# Patient Record
Sex: FEMALE | Race: WHITE | NOT HISPANIC OR LATINO | Employment: FULL TIME | URBAN - METROPOLITAN AREA
[De-identification: names, ages, dates, MRNs, and addresses within clinical notes are randomized per-mention and may not be internally consistent; named-entity substitution may affect disease eponyms.]

---

## 2017-01-08 ENCOUNTER — APPOINTMENT (OUTPATIENT)
Dept: RADIOLOGY | Facility: MEDICAL CENTER | Age: 67
DRG: 871 | End: 2017-01-08
Attending: EMERGENCY MEDICINE
Payer: MEDICAID

## 2017-01-08 ENCOUNTER — HOSPITAL ENCOUNTER (INPATIENT)
Facility: MEDICAL CENTER | Age: 67
LOS: 3 days | DRG: 871 | End: 2017-01-11
Attending: EMERGENCY MEDICINE | Admitting: INTERNAL MEDICINE
Payer: MEDICAID

## 2017-01-08 ENCOUNTER — RESOLUTE PROFESSIONAL BILLING HOSPITAL PROF FEE (OUTPATIENT)
Dept: HOSPITALIST | Facility: MEDICAL CENTER | Age: 67
End: 2017-01-08
Payer: MEDICAID

## 2017-01-08 PROBLEM — A41.9 SEPSIS (HCC): Status: ACTIVE | Noted: 2017-01-08

## 2017-01-08 PROBLEM — N12 PYELONEPHRITIS: Status: ACTIVE | Noted: 2017-01-08

## 2017-01-08 LAB
ALBUMIN SERPL BCP-MCNC: 3.8 G/DL (ref 3.2–4.9)
ALBUMIN/GLOB SERPL: 1.1 G/DL
ALP SERPL-CCNC: 84 U/L (ref 30–99)
ALT SERPL-CCNC: 8 U/L (ref 2–50)
ANION GAP SERPL CALC-SCNC: 10 MMOL/L (ref 0–11.9)
APPEARANCE UR: ABNORMAL
AST SERPL-CCNC: 9 U/L (ref 12–45)
BACTERIA #/AREA URNS HPF: ABNORMAL /HPF
BASOPHILS # BLD AUTO: 0.4 % (ref 0–1.8)
BASOPHILS # BLD: 0.04 K/UL (ref 0–0.12)
BILIRUB SERPL-MCNC: 1.1 MG/DL (ref 0.1–1.5)
BILIRUB UR QL STRIP.AUTO: NEGATIVE
BNP SERPL-MCNC: 126 PG/ML (ref 0–100)
BUN SERPL-MCNC: 16 MG/DL (ref 8–22)
CALCIUM SERPL-MCNC: 10.6 MG/DL (ref 8.5–10.5)
CHLORIDE SERPL-SCNC: 104 MMOL/L (ref 96–112)
CO2 SERPL-SCNC: 21 MMOL/L (ref 20–33)
COLOR UR: YELLOW
CREAT SERPL-MCNC: 1.13 MG/DL (ref 0.5–1.4)
EOSINOPHIL # BLD AUTO: 0 K/UL (ref 0–0.51)
EOSINOPHIL NFR BLD: 0 % (ref 0–6.9)
EPI CELLS #/AREA URNS HPF: ABNORMAL /HPF
ERYTHROCYTE [DISTWIDTH] IN BLOOD BY AUTOMATED COUNT: 42.6 FL (ref 35.9–50)
FLUAV H1 2009 PAND RNA SPEC QL NAA+PROBE: NOT DETECTED
FLUAV RNA SPEC QL NAA+PROBE: NEGATIVE
FLUAV+FLUBV AG SPEC QL IA: NORMAL
FLUBV RNA SPEC QL NAA+PROBE: NEGATIVE
GFR SERPL CREATININE-BSD FRML MDRD: 48 ML/MIN/1.73 M 2
GLOBULIN SER CALC-MCNC: 3.6 G/DL (ref 1.9–3.5)
GLUCOSE SERPL-MCNC: 138 MG/DL (ref 65–99)
GLUCOSE UR STRIP.AUTO-MCNC: NEGATIVE MG/DL
HCT VFR BLD AUTO: 43 % (ref 37–47)
HGB BLD-MCNC: 14.3 G/DL (ref 12–16)
IMM GRANULOCYTES # BLD AUTO: 0.03 K/UL (ref 0–0.11)
IMM GRANULOCYTES NFR BLD AUTO: 0.3 % (ref 0–0.9)
KETONES UR STRIP.AUTO-MCNC: NEGATIVE MG/DL
LACTATE BLD-SCNC: 1.7 MMOL/L (ref 0.5–2)
LACTATE BLD-SCNC: 2.2 MMOL/L (ref 0.5–2)
LEUKOCYTE ESTERASE UR QL STRIP.AUTO: ABNORMAL
LYMPHOCYTES # BLD AUTO: 0.9 K/UL (ref 1–4.8)
LYMPHOCYTES NFR BLD: 9.6 % (ref 22–41)
MCH RBC QN AUTO: 31.4 PG (ref 27–33)
MCHC RBC AUTO-ENTMCNC: 33.3 G/DL (ref 33.6–35)
MCV RBC AUTO: 94.3 FL (ref 81.4–97.8)
MICRO URNS: ABNORMAL
MONOCYTES # BLD AUTO: 0.82 K/UL (ref 0–0.85)
MONOCYTES NFR BLD AUTO: 8.8 % (ref 0–13.4)
NEUTROPHILS # BLD AUTO: 7.54 K/UL (ref 2–7.15)
NEUTROPHILS NFR BLD: 80.9 % (ref 44–72)
NITRITE UR QL STRIP.AUTO: NEGATIVE
NRBC # BLD AUTO: 0 K/UL
NRBC BLD AUTO-RTO: 0 /100 WBC
PH UR STRIP.AUTO: 6 [PH]
PLATELET # BLD AUTO: 166 K/UL (ref 164–446)
PMV BLD AUTO: 11.9 FL (ref 9–12.9)
POTASSIUM SERPL-SCNC: 3.6 MMOL/L (ref 3.6–5.5)
PROT SERPL-MCNC: 7.4 G/DL (ref 6–8.2)
PROT UR QL STRIP: 50 MG/DL
RBC # BLD AUTO: 4.56 M/UL (ref 4.2–5.4)
RBC # URNS HPF: ABNORMAL /HPF
RBC UR QL AUTO: ABNORMAL
SIGNIFICANT IND 70042: NORMAL
SITE SITE: NORMAL
SODIUM SERPL-SCNC: 135 MMOL/L (ref 135–145)
SOURCE SOURCE: NORMAL
SP GR UR STRIP.AUTO: 1.02
TROPONIN I SERPL-MCNC: <0.01 NG/ML (ref 0–0.04)
WBC # BLD AUTO: 9.3 K/UL (ref 4.8–10.8)
WBC #/AREA URNS HPF: >150 /HPF

## 2017-01-08 PROCEDURE — 700111 HCHG RX REV CODE 636 W/ 250 OVERRIDE (IP): Performed by: INTERNAL MEDICINE

## 2017-01-08 PROCEDURE — 81001 URINALYSIS AUTO W/SCOPE: CPT

## 2017-01-08 PROCEDURE — 700117 HCHG RX CONTRAST REV CODE 255: Performed by: EMERGENCY MEDICINE

## 2017-01-08 PROCEDURE — A9270 NON-COVERED ITEM OR SERVICE: HCPCS | Performed by: NURSE PRACTITIONER

## 2017-01-08 PROCEDURE — 87503 INFLUENZA DNA AMP PROB ADDL: CPT

## 2017-01-08 PROCEDURE — 87040 BLOOD CULTURE FOR BACTERIA: CPT | Mod: 91

## 2017-01-08 PROCEDURE — 74177 CT ABD & PELVIS W/CONTRAST: CPT

## 2017-01-08 PROCEDURE — 83605 ASSAY OF LACTIC ACID: CPT | Mod: 91

## 2017-01-08 PROCEDURE — 700102 HCHG RX REV CODE 250 W/ 637 OVERRIDE(OP): Performed by: NURSE PRACTITIONER

## 2017-01-08 PROCEDURE — 71010 DX-CHEST-LIMITED (1 VIEW): CPT

## 2017-01-08 PROCEDURE — 700105 HCHG RX REV CODE 258: Performed by: INTERNAL MEDICINE

## 2017-01-08 PROCEDURE — 700105 HCHG RX REV CODE 258: Performed by: EMERGENCY MEDICINE

## 2017-01-08 PROCEDURE — 770020 HCHG ROOM/CARE - TELE (206)

## 2017-01-08 PROCEDURE — 96374 THER/PROPH/DIAG INJ IV PUSH: CPT

## 2017-01-08 PROCEDURE — 99223 1ST HOSP IP/OBS HIGH 75: CPT | Performed by: INTERNAL MEDICINE

## 2017-01-08 PROCEDURE — 87400 INFLUENZA A/B EACH AG IA: CPT

## 2017-01-08 PROCEDURE — 94760 N-INVAS EAR/PLS OXIMETRY 1: CPT

## 2017-01-08 PROCEDURE — 36415 COLL VENOUS BLD VENIPUNCTURE: CPT

## 2017-01-08 PROCEDURE — 87186 SC STD MICRODIL/AGAR DIL: CPT | Mod: 91

## 2017-01-08 PROCEDURE — 85025 COMPLETE CBC W/AUTO DIFF WBC: CPT

## 2017-01-08 PROCEDURE — 700111 HCHG RX REV CODE 636 W/ 250 OVERRIDE (IP): Performed by: EMERGENCY MEDICINE

## 2017-01-08 PROCEDURE — 87502 INFLUENZA DNA AMP PROBE: CPT

## 2017-01-08 PROCEDURE — 83880 ASSAY OF NATRIURETIC PEPTIDE: CPT

## 2017-01-08 PROCEDURE — 99285 EMERGENCY DEPT VISIT HI MDM: CPT

## 2017-01-08 PROCEDURE — 700102 HCHG RX REV CODE 250 W/ 637 OVERRIDE(OP): Performed by: INTERNAL MEDICINE

## 2017-01-08 PROCEDURE — 700105 HCHG RX REV CODE 258

## 2017-01-08 PROCEDURE — 96361 HYDRATE IV INFUSION ADD-ON: CPT

## 2017-01-08 PROCEDURE — 87077 CULTURE AEROBIC IDENTIFY: CPT | Mod: 91

## 2017-01-08 PROCEDURE — A9270 NON-COVERED ITEM OR SERVICE: HCPCS | Performed by: INTERNAL MEDICINE

## 2017-01-08 PROCEDURE — 87086 URINE CULTURE/COLONY COUNT: CPT

## 2017-01-08 PROCEDURE — 84484 ASSAY OF TROPONIN QUANT: CPT

## 2017-01-08 PROCEDURE — 93005 ELECTROCARDIOGRAM TRACING: CPT | Performed by: NURSE PRACTITIONER

## 2017-01-08 PROCEDURE — 93010 ELECTROCARDIOGRAM REPORT: CPT | Performed by: INTERNAL MEDICINE

## 2017-01-08 PROCEDURE — 80053 COMPREHEN METABOLIC PANEL: CPT

## 2017-01-08 RX ORDER — LACTULOSE 20 G/30ML
30 SOLUTION ORAL
Status: DISCONTINUED | OUTPATIENT
Start: 2017-01-08 | End: 2017-01-11 | Stop reason: HOSPADM

## 2017-01-08 RX ORDER — ENEMA 19; 7 G/133ML; G/133ML
1 ENEMA RECTAL
Status: DISCONTINUED | OUTPATIENT
Start: 2017-01-08 | End: 2017-01-11 | Stop reason: HOSPADM

## 2017-01-08 RX ORDER — ONDANSETRON 2 MG/ML
4 INJECTION INTRAMUSCULAR; INTRAVENOUS ONCE
Status: COMPLETED | OUTPATIENT
Start: 2017-01-08 | End: 2017-01-08

## 2017-01-08 RX ORDER — SODIUM CHLORIDE 9 MG/ML
30 INJECTION, SOLUTION INTRAVENOUS
Status: DISCONTINUED | OUTPATIENT
Start: 2017-01-08 | End: 2017-01-11 | Stop reason: HOSPADM

## 2017-01-08 RX ORDER — METHOCARBAMOL 750 MG/1
1500 TABLET, FILM COATED ORAL 2 TIMES DAILY
Status: DISCONTINUED | OUTPATIENT
Start: 2017-01-08 | End: 2017-01-11 | Stop reason: HOSPADM

## 2017-01-08 RX ORDER — SODIUM CHLORIDE 9 MG/ML
1000 INJECTION, SOLUTION INTRAVENOUS ONCE
Status: COMPLETED | OUTPATIENT
Start: 2017-01-08 | End: 2017-01-08

## 2017-01-08 RX ORDER — PROMETHAZINE HYDROCHLORIDE 25 MG/1
12.5-25 SUPPOSITORY RECTAL EVERY 4 HOURS PRN
Status: DISCONTINUED | OUTPATIENT
Start: 2017-01-08 | End: 2017-01-11 | Stop reason: HOSPADM

## 2017-01-08 RX ORDER — PROMETHAZINE HYDROCHLORIDE 25 MG/1
12.5-25 TABLET ORAL EVERY 4 HOURS PRN
Status: DISCONTINUED | OUTPATIENT
Start: 2017-01-08 | End: 2017-01-11 | Stop reason: HOSPADM

## 2017-01-08 RX ORDER — UBIDECARENONE 75 MG
100 CAPSULE ORAL DAILY
Status: DISCONTINUED | OUTPATIENT
Start: 2017-01-08 | End: 2017-01-11 | Stop reason: HOSPADM

## 2017-01-08 RX ORDER — PROPRANOLOL HYDROCHLORIDE 40 MG/1
80 TABLET ORAL 2 TIMES DAILY
Status: DISCONTINUED | OUTPATIENT
Start: 2017-01-08 | End: 2017-01-11 | Stop reason: HOSPADM

## 2017-01-08 RX ORDER — TRAMADOL HYDROCHLORIDE 50 MG/1
50 TABLET ORAL EVERY 6 HOURS PRN
Status: DISCONTINUED | OUTPATIENT
Start: 2017-01-08 | End: 2017-01-11 | Stop reason: HOSPADM

## 2017-01-08 RX ORDER — METHOCARBAMOL 750 MG/1
1500 TABLET, FILM COATED ORAL 2 TIMES DAILY
COMMUNITY
End: 2017-07-25 | Stop reason: SDUPTHER

## 2017-01-08 RX ORDER — SODIUM CHLORIDE 9 MG/ML
500 INJECTION, SOLUTION INTRAVENOUS
Status: DISCONTINUED | OUTPATIENT
Start: 2017-01-08 | End: 2017-01-11 | Stop reason: HOSPADM

## 2017-01-08 RX ORDER — GABAPENTIN 300 MG/1
300 CAPSULE ORAL 3 TIMES DAILY
Status: DISCONTINUED | OUTPATIENT
Start: 2017-01-08 | End: 2017-01-11 | Stop reason: HOSPADM

## 2017-01-08 RX ORDER — CEFTRIAXONE 2 G/1
2 INJECTION, POWDER, FOR SOLUTION INTRAMUSCULAR; INTRAVENOUS ONCE
Status: DISCONTINUED | OUTPATIENT
Start: 2017-01-08 | End: 2017-01-08

## 2017-01-08 RX ORDER — SODIUM CHLORIDE 9 MG/ML
INJECTION, SOLUTION INTRAVENOUS
Status: COMPLETED
Start: 2017-01-08 | End: 2017-01-08

## 2017-01-08 RX ORDER — ALBUTEROL SULFATE 90 UG/1
2 AEROSOL, METERED RESPIRATORY (INHALATION) EVERY 6 HOURS PRN
Status: DISCONTINUED | OUTPATIENT
Start: 2017-01-08 | End: 2017-01-11 | Stop reason: HOSPADM

## 2017-01-08 RX ORDER — TRAMADOL HYDROCHLORIDE 50 MG/1
100 TABLET ORAL EVERY 6 HOURS PRN
Status: DISCONTINUED | OUTPATIENT
Start: 2017-01-08 | End: 2017-01-11 | Stop reason: HOSPADM

## 2017-01-08 RX ORDER — ONDANSETRON 4 MG/1
4 TABLET, ORALLY DISINTEGRATING ORAL EVERY 4 HOURS PRN
Status: DISCONTINUED | OUTPATIENT
Start: 2017-01-08 | End: 2017-01-11 | Stop reason: HOSPADM

## 2017-01-08 RX ORDER — AMOXICILLIN 250 MG
1 CAPSULE ORAL
Status: DISCONTINUED | OUTPATIENT
Start: 2017-01-08 | End: 2017-01-11 | Stop reason: HOSPADM

## 2017-01-08 RX ORDER — DOCUSATE SODIUM 100 MG/1
100 CAPSULE, LIQUID FILLED ORAL EVERY MORNING
Status: DISCONTINUED | OUTPATIENT
Start: 2017-01-09 | End: 2017-01-11 | Stop reason: HOSPADM

## 2017-01-08 RX ORDER — PROPRANOLOL HYDROCHLORIDE 40 MG/1
80 TABLET ORAL 2 TIMES DAILY
COMMUNITY
End: 2017-06-06

## 2017-01-08 RX ORDER — AMOXICILLIN 250 MG
1 CAPSULE ORAL NIGHTLY
Status: DISCONTINUED | OUTPATIENT
Start: 2017-01-08 | End: 2017-01-11 | Stop reason: HOSPADM

## 2017-01-08 RX ORDER — GABAPENTIN 300 MG/1
300 CAPSULE ORAL 3 TIMES DAILY
COMMUNITY
End: 2017-03-01 | Stop reason: SDUPTHER

## 2017-01-08 RX ORDER — VENLAFAXINE HYDROCHLORIDE 37.5 MG/1
37.5 CAPSULE, EXTENDED RELEASE ORAL 2 TIMES DAILY
Status: DISCONTINUED | OUTPATIENT
Start: 2017-01-08 | End: 2017-01-11 | Stop reason: HOSPADM

## 2017-01-08 RX ORDER — BISACODYL 10 MG
10 SUPPOSITORY, RECTAL RECTAL
Status: DISCONTINUED | OUTPATIENT
Start: 2017-01-08 | End: 2017-01-11 | Stop reason: HOSPADM

## 2017-01-08 RX ORDER — ACETAMINOPHEN 325 MG/1
650 TABLET ORAL EVERY 6 HOURS PRN
Status: DISCONTINUED | OUTPATIENT
Start: 2017-01-08 | End: 2017-01-11 | Stop reason: HOSPADM

## 2017-01-08 RX ORDER — ONDANSETRON 2 MG/ML
4 INJECTION INTRAMUSCULAR; INTRAVENOUS EVERY 4 HOURS PRN
Status: DISCONTINUED | OUTPATIENT
Start: 2017-01-08 | End: 2017-01-11 | Stop reason: HOSPADM

## 2017-01-08 RX ADMIN — IOHEXOL 100 ML: 350 INJECTION, SOLUTION INTRAVENOUS at 17:35

## 2017-01-08 RX ADMIN — ONDANSETRON 4 MG: 2 INJECTION, SOLUTION INTRAMUSCULAR; INTRAVENOUS at 16:15

## 2017-01-08 RX ADMIN — STANDARDIZED SENNA CONCENTRATE AND DOCUSATE SODIUM 1 TABLET: 8.6; 5 TABLET, FILM COATED ORAL at 20:03

## 2017-01-08 RX ADMIN — METHOCARBAMOL 1500 MG: 750 TABLET ORAL at 22:34

## 2017-01-08 RX ADMIN — PROPRANOLOL HYDROCHLORIDE 80 MG: 10 TABLET ORAL at 20:03

## 2017-01-08 RX ADMIN — SODIUM CHLORIDE 1000 ML: 9 INJECTION, SOLUTION INTRAVENOUS at 16:15

## 2017-01-08 RX ADMIN — TRAMADOL HYDROCHLORIDE 50 MG: 50 TABLET, COATED ORAL at 22:36

## 2017-01-08 RX ADMIN — CEFTRIAXONE 2 G: 2 INJECTION, POWDER, FOR SOLUTION INTRAMUSCULAR; INTRAVENOUS at 22:28

## 2017-01-08 RX ADMIN — SODIUM CHLORIDE 1000 ML: 9 INJECTION, SOLUTION INTRAVENOUS at 20:05

## 2017-01-08 RX ADMIN — VENLAFAXINE HYDROCHLORIDE 37.5 MG: 37.5 CAPSULE, EXTENDED RELEASE ORAL at 22:33

## 2017-01-08 RX ADMIN — VITAMIN B12 0.1 MG ORAL TABLET 100 MCG: 0.1 TABLET ORAL at 22:34

## 2017-01-08 RX ADMIN — ACETAMINOPHEN 650 MG: 325 TABLET, FILM COATED ORAL at 20:02

## 2017-01-08 RX ADMIN — GABAPENTIN 300 MG: 300 CAPSULE ORAL at 20:01

## 2017-01-08 ASSESSMENT — PAIN SCALES - GENERAL
PAINLEVEL_OUTOF10: 6
PAINLEVEL_OUTOF10: 9
PAINLEVEL_OUTOF10: 7

## 2017-01-08 ASSESSMENT — COPD QUESTIONNAIRES
DURING THE PAST 4 WEEKS HOW MUCH DID YOU FEEL SHORT OF BREATH: NONE/LITTLE OF THE TIME
DO YOU EVER COUGH UP ANY MUCUS OR PHLEGM?: YES, A FEW DAYS A WEEK OR MONTH
HAVE YOU SMOKED AT LEAST 100 CIGARETTES IN YOUR ENTIRE LIFE: YES
COPD SCREENING SCORE: 5

## 2017-01-08 ASSESSMENT — LIFESTYLE VARIABLES: EVER_SMOKED: YES

## 2017-01-08 NOTE — IP AVS SNAPSHOT
1/11/2017          Miryam Veloz  [id number] [facility] [place] [address]    Dear Miryam:    [facility] wants to ensure your discharge home is safe and you or your loved ones have had all your questions answered regarding your care after you leave the hospital.    You may receive a telephone call within two days of your discharge.  This call is to make certain you understand your discharge instructions as well as ensure we provided you with the best care possible during your stay with us.     The call will only last approximately 3-5 minutes and will be done by a nurse.    Once again, we want to ensure your discharge home is safe and that you have a clear understanding of any next steps in your care.  If you have any questions or concerns, please do not hesitate to contact us, we are here for you.  Thank you for choosing [facility] for your healthcare needs.    Sincerely,    Khalif Godoy    [facility]

## 2017-01-08 NOTE — IP AVS SNAPSHOT
" After Visit Summary                                                                                                                  Name:Miryam Veloz  Medical Record Number:[id number]  CSN: [id number]    YOB: 1950   Age: 66 y.o.  Sex: female  HT:1.778 m (5' 10\") WT: 112.8 kg (248 lb 10.9 oz)          Admit Date: 1/8/2017     Discharge Date:   Today's Date: 1/11/2017  Attending Doctor:  Xavier Jules M.D.                  Allergies:  Codeine; Imitrex; Penicillins; Sulfa drugs; Azithromycin; Dilaudid; Sensipar; Imipramine; Other drug; and Seroquel            Discharge Instructions       Discharge Instructions    Discharged to home by car with relative. Discharged via walking, hospital escort: Refused.  Special equipment needed: Not Applicable    Be sure to schedule a follow-up appointment with your primary care doctor or any specialists as instructed.     Discharge Plan:   Diet Plan: Discussed  Activity Level: Discussed  Confirmed Follow up Appointment: Patient to Call and Schedule Appointment  Confirmed Symptoms Management: Discussed  Medication Reconciliation Updated: Yes  Influenza Vaccine Indication: Not indicated: Previously immunized this influenza season and > 8 years of age    I understand that a diet low in cholesterol, fat, and sodium is recommended for good health. Unless I have been given specific instructions below for another diet, I accept this instruction as my diet prescription.     Special Instructions: Sepsis, Adult  Sepsis is a serious infection of your blood or tissues that affects your whole body. The infection that causes sepsis may be bacterial, viral, fungal, or parasitic. Sepsis may be life threatening. Sepsis can cause your blood pressure to drop. This may result in shock. Shock causes your central nervous system and your organs to stop working correctly.   RISK FACTORS  Sepsis can happen in anyone, but it is more likely to happen in people who have weakened immune " systems.  SIGNS AND SYMPTOMS   Symptoms of sepsis can include:  · Fever or low body temperature (hypothermia).  · Rapid breathing (hyperventilation).  · Chills.  · Rapid heartbeat (tachycardia).  · Confusion or light-headedness.  · Trouble breathing.  · Urinating much less than usual.  · Cool, clammy skin or red, flushed skin.  · Other problems with the heart, kidneys, or brain.  DIAGNOSIS   Your health care provider will likely do tests to look for an infection, to see if the infection has spread to your blood, and to see how serious your condition is. Tests can include:  · Blood tests, including cultures of your blood.  · Cultures of other fluids from your body, such as:  ¨ Urine.  ¨ Pus from wounds.  ¨ Mucus coughed up from your lungs.  · Urine tests other than cultures.  · X-ray exams or other imaging tests.  TREATMENT   Treatment will begin with elimination of the source of infection. If your sepsis is likely caused by a bacterial or fungal infection, you will be given antibiotic or antifungal medicines.  You may also receive:  · Oxygen.  · Fluids through an IV tube.  · Medicines to increase your blood pressure.  · A machine to clean your blood (dialysis) if your kidneys fail.  · A machine to help you breathe if your lungs fail.  SEEK IMMEDIATE MEDICAL CARE IF:  You get an infection or develop any of the signs and symptoms of sepsis after surgery or a hospitalization.     This information is not intended to replace advice given to you by your health care provider. Make sure you discuss any questions you have with your health care provider.     Document Released: 09/15/2004 Document Revised: 05/03/2016 Document Reviewed: 08/25/2014  [employer] Interactive Patient Education ©2016 [employer]      · Is patient discharged on Warfarin / Coumadin?   No     · Is patient Post Blood Transfusion?  No    Depression / Suicide Risk    As you are discharged from this [facility] facility, it is important to learn how to  keep safe from harming yourself.    Recognize the warning signs:  · Abrupt changes in personality, positive or negative- including increase in energy   · Giving away possessions  · Change in eating patterns- significant weight changes-  positive or negative  · Change in sleeping patterns- unable to sleep or sleeping all the time   · Unwillingness or inability to communicate  · Depression  · Unusual sadness, discouragement and loneliness  · Talk of wanting to die  · Neglect of personal appearance   · Rebelliousness- reckless behavior  · Withdrawal from people/activities they love  · Confusion- inability to concentrate     If you or a loved one observes any of these behaviors or has concerns about self-harm, here's what you can do:  · Talk about it- your feelings and reasons for harming yourself  · Remove any means that you might use to hurt yourself (examples: pills, rope, extension cords, firearm)  · Get professional help from the community (Mental Health, Substance Abuse, psychological counseling)  · Do not be alone:Call your Safe Contact- someone whom you trust who will be there for you.  · Call your local CRISIS HOTLINE 065-7945 or 343-338-4681  · Call your local Children's Mobile Crisis Response Team [place] (956) 110-7789 or www.[employer]  · Call the toll free National Suicide Prevention Hotlines   · National Suicide Prevention Lifeline 074-178-EPNU (3359)  · National Hope Line Network 800-SUICIDE (469-0387)    Pyelonephritis, Adult  Pyelonephritis is a kidney infection. In general, there are 2 main types of pyelonephritis:  · Infections that come on quickly without any warning (acute pyelonephritis).  · Infections that persist for a long period of time (chronic pyelonephritis).  CAUSES   Two main causes of pyelonephritis are:  · Bacteria traveling from the bladder to the kidney. This is a problem especially in pregnant women. The urine in the bladder can become filled with bacteria from multiple  causes, including:  ¨ Inflammation of the prostate gland (prostatitis).  ¨ Sexual intercourse in females.  ¨ Bladder infection (cystitis).  · Bacteria traveling from the bloodstream to the tissue part of the kidney.  Problems that may increase your risk of getting a kidney infection include:  · Diabetes.  · Kidney stones or bladder stones.  · Cancer.  · Catheters placed in the bladder.  · Other abnormalities of the kidney or ureter.  SYMPTOMS   · Abdominal pain.  · Pain in the side or flank area.  · Fever.  · Chills.  · Upset stomach.  · Blood in the urine (dark urine).  · Frequent urination.  · Strong or persistent urge to urinate.  · Burning or stinging when urinating.  DIAGNOSIS   Your caregiver may diagnose your kidney infection based on your symptoms. A urine sample may also be taken.  TREATMENT   In general, treatment depends on how severe the infection is.   · If the infection is mild and caught early, your caregiver may treat you with oral antibiotics and send you home.  · If the infection is more severe, the bacteria may have gotten into the bloodstream. This will require intravenous (IV) antibiotics and a hospital stay. Symptoms may include:  ¨ High fever.  ¨ Severe flank pain.  ¨ Shaking chills.  · Even after a hospital stay, your caregiver may require you to be on oral antibiotics for a period of time.  · Other treatments may be required depending upon the cause of the infection.  HOME CARE INSTRUCTIONS   · Take your antibiotics as directed. Finish them even if you start to feel better.  · Make an appointment to have your urine checked to make sure the infection is gone.  · Drink enough fluids to keep your urine clear or pale yellow.  · Take medicines for the bladder if you have urgency and frequency of urination as directed by your caregiver.  SEEK IMMEDIATE MEDICAL CARE IF:   · You have a fever or persistent symptoms for more than 2-3 days.  · You have a fever and your symptoms suddenly get  worse.  · You are unable to take your antibiotics or fluids.  · You develop shaking chills.  · You experience extreme weakness or fainting.  · There is no improvement after 2 days of treatment.  MAKE SURE YOU:  · Understand these instructions.  · Will watch your condition.  · Will get help right away if you are not doing well or get worse.     This information is not intended to replace advice given to you by your health care provider. Make sure you discuss any questions you have with your health care provider.     Document Released: 12/18/2006 Document Revised: 06/18/2013 Document Reviewed: 05/23/2012  [employer] Interactive Patient Education ©2016 [employer]    Tramadol tablets  What is this medicine?  TRAMADOL (TRA ma dole) is a pain reliever. It is used to treat moderate to severe pain in adults.  This medicine may be used for other purposes; ask your health care provider or pharmacist if you have questions.  COMMON BRAND NAME(S): Ultram  What should I tell my health care provider before I take this medicine?  They need to know if you have any of these conditions:  -brain tumor  -depression  -drug abuse or addiction  -head injury  -if you frequently drink alcohol containing drinks  -kidney disease or trouble passing urine  -liver disease  -lung disease, asthma, or breathing problems  -seizures or epilepsy  -suicidal thoughts, plans, or attempt; a previous suicide attempt by you or a family member  -an unusual or allergic reaction to tramadol, codeine, other medicines, foods, dyes, or preservatives  -pregnant or trying to get pregnant  -breast-feeding  How should I use this medicine?  Take this medicine by mouth with a full glass of water. Follow the directions on the prescription label. If the medicine upsets your stomach, take it with food or milk. Do not take more medicine than you are told to take.  Talk to your pediatrician regarding the use of this medicine in children. Special care may be  needed.  Overdosage: If you think you have taken too much of this medicine contact a poison control center or emergency room at once.  NOTE: This medicine is only for you. Do not share this medicine with others.  What if I miss a dose?  If you miss a dose, take it as soon as you can. If it is almost time for your next dose, take only that dose. Do not take double or extra doses.  What may interact with this medicine?  Do not take this medicine with any of the following medications:  -MAOIs like Carbex, Eldepryl, Marplan, Nardil, and Parnate  This medicine may also interact with the following medications:  -alcohol or medicines that contain alcohol  -antihistamines  -benzodiazepines  -bupropion  -carbamazepine or oxcarbazepine  -clozapine  -cyclobenzaprine  -digoxin  -furazolidone  -linezolid  -medicines for depression, anxiety, or psychotic disturbances  -medicines for migraine headache like almotriptan, eletriptan, frovatriptan, naratriptan, rizatriptan, sumatriptan, zolmitriptan  -medicines for pain like pentazocine, buprenorphine, butorphanol, meperidine, nalbuphine, and propoxyphene  -medicines for sleep  -muscle relaxants  -naltrexone  -phenobarbital  -phenothiazines like perphenazine, thioridazine, chlorpromazine, mesoridazine, fluphenazine, prochlorperazine, promazine, and trifluoperazine  -procarbazine  -warfarin  This list may not describe all possible interactions. Give your health care provider a list of all the medicines, herbs, non-prescription drugs, or dietary supplements you use. Also tell them if you smoke, drink alcohol, or use illegal drugs. Some items may interact with your medicine.  What should I watch for while using this medicine?  Tell your doctor or health care professional if your pain does not go away, if it gets worse, or if you have new or a different type of pain. You may develop tolerance to the medicine. Tolerance means that you will need a higher dose of the medicine for pain  relief. Tolerance is normal and is expected if you take this medicine for a long time.  Do not suddenly stop taking your medicine because you may develop a severe reaction. Your body becomes used to the medicine. This does NOT mean you are addicted. Addiction is a behavior related to getting and using a drug for a non-medical reason. If you have pain, you have a medical reason to take pain medicine. Your doctor will tell you how much medicine to take. If your doctor wants you to stop the medicine, the dose will be slowly lowered over time to avoid any side effects.  You may get drowsy or dizzy. Do not drive, use machinery, or do anything that needs mental alertness until you know how this medicine affects you. Do not stand or sit up quickly, especially if you are an older patient. This reduces the risk of dizzy or fainting spells. Alcohol can increase or decrease the effects of this medicine. Avoid alcoholic drinks.  You may have constipation. Try to have a bowel movement at least every 2 to 3 days. If you do not have a bowel movement for 3 days, call your doctor or health care professional.  Your mouth may get dry. Chewing sugarless gum or sucking hard candy, and drinking plenty of water may help. Contact your doctor if the problem does not go away or is severe.  What side effects may I notice from receiving this medicine?  Side effects that you should report to your doctor or health care professional as soon as possible:  -allergic reactions like skin rash, itching or hives, swelling of the face, lips, or tongue  -breathing difficulties, wheezing  -confusion  -itching  -light headedness or fainting spells  -redness, blistering, peeling or loosening of the skin, including inside the mouth  -seizures  Side effects that usually do not require medical attention (report to your doctor or health care professional if they continue or are bothersome):  -constipation  -dizziness  -drowsiness  -headache  -nausea,  vomiting  This list may not describe all possible side effects. Call your doctor for medical advice about side effects. You may report side effects to FDA at 5-716-FDA-5589.  Where should I keep my medicine?  Keep out of the reach of children.  Store at room temperature between 15 and 30 degrees C (59 and 86 degrees F). Keep container tightly closed. Throw away any unused medicine after the expiration date.  NOTE: This sheet is a summary. It may not cover all possible information. If you have questions about this medicine, talk to your doctor, pharmacist, or health care provider.  © 2014, [employer]. (8/31/2011 11:55:44 AM)    Sulfamethoxazole; Trimethoprim, SMX-TMP tablets  What is this medicine?  SULFAMETHOXAZOLE; TRIMETHOPRIM or SMX-TMP (suhl fuh meth OK dakota zohl; trye METH oh prim) is a combination of a sulfonamide antibiotic and a second antibiotic, trimethoprim. It is used to treat or prevent certain kinds of bacterial infections. It will not work for colds, flu, or other viral infections.  This medicine may be used for other purposes; ask your health care provider or pharmacist if you have questions.  COMMON BRAND NAME(S): Bacter-Aid DS , Bactrim DS, Bactrim, Septra DS, Septra  What should I tell my health care provider before I take this medicine?  They need to know if you have any of these conditions:  -anemia  -asthma  -being treated with anticonvulsants  -if you frequently drink alcohol containing drinks  -kidney disease  -liver disease  -low level of folic acid or glucose-6-phosphate dehydrogenase  -poor nutrition or malabsorption  -porphyria  -severe allergies  -thyroid disorder  -an unusual or allergic reaction to sulfamethoxazole, trimethoprim, sulfa drugs, other medicines, foods, dyes, or preservatives  -pregnant or trying to get pregnant  -breast-feeding  How should I use this medicine?  Take this medicine by mouth with a full glass of water. Follow the directions on the prescription  label. Take your medicine at regular intervals. Do not take it more often than directed. Do not skip doses or stop your medicine early.  Talk to your pediatrician regarding the use of this medicine in children. Special care may be needed. This medicine has been used in children as young as 2 months of age.  Overdosage: If you think you have taken too much of this medicine contact a poison control center or emergency room at once.  NOTE: This medicine is only for you. Do not share this medicine with others.  What if I miss a dose?  If you miss a dose, take it as soon as you can. If it is almost time for your next dose, take only that dose. Do not take double or extra doses.  What may interact with this medicine?  Do not take this medicine with any of the following medications:  -aminobenzoate potassium  -dofetilide  -metronidazole  This medicine may also interact with the following medications:  -ACE inhibitors like benazepril, enalapril, lisinopril, and ramipril  -cyclosporine  -digoxin  -diuretics  -indomethacin  -medicines for diabetes  -methenamine  -methotrexate  -phenytoin  -potassium supplements  -pyrimethamine  -sulfinpyrazone  -tricyclic antidepressants  -warfarin  This list may not describe all possible interactions. Give your health care provider a list of all the medicines, herbs, non-prescription drugs, or dietary supplements you use. Also tell them if you smoke, drink alcohol, or use illegal drugs. Some items may interact with your medicine.  What should I watch for while using this medicine?  Tell your doctor or health care professional if your symptoms do not improve. Drink several glasses of water a day to reduce the risk of kidney problems.  Do not treat diarrhea with over the counter products. Contact your doctor if you have diarrhea that lasts more than 2 days or if it is severe and watery.  This medicine can make you more sensitive to the sun. Keep out of the sun. If you cannot avoid being in the  sun, wear protective clothing and use a sunscreen. Do not use sun lamps or tanning beds/booths.  What side effects may I notice from receiving this medicine?  Side effects that you should report to your doctor or health care professional as soon as possible:  -allergic reactions like skin rash or hives, swelling of the face, lips, or tongue  -breathing problems  -fever or chills, sore throat  -irregular heartbeat, chest pain  -joint or muscle pain  -pain or difficulty passing urine  -red pinpoint spots on skin  -redness, blistering, peeling or loosening of the skin, including inside the mouth  -unusual bleeding or bruising  -unusually weak or tired  -yellowing of the eyes or skin  Side effects that usually do not require medical attention (report to your doctor or health care professional if they continue or are bothersome):  -diarrhea  -dizziness  -headache  -loss of appetite  -nausea, vomiting  -nervousness  This list may not describe all possible side effects. Call your doctor for medical advice about side effects. You may report side effects to FDA at 6-736-FDA-3649.  Where should I keep my medicine?  Keep out of the reach of children.  Store at room temperature between 20 to 25 degrees C (68 to 77 degrees F). Protect from light. Throw away any unused medicine after the expiration date.  NOTE: This sheet is a summary. It may not cover all possible information. If you have questions about this medicine, talk to your doctor, pharmacist, or health care provider.  © 2014, [employer]. (8/19/2009 11:32:51 AM)    Rivaroxaban oral tablets  What is this medicine?  RIVAROXABAN (ri va JAVIER a ban) is an anticoagulant (blood thinner). It is used to treat blood clots in the lungs or in the veins. It is also used after knee or hip surgeries to prevent blood clots. It is also used to lower the chance of stroke in people with a medical condition called atrial fibrillation.  This medicine may be used for other  purposes; ask your health care provider or pharmacist if you have questions.  COMMON BRAND NAME(S): Xarelto  What should I tell my health care provider before I take this medicine?  They need to know if you have any of these conditions:  -bleeding disorders  -bleeding in the brain  -blood in your stools (black or tarry stools) or if you have blood in your vomit  -history of stomach bleeding  -kidney disease  -liver disease  -low blood counts, like low white cell, platelet, or red cell counts  -recent or planned spinal or epidural procedure  -take medicines that treat or prevent blood clots  -an unusual or allergic reaction to rivaroxaban, other medicines, foods, dyes, or preservatives  -pregnant or trying to get pregnant  -breast-feeding  How should I use this medicine?  Take this medicine by mouth with a glass of water. Follow the directions on the prescription label. Take your medicine at regular intervals. Do not take it more often than directed. Do not stop taking except on your doctor's advice.  If you are taking this medicine after hip or knee replacement surgery, take it with or without food.  If you are taking this medicine for atrial fibrillation, take it with your evening meal. If you are taking this medicine to treat blood clots, take it with food at the same time each day. If you are unable to swallow your tablet, you may crush the tablet and mix it in applesauce. Then, immediately eat the applesauce. You should eat more food right after you eat the applesauce containing the crushed tablet.  Talk to your pediatrician regarding the use of this medicine in children. Special care may be needed.  Overdosage: If you think you've taken too much of this medicine contact a poison control center or emergency room at once.  Overdosage: If you think you have taken too much of this medicine contact a poison control center or emergency room at once.  NOTE: This medicine is only for you. Do not share this medicine  with others.  What if I miss a dose?  If you take your medicine once a day and miss a dose, take the missed dose as soon as you remember. If you take your medicine twice a day and miss a dose, take the missed dose immediately. In this instance, 2 tablets may be taken at the same time. The next day you should take 1 tablet twice a day as directed.  What may interact with this medicine?  -aspirin and aspirin-like medicines  -certain antibiotics like erythromycin, azithromycin, and clarithromycin  -certain medicines for fungal infections like ketoconazole and itraconazole  -certain medicines for irregular heart beat like amiodarone, quinidine, dronedarone  -certain medicines for seizures like carbamazepine, phenytoin  -certain medicines that treat or prevent blood clots like warfarin, enoxaparin, and dalteparin   -conivaptan  -diltiazem  -felodipine  -indinavir  -lopinavir; ritonavir  -NSAIDS, medicines for pain and inflammation, like ibuprofen or naproxen  -ranolazine  -rifampin  -ritonavir  -[place]'s wort  -verapamil  This list may not describe all possible interactions. Give your health care provider a list of all the medicines, herbs, non-prescription drugs, or dietary supplements you use. Also tell them if you smoke, drink alcohol, or use illegal drugs. Some items may interact with your medicine.  What should I watch for while using this medicine?  Do not stop taking this medicine without first talking to your doctor. Stopping this medicine may increase your risk of having a stroke. Be sure to refill your prescription before you run out of medicine.  This medicine may increase your risk to bruise or bleed. Call your doctor or health care professional if you notice any unusual bleeding.  Be careful brushing and flossing your teeth or using a toothpick because you may bleed more easily. If you have any dental work done, tell your dentist you are receiving this medicine.  What side effects may I notice from  receiving this medicine?  Side effects that you should report to your doctor or health care professional as soon as possible:  -allergic reactions like skin rash, itching or hives, swelling of the face, lips, or tongue  -back pain  -bloody or black, tarry stools  -changes in vision  -confusion, trouble speaking or understanding  -red or dark-brown urine  -redness, blistering, peeling or loosening of the skin, including inside the mouth  -severe headaches  -spitting up blood or brown material that looks like coffee grounds  -sudden numbness or weakness of the face, arm or leg  -trouble walking, dizziness, loss of balance or coordination  -unusual bruising or bleeding from the eye, gums, or nose   Side effects that usually do not require medical attention (Report these to your doctor or health care professional if they continue or are bothersome.):  -dizziness  -muscle pain  This list may not describe all possible side effects. Call your doctor for medical advice about side effects. You may report side effects to FDA at 1-564-FDA-2522.  Where should I keep my medicine?  Keep out of the reach of children.  Store at room temperature between 15 and 30 degrees C (59 and 86 degrees F). Throw away any unused medicine after the expiration date.  NOTE: This sheet is a summary. It may not cover all possible information. If you have questions about this medicine, talk to your doctor, pharmacist, or health care provider.  © 2014, [employer]/Gold Standard. (3/17/2014 3:32:09 PM)           Discharge Medication Instructions:    Below are the medications your physician expects you to take upon discharge:    Review all your home medications and newly ordered medications with your doctor and/or pharmacist. Follow medication instructions as directed by your doctor and/or pharmacist.    Please keep your medication list with you and share with your physician.               Medication List      START taking these medications         Instructions    ciprofloxacin 500 MG Tabs   Commonly known as:  CIPRO   Next Dose Due:  Tomorrow morning, 1/12    Take 1 Tab by mouth 2 times a day for 10 days.   Dose:  500 mg       rivaroxaban 20 MG Tabs tablet   Last time this was given:  20 mg on 1/10/2017  5:36 PM   Commonly known as:  XARELTO   Next Dose Due:  Tomorrow evening, 1/12    Take 1 Tab by mouth with dinner.   Dose:  20 mg       tramadol 50 MG Tabs   Last time this was given:  100 mg on 1/11/2017  5:40 AM   Commonly known as:  ULTRAM   Next Dose Due:  Every 6 hr as needed.    Take 1-2 Tabs by mouth every 6 hours as needed for Mild Pain.   Dose:   mg         CONTINUE taking these medications        Instructions    albuterol 108 (90 BASE) MCG/ACT Aers inhalation aerosol   Commonly known as:  VENTOLIN HFA   Next Dose Due:  Every 6 hr as needed.    Inhale 2 Puffs by mouth every 6 hours as needed for Shortness of Breath.   Dose:  2 Puff       cyanocobalamin 1000 MCG Tabs   Last time this was given:  100 mcg on 1/11/2017  9:09 AM   Commonly known as:  VITAMIN B12   Next Dose Due:  Tomorrow, 1/12    Take 1 Tab by mouth every day.   Dose:  1000 mcg       gabapentin 300 MG Caps   Last time this was given:  300 mg on 1/11/2017  1:54 PM   Commonly known as:  NEURONTIN   Next Dose Due:  This evening, 1/11    Take 300 mg by mouth 3 times a day.   Dose:  300 mg       methocarbamol 750 MG Tabs   Last time this was given:  1,500 mg on 1/11/2017  9:08 AM   Commonly known as:  ROBAXIN   Next Dose Due:  This evening, 1/11    Take 1,500 mg by mouth 2 Times a Day.   Dose:  1500 mg       propranolol 40 MG Tabs   Last time this was given:  80 mg on 1/11/2017  9:08 AM   Commonly known as:  INDERAL   Next Dose Due:  This evening, 1/11    Take 80 mg by mouth 2 times a day.   Dose:  80 mg       venlafaxine XR 37.5 MG Cp24   Last time this was given:  37.5 mg on 1/11/2017  9:08 AM   Commonly known as:  EFFEXOR XR   Next Dose Due:  Tomorrow morning, 1/12    Take 1 Cap by  mouth 2 times a day. TAKE ONE CAPSULE BY MOUTH TWICE A DAY WITH MEALS   Dose:  37.5 mg         STOP taking these medications     furosemide 20 MG Tabs   Commonly known as:  LASIX               Instructions           Diet / Nutrition:    Follow any diet instructions given to you by your doctor or the dietician, including how much salt (sodium) you are allowed each day.    If you are overweight, talk to your doctor about a weight reduction plan.    Activity:    Remain physically active following your doctor's instructions about exercise and activity.    Rest often.     Any time you become even a little tired or short of breath, SIT DOWN and rest.    Worsening Symptoms:    Report any of the following signs and symptoms to the doctor's office immediately:    *Pain of jaw, arm, or neck  *Chest pain not relieved by medication                               *Dizziness or loss of consciousness  *Difficulty breathing even when at rest   *More tired than usual                                       *Bleeding drainage or swelling of surgical site  *Swelling of feet, ankles, legs or stomach                 *Fever (>100ºF)  *Pink or blood tinged sputum  *Weight gain (3lbs/day or 5lbs /week)           *Shock from internal defibrillator (if applicable)  *Palpitations or irregular heartbeats                *Cool and/or numb extremities    Stroke Awareness    Common Risk Factors for Stroke include:    Age  Atrial Fibrillation  Carotid Artery Stenosis  Diabetes Mellitus  Excessive alcohol consumption  High blood pressure  Overweight   Physical inactivity  Smoking    Warning signs and symptoms of a stroke include:    *Sudden numbness or weakness of the face, arm or leg (especially on one side of the body).  *Sudden confusion, trouble speaking or understanding.  *Sudden trouble seeing in one or both eyes.  *Sudden trouble walking, dizziness, loss of balance or coordination.Sudden severe headache with no known cause.    It is very  important to get treatment quickly when a stroke occurs. If you experience any of the above warning signs, call 911 immediately.                   Disclaimer         Quit Smoking / Tobacco Use:    I understand the use of any tobacco products increases my chance of suffering from future heart disease or stroke and could cause other illnesses which may shorten my life. Quitting the use of tobacco products is the single most important thing I can do to improve my health. For further information on smoking / tobacco cessation call a Toll Free Quit Line at 1-110.669.6288 (*[employer] [place]) or 1-659.488.5853 (American [employer] Association) or you can access the web based program at www.lungusa.org.    [place] Tobacco Users Help Line:  (167) 143-2113       Toll Free: 1-815.867.6157  Quit Tobacco Program [facility] (197)713-0125    Crisis Hotline:    [place] Crisis Hotline:  1-571-DWJWNTI or 1-175.196.8342    [place] Crisis Hotline:    1-894.582.2440 or 604-074-9141    Discharge Survey:   Thank you for choosing [facility]. We hope we did everything we could to make your hospital stay a pleasant one. You may be receiving a phone survey and we would appreciate your time and participation in answering the questions. Your input is very valuable to us in our efforts to improve our service to our patients and their families.        My signature on this form indicates that:    1. I have reviewed and understand the above information.  2. My questions regarding this information have been answered to my satisfaction.  3. I have formulated a plan with my discharge nurse to obtain my prescribed medications for home.                  Disclaimer         __________________________________                     __________       ________                       Patient Signature                                                 Date                    Time

## 2017-01-08 NOTE — ED NOTES
"Miryam Veloz  66 y.o.  Chief Complaint   Patient presents with   • Flu Like Symptoms     pt reports nausea, vomiting, fevers, and \"unable to keep anything down\" since wednesday      Pt has a sepsis score of 3, appears pale and sweaty, states that she feels like she is going to \"pass out\". Charge RN aware. Rechecked b/p in triage, was 99/59. Pt assisted to senior lounge and is laying in a recliner chair at this time.  called for sepsis lab draw  "

## 2017-01-08 NOTE — ED PROVIDER NOTES
"ED Provider Note    CHIEF COMPLAINT  Chief Complaint   Patient presents with   • Flu Like Symptoms     pt reports nausea, vomiting, fevers, and \"unable to keep anything down\" since wednesday        HPI  Miryam Veloz is a 66 y.o. female who presents for evaluation of body aches reported nausea vomiting diarrhea low-grade fevers. She denies productive cough. No sore throat. She does have mild headache no neck stiffness.  She denies any dysuria or hematuria. She does report some crampy abdominal pain. She has history of extensive abdominal surgeries has not been having any bowel movements. She has been sick for several days unable to keep any clear liquids down and she has not been making any urine.    REVIEW OF SYSTEMS  See HPI for further details. No night sweats or weight loss numbness tingling or weakness All other systems are negative.     PAST MEDICAL HISTORY  Past Medical History   Diagnosis Date   • Headache, frequent episodic tension-type    • Bipolar affective disorder (HCC) 1995   • Depression 5/22/2009   • Lumbar facet arthropathy (HCC) 2004     lumbar disc disease   • Chronic knee pain 2000   • IBS (irritable bowel syndrome)    • Family history of breast cancer in mother    • Rotator cuff syndrome of right shoulder 2008   • Migraine    • Hypertension        FAMILY HISTORY  Noncontributory    SOCIAL HISTORY  Social History     Social History   • Marital Status: Single     Spouse Name: N/A   • Number of Children: N/A   • Years of Education: N/A     Social History Main Topics   • Smoking status: Former Smoker -- 1.00 packs/day for 20 years     Types: Cigarettes     Quit date: 01/01/1994   • Smokeless tobacco: Never Used   • Alcohol Use: Yes      Comment: 2-3 drinks per month   • Drug Use: No      Comment: Drug Problem Met, Clean x 24 years.   • Sexual Activity: Not on file     Other Topics Concern   • Not on file     Social History Narrative     Former smoker denies IV drugs  SURGICAL HISTORY  Past " "Surgical History   Procedure Laterality Date   • Cholecystectomy  1999   • Finger or hand incision and drainage Right 10/20/2015     Procedure: FINGER OR HAND INCISION AND DRAINAGE- 4th finger;  Surgeon: Eric Pritchett M.D.;  Location: SURGERY [facility];  Service:      Appendectomy hysterectomy  CURRENT MEDICATIONS    Current facility-administered medications:   •  cefTRIAXone (ROCEPHIN) injection 2 g, 2 g, Intravenous, Once, Juan Aldana M.D.    Current outpatient prescriptions:   •  gabapentin (NEURONTIN) 300 MG Cap, Take 300 mg by mouth 3 times a day., Disp: , Rfl:   •  methocarbamol (ROBAXIN) 750 MG Tab, Take 1,500 mg by mouth 2 Times a Day., Disp: , Rfl:   •  propranolol (INDERAL) 40 MG Tab, Take 80 mg by mouth 2 times a day., Disp: , Rfl:   •  albuterol (VENTOLIN HFA) 108 (90 BASE) MCG/ACT Aero Soln inhalation aerosol, Inhale 2 Puffs by mouth every 6 hours as needed for Shortness of Breath., Disp: 8.5 g, Rfl: 3  •  furosemide (LASIX) 20 MG Tab, Take 1 Tab by mouth every day., Disp: 30 Tab, Rfl: 3  •  venlafaxine XR (EFFEXOR XR) 37.5 MG CAPSULE SR 24 HR, Take 1 Cap by mouth 2 times a day. TAKE ONE CAPSULE BY MOUTH TWICE A DAY WITH MEALS, Disp: 60 Cap, Rfl: 3  •  cyanocobalamin (VITAMIN B12) 1000 MCG Tab, Take 1 Tab by mouth every day., Disp: 30 Tab, Rfl: 3      ALLERGIES  Allergies   Allergen Reactions   • Codeine Itching and Nausea   • Imitrex [Sumatriptan Succinate] Shortness of Breath and Swelling   • Penicillins Rash and Vomiting   • Sulfa Drugs Shortness of Breath and Itching   • Azithromycin Itching   • Dilaudid [Hydromorphone] Itching     sweating   • Sensipar [Cinacalcet] Unspecified     Nightmares and leg cramping   • Imipramine Shortness of Breath and Itching     anxious   • Other Drug      Z Pack   • Seroquel [Quetiapine Fumarate]        PHYSICAL EXAM  VITAL SIGNS: BP 99/59 mmHg  Pulse 133  Temp(Src) 36.4 °C (97.5 °F)  Resp 16  Ht 1.778 m (5' 10\")  Wt 104.9 kg (231 lb 4.2 oz)  BMI " 33.18 kg/m2  SpO2 92% Room air O2: 94    Constitutional: The patient appears uncomfortable  HENT: Normocephalic, Atraumatic, Bilateral external ears normal, Oropharynx moist, No oral exudates, Nose normal.   Eyes: PERRLA, EOMI, Conjunctiva normal, No discharge.   Neck: Normal range of motion, No tenderness, Supple, No stridor.   Cardiovascular: Normal heart rate, Normal rhythm, No murmurs, No rubs, No gallops.   Thorax & Lungs: Normal breath sounds, No respiratory distress, No wheezing, No chest tenderness.   Abdomen: Bowel sounds high pitched, moderate distention noted. Patient is diffusely tender but no hernias masses rebound guarding or rigidity  Skin: Warm, Dry, No erythema, No rash.   Back: No tenderness, No CVA tenderness.   Extremities: Intact distal pulses, No edema, No tenderness, No cyanosis, No clubbing.   Neurologic: Alert & oriented x 3, Normal motor function, Normal sensory function, No focal deficits noted.   Psychiatric: Affect normal, Judgment normal, Mood normal.         RADIOLOGY/PROCEDURES  CT-ABDOMEN-PELVIS WITH   Final Result      New right renal upper pole anterior cortex hypodense masslike region could indicate focal pyelonephritis or renal cell carcinoma.      Bilateral renal pelvis/proximal ureteral wall enhancement is suspicious for ureteritis      Stable splenomegaly and 1 cm mass which is of unlikely clinical significance      Cholecystectomy      DX-CHEST-LIMITED (1 VIEW)   Final Result      No acute cardiopulmonary disease.          COURSE & MEDICAL DECISION MAKING  Pertinent Labs & Imaging studies reviewed. (See chart for details)  Results for orders placed or performed during the hospital encounter of 01/08/17   Lactic acid (lactate)   Result Value Ref Range    Lactic Acid 2.2 (H) 0.5 - 2.0 mmol/L   Lactic acid (lactate)   Result Value Ref Range    Lactic Acid 1.7 0.5 - 2.0 mmol/L   CBC WITH DIFFERENTIAL   Result Value Ref Range    WBC 9.3 4.8 - 10.8 K/uL    RBC 4.56 4.20 - 5.40  M/uL    Hemoglobin 14.3 12.0 - 16.0 g/dL    Hematocrit 43.0 37.0 - 47.0 %    MCV 94.3 81.4 - 97.8 fL    MCH 31.4 27.0 - 33.0 pg    MCHC 33.3 (L) 33.6 - 35.0 g/dL    RDW 42.6 35.9 - 50.0 fL    Platelet Count 166 164 - 446 K/uL    MPV 11.9 9.0 - 12.9 fL    Neutrophils-Polys 80.90 (H) 44.00 - 72.00 %    Lymphocytes 9.60 (L) 22.00 - 41.00 %    Monocytes 8.80 0.00 - 13.40 %    Eosinophils 0.00 0.00 - 6.90 %    Basophils 0.40 0.00 - 1.80 %    Immature Granulocytes 0.30 0.00 - 0.90 %    Nucleated RBC 0.00 /100 WBC    Neutrophils (Absolute) 7.54 (H) 2.00 - 7.15 K/uL    Lymphs (Absolute) 0.90 (L) 1.00 - 4.80 K/uL    Monos (Absolute) 0.82 0.00 - 0.85 K/uL    Eos (Absolute) 0.00 0.00 - 0.51 K/uL    Baso (Absolute) 0.04 0.00 - 0.12 K/uL    Immature Granulocytes (abs) 0.03 0.00 - 0.11 K/uL    NRBC (Absolute) 0.00 K/uL   COMP METABOLIC PANEL   Result Value Ref Range    Sodium 135 135 - 145 mmol/L    Potassium 3.6 3.6 - 5.5 mmol/L    Chloride 104 96 - 112 mmol/L    Co2 21 20 - 33 mmol/L    Anion Gap 10.0 0.0 - 11.9    Glucose 138 (H) 65 - 99 mg/dL    Bun 16 8 - 22 mg/dL    Creatinine 1.13 0.50 - 1.40 mg/dL    Calcium 10.6 (H) 8.5 - 10.5 mg/dL    AST(SGOT) 9 (L) 12 - 45 U/L    ALT(SGPT) 8 2 - 50 U/L    Alkaline Phosphatase 84 30 - 99 U/L    Total Bilirubin 1.1 0.1 - 1.5 mg/dL    Albumin 3.8 3.2 - 4.9 g/dL    Total Protein 7.4 6.0 - 8.2 g/dL    Globulin 3.6 (H) 1.9 - 3.5 g/dL    A-G Ratio 1.1 g/dL   URINALYSIS   Result Value Ref Range    Micro Urine Req Microscopic     Color Yellow     Character Sl Cloudy (A)     Specific Gravity 1.021 <1.035    Ph 6.0 5.0-8.0    Glucose Negative Negative mg/dL    Ketones Negative Negative mg/dL    Protein 50 (A) Negative mg/dL    Bilirubin Negative Negative    Nitrite Negative Negative    Leukocyte Esterase Large (A) Negative    Occult Blood Small (A) Negative   ESTIMATED GFR   Result Value Ref Range    GFR If  59 (A) >60 mL/min/1.73 m 2    GFR If Non  48 (A) >60  mL/min/1.73 m 2   INFLUENZA RAPID   Result Value Ref Range    Significant Indicator NEG     Source RESP     Site RESPIRATORY     Rapid Influenza A-B       Negative for Influenza A and Influenza B antigens.  Infection due to influenza A or B cannot be ruled out  since the antigen present in the specimen may be below the  detection limit of the test. Culture confirmation of  negative samples is recommended.     BTYPE NATRIURETIC PEPTIDE   Result Value Ref Range    B Natriuretic Peptide 126 (H) 0 - 100 pg/mL   TROPONIN   Result Value Ref Range    Troponin I <0.01 0.00 - 0.04 ng/mL   URINE MICROSCOPIC (W/UA)   Result Value Ref Range    WBC >150 (A) /hpf    RBC 20-50 (A) /hpf    Bacteria Many (A) None /hpf    Epithelial Cells Few /hpf      Septic protocol was initiated. The patient had initially slightly low blood pressure that normalized after IV fluids. Chest x-ray is normal she did not any skin findings. I was concerned about possible bowel obstruction given the fact that she had no surgeries yet CT scan subsequently straight likely pyelonephritis. There is question of possible renal lesion but her urine is for the positive strongly suggesting that this is the source of her infection. Blood cultures were administered. She was given nausea medicine and pain medicine. She was given broad-spectrum IV antibiotics and will be admitted to internal medicine  FINAL IMPRESSION  1. Pyelonephritis      Electronically signed by: Juan Aldana, 1/8/2017 3:56 PM

## 2017-01-08 NOTE — IP AVS SNAPSHOT
Dizzion Access Code: Activation code not generated  Current Dizzion Status: Active    Meet[employer]hart  A secure, online tool to manage your health information     [facility]’s Dizzion® is a secure, online tool that connects you to your personalized health information from the privacy of your home -- day or night - making it very easy for you to manage your healthcare. Once the activation process is completed, you can even access your medical information using the Dizzion rupert, which is available for free in the Apple Rupert store or Google Play store.     Dizzion provides the following levels of access (as shown below):   My Chart Features   [facility] Primary Care Doctor [facility]  Specialists [facility]  Urgent  Care Non-[facility]  Primary Care  Doctor   Email your healthcare team securely and privately 24/7 X X X X   Manage appointments: schedule your next appointment; view details of past/upcoming appointments X      Request prescription refills. X      View recent personal medical records, including lab and immunizations X X X X   View health record, including health history, allergies, medications X X X X   Read reports about your outpatient visits, procedures, consult and ER notes X X X X   See your discharge summary, which is a recap of your hospital and/or ER visit that includes your diagnosis, lab results, and care plan. X X       How to register for Dizzion:  1. Go to  [email or web address].  2. Click on the Sign Up Now box, which takes you to the New Member Sign Up page. You will need to provide the following information:  a. Enter your Dizzion Access Code exactly as it appears at the top of this page. (You will not need to use this code after you’ve completed the sign-up process. If you do not sign up before the expiration date, you must request a new code.)   b. Enter your date of birth.   c. Enter your home email address.   d. Click Submit, and follow the next screen’s instructions.  3. Create a Dizzion ID. This will  be your [employer] login ID and cannot be changed, so think of one that is secure and easy to remember.  4. Create a [employer] password. You can change your password at any time.  5. Enter your Password Reset Question and Answer. This can be used at a later time if you forget your password.   6. Enter your e-mail address. This allows you to receive e-mail notifications when new information is available in [employer].  7. Click Sign Up. You can now view your health information.    For assistance activating your [employer] account, call [phone]

## 2017-01-08 NOTE — IP AVS SNAPSHOT
" <p align=\"LEFT\"><IMG SRC=\"[email or web address]\" alt=\"Image\" WIDTH=\"50%\" HEIGHT=\"200\" BORDER=\"\"></p>                   Name:Miryam Veloz  Medical Record Number:[id number]  CSN: [id number]    YOB: 1950   Age: 66 y.o.  Sex: female  HT:1.778 m (5' 10\") WT: 112.8 kg (248 lb 10.9 oz)          Admit Date: 1/8/2017     Discharge Date:   Today's Date: 1/11/2017  Attending Doctor:  Xavier Jules M.D.                  Allergies:  Codeine; Imitrex; Penicillins; Sulfa drugs; Azithromycin; Dilaudid; Sensipar; Imipramine; Other drug; and Seroquel             Medication List      Take these Medications        Instructions    albuterol 108 (90 BASE) MCG/ACT Aers inhalation aerosol   Commonly known as:  VENTOLIN HFA    Inhale 2 Puffs by mouth every 6 hours as needed for Shortness of Breath.   Dose:  2 Puff       ciprofloxacin 500 MG Tabs   Commonly known as:  CIPRO    Take 1 Tab by mouth 2 times a day for 10 days.   Dose:  500 mg       cyanocobalamin 1000 MCG Tabs   Commonly known as:  VITAMIN B12    Take 1 Tab by mouth every day.   Dose:  1000 mcg       gabapentin 300 MG Caps   Commonly known as:  NEURONTIN    Take 300 mg by mouth 3 times a day.   Dose:  300 mg       methocarbamol 750 MG Tabs   Commonly known as:  ROBAXIN    Take 1,500 mg by mouth 2 Times a Day.   Dose:  1500 mg       propranolol 40 MG Tabs   Commonly known as:  INDERAL    Take 80 mg by mouth 2 times a day.   Dose:  80 mg       rivaroxaban 20 MG Tabs tablet   Commonly known as:  XARELTO    Take 1 Tab by mouth with dinner.   Dose:  20 mg       tramadol 50 MG Tabs   Commonly known as:  ULTRAM    Take 1-2 Tabs by mouth every 6 hours as needed for Mild Pain.   Dose:   mg       venlafaxine XR 37.5 MG Cp24   Commonly known as:  EFFEXOR XR    Take 1 Cap by mouth 2 times a day. TAKE ONE CAPSULE BY MOUTH TWICE A DAY WITH MEALS   Dose:  37.5 mg         "

## 2017-01-09 PROBLEM — J96.01 ACUTE RESPIRATORY FAILURE WITH HYPOXIA (HCC): Status: ACTIVE | Noted: 2017-01-09

## 2017-01-09 PROBLEM — R78.81 GRAM-NEGATIVE BACTEREMIA: Status: ACTIVE | Noted: 2017-01-09

## 2017-01-09 PROBLEM — R65.20 SEVERE SEPSIS (HCC): Status: ACTIVE | Noted: 2017-01-09

## 2017-01-09 PROBLEM — A41.9 SEVERE SEPSIS (HCC): Status: ACTIVE | Noted: 2017-01-09

## 2017-01-09 PROBLEM — M79.7 FIBROMYALGIA: Status: ACTIVE | Noted: 2017-01-09

## 2017-01-09 PROBLEM — N18.2 CHRONIC RENAL FAILURE, STAGE 2 (MILD): Status: ACTIVE | Noted: 2017-01-09

## 2017-01-09 PROBLEM — I48.0 PAROXYSMAL ATRIAL FIBRILLATION (HCC): Status: ACTIVE | Noted: 2017-01-09

## 2017-01-09 PROBLEM — I10 HTN (HYPERTENSION): Status: ACTIVE | Noted: 2017-01-09

## 2017-01-09 LAB
ANION GAP SERPL CALC-SCNC: 7 MMOL/L (ref 0–11.9)
BASOPHILS # BLD AUTO: 0.7 % (ref 0–1.8)
BASOPHILS # BLD: 0.06 K/UL (ref 0–0.12)
BUN SERPL-MCNC: 18 MG/DL (ref 8–22)
CALCIUM SERPL-MCNC: 10.2 MG/DL (ref 8.5–10.5)
CHLORIDE SERPL-SCNC: 109 MMOL/L (ref 96–112)
CO2 SERPL-SCNC: 21 MMOL/L (ref 20–33)
CREAT SERPL-MCNC: 0.98 MG/DL (ref 0.5–1.4)
EKG IMPRESSION: NORMAL
EOSINOPHIL # BLD AUTO: 0.01 K/UL (ref 0–0.51)
EOSINOPHIL NFR BLD: 0.1 % (ref 0–6.9)
ERYTHROCYTE [DISTWIDTH] IN BLOOD BY AUTOMATED COUNT: 43.8 FL (ref 35.9–50)
GFR SERPL CREATININE-BSD FRML MDRD: 57 ML/MIN/1.73 M 2
GLUCOSE SERPL-MCNC: 128 MG/DL (ref 65–99)
HCT VFR BLD AUTO: 39.3 % (ref 37–47)
HGB BLD-MCNC: 13.5 G/DL (ref 12–16)
IMM GRANULOCYTES # BLD AUTO: 0.03 K/UL (ref 0–0.11)
IMM GRANULOCYTES NFR BLD AUTO: 0.3 % (ref 0–0.9)
LYMPHOCYTES # BLD AUTO: 1.13 K/UL (ref 1–4.8)
LYMPHOCYTES NFR BLD: 13.2 % (ref 22–41)
MCH RBC QN AUTO: 32.8 PG (ref 27–33)
MCHC RBC AUTO-ENTMCNC: 34.4 G/DL (ref 33.6–35)
MCV RBC AUTO: 95.4 FL (ref 81.4–97.8)
MONOCYTES # BLD AUTO: 0.9 K/UL (ref 0–0.85)
MONOCYTES NFR BLD AUTO: 10.5 % (ref 0–13.4)
NEUTROPHILS # BLD AUTO: 6.45 K/UL (ref 2–7.15)
NEUTROPHILS NFR BLD: 75.2 % (ref 44–72)
NRBC # BLD AUTO: 0 K/UL
NRBC BLD AUTO-RTO: 0 /100 WBC
PLATELET # BLD AUTO: 134 K/UL (ref 164–446)
PMV BLD AUTO: 11.7 FL (ref 9–12.9)
POTASSIUM SERPL-SCNC: 3.6 MMOL/L (ref 3.6–5.5)
RBC # BLD AUTO: 4.12 M/UL (ref 4.2–5.4)
SODIUM SERPL-SCNC: 137 MMOL/L (ref 135–145)
WBC # BLD AUTO: 8.6 K/UL (ref 4.8–10.8)

## 2017-01-09 PROCEDURE — 85025 COMPLETE CBC W/AUTO DIFF WBC: CPT

## 2017-01-09 PROCEDURE — 700111 HCHG RX REV CODE 636 W/ 250 OVERRIDE (IP): Performed by: INTERNAL MEDICINE

## 2017-01-09 PROCEDURE — 87040 BLOOD CULTURE FOR BACTERIA: CPT | Mod: 91

## 2017-01-09 PROCEDURE — A9270 NON-COVERED ITEM OR SERVICE: HCPCS | Performed by: NURSE PRACTITIONER

## 2017-01-09 PROCEDURE — 36415 COLL VENOUS BLD VENIPUNCTURE: CPT

## 2017-01-09 PROCEDURE — A9270 NON-COVERED ITEM OR SERVICE: HCPCS | Performed by: INTERNAL MEDICINE

## 2017-01-09 PROCEDURE — 700105 HCHG RX REV CODE 258: Performed by: INTERNAL MEDICINE

## 2017-01-09 PROCEDURE — 770020 HCHG ROOM/CARE - TELE (206)

## 2017-01-09 PROCEDURE — 700102 HCHG RX REV CODE 250 W/ 637 OVERRIDE(OP): Performed by: NURSE PRACTITIONER

## 2017-01-09 PROCEDURE — 700102 HCHG RX REV CODE 250 W/ 637 OVERRIDE(OP): Performed by: INTERNAL MEDICINE

## 2017-01-09 PROCEDURE — 99233 SBSQ HOSP IP/OBS HIGH 50: CPT | Performed by: INTERNAL MEDICINE

## 2017-01-09 PROCEDURE — 80048 BASIC METABOLIC PNL TOTAL CA: CPT

## 2017-01-09 RX ORDER — SODIUM CHLORIDE 9 MG/ML
INJECTION, SOLUTION INTRAVENOUS CONTINUOUS
Status: DISCONTINUED | OUTPATIENT
Start: 2017-01-09 | End: 2017-01-11 | Stop reason: HOSPADM

## 2017-01-09 RX ORDER — IBUPROFEN 200 MG
200 TABLET ORAL EVERY 6 HOURS PRN
Status: DISCONTINUED | OUTPATIENT
Start: 2017-01-09 | End: 2017-01-11 | Stop reason: HOSPADM

## 2017-01-09 RX ORDER — ASPIRIN 325 MG
325 TABLET ORAL DAILY
Status: DISCONTINUED | OUTPATIENT
Start: 2017-01-09 | End: 2017-01-10

## 2017-01-09 RX ADMIN — TRAMADOL HYDROCHLORIDE 50 MG: 50 TABLET, COATED ORAL at 06:09

## 2017-01-09 RX ADMIN — METHOCARBAMOL 1500 MG: 750 TABLET ORAL at 08:42

## 2017-01-09 RX ADMIN — ACETAMINOPHEN 650 MG: 325 TABLET, FILM COATED ORAL at 10:55

## 2017-01-09 RX ADMIN — VENLAFAXINE HYDROCHLORIDE 37.5 MG: 37.5 CAPSULE, EXTENDED RELEASE ORAL at 08:42

## 2017-01-09 RX ADMIN — CEFTRIAXONE 2 G: 2 INJECTION, POWDER, FOR SOLUTION INTRAMUSCULAR; INTRAVENOUS at 21:29

## 2017-01-09 RX ADMIN — ASPIRIN 325 MG: 325 TABLET, COATED ORAL at 16:43

## 2017-01-09 RX ADMIN — ENOXAPARIN SODIUM 40 MG: 100 INJECTION SUBCUTANEOUS at 09:00

## 2017-01-09 RX ADMIN — GABAPENTIN 300 MG: 300 CAPSULE ORAL at 08:42

## 2017-01-09 RX ADMIN — VITAMIN B12 0.1 MG ORAL TABLET 100 MCG: 0.1 TABLET ORAL at 08:43

## 2017-01-09 RX ADMIN — PROPRANOLOL HYDROCHLORIDE 80 MG: 10 TABLET ORAL at 21:26

## 2017-01-09 RX ADMIN — GABAPENTIN 300 MG: 300 CAPSULE ORAL at 21:28

## 2017-01-09 RX ADMIN — METHOCARBAMOL 1500 MG: 750 TABLET ORAL at 21:27

## 2017-01-09 RX ADMIN — VENLAFAXINE HYDROCHLORIDE 37.5 MG: 37.5 CAPSULE, EXTENDED RELEASE ORAL at 16:43

## 2017-01-09 RX ADMIN — TRAMADOL HYDROCHLORIDE 100 MG: 50 TABLET, COATED ORAL at 21:29

## 2017-01-09 RX ADMIN — GABAPENTIN 300 MG: 300 CAPSULE ORAL at 14:52

## 2017-01-09 RX ADMIN — SODIUM CHLORIDE: 9 INJECTION, SOLUTION INTRAVENOUS at 16:42

## 2017-01-09 RX ADMIN — PROPRANOLOL HYDROCHLORIDE 80 MG: 10 TABLET ORAL at 10:55

## 2017-01-09 RX ADMIN — SODIUM CHLORIDE: 9 INJECTION, SOLUTION INTRAVENOUS at 08:30

## 2017-01-09 ASSESSMENT — PAIN SCALES - GENERAL
PAINLEVEL_OUTOF10: 3
PAINLEVEL_OUTOF10: 7
PAINLEVEL_OUTOF10: 6
PAINLEVEL_OUTOF10: 6
PAINLEVEL_OUTOF10: 7
PAINLEVEL_OUTOF10: 1
PAINLEVEL_OUTOF10: 5

## 2017-01-09 ASSESSMENT — LIFESTYLE VARIABLES
AVERAGE NUMBER OF DAYS PER WEEK YOU HAVE A DRINK CONTAINING ALCOHOL: 1
EVER FELT BAD OR GUILTY ABOUT YOUR DRINKING: NO
ON A TYPICAL DAY WHEN YOU DRINK ALCOHOL HOW MANY DRINKS DO YOU HAVE: 1
HAVE YOU EVER FELT YOU SHOULD CUT DOWN ON YOUR DRINKING: NO
ALCOHOL_USE: YES
TOTAL SCORE: 0
HOW MANY TIMES IN THE PAST YEAR HAVE YOU HAD 5 OR MORE DRINKS IN A DAY: 0
TOTAL SCORE: 0
EVER HAD A DRINK FIRST THING IN THE MORNING TO STEADY YOUR NERVES TO GET RID OF A HANGOVER: NO
HAVE PEOPLE ANNOYED YOU BY CRITICIZING YOUR DRINKING: NO
EVER_SMOKED: YES
TOTAL SCORE: 0
CONSUMPTION TOTAL: NEGATIVE

## 2017-01-09 NOTE — PROGRESS NOTES
MS:    [id number]    SR-ST ; F PVC, F PAC  .16/.1/.32    Intermittent A-Flutter Bursts    2235-Present    A-Fib/Flutter 75-84; F PVC, F PAC  -/.08/-

## 2017-01-09 NOTE — PROGRESS NOTES
Assumed Pt care at approximately 1930 upon Pt's arrival from ED. Pt transferred from [facility] to bed as x2 assist. Pt x2 assist with walker to commode since arriving. Pt attached to Tele-Box and monitor room notified. Pt SR with frequent PAC bursts associated with elevated rat as high as 150 BPM upon arrival. Call received from monitor room at approximately 2205 in regards to Pt having non-sustained A-Flutter bursts with elevated rates during bursts. On call provider Roxane Lopez contacted and notified of rhythm changes. EKG order received. Pt states 9/10 lower back pain upon arrival. Pt febrile with temp of 101.2 degrees upon arrival. PRN Tylenol administered in response to pain and temperature. Pt hypertensive with BP of 174/75 upon arrival. Scheduled vasoactive medication administered in response to HTN. VS re-assessed post medication administration. Pt currently afebrile at 98.8 degrees and normotensive at 119/56. Pt updated on immediate POC and call light system. Pt requesting not to do admission profile at this time do to fatigue. Pt wants to sleep only. Currently awaiting IV ABX from pharmacy and EKG results. Bed alarm in affect and call light within reach.

## 2017-01-09 NOTE — PROGRESS NOTES
Cherri from Lab called with critical result of Blood Culture of Gram Negative Rods. Critical lab result read back to Cherri.   On call provider Roxane Lopez notified of critical lab result ofBlood Culture of Gram Negative Rods.  Critical lab result read back by Roxane.

## 2017-01-09 NOTE — ED NOTES
Med rec updated and complete  Allergies reviewed.  No antibiotics in last 30 days.  Pt has not taken any medications since the 4th of January.    PPE worn by this writer.

## 2017-01-09 NOTE — PROGRESS NOTES
Call received from monitor room at 2245 in regards to Pt sustaining A-Flutter. EKG underway at time of conversion. EKG results reading A-Fib with rate of . Pt asymptomatic as she denies any alteration in feeling. On call provider Roxane Lopez contacted with updates. Rate ranging between  BPM while on phone within provider. Currently awaiting updates.

## 2017-01-09 NOTE — PROGRESS NOTES
Hospital Medicine Progress Note, Adult, Complex               Author: Emilio CITLALLI Nickolas Date & Time created: 1/9/2017  7:58 AM     66/F with Stage II chronic kidney disease, bipolar disorder, hypertension, fibromyalgia, admitted for nausea/vomiting x 5 days, with fevers and chills, decreased OFI and diminished urination.  No leukocytosis. UA positive for pyuria. Lactate 2.2. Crea within baseline. CT abd/pelvis showed new right renal upper pole cortex hypodense region likely focal pyelonephritis, bilateral renal pelvis,  proximal ureteral wall enhancement suspicious for ureteritis , no hydronephrosis. Started on rocephin,a nd IVF.          Interval History:  1/9/2017 - no overnight events. Remains hemodynamically stable. Had fever yesterday but afebrile since last night. Remains on 2L O2 NC (from 4L). No leukocytosis. Hgb stable. No bands. BMP unimpressive. Lactate WNL. Blood culture from 1/8 grew gram negative rods. (+) report of bout of Afib overnight, but converted back to NSR, and has been maintaining since.       > Seen and examined. (+) Chronic back & leg pain, with increased flank pain. (+) headache. Denied nausea, vomiting, CP, SOB.   States she does not use home O2.       Review of Systems:  ROS   Pertinent positives/negatives as mentioned above.     A complete review of systems was done. All other systems were negative.       Physical Exam:  Physical Exam   Constitutional: She is oriented to person, place, and time. She appears well-developed and well-nourished. No distress.   HENT:   Head: Normocephalic and atraumatic.   Mouth/Throat: No oropharyngeal exudate.   Eyes: Conjunctivae are normal. Pupils are equal, round, and reactive to light. No scleral icterus.   Neck: Normal range of motion. Neck supple.   Cardiovascular: Normal rate and regular rhythm.  Exam reveals no gallop and no friction rub.    No murmur heard.  Pulmonary/Chest: Effort normal and breath sounds normal. No respiratory distress. She has no  wheezes. She has no rales. She exhibits no tenderness.   Abdominal: Soft. Bowel sounds are normal. She exhibits no distension. There is tenderness (B/L flanks). There is no rebound and no guarding.   Musculoskeletal: Normal range of motion. She exhibits no edema or tenderness.   Lymphadenopathy:     She has no cervical adenopathy.   Neurological: She is alert and oriented to person, place, and time. No cranial nerve deficit.   Skin: Skin is warm and dry. No rash noted. No erythema. No pallor.   Psychiatric: She has a normal mood and affect. Her behavior is normal. Judgment and thought content normal.   Nursing note and vitals reviewed.      Labs:        Invalid input(s): [id number]  Recent Labs      17   TROPONINI   --   <0.01   BNPBTYPENAT  126*   --      Recent Labs      17   14317   031   SODIUM  135  137   POTASSIUM  3.6  3.6   CHLORIDE  104  109   CO2  21  21   BUN  16  18   CREATININE  1.13  0.98   CALCIUM  10.6*  10.2     Recent Labs      17   14317   0314   ALTSGPT  8   --    ASTSGOT  9*   --    ALKPHOSPHAT  84   --    TBILIRUBIN  1.1   --    GLUCOSE  138*  128*     Recent Labs      17   14317   0314   RBC  4.56  4.12*   HEMOGLOBIN  14.3  13.5   HEMATOCRIT  43.0  39.3   PLATELETCT  166  134*     Recent Labs      17   14317   0314   WBC  9.3  8.6   NEUTSPOLYS  80.90*  75.20*   LYMPHOCYTES  9.60*  13.20*   MONOCYTES  8.80  10.50   EOSINOPHILS  0.00  0.10   BASOPHILS  0.40  0.70   ASTSGOT  9*   --    ALTSGPT  8   --    ALKPHOSPHAT  84   --    TBILIRUBIN  1.1   --            Hemodynamics:  Temp (24hrs), Av.7 °C (99.9 °F), Min:36.4 °C (97.5 °F), Max:40.4 °C (104.8 °F)  Temperature: 37.1 °C (98.7 °F)  Pulse  Av.2  Min: 64  Max: 133Heart Rate (Monitored): 72  Blood Pressure: 107/64 mmHg, NIBP: (!) 161/73 mmHg     Respiratory:    Respiration: 16, Pulse Oximetry: 97 %, O2 Daily Delivery Respiratory : Silicone Nasal  Cannula        RUL Breath Sounds: Diminished, RML Breath Sounds: Diminished, RLL Breath Sounds: Diminished, TRICIA Breath Sounds: Diminished, LLL Breath Sounds: Diminished  Fluids:    Intake/Output Summary (Last 24 hours) at 01/09/17 0758  Last data filed at 01/09/17 0600   Gross per 24 hour   Intake    700 ml   Output    380 ml   Net    320 ml     Weight: 111.7 kg (246 lb 4.1 oz)  GI/Nutrition:  Orders Placed This Encounter   Procedures   • Diet Order     Standing Status: Standing      Number of Occurrences: 1      Standing Expiration Date:      Order Specific Question:  Diet:     Answer:  Regular [1]     Medical Decision Making, by Problem:  Active Problems:    Severe sepsis, POA (HCC) due to pyelonephritis  - continue IVF NS at 125cc/hr, with PRN boluses for low BP.   - continue antibiotics for pyelonephritis. Follow urine culture and blood cultures.   - close hemodynamic monitoring, as if clinically deteriorates, may need to broaden antibiotics pending cultures.       Acute pyelonephritis  - no hydronephrosis or obstuction.  - continue IV ceftriaxone for now. Follow urine culture.       Acute respiratory failure with hypoxia (HCC) due to sepsis, POA  - continue RT protocol, O2 to keep sats>88%,      Gram-negative katharina bacteremia  - suspect E. Coli.   - continue IV ceftriaxone for now. If clinically deteriorates, will broaden antibiotics pending cultures. Repeat blood cultures x 2.       Paroxysmal atrial fibrillation (HCC)  - due to sepsis.   - continue IVF. Start full dose ASA for now. CHADS2=1. Continue propranolol.  - continue telemetry.       HTN (hypertension)  - continue propranolol (home dose). Monitor, as hemodynamics can become tenuous from sepsis.       Chronic renal failure, stage 2 (mild)  - continue IVF. Monitor. BMP in AM.      Fibromyalgia        Labs reviewed, Medications reviewed and Radiology images reviewed  Luna catheter: No Luna      DVT Prophylaxis: Enoxaparin (Lovenox)    Ulcer prophylaxis:  Not indicated  Antibiotics: Treating active infection/contamination beyond 24 hours perioperative coverage  Assessed for rehab: Patient was assess for and/or received rehabilitation services during this hospitalization

## 2017-01-09 NOTE — H&P
IDENTIFICATION:  The patient is a 66-year-old female patient of Dr. Bonds and   is also followed by nephrology, Dr. Lloyd.    CHIEF COMPLAINT:  Nausea, vomiting for 5 days.    HISTORY OF PRESENT ILLNESS:  The patient is a 66-year-old female who has   chronic low back pain which she states is related to her kidneys.  She has a   history of stage II chronic kidney disease, is followed by Dr. Lloyd for   this, she states for the last 5 days, however, she started having nausea and   vomiting, it seemed intermittent and progressively getting worse.  She started   having some fevers and uncontrollable chills, was shaking and she comes to   the hospital today to be evaluated.  She denies any lightheadedness,   dizziness, or headache.  She denies any dysuria, but states she has not been   eating or drinking much.  She is not urinating very often.  She does have mid   to low back pain, but she states it is hard to tell if this is acute or   chronic.  She denies any rash or itching, or diarrhea.    REVIEW OF SYSTEMS:  A 10-point review of systems is reviewed and otherwise   negative.    PAST MEDICAL HISTORY:  Stage II chronic kidney disease, bipolar disorder,   hypertension, fibromyalgia.    OUTPATIENT MEDICATIONS:  Gabapentin 300 mg 3 times daily, Robaxin 1500 mg   twice daily, propranolol 80 mg twice daily, albuterol 2 puffs every 6 hours as   needed for shortness of breath, Lasix 20 mg daily, Effexor XR 37.5 mg twice   daily, and vitamin B12 1000 mcg daily.    DRUG ALLERGIES:  CODEINE, IMITREX, PENICILLIN, SULFA, AZITHROMYCIN, DILAUDID,   SENSIPAR, AND IMIPRAMINE.    SOCIAL HISTORY:  Patient denies any tobacco or alcohol use.    PAST SURGICAL HISTORY:  None.    FAMILY HISTORY:  Father had heart disease.    PHYSICAL EXAMINATION:  VITAL SIGNS:  Temperature 97.5, blood pressure 99/59, heart rate 92,   respiratory rate 16, oxygen saturation 94% on room air.  GENERAL:  Patient is a pleasant female who is ill appearing.   She was shaking   uncontrollably with chills.  HEENT:  Head atraumatic.  Extraocular movements intact.  Pupils equal, round,   reactive to light.  Sclerae clear.  Conjunctivae pink.  Mucous membranes dry.    Skin turgor poor.  Skin is pink and warm to touch.  Capillary refill less   than 4 seconds.  NECK:  Supple.  No jugular venous distention.  Trachea midline.  No anterior,   posterior cervical or supraclavicular lymphadenopathy.  HEART:  Regular rate and rhythm with no murmur.  Point of maximal impulse   nondisplaced.  LUNGS:  Clear to auscultation bilaterally.  Good breath sounds to bases.  CHEST:  Symmetric with respiration.  ABDOMEN:  Soft, nontender, nondistended with normoactive bowel sounds.  No   palpable hepatosplenomegaly.  EXTREMITIES:  No clubbing, cyanosis or edema.  Pedal pulses 2+ bilaterally.  NEUROLOGIC:  Patient is alert and oriented x3 and she is moving all 4   extremities equally.    LABORATORY DATA:  Urinalysis shows over 150 white cells with many bacteria.    Lactate 1.7.  Influenza negative.  Troponin less than 0.01.  Sodium 135,   potassium 3.6, BUN 16, creatinine 1.13, calcium 10.6.  Lactate 2.2.  WBC is   9.3, hemoglobin 14.3, platelets 166.    IMAGING:  CT scan of the abdomen and pelvis shows new right renal upper pole   cortex hypodense region likely focal pyelonephritis, bilateral renal pelvis,   proximal ureteral wall enhancement suspicious for ureteritis.    ASSESSMENT AND PLAN:  1.  Acute pyelonephritis.  Patient will be admitted to the hospital and   treated with intravenous antibiotics consisting of Rocephin and we will send   urine for culture and follow up on these results.  2.  Sepsis with elevated lactate level, order for serial lactate level treated   with aggressive IV fluid hydration and monitor her on telemetry given her   septic picture.  3.  Hypertension.  I will continue her propranolol only as tolerated by her   blood pressure and place parameters to hold this for  systolic blood pressure   less than 100.  4.  Fibromyalgia.  We will continue Neurontin and Robaxin.  5.  Stage II chronic kidney disease followed by Dr. Lloyd.  We will monitor   creatinine and urine output while in the hospital.  Renal failure is   secondary to lithium ingestion years ago for her bipolar disorder.  She is not   currently on lithium.    Patient will need greater than 2 midnight stays for treatment of her acute   sepsis with pyelonephritis.       ____________________________________     MD JAYESH GAMBOA / NTS    DD:  01/08/2017 18:59:56  DT:  01/08/2017 19:40:21    D#:  [id number]  Job#:  [id number]

## 2017-01-10 LAB
ANION GAP SERPL CALC-SCNC: 3 MMOL/L (ref 0–11.9)
BACTERIA UR CULT: ABNORMAL
BACTERIA UR CULT: ABNORMAL
BASOPHILS # BLD AUTO: 0.5 % (ref 0–1.8)
BASOPHILS # BLD: 0.03 K/UL (ref 0–0.12)
BUN SERPL-MCNC: 17 MG/DL (ref 8–22)
CALCIUM SERPL-MCNC: 9.9 MG/DL (ref 8.5–10.5)
CHLORIDE SERPL-SCNC: 108 MMOL/L (ref 96–112)
CO2 SERPL-SCNC: 25 MMOL/L (ref 20–33)
CREAT SERPL-MCNC: 0.93 MG/DL (ref 0.5–1.4)
EOSINOPHIL # BLD AUTO: 0.05 K/UL (ref 0–0.51)
EOSINOPHIL NFR BLD: 0.8 % (ref 0–6.9)
ERYTHROCYTE [DISTWIDTH] IN BLOOD BY AUTOMATED COUNT: 45.1 FL (ref 35.9–50)
GFR SERPL CREATININE-BSD FRML MDRD: >60 ML/MIN/1.73 M 2
GLUCOSE SERPL-MCNC: 121 MG/DL (ref 65–99)
HCT VFR BLD AUTO: 38.1 % (ref 37–47)
HGB BLD-MCNC: 12.1 G/DL (ref 12–16)
IMM GRANULOCYTES # BLD AUTO: 0.03 K/UL (ref 0–0.11)
IMM GRANULOCYTES NFR BLD AUTO: 0.5 % (ref 0–0.9)
LYMPHOCYTES # BLD AUTO: 1.02 K/UL (ref 1–4.8)
LYMPHOCYTES NFR BLD: 16.3 % (ref 22–41)
MCH RBC QN AUTO: 31 PG (ref 27–33)
MCHC RBC AUTO-ENTMCNC: 31.8 G/DL (ref 33.6–35)
MCV RBC AUTO: 97.7 FL (ref 81.4–97.8)
MONOCYTES # BLD AUTO: 0.83 K/UL (ref 0–0.85)
MONOCYTES NFR BLD AUTO: 13.2 % (ref 0–13.4)
NEUTROPHILS # BLD AUTO: 4.31 K/UL (ref 2–7.15)
NEUTROPHILS NFR BLD: 68.7 % (ref 44–72)
NRBC # BLD AUTO: 0 K/UL
NRBC BLD AUTO-RTO: 0 /100 WBC
PLATELET # BLD AUTO: 131 K/UL (ref 164–446)
PMV BLD AUTO: 12.6 FL (ref 9–12.9)
POTASSIUM SERPL-SCNC: 4.3 MMOL/L (ref 3.6–5.5)
RBC # BLD AUTO: 3.9 M/UL (ref 4.2–5.4)
SIGNIFICANT IND 70042: ABNORMAL
SITE SITE: ABNORMAL
SODIUM SERPL-SCNC: 136 MMOL/L (ref 135–145)
SOURCE SOURCE: ABNORMAL
WBC # BLD AUTO: 6.3 K/UL (ref 4.8–10.8)

## 2017-01-10 PROCEDURE — A9270 NON-COVERED ITEM OR SERVICE: HCPCS | Performed by: INTERNAL MEDICINE

## 2017-01-10 PROCEDURE — 700102 HCHG RX REV CODE 250 W/ 637 OVERRIDE(OP): Performed by: NURSE PRACTITIONER

## 2017-01-10 PROCEDURE — 80048 BASIC METABOLIC PNL TOTAL CA: CPT

## 2017-01-10 PROCEDURE — 770020 HCHG ROOM/CARE - TELE (206)

## 2017-01-10 PROCEDURE — 99233 SBSQ HOSP IP/OBS HIGH 50: CPT | Performed by: HOSPITALIST

## 2017-01-10 PROCEDURE — 85025 COMPLETE CBC W/AUTO DIFF WBC: CPT

## 2017-01-10 PROCEDURE — 700111 HCHG RX REV CODE 636 W/ 250 OVERRIDE (IP): Performed by: INTERNAL MEDICINE

## 2017-01-10 PROCEDURE — 700102 HCHG RX REV CODE 250 W/ 637 OVERRIDE(OP): Performed by: INTERNAL MEDICINE

## 2017-01-10 PROCEDURE — 700102 HCHG RX REV CODE 250 W/ 637 OVERRIDE(OP): Performed by: HOSPITALIST

## 2017-01-10 PROCEDURE — A9270 NON-COVERED ITEM OR SERVICE: HCPCS | Performed by: HOSPITALIST

## 2017-01-10 PROCEDURE — A9270 NON-COVERED ITEM OR SERVICE: HCPCS | Performed by: NURSE PRACTITIONER

## 2017-01-10 PROCEDURE — 700105 HCHG RX REV CODE 258: Performed by: INTERNAL MEDICINE

## 2017-01-10 PROCEDURE — 36415 COLL VENOUS BLD VENIPUNCTURE: CPT

## 2017-01-10 RX ADMIN — GABAPENTIN 300 MG: 300 CAPSULE ORAL at 16:34

## 2017-01-10 RX ADMIN — METHOCARBAMOL 1500 MG: 750 TABLET ORAL at 08:52

## 2017-01-10 RX ADMIN — VENLAFAXINE HYDROCHLORIDE 37.5 MG: 37.5 CAPSULE, EXTENDED RELEASE ORAL at 08:52

## 2017-01-10 RX ADMIN — SODIUM CHLORIDE: 9 INJECTION, SOLUTION INTRAVENOUS at 04:41

## 2017-01-10 RX ADMIN — METHOCARBAMOL 1500 MG: 750 TABLET ORAL at 19:47

## 2017-01-10 RX ADMIN — PROPRANOLOL HYDROCHLORIDE 80 MG: 10 TABLET ORAL at 19:47

## 2017-01-10 RX ADMIN — VENLAFAXINE HYDROCHLORIDE 37.5 MG: 37.5 CAPSULE, EXTENDED RELEASE ORAL at 16:34

## 2017-01-10 RX ADMIN — PROPRANOLOL HYDROCHLORIDE 80 MG: 10 TABLET ORAL at 08:52

## 2017-01-10 RX ADMIN — SODIUM CHLORIDE: 9 INJECTION, SOLUTION INTRAVENOUS at 13:26

## 2017-01-10 RX ADMIN — RIVAROXABAN 20 MG: 20 TABLET, FILM COATED ORAL at 17:36

## 2017-01-10 RX ADMIN — TRAMADOL HYDROCHLORIDE 100 MG: 50 TABLET, COATED ORAL at 04:41

## 2017-01-10 RX ADMIN — GABAPENTIN 300 MG: 300 CAPSULE ORAL at 19:47

## 2017-01-10 RX ADMIN — VITAMIN B12 0.1 MG ORAL TABLET 100 MCG: 0.1 TABLET ORAL at 08:52

## 2017-01-10 RX ADMIN — TRAMADOL HYDROCHLORIDE 100 MG: 50 TABLET, COATED ORAL at 19:46

## 2017-01-10 RX ADMIN — SODIUM CHLORIDE: 9 INJECTION, SOLUTION INTRAVENOUS at 22:25

## 2017-01-10 RX ADMIN — CEFTRIAXONE 2 G: 2 INJECTION, POWDER, FOR SOLUTION INTRAMUSCULAR; INTRAVENOUS at 19:46

## 2017-01-10 RX ADMIN — ACETAMINOPHEN 650 MG: 325 TABLET, FILM COATED ORAL at 19:46

## 2017-01-10 RX ADMIN — GABAPENTIN 300 MG: 300 CAPSULE ORAL at 08:52

## 2017-01-10 RX ADMIN — ENOXAPARIN SODIUM 40 MG: 100 INJECTION SUBCUTANEOUS at 08:54

## 2017-01-10 RX ADMIN — ASPIRIN 325 MG: 325 TABLET, COATED ORAL at 08:52

## 2017-01-10 ASSESSMENT — PAIN SCALES - GENERAL
PAINLEVEL_OUTOF10: 5
PAINLEVEL_OUTOF10: 3
PAINLEVEL_OUTOF10: 5
PAINLEVEL_OUTOF10: 6
PAINLEVEL_OUTOF10: 5
PAINLEVEL_OUTOF10: 5

## 2017-01-10 ASSESSMENT — ENCOUNTER SYMPTOMS
DIARRHEA: 0
CONSTIPATION: 0
WHEEZING: 0
COUGH: 0
FEVER: 0
NAUSEA: 0
SHORTNESS OF BREATH: 0
VOMITING: 0
CHILLS: 0

## 2017-01-10 ASSESSMENT — PATIENT HEALTH QUESTIONNAIRE - PHQ9
1. LITTLE INTEREST OR PLEASURE IN DOING THINGS: NOT AT ALL
SUM OF ALL RESPONSES TO PHQ9 QUESTIONS 1 AND 2: 0
SUM OF ALL RESPONSES TO PHQ QUESTIONS 1-9: 0

## 2017-01-10 NOTE — RESPIRATORY CARE
COPD EDUCATION by COPD CLINICAL EDUCATOR  1/10/2017 at 6:20 AM by Judy Chi     Patient reviewed by COPD education team. Patient does not qualify for COPD program.

## 2017-01-10 NOTE — CARE PLAN
Problem: Safety  Goal: Will remain free from injury  Pt educated on the importance of utilizing call light prior to any attempts of ambulation. Pt demonstrates understanding of education point through compliance. Ambulatory ability assessed, bed alarm in affect, bed locked and in low position, upper bed rails up, personal possessions within reach, frequent trips to the bathroom offered, and call light and phone within reach.

## 2017-01-10 NOTE — DISCHARGE PLANNING
Prescription for Xaralto received by CM. Reviewed with pt at bedside. Her pharmacy is [facility] on [address]. Ph [phone] Fax [phone]. Rx faxed to them. Await callback with price to pt.

## 2017-01-10 NOTE — PROGRESS NOTES
Hospital Medicine Progress Note, Adult, Complex               Author: Xavier Jules  Date & Time created: 1/10/2017  1:58 PM     CC: 66/F with Stage II chronic kidney disease, bipolar disorder, hypertension, fibromyalgia, admitted for nausea/vomiting x 5 days, with fevers and chills, decreased OFI and diminished urination.  No leukocytosis. UA positive for pyuria. Lactate 2.2. Crea within baseline. CT abd/pelvis showed new right renal upper pole cortex hypodense region likely focal pyelonephritis, bilateral renal pelvis,  proximal ureteral wall enhancement suspicious for ureteritis , no hydronephrosis. Started on rocephin, and IVF.      Interval History:  1/9/2017 - no overnight events. Remains hemodynamically stable. Had fever yesterday but afebrile since last night. Remains on 2L O2 NC (from 4L). No leukocytosis. Hgb stable. No bands. BMP unimpressive. Lactate WNL. Blood culture from 1/8 grew gram negative rods. (+) report of bout of Afib overnight, but converted back to NSR, and has been maintaining since.   1/10: AFib: Echo pending. Discussed anticoag, pt chose xarelto. Repeat BC Pending. Possible dc home tomm.     Review of Systems:  Review of Systems   Constitutional: Negative for fever and chills.   Respiratory: Negative for cough, shortness of breath and wheezing.    Cardiovascular: Negative for chest pain.   Gastrointestinal: Negative for nausea, vomiting, diarrhea and constipation.      Physical Exam:  Physical Exam   Constitutional: She is oriented to person, place, and time. She appears well-developed and well-nourished. No distress.   HENT:   Head: Normocephalic and atraumatic.   Cardiovascular:   No murmur heard.  irr irr   Pulmonary/Chest: Effort normal and breath sounds normal. No respiratory distress. She has no wheezes. She has no rales.   Abdominal: Soft. Bowel sounds are normal. She exhibits no distension. There is tenderness (B/L flanks). There is no rebound and no guarding.   Musculoskeletal:  Normal range of motion. She exhibits no edema.   Neurological: She is alert and oriented to person, place, and time.   Skin: Skin is warm and dry. No erythema.   Nursing note and vitals reviewed.    Labs:        Invalid input(s): [id number]  Recent Labs      17   TROPONINI   --   <0.01   BNPBTYPENAT  126*   --      Recent Labs      01/08/17   1431  01/09/17   0314  01/10/17   0234   SODIUM  135  137  136   POTASSIUM  3.6  3.6  4.3   CHLORIDE  104  109  108   CO2  21  21  25   BUN  16  18  17   CREATININE  1.13  0.98  0.93   CALCIUM  10.6*  10.2  9.9     Recent Labs      01/08/17   1431  01/09/17   0314  01/10/17   0234   ALTSGPT  8   --    --    ASTSGOT  9*   --    --    ALKPHOSPHAT  84   --    --    TBILIRUBIN  1.1   --    --    GLUCOSE  138*  128*  121*     Recent Labs      01/08/17   1431  01/09/17   0314  01/10/17   0234   RBC  4.56  4.12*  3.90*   HEMOGLOBIN  14.3  13.5  12.1   HEMATOCRIT  43.0  39.3  38.1   PLATELETCT  166  134*  131*     Recent Labs      01/08/17   1431  01/09/17   0314  01/10/17   0234   WBC  9.3  8.6  6.3   NEUTSPOLYS  80.90*  75.20*  68.70   LYMPHOCYTES  9.60*  13.20*  16.30*   MONOCYTES  8.80  10.50  13.20   EOSINOPHILS  0.00  0.10  0.80   BASOPHILS  0.40  0.70  0.50   ASTSGOT  9*   --    --    ALTSGPT  8   --    --    ALKPHOSPHAT  84   --    --    TBILIRUBIN  1.1   --    --            Hemodynamics:  Temp (24hrs), Av.9 °C (98.5 °F), Min:36.7 °C (98.1 °F), Max:37.3 °C (99.2 °F)  Temperature: 36.8 °C (98.2 °F)  Pulse  Av.1  Min: 56  Max: 133   Blood Pressure: (!) 93/63 mmHg     Respiratory:    Respiration: 18, Pulse Oximetry: 96 %        RUL Breath Sounds: Diminished, RML Breath Sounds: Diminished, RLL Breath Sounds: Diminished, TRICIA Breath Sounds: Diminished, LLL Breath Sounds: Diminished  Fluids:    Intake/Output Summary (Last 24 hours) at 01/10/17 1358  Last data filed at 01/10/17 0600   Gross per 24 hour   Intake   2225 ml   Output      0 ml    Net   2225 ml     Weight: 113.3 kg (249 lb 12.5 oz)  GI/Nutrition:  Orders Placed This Encounter   Procedures   • Diet Order     Standing Status: Standing      Number of Occurrences: 1      Standing Expiration Date:      Order Specific Question:  Diet:     Answer:  Regular [1]     Medical Decision Making, by Problem:  Active Problems:    Severe sepsis, POA (HCC) due to pyelonephritis  - continue IVF NS at 125cc/hr, with PRN boluses for low BP.   - continue antibiotics for pyelonephritis. Follow urine culture and blood cultures.   - close hemodynamic monitoring, as if clinically deteriorates, may need to broaden antibiotics pending cultures.       Acute pyelonephritis  - no hydronephrosis or obstuction.  - continue IV ceftriaxone for now. Follow urine culture.       Acute respiratory failure with hypoxia (HCC) due to sepsis, POA  - continue RT protocol, O2 to keep sats>88%,      Gram-negative katharina bacteremia  - suspect E. Coli.   - continue IV ceftriaxone for now. If clinically deteriorates, will broaden antibiotics pending cultures. Repeat blood cultures x 2.   --  Repeat BC Pending.       Paroxysmal atrial fibrillation (HCC)  - due to sepsis.   - continue IVF.   - Echo pending.   - Discussed anticoag, pt chose xarelto. DC asa  - CHADS2=1 (HTN). Continue propranolol.  - continue telemetry.       HTN (hypertension)  - continue propranolol (home dose). Monitor, as hemodynamics can become tenuous from sepsis.       Chronic renal failure, stage 2 (mild)  - continue IVF. Monitor. BMP in AM.      Fibromyalgia    Dispo: Possible dc home tomm.      Labs reviewed and Medications reviewed  Luna catheter: No Luna      DVT Prophylaxis: Enoxaparin (Lovenox)    Ulcer prophylaxis: Not indicated  Antibiotics: Treating active infection/contamination beyond 24 hours perioperative coverage  Assessed for rehab: Patient was assess for and/or received rehabilitation services during this hospitalization

## 2017-01-10 NOTE — CARE PLAN
Problem: Pain Management  Goal: Pain level will decrease to patient’s comfort goal  Pt educated on the 0-10 pain scale, pain descriptors, and locating pain. Pt demonstrates understanding through usage. Rest and administration of PRN analgesics implemented in response to pain. Minimum q2h pain assessments in affect.

## 2017-01-10 NOTE — CARE PLAN
Problem: Safety  Goal: Will remain free from injury  Outcome: PROGRESSING AS EXPECTED  Patient educated about risk of falls and reasoning for use of tread socks, calling for help, and bed alarms.         Problem: Knowledge Deficit  Goal: Knowledge of disease process/condition, treatment plan, diagnostic tests, and medications will improve  Outcome: PROGRESSING AS EXPECTED  Patient educated regarding medications, procedures and plan of care.

## 2017-01-10 NOTE — PROGRESS NOTES
Assumed Pt care at 1900. Pt AOx4 and is administered PRN analgesic (see MAR) for stated 6/10 HA pain. Pt BP within parameters prior to administration of scheduled PO Propanolol and Pt denies any SS of hypotension. Pt up to bathroom as stand bye assist. Walker no longer necessary. Pt updated on immediate POC. Pt denies any other requests at this time. Call light and phone within reach.

## 2017-01-11 VITALS
HEART RATE: 69 BPM | BODY MASS INDEX: 35.6 KG/M2 | TEMPERATURE: 98.2 F | DIASTOLIC BLOOD PRESSURE: 54 MMHG | OXYGEN SATURATION: 90 % | RESPIRATION RATE: 16 BRPM | HEIGHT: 70 IN | WEIGHT: 248.68 LBS | SYSTOLIC BLOOD PRESSURE: 105 MMHG

## 2017-01-11 PROBLEM — R51.9 HEADACHE: Status: ACTIVE | Noted: 2017-01-11

## 2017-01-11 LAB
ANION GAP SERPL CALC-SCNC: 4 MMOL/L (ref 0–11.9)
BACTERIA BLD CULT: ABNORMAL
BACTERIA BLD CULT: ABNORMAL
BUN SERPL-MCNC: 11 MG/DL (ref 8–22)
CALCIUM SERPL-MCNC: 9.5 MG/DL (ref 8.5–10.5)
CHLORIDE SERPL-SCNC: 112 MMOL/L (ref 96–112)
CO2 SERPL-SCNC: 22 MMOL/L (ref 20–33)
CREAT SERPL-MCNC: 0.79 MG/DL (ref 0.5–1.4)
ERYTHROCYTE [DISTWIDTH] IN BLOOD BY AUTOMATED COUNT: 44.9 FL (ref 35.9–50)
GFR SERPL CREATININE-BSD FRML MDRD: >60 ML/MIN/1.73 M 2
GLUCOSE SERPL-MCNC: 131 MG/DL (ref 65–99)
HCT VFR BLD AUTO: 34 % (ref 37–47)
HGB BLD-MCNC: 11 G/DL (ref 12–16)
LV EJECT FRACT  99904: 65
LV EJECT FRACT MOD 2C 99903: 62.67
LV EJECT FRACT MOD 4C 99902: 63.19
LV EJECT FRACT MOD BP 99901: 62.81
MCH RBC QN AUTO: 31.1 PG (ref 27–33)
MCHC RBC AUTO-ENTMCNC: 32.4 G/DL (ref 33.6–35)
MCV RBC AUTO: 96 FL (ref 81.4–97.8)
PLATELET # BLD AUTO: 144 K/UL (ref 164–446)
PMV BLD AUTO: 12.1 FL (ref 9–12.9)
POTASSIUM SERPL-SCNC: 3.7 MMOL/L (ref 3.6–5.5)
RBC # BLD AUTO: 3.54 M/UL (ref 4.2–5.4)
SIGNIFICANT IND 70042: ABNORMAL
SITE SITE: ABNORMAL
SODIUM SERPL-SCNC: 138 MMOL/L (ref 135–145)
SOURCE SOURCE: ABNORMAL
T4 FREE SERPL-MCNC: 1.11 NG/DL (ref 0.53–1.43)
TSH SERPL DL<=0.005 MIU/L-ACNC: 0.55 UIU/ML (ref 0.3–3.7)
WBC # BLD AUTO: 6.2 K/UL (ref 4.8–10.8)

## 2017-01-11 PROCEDURE — 700102 HCHG RX REV CODE 250 W/ 637 OVERRIDE(OP): Performed by: HOSPITALIST

## 2017-01-11 PROCEDURE — 90471 IMMUNIZATION ADMIN: CPT

## 2017-01-11 PROCEDURE — 85027 COMPLETE CBC AUTOMATED: CPT

## 2017-01-11 PROCEDURE — 700102 HCHG RX REV CODE 250 W/ 637 OVERRIDE(OP): Performed by: NURSE PRACTITIONER

## 2017-01-11 PROCEDURE — 99239 HOSP IP/OBS DSCHRG MGMT >30: CPT | Performed by: HOSPITALIST

## 2017-01-11 PROCEDURE — 93306 TTE W/DOPPLER COMPLETE: CPT

## 2017-01-11 PROCEDURE — 93306 TTE W/DOPPLER COMPLETE: CPT | Mod: 26 | Performed by: INTERNAL MEDICINE

## 2017-01-11 PROCEDURE — 3E0234Z INTRODUCTION OF SERUM, TOXOID AND VACCINE INTO MUSCLE, PERCUTANEOUS APPROACH: ICD-10-PCS | Performed by: HOSPITALIST

## 2017-01-11 PROCEDURE — 700105 HCHG RX REV CODE 258: Performed by: INTERNAL MEDICINE

## 2017-01-11 PROCEDURE — A9270 NON-COVERED ITEM OR SERVICE: HCPCS | Performed by: INTERNAL MEDICINE

## 2017-01-11 PROCEDURE — 36415 COLL VENOUS BLD VENIPUNCTURE: CPT

## 2017-01-11 PROCEDURE — A9270 NON-COVERED ITEM OR SERVICE: HCPCS | Performed by: NURSE PRACTITIONER

## 2017-01-11 PROCEDURE — 84439 ASSAY OF FREE THYROXINE: CPT

## 2017-01-11 PROCEDURE — 700111 HCHG RX REV CODE 636 W/ 250 OVERRIDE (IP): Performed by: HOSPITALIST

## 2017-01-11 PROCEDURE — 700102 HCHG RX REV CODE 250 W/ 637 OVERRIDE(OP): Performed by: INTERNAL MEDICINE

## 2017-01-11 PROCEDURE — 80048 BASIC METABOLIC PNL TOTAL CA: CPT

## 2017-01-11 PROCEDURE — 84443 ASSAY THYROID STIM HORMONE: CPT

## 2017-01-11 PROCEDURE — 90670 PCV13 VACCINE IM: CPT | Performed by: HOSPITALIST

## 2017-01-11 PROCEDURE — A9270 NON-COVERED ITEM OR SERVICE: HCPCS | Performed by: HOSPITALIST

## 2017-01-11 RX ORDER — BUTALBITAL, ACETAMINOPHEN AND CAFFEINE 50; 325; 40 MG/1; MG/1; MG/1
1 TABLET ORAL EVERY 6 HOURS PRN
Status: DISCONTINUED | OUTPATIENT
Start: 2017-01-11 | End: 2017-01-11 | Stop reason: HOSPADM

## 2017-01-11 RX ORDER — CIPROFLOXACIN 500 MG/1
500 TABLET, FILM COATED ORAL 2 TIMES DAILY
Qty: 20 TAB | Refills: 0 | Status: SHIPPED | OUTPATIENT
Start: 2017-01-11 | End: 2017-01-21

## 2017-01-11 RX ORDER — TRAMADOL HYDROCHLORIDE 50 MG/1
50-100 TABLET ORAL EVERY 6 HOURS PRN
Qty: 40 TAB | Refills: 0 | Status: SHIPPED | OUTPATIENT
Start: 2017-01-11 | End: 2017-03-01 | Stop reason: SDUPTHER

## 2017-01-11 RX ADMIN — SODIUM CHLORIDE: 9 INJECTION, SOLUTION INTRAVENOUS at 05:42

## 2017-01-11 RX ADMIN — GABAPENTIN 300 MG: 300 CAPSULE ORAL at 09:08

## 2017-01-11 RX ADMIN — VENLAFAXINE HYDROCHLORIDE 37.5 MG: 37.5 CAPSULE, EXTENDED RELEASE ORAL at 09:08

## 2017-01-11 RX ADMIN — ACETAMINOPHEN 650 MG: 325 TABLET, FILM COATED ORAL at 09:08

## 2017-01-11 RX ADMIN — METHOCARBAMOL 1500 MG: 750 TABLET ORAL at 09:08

## 2017-01-11 RX ADMIN — VENLAFAXINE HYDROCHLORIDE 37.5 MG: 37.5 CAPSULE, EXTENDED RELEASE ORAL at 16:06

## 2017-01-11 RX ADMIN — GABAPENTIN 300 MG: 300 CAPSULE ORAL at 13:54

## 2017-01-11 RX ADMIN — TRAMADOL HYDROCHLORIDE 100 MG: 50 TABLET, COATED ORAL at 05:40

## 2017-01-11 RX ADMIN — BUTALBITAL, ACETAMINOPHEN, AND CAFFEINE 1 TABLET: 50; 325; 40 TABLET ORAL at 13:54

## 2017-01-11 RX ADMIN — VITAMIN B12 0.1 MG ORAL TABLET 100 MCG: 0.1 TABLET ORAL at 09:09

## 2017-01-11 RX ADMIN — PROPRANOLOL HYDROCHLORIDE 80 MG: 10 TABLET ORAL at 09:08

## 2017-01-11 RX ADMIN — RIVAROXABAN 20 MG: 20 TABLET, FILM COATED ORAL at 16:06

## 2017-01-11 RX ADMIN — PNEUMOCOCCAL 13-VALENT CONJUGATE VACCINE 0.5 ML: 2.2; 2.2; 2.2; 2.2; 2.2; 4.4; 2.2; 2.2; 2.2; 2.2; 2.2; 2.2; 2.2 INJECTION, SUSPENSION INTRAMUSCULAR at 16:06

## 2017-01-11 ASSESSMENT — PAIN SCALES - GENERAL
PAINLEVEL_OUTOF10: 3
PAINLEVEL_OUTOF10: 3
PAINLEVEL_OUTOF10: 6
PAINLEVEL_OUTOF10: 3

## 2017-01-11 NOTE — CARE PLAN
Problem: Communication  Goal: The ability to communicate needs accurately and effectively will improve  Outcome: PROGRESSING AS EXPECTED  POC discussed with pt. All medications explained. All questions answered.     Problem: Venous Thromboembolism (VTW)/Deep Vein Thrombosis (DVT) Prevention:  Goal: Patient will participate in Venous Thrombosis (VTE)/Deep Vein Thrombosis (DVT)Prevention Measures  Outcome: PROGRESSING AS EXPECTED  Pt is ambulatory and receives lovenox.

## 2017-01-11 NOTE — DISCHARGE SUMMARY
Hospital Medicine Discharge Note     Admit Date:  1/8/2017       Discharge Date:   1/11/2017    Attending Physician:  Xavier Jules     Diagnoses (includes active and resolved):   Principal Problem:    Severe sepsis, POA (HCC) due to pyelonephritis POA: Yes  Active Problems:    Acute pyelonephritis POA: Yes    Acute respiratory failure with hypoxia (HCC) due to sepsis, POA POA: Yes    Gram-negative katharina bacteremia POA: Yes    Paroxysmal atrial fibrillation (HCC) POA: No    HTN (hypertension) POA: Yes    Chronic renal failure, stage 2 (mild) POA: Yes    Fibromyalgia POA: Yes    Headache POA: Unknown  Resolved Problems:    * No resolved hospital problems. *      Hospital Summary (Brief Narrative):       66-year-old female w/h/o chronic low back pain which she states is related to her kidneys, stage II chronic kidney disease, followed by Dr. Lloyd p/w nausea and vomiting, it seemed intermittent and progressively getting worse.   She was admitted and had an abnormal UA. She was found on CT abdomen to have likely focal pyelonephritis. Her urine and blood cultures grew E. coli. She was treated with ceftriaxone. The E. coli turned out to be pansensitive. Repeat blood cultures were negative. Thus she was able to be discharged home on oral ciprofloxacin.  Narcotic history was researched for this patient. The last fill was in 9/2016.     Consultants:      None    Procedures:        None    Discharge Medications:           Medication List      START taking these medications       Instructions    ciprofloxacin 500 MG Tabs   Commonly known as:  CIPRO   Next Dose Due:  Tomorrow morning, 1/12    Take 1 Tab by mouth 2 times a day for 10 days.   Dose:  500 mg       rivaroxaban 20 MG Tabs tablet   Last time this was given:  20 mg on 1/11/2017  4:06 PM   Commonly known as:  XARELTO   Next Dose Due:  Tomorrow evening, 1/12    Take 1 Tab by mouth with dinner.   Dose:  20 mg       tramadol 50 MG Tabs   Last time this was given:  100 mg  on 1/11/2017  5:40 AM   Commonly known as:  ULTRAM   Next Dose Due:  Every 6 hr as needed.    Take 1-2 Tabs by mouth every 6 hours as needed for Mild Pain.   Dose:   mg         CONTINUE taking these medications       Instructions    albuterol 108 (90 BASE) MCG/ACT Aers inhalation aerosol   Commonly known as:  VENTOLIN HFA   Next Dose Due:  Every 6 hr as needed.    Inhale 2 Puffs by mouth every 6 hours as needed for Shortness of Breath.   Dose:  2 Puff       cyanocobalamin 1000 MCG Tabs   Last time this was given:  100 mcg on 1/11/2017  9:09 AM   Commonly known as:  VITAMIN B12   Next Dose Due:  Tomorrow, 1/12    Take 1 Tab by mouth every day.   Dose:  1000 mcg       gabapentin 300 MG Caps   Last time this was given:  300 mg on 1/11/2017  1:54 PM   Commonly known as:  NEURONTIN   Next Dose Due:  This evening, 1/11    Take 300 mg by mouth 3 times a day.   Dose:  300 mg       methocarbamol 750 MG Tabs   Last time this was given:  1,500 mg on 1/11/2017  9:08 AM   Commonly known as:  ROBAXIN   Next Dose Due:  This evening, 1/11    Take 1,500 mg by mouth 2 Times a Day.   Dose:  1500 mg       propranolol 40 MG Tabs   Last time this was given:  80 mg on 1/11/2017  9:08 AM   Commonly known as:  INDERAL   Next Dose Due:  This evening, 1/11    Take 80 mg by mouth 2 times a day.   Dose:  80 mg       venlafaxine XR 37.5 MG Cp24   Last time this was given:  37.5 mg on 1/11/2017  4:06 PM   Commonly known as:  EFFEXOR XR   Next Dose Due:  Tomorrow morning, 1/12    Take 1 Cap by mouth 2 times a day. TAKE ONE CAPSULE BY MOUTH TWICE A DAY WITH MEALS   Dose:  37.5 mg         STOP taking these medications          furosemide 20 MG Tabs   Commonly known as:  LASIX           Disposition:   Discharge home    Diet:   Regular    Activity:   As tolerated    Code status:   Full code    Primary Care Provider:    HODAN Sterling    Follow up appointment details :      PCP in 2 weeks  No follow-up provider specified.  No future  appointments.    Pending Studies:         Final repeat blood cultures    Time spent on discharge day patient visit: 48 minutes    #################################################    Interval history/exam for day of discharge:    Filed Vitals:    01/10/17 2006 01/11/17 0006 01/11/17 0406 01/11/17 0740   BP: 134/87 104/70 113/79 142/65   Pulse: 99 85 71 60   Temp: 38 °C (100.4 °F) 36.4 °C (97.5 °F) 37.5 °C (99.5 °F) 36.8 °C (98.3 °F)   Resp: 18 16 16 16   Height:       Weight: 112.8 kg (248 lb 10.9 oz)      SpO2: 92% 91% 90% 92%     Weight/BMI: Body mass index is 35.68 kg/(m^2).  Pulse Oximetry: 92 %, O2 (LPM): 1.5, O2 Delivery: None (Room Air)    General:  NAD  CVS:  RRR  PULM:  CTAB, no respiratory distress  Flanks: Tenderness to palpation improved    Most Recent Labs:    Lab Results   Component Value Date/Time    WBC 6.2 01/11/2017 02:04 AM    RBC 3.54* 01/11/2017 02:04 AM    HEMOGLOBIN 11.0* 01/11/2017 02:04 AM    HEMATOCRIT 34.0* 01/11/2017 02:04 AM    MCV 96.0 01/11/2017 02:04 AM    MCH 31.1 01/11/2017 02:04 AM    MCHC 32.4* 01/11/2017 02:04 AM    MPV 12.1 01/11/2017 02:04 AM    NEUTROPHILS-POLYS 68.70 01/10/2017 02:34 AM    LYMPHOCYTES 16.30* 01/10/2017 02:34 AM    MONOCYTES 13.20 01/10/2017 02:34 AM    EOSINOPHILS 0.80 01/10/2017 02:34 AM    BASOPHILS 0.50 01/10/2017 02:34 AM      Lab Results   Component Value Date/Time    SODIUM 138 01/11/2017 02:04 AM    POTASSIUM 3.7 01/11/2017 02:04 AM    CHLORIDE 112 01/11/2017 02:04 AM    CO2 22 01/11/2017 02:04 AM    GLUCOSE 131* 01/11/2017 02:04 AM    BUN 11 01/11/2017 02:04 AM    CREATININE 0.79 01/11/2017 02:04 AM      Lab Results   Component Value Date/Time    ALT(SGPT) 8 01/08/2017 02:31 PM    AST(SGOT) 9* 01/08/2017 02:31 PM    ALKALINE PHOSPHATASE 84 01/08/2017 02:31 PM    TOTAL BILIRUBIN 1.1 01/08/2017 02:31 PM    ALBUMIN 3.8 01/08/2017 02:31 PM    GLOBULIN 3.6* 01/08/2017 02:31 PM     No results found for: PROTHROMBTM, INR     Imaging/ Testing:       ECHOCARDIOGRAM COMP W/O CONT   Final Result      CT-ABDOMEN-PELVIS WITH   Final Result      New right renal upper pole anterior cortex hypodense masslike region could indicate focal pyelonephritis or renal cell carcinoma.      Bilateral renal pelvis/proximal ureteral wall enhancement is suspicious for ureteritis      Stable splenomegaly and 1 cm mass which is of unlikely clinical significance      Cholecystectomy      DX-CHEST-LIMITED (1 VIEW)   Final Result      No acute cardiopulmonary disease.          Instructions:      The patient was instructed to return to the ER in the event of worsening symptoms. I have counseled the patient on the importance of compliance and the patient has agreed to proceed with all medical recommendations and follow up plan indicated above.   The patient understands that all medications come with benefits and risks. Risks may include permanent injury or death and these risks can be minimized with close reassessment and monitoring.

## 2017-01-11 NOTE — DISCHARGE PLANNING
Pt provided with a note for absence from work due to hospitalization. DC Plan is dc today. Son to provide ride home. Kaitlinto authed with no copay to pt. Pt with no dc concerns.

## 2017-01-11 NOTE — PROGRESS NOTES
Assessment completed. Patient A&O x 4. Pt c/o 3/10 head pain, medicated per MAR. POC discussed, pending ECHO results. NS running at 125 mL/hr. Call light & bedside table within reach. Bed locked and in lowest position. Bed alarm on and fall precautions in place. Hourly rounding in place.

## 2017-01-12 NOTE — PROGRESS NOTES
Pt ready for d/c. Orders received. Pt received new prescriptions for xarelto, ultram, and cipro. Educated on Xarelto and its anticoagulation effects. Educated pt on cipro and the importance of completing abx therapy. IV d/c'd and tip intact. Tele monitor off tech notified. All personal belongings with pt. Pt will follow up with PCP, she states she will call her MD tomorrow.

## 2017-01-12 NOTE — DISCHARGE INSTRUCTIONS
Discharge Instructions    Discharged to home by car with relative. Discharged via walking, hospital escort: Refused.  Special equipment needed: Not Applicable    Be sure to schedule a follow-up appointment with your primary care doctor or any specialists as instructed.     Discharge Plan:   Diet Plan: Discussed  Activity Level: Discussed  Confirmed Follow up Appointment: Patient to Call and Schedule Appointment  Confirmed Symptoms Management: Discussed  Medication Reconciliation Updated: Yes  Influenza Vaccine Indication: Not indicated: Previously immunized this influenza season and > 8 years of age    I understand that a diet low in cholesterol, fat, and sodium is recommended for good health. Unless I have been given specific instructions below for another diet, I accept this instruction as my diet prescription.     Special Instructions: Sepsis, Adult  Sepsis is a serious infection of your blood or tissues that affects your whole body. The infection that causes sepsis may be bacterial, viral, fungal, or parasitic. Sepsis may be life threatening. Sepsis can cause your blood pressure to drop. This may result in shock. Shock causes your central nervous system and your organs to stop working correctly.   RISK FACTORS  Sepsis can happen in anyone, but it is more likely to happen in people who have weakened immune systems.  SIGNS AND SYMPTOMS   Symptoms of sepsis can include:  · Fever or low body temperature (hypothermia).  · Rapid breathing (hyperventilation).  · Chills.  · Rapid heartbeat (tachycardia).  · Confusion or light-headedness.  · Trouble breathing.  · Urinating much less than usual.  · Cool, clammy skin or red, flushed skin.  · Other problems with the heart, kidneys, or brain.  DIAGNOSIS   Your health care provider will likely do tests to look for an infection, to see if the infection has spread to your blood, and to see how serious your condition is. Tests can include:  · Blood tests, including cultures of  your blood.  · Cultures of other fluids from your body, such as:  ¨ Urine.  ¨ Pus from wounds.  ¨ Mucus coughed up from your lungs.  · Urine tests other than cultures.  · X-ray exams or other imaging tests.  TREATMENT   Treatment will begin with elimination of the source of infection. If your sepsis is likely caused by a bacterial or fungal infection, you will be given antibiotic or antifungal medicines.  You may also receive:  · Oxygen.  · Fluids through an IV tube.  · Medicines to increase your blood pressure.  · A machine to clean your blood (dialysis) if your kidneys fail.  · A machine to help you breathe if your lungs fail.  SEEK IMMEDIATE MEDICAL CARE IF:  You get an infection or develop any of the signs and symptoms of sepsis after surgery or a hospitalization.     This information is not intended to replace advice given to you by your health care provider. Make sure you discuss any questions you have with your health care provider.     Document Released: 09/15/2004 Document Revised: 05/03/2016 Document Reviewed: 08/25/2014  [employer]amiliar Interactive Patient Education ©2016 [employer]      · Is patient discharged on Warfarin / Coumadin?   No     · Is patient Post Blood Transfusion?  No    Depression / Suicide Risk    As you are discharged from this [facility] facility, it is important to learn how to keep safe from harming yourself.    Recognize the warning signs:  · Abrupt changes in personality, positive or negative- including increase in energy   · Giving away possessions  · Change in eating patterns- significant weight changes-  positive or negative  · Change in sleeping patterns- unable to sleep or sleeping all the time   · Unwillingness or inability to communicate  · Depression  · Unusual sadness, discouragement and loneliness  · Talk of wanting to die  · Neglect of personal appearance   · Rebelliousness- reckless behavior  · Withdrawal from people/activities they love  · Confusion- inability to  concentrate     If you or a loved one observes any of these behaviors or has concerns about self-harm, here's what you can do:  · Talk about it- your feelings and reasons for harming yourself  · Remove any means that you might use to hurt yourself (examples: pills, rope, extension cords, firearm)  · Get professional help from the community (Mental Health, Substance Abuse, psychological counseling)  · Do not be alone:Call your Safe Contact- someone whom you trust who will be there for you.  · Call your local CRISIS HOTLINE 172-4932 or 779-853-8246  · Call your local Children's Mobile Crisis Response Team [place] (289) 597-8054 or www.hCentive  · Call the toll free National Suicide Prevention Hotlines   · National Suicide Prevention Lifeline 932-583-JRLN (6856)  · [employer] Line Network 800-SUICIDE (451-7531)    Pyelonephritis, Adult  Pyelonephritis is a kidney infection. In general, there are 2 main types of pyelonephritis:  · Infections that come on quickly without any warning (acute pyelonephritis).  · Infections that persist for a long period of time (chronic pyelonephritis).  CAUSES   Two main causes of pyelonephritis are:  · Bacteria traveling from the bladder to the kidney. This is a problem especially in pregnant women. The urine in the bladder can become filled with bacteria from multiple causes, including:  ¨ Inflammation of the prostate gland (prostatitis).  ¨ Sexual intercourse in females.  ¨ Bladder infection (cystitis).  · Bacteria traveling from the bloodstream to the tissue part of the kidney.  Problems that may increase your risk of getting a kidney infection include:  · Diabetes.  · Kidney stones or bladder stones.  · Cancer.  · Catheters placed in the bladder.  · Other abnormalities of the kidney or ureter.  SYMPTOMS   · Abdominal pain.  · Pain in the side or flank area.  · Fever.  · Chills.  · Upset stomach.  · Blood in the urine (dark urine).  · Frequent urination.  · Strong or  persistent urge to urinate.  · Burning or stinging when urinating.  DIAGNOSIS   Your caregiver may diagnose your kidney infection based on your symptoms. A urine sample may also be taken.  TREATMENT   In general, treatment depends on how severe the infection is.   · If the infection is mild and caught early, your caregiver may treat you with oral antibiotics and send you home.  · If the infection is more severe, the bacteria may have gotten into the bloodstream. This will require intravenous (IV) antibiotics and a hospital stay. Symptoms may include:  ¨ High fever.  ¨ Severe flank pain.  ¨ Shaking chills.  · Even after a hospital stay, your caregiver may require you to be on oral antibiotics for a period of time.  · Other treatments may be required depending upon the cause of the infection.  HOME CARE INSTRUCTIONS   · Take your antibiotics as directed. Finish them even if you start to feel better.  · Make an appointment to have your urine checked to make sure the infection is gone.  · Drink enough fluids to keep your urine clear or pale yellow.  · Take medicines for the bladder if you have urgency and frequency of urination as directed by your caregiver.  SEEK IMMEDIATE MEDICAL CARE IF:   · You have a fever or persistent symptoms for more than 2-3 days.  · You have a fever and your symptoms suddenly get worse.  · You are unable to take your antibiotics or fluids.  · You develop shaking chills.  · You experience extreme weakness or fainting.  · There is no improvement after 2 days of treatment.  MAKE SURE YOU:  · Understand these instructions.  · Will watch your condition.  · Will get help right away if you are not doing well or get worse.     This information is not intended to replace advice given to you by your health care provider. Make sure you discuss any questions you have with your health care provider.     Document Released: 12/18/2006 Document Revised: 06/18/2013 Document Reviewed: 05/23/2012  [email or web address]  Interactive Patient Education ©2016 [employer]    Tramadol tablets  What is this medicine?  TRAMADOL (TRA ma dole) is a pain reliever. It is used to treat moderate to severe pain in adults.  This medicine may be used for other purposes; ask your health care provider or pharmacist if you have questions.  COMMON BRAND NAME(S): Ultram  What should I tell my health care provider before I take this medicine?  They need to know if you have any of these conditions:  -brain tumor  -depression  -drug abuse or addiction  -head injury  -if you frequently drink alcohol containing drinks  -kidney disease or trouble passing urine  -liver disease  -lung disease, asthma, or breathing problems  -seizures or epilepsy  -suicidal thoughts, plans, or attempt; a previous suicide attempt by you or a family member  -an unusual or allergic reaction to tramadol, codeine, other medicines, foods, dyes, or preservatives  -pregnant or trying to get pregnant  -breast-feeding  How should I use this medicine?  Take this medicine by mouth with a full glass of water. Follow the directions on the prescription label. If the medicine upsets your stomach, take it with food or milk. Do not take more medicine than you are told to take.  Talk to your pediatrician regarding the use of this medicine in children. Special care may be needed.  Overdosage: If you think you have taken too much of this medicine contact a poison control center or emergency room at once.  NOTE: This medicine is only for you. Do not share this medicine with others.  What if I miss a dose?  If you miss a dose, take it as soon as you can. If it is almost time for your next dose, take only that dose. Do not take double or extra doses.  What may interact with this medicine?  Do not take this medicine with any of the following medications:  -MAOIs like Carbex, Eldepryl, Marplan, Nardil, and Parnate  This medicine may also interact with the following medications:  -alcohol or medicines  that contain alcohol  -antihistamines  -benzodiazepines  -bupropion  -carbamazepine or oxcarbazepine  -clozapine  -cyclobenzaprine  -digoxin  -furazolidone  -linezolid  -medicines for depression, anxiety, or psychotic disturbances  -medicines for migraine headache like almotriptan, eletriptan, frovatriptan, naratriptan, rizatriptan, sumatriptan, zolmitriptan  -medicines for pain like pentazocine, buprenorphine, butorphanol, meperidine, nalbuphine, and propoxyphene  -medicines for sleep  -muscle relaxants  -naltrexone  -phenobarbital  -phenothiazines like perphenazine, thioridazine, chlorpromazine, mesoridazine, fluphenazine, prochlorperazine, promazine, and trifluoperazine  -procarbazine  -warfarin  This list may not describe all possible interactions. Give your health care provider a list of all the medicines, herbs, non-prescription drugs, or dietary supplements you use. Also tell them if you smoke, drink alcohol, or use illegal drugs. Some items may interact with your medicine.  What should I watch for while using this medicine?  Tell your doctor or health care professional if your pain does not go away, if it gets worse, or if you have new or a different type of pain. You may develop tolerance to the medicine. Tolerance means that you will need a higher dose of the medicine for pain relief. Tolerance is normal and is expected if you take this medicine for a long time.  Do not suddenly stop taking your medicine because you may develop a severe reaction. Your body becomes used to the medicine. This does NOT mean you are addicted. Addiction is a behavior related to getting and using a drug for a non-medical reason. If you have pain, you have a medical reason to take pain medicine. Your doctor will tell you how much medicine to take. If your doctor wants you to stop the medicine, the dose will be slowly lowered over time to avoid any side effects.  You may get drowsy or dizzy. Do not drive, use machinery, or do  anything that needs mental alertness until you know how this medicine affects you. Do not stand or sit up quickly, especially if you are an older patient. This reduces the risk of dizzy or fainting spells. Alcohol can increase or decrease the effects of this medicine. Avoid alcoholic drinks.  You may have constipation. Try to have a bowel movement at least every 2 to 3 days. If you do not have a bowel movement for 3 days, call your doctor or health care professional.  Your mouth may get dry. Chewing sugarless gum or sucking hard candy, and drinking plenty of water may help. Contact your doctor if the problem does not go away or is severe.  What side effects may I notice from receiving this medicine?  Side effects that you should report to your doctor or health care professional as soon as possible:  -allergic reactions like skin rash, itching or hives, swelling of the face, lips, or tongue  -breathing difficulties, wheezing  -confusion  -itching  -light headedness or fainting spells  -redness, blistering, peeling or loosening of the skin, including inside the mouth  -seizures  Side effects that usually do not require medical attention (report to your doctor or health care professional if they continue or are bothersome):  -constipation  -dizziness  -drowsiness  -headache  -nausea, vomiting  This list may not describe all possible side effects. Call your doctor for medical advice about side effects. You may report side effects to FDA at 9-057-FDA-7974.  Where should I keep my medicine?  Keep out of the reach of children.  Store at room temperature between 15 and 30 degrees C (59 and 86 degrees F). Keep container tightly closed. Throw away any unused medicine after the expiration date.  NOTE: This sheet is a summary. It may not cover all possible information. If you have questions about this medicine, talk to your doctor, pharmacist, or health care provider.  © 2014, [employer]. (8/31/2011 11:55:44  AM)    Sulfamethoxazole; Trimethoprim, SMX-TMP tablets  What is this medicine?  SULFAMETHOXAZOLE; TRIMETHOPRIM or SMX-TMP (suhl fuh meth OK dakota zohl; trye METH oh prim) is a combination of a sulfonamide antibiotic and a second antibiotic, trimethoprim. It is used to treat or prevent certain kinds of bacterial infections. It will not work for colds, flu, or other viral infections.  This medicine may be used for other purposes; ask your health care provider or pharmacist if you have questions.  COMMON BRAND NAME(S): Bacter-Aid DS , Bactrim DS, Bactrim, Septra DS, Septra  What should I tell my health care provider before I take this medicine?  They need to know if you have any of these conditions:  -anemia  -asthma  -being treated with anticonvulsants  -if you frequently drink alcohol containing drinks  -kidney disease  -liver disease  -low level of folic acid or glucose-6-phosphate dehydrogenase  -poor nutrition or malabsorption  -porphyria  -severe allergies  -thyroid disorder  -an unusual or allergic reaction to sulfamethoxazole, trimethoprim, sulfa drugs, other medicines, foods, dyes, or preservatives  -pregnant or trying to get pregnant  -breast-feeding  How should I use this medicine?  Take this medicine by mouth with a full glass of water. Follow the directions on the prescription label. Take your medicine at regular intervals. Do not take it more often than directed. Do not skip doses or stop your medicine early.  Talk to your pediatrician regarding the use of this medicine in children. Special care may be needed. This medicine has been used in children as young as 2 months of age.  Overdosage: If you think you have taken too much of this medicine contact a poison control center or emergency room at once.  NOTE: This medicine is only for you. Do not share this medicine with others.  What if I miss a dose?  If you miss a dose, take it as soon as you can. If it is almost time for your next dose, take only that  dose. Do not take double or extra doses.  What may interact with this medicine?  Do not take this medicine with any of the following medications:  -aminobenzoate potassium  -dofetilide  -metronidazole  This medicine may also interact with the following medications:  -ACE inhibitors like benazepril, enalapril, lisinopril, and ramipril  -cyclosporine  -digoxin  -diuretics  -indomethacin  -medicines for diabetes  -methenamine  -methotrexate  -phenytoin  -potassium supplements  -pyrimethamine  -sulfinpyrazone  -tricyclic antidepressants  -warfarin  This list may not describe all possible interactions. Give your health care provider a list of all the medicines, herbs, non-prescription drugs, or dietary supplements you use. Also tell them if you smoke, drink alcohol, or use illegal drugs. Some items may interact with your medicine.  What should I watch for while using this medicine?  Tell your doctor or health care professional if your symptoms do not improve. Drink several glasses of water a day to reduce the risk of kidney problems.  Do not treat diarrhea with over the counter products. Contact your doctor if you have diarrhea that lasts more than 2 days or if it is severe and watery.  This medicine can make you more sensitive to the sun. Keep out of the sun. If you cannot avoid being in the sun, wear protective clothing and use a sunscreen. Do not use sun lamps or tanning beds/booths.  What side effects may I notice from receiving this medicine?  Side effects that you should report to your doctor or health care professional as soon as possible:  -allergic reactions like skin rash or hives, swelling of the face, lips, or tongue  -breathing problems  -fever or chills, sore throat  -irregular heartbeat, chest pain  -joint or muscle pain  -pain or difficulty passing urine  -red pinpoint spots on skin  -redness, blistering, peeling or loosening of the skin, including inside the mouth  -unusual bleeding or  bruising  -unusually weak or tired  -yellowing of the eyes or skin  Side effects that usually do not require medical attention (report to your doctor or health care professional if they continue or are bothersome):  -diarrhea  -dizziness  -headache  -loss of appetite  -nausea, vomiting  -nervousness  This list may not describe all possible side effects. Call your doctor for medical advice about side effects. You may report side effects to FDA at 9-702-XOP-7865.  Where should I keep my medicine?  Keep out of the reach of children.  Store at room temperature between 20 to 25 degrees C (68 to 77 degrees F). Protect from light. Throw away any unused medicine after the expiration date.  NOTE: This sheet is a summary. It may not cover all possible information. If you have questions about this medicine, talk to your doctor, pharmacist, or health care provider.  © 2014, [employer]. (8/19/2009 11:32:51 AM)    Rivaroxaban oral tablets  What is this medicine?  RIVAROXABAN (ri va JAVIER a ban) is an anticoagulant (blood thinner). It is used to treat blood clots in the lungs or in the veins. It is also used after knee or hip surgeries to prevent blood clots. It is also used to lower the chance of stroke in people with a medical condition called atrial fibrillation.  This medicine may be used for other purposes; ask your health care provider or pharmacist if you have questions.  COMMON BRAND NAME(S): Xarelto  What should I tell my health care provider before I take this medicine?  They need to know if you have any of these conditions:  -bleeding disorders  -bleeding in the brain  -blood in your stools (black or tarry stools) or if you have blood in your vomit  -history of stomach bleeding  -kidney disease  -liver disease  -low blood counts, like low white cell, platelet, or red cell counts  -recent or planned spinal or epidural procedure  -take medicines that treat or prevent blood clots  -an unusual or allergic reaction  to rivaroxaban, other medicines, foods, dyes, or preservatives  -pregnant or trying to get pregnant  -breast-feeding  How should I use this medicine?  Take this medicine by mouth with a glass of water. Follow the directions on the prescription label. Take your medicine at regular intervals. Do not take it more often than directed. Do not stop taking except on your doctor's advice.  If you are taking this medicine after hip or knee replacement surgery, take it with or without food.  If you are taking this medicine for atrial fibrillation, take it with your evening meal. If you are taking this medicine to treat blood clots, take it with food at the same time each day. If you are unable to swallow your tablet, you may crush the tablet and mix it in applesauce. Then, immediately eat the applesauce. You should eat more food right after you eat the applesauce containing the crushed tablet.  Talk to your pediatrician regarding the use of this medicine in children. Special care may be needed.  Overdosage: If you think you've taken too much of this medicine contact a poison control center or emergency room at once.  Overdosage: If you think you have taken too much of this medicine contact a poison control center or emergency room at once.  NOTE: This medicine is only for you. Do not share this medicine with others.  What if I miss a dose?  If you take your medicine once a day and miss a dose, take the missed dose as soon as you remember. If you take your medicine twice a day and miss a dose, take the missed dose immediately. In this instance, 2 tablets may be taken at the same time. The next day you should take 1 tablet twice a day as directed.  What may interact with this medicine?  -aspirin and aspirin-like medicines  -certain antibiotics like erythromycin, azithromycin, and clarithromycin  -certain medicines for fungal infections like ketoconazole and itraconazole  -certain medicines for irregular heart beat like  amiodarone, quinidine, dronedarone  -certain medicines for seizures like carbamazepine, phenytoin  -certain medicines that treat or prevent blood clots like warfarin, enoxaparin, and dalteparin   -conivaptan  -diltiazem  -felodipine  -indinavir  -lopinavir; ritonavir  -NSAIDS, medicines for pain and inflammation, like ibuprofen or naproxen  -ranolazine  -rifampin  -ritonavir  -[place]'s wort  -verapamil  This list may not describe all possible interactions. Give your health care provider a list of all the medicines, herbs, non-prescription drugs, or dietary supplements you use. Also tell them if you smoke, drink alcohol, or use illegal drugs. Some items may interact with your medicine.  What should I watch for while using this medicine?  Do not stop taking this medicine without first talking to your doctor. Stopping this medicine may increase your risk of having a stroke. Be sure to refill your prescription before you run out of medicine.  This medicine may increase your risk to bruise or bleed. Call your doctor or health care professional if you notice any unusual bleeding.  Be careful brushing and flossing your teeth or using a toothpick because you may bleed more easily. If you have any dental work done, tell your dentist you are receiving this medicine.  What side effects may I notice from receiving this medicine?  Side effects that you should report to your doctor or health care professional as soon as possible:  -allergic reactions like skin rash, itching or hives, swelling of the face, lips, or tongue  -back pain  -bloody or black, tarry stools  -changes in vision  -confusion, trouble speaking or understanding  -red or dark-brown urine  -redness, blistering, peeling or loosening of the skin, including inside the mouth  -severe headaches  -spitting up blood or brown material that looks like coffee grounds  -sudden numbness or weakness of the face, arm or leg  -trouble walking, dizziness, loss of balance or  coordination  -unusual bruising or bleeding from the eye, gums, or nose   Side effects that usually do not require medical attention (Report these to your doctor or health care professional if they continue or are bothersome.):  -dizziness  -muscle pain  This list may not describe all possible side effects. Call your doctor for medical advice about side effects. You may report side effects to FDA at 4-066-FDA-4857.  Where should I keep my medicine?  Keep out of the reach of children.  Store at room temperature between 15 and 30 degrees C (59 and 86 degrees F). Throw away any unused medicine after the expiration date.  NOTE: This sheet is a summary. It may not cover all possible information. If you have questions about this medicine, talk to your doctor, pharmacist, or health care provider.  © 2014, [employer]. (3/17/2014 3:32:09 PM)

## 2017-01-13 LAB
BACTERIA BLD CULT: NORMAL
SIGNIFICANT IND 70042: NORMAL
SITE SITE: NORMAL
SOURCE SOURCE: NORMAL

## 2017-01-14 LAB
BACTERIA BLD CULT: NORMAL
BACTERIA BLD CULT: NORMAL
SIGNIFICANT IND 70042: NORMAL
SIGNIFICANT IND 70042: NORMAL
SITE SITE: NORMAL
SITE SITE: NORMAL
SOURCE SOURCE: NORMAL
SOURCE SOURCE: NORMAL

## 2017-01-25 ENCOUNTER — OFFICE VISIT (OUTPATIENT)
Dept: MEDICAL GROUP | Facility: MEDICAL CENTER | Age: 67
End: 2017-01-25
Attending: NURSE PRACTITIONER
Payer: MEDICAID

## 2017-01-25 VITALS
OXYGEN SATURATION: 94 % | WEIGHT: 226 LBS | HEART RATE: 86 BPM | BODY MASS INDEX: 32.35 KG/M2 | DIASTOLIC BLOOD PRESSURE: 84 MMHG | HEIGHT: 70 IN | SYSTOLIC BLOOD PRESSURE: 146 MMHG | RESPIRATION RATE: 20 BRPM | TEMPERATURE: 98.2 F

## 2017-01-25 DIAGNOSIS — R22.42 LUMP OF SKIN OF LEFT LOWER EXTREMITY: ICD-10-CM

## 2017-01-25 DIAGNOSIS — L72.3 SEBACEOUS CYST: ICD-10-CM

## 2017-01-25 DIAGNOSIS — M25.562 CHRONIC PAIN OF BOTH KNEES: ICD-10-CM

## 2017-01-25 DIAGNOSIS — G89.29 CHRONIC PAIN OF BOTH KNEES: ICD-10-CM

## 2017-01-25 DIAGNOSIS — N12 PYELONEPHRITIS: ICD-10-CM

## 2017-01-25 DIAGNOSIS — R00.2 HEART PALPITATIONS: ICD-10-CM

## 2017-01-25 DIAGNOSIS — Z13.21 ENCOUNTER FOR VITAMIN DEFICIENCY SCREENING: ICD-10-CM

## 2017-01-25 DIAGNOSIS — M25.561 CHRONIC PAIN OF BOTH KNEES: ICD-10-CM

## 2017-01-25 DIAGNOSIS — I10 ESSENTIAL HYPERTENSION: ICD-10-CM

## 2017-01-25 DIAGNOSIS — R53.83 LOW ENERGY: ICD-10-CM

## 2017-01-25 DIAGNOSIS — I48.91 ATRIAL FIBRILLATION, NEW ONSET (HCC): ICD-10-CM

## 2017-01-25 LAB
APPEARANCE UR: CLEAR
BILIRUB UR STRIP-MCNC: NEGATIVE MG/DL
COLOR UR AUTO: NORMAL
GLUCOSE UR STRIP.AUTO-MCNC: NEGATIVE MG/DL
KETONES UR STRIP.AUTO-MCNC: NEGATIVE MG/DL
LEUKOCYTE ESTERASE UR QL STRIP.AUTO: NEGATIVE
NITRITE UR QL STRIP.AUTO: NEGATIVE
PH UR STRIP.AUTO: 5 [PH] (ref 5–8)
PROT UR QL STRIP: NEGATIVE MG/DL
RBC UR QL AUTO: NEGATIVE
SP GR UR STRIP.AUTO: 1.01
UROBILINOGEN UR STRIP-MCNC: 0.2 MG/DL

## 2017-01-25 PROCEDURE — 81002 URINALYSIS NONAUTO W/O SCOPE: CPT | Performed by: NURSE PRACTITIONER

## 2017-01-25 PROCEDURE — 99214 OFFICE O/P EST MOD 30 MIN: CPT | Performed by: NURSE PRACTITIONER

## 2017-01-25 PROCEDURE — 99213 OFFICE O/P EST LOW 20 MIN: CPT | Performed by: NURSE PRACTITIONER

## 2017-01-25 NOTE — ASSESSMENT & PLAN NOTE
"Pt reports will complete her antibiotics (Cipro) tomorrow.  Recently hospitalized for sepsis and Pyelonephritis.   Reports since leaving hospital has felt \"not right\"  Sleeps on left side. Feels heart beating.  "

## 2017-01-25 NOTE — ASSESSMENT & PLAN NOTE
BP  146/84 in clinic today. Known hx of HTN.Pt stating that they have been taking their medication ( Inderal 40 mg BID) as prescribed without adverse effect. Pt denies HA, vision changes, neurologic deficits, CP, SOB.

## 2017-01-25 NOTE — ASSESSMENT & PLAN NOTE
Pt reports saw Vascular Surgeon and instructed by him to see Orthopedic MD or gen'l surgeon related to probable sebaceous cyst to left lower leg.

## 2017-01-25 NOTE — MR AVS SNAPSHOT
"        Miryam Juan Magdiel   2017 2:30 PM   Office Visit   MRN: [id number]    [facility]   Dept Phone:  [phone]    Description:  Female : 1950   Provider:  HODAN Sterling           Reason for Visit     Hospital Follow-up           Allergies as of 2017     Allergen Noted Reactions    Codeine 2009   Itching, Nausea    Imitrex [Sumatriptan Succinate] 2010   Shortness of Breath, Swelling    Penicillins 2007   Rash, Vomiting    Sulfa Drugs 2007   Shortness of Breath, Itching    Azithromycin 2007   Itching    Dilaudid [Hydromorphone] 10/20/2015   Itching    sweating    Sensipar [Cinacalcet] 2016   Unspecified    Nightmares and leg cramping    Imipramine 2011   Shortness of Breath, Itching    anxious    Other Drug 2011       Z Pack    Seroquel [Quetiapine Fumarate] 2011         You were diagnosed with     Pyelonephritis   [104473]       Heart palpitations   [509968]       Atrial fibrillation, new onset (CMS-HCC)   [602183]       Low energy   [9311682]       Encounter for vitamin deficiency screening   [431409]       Essential hypertension   [1491472]       Lump of skin of left lower extremity   [4249515]       Chronic pain of both knees   [1894817]       Sebaceous cyst   [706.2.ICD-9-CM]         Vital Signs     Blood Pressure Pulse Temperature Respirations Height Weight    146/84 mmHg 86 36.8 °C (98.2 °F) 20 1.778 m (5' 10\") 102.513 kg (226 lb)    Body Mass Index Oxygen Saturation Smoking Status             32.43 kg/m2 94% Former Smoker         Basic Information     Date Of Birth Sex Race Ethnicity Preferred Language    1950 Female White Non- English      Your appointments     Feb 15, 2017  2:50 PM   Established Patient with HODAN Sterling   [facility]    [address] [place] [id number]   [phone]           You will be receiving a confirmation call a few days " before your appointment from our automated call confirmation system.              Problem List              ICD-10-CM Priority Class Noted - Resolved    Bipolar affective disorder (CMS-HCC) F31.9   5/22/2009 - Present    Depression F32.9   5/22/2009 - Present    Family history of breast cancer in mother Z80.3   Unknown - Present    Anxiety F41.9   7/15/2011 - Present    Allergic rhinitis due to pollen J30.1   8/26/2011 - Present    Rotator cuff syndrome of right shoulder M75.101   12/9/2011 - Present    Migraine headache G43.909   Unknown - Present    Lumbar facet arthropathy (HCC) M12.88   Unknown - Present    Lumbar disc disease with radiculopathy M51.16   9/14/2012 - Present    Recurrent sinus infections J32.9   7/22/2014 - Present    Essential hypertension I10   8/12/2014 - Present    Chronic pain syndrome G89.4   11/24/2014 - Present    Cellulitis of right hand L03.113   10/4/2015 - Present    Osteoarthritis, hand, primary localized M19.049   10/5/2015 - Present    Pain of right hand M79.641   10/15/2015 - Present    Tenosynovitis of finger M65.9 High  10/15/2015 - Present    Hypercalcemia E83.52 Medium  10/20/2015 - Present    Vitamin B12 deficiency E53.8   11/12/2015 - Present    Renal insufficiency N28.9   11/12/2015 - Present    Acute pain of left wrist M25.532   11/12/2015 - Present    Chronic pain G89.29   12/21/2015 - Present    Pain of finger of left hand M79.645   7/28/2016 - Present    Elevated PTHrP level (CMS-[facility]) E21.5   7/28/2016 - Present    Lump of skin of left lower extremity R22.42   8/22/2016 - Present    Pain in both knees M25.561, M25.562   10/6/2016 - Present    Accessory skin tags Q82.8   10/6/2016 - Present    Acute pyelonephritis N12 Medium  1/8/2017 - Present    Severe sepsis, POA (HCC) due to pyelonephritis A41.9, R65.20 High  1/9/2017 - Present    Acute respiratory failure with hypoxia (HCC) due to sepsis, POA J96.01 Medium  1/9/2017 - Present    Gram-negative katharina bacteremia R78.81  Medium  1/9/2017 - Present    HTN (hypertension) I10 Low  1/9/2017 - Present    Paroxysmal atrial fibrillation (CMS-[facility]) I48.0 Medium  1/9/2017 - Present    Chronic renal failure, stage 2 (mild) N18.2 Low  1/9/2017 - Present    Fibromyalgia M79.7 Low  1/9/2017 - Present    Headache R51   1/11/2017 - Present    Heart palpitations R00.2   1/25/2017 - Present    Low energy R53.83   1/25/2017 - Present      Health Maintenance        Date Due Completion Dates    PAP SMEAR 9/20/1973 ---    IMM ZOSTER VACCINE 9/20/2012 ---    MAMMOGRAM 12/13/2017 12/13/2016, 5/29/2013, 8/12/2011, 7/17/2009, 7/17/2009, 7/23/2008, 6/27/2008, 6/27/2008, 12/9/2005    COLONOSCOPY 10/7/2021 10/7/2011 (Declined)    Override on 10/7/2011: Patient Declined (will do FIT test instead)    IMM DTaP/Tdap/Td Vaccine (2 - Td) 10/27/2026 10/27/2016            Results     POCT Urinalysis      Component Value Standard Range & Units    POC Color Light Yellow Negative    POC Appearance clear Negative    POC Leukocyte Esterase negative Negative    POC Nitrites negative Negative    POC Urobiligen 0.2 Negative (0.2) mg/dL    POC Protein negative Negative mg/dL    POC Urine PH 5.0 5.0 - 8.0    POC Blood negative Negative    POC Specific Gravity 1.010 <1.005 - >1.030    POC Ketones negative Negative mg/dL    POC Biliruben negative Negative mg/dL    POC Glucose negative Negative mg/dL                        Current Immunizations     13-VALENT PCV PREVNAR 1/11/2017  4:06 PM    Influenza TIV (IM) 9/19/2015, 11/7/2014    Influenza Vaccine Adult HD 10/1/2016    Influenza Vaccine Pediatric 11/3/2010    Tdap Vaccine 10/27/2016      Below and/or attached are the medications your provider expects you to take. Review all of your home medications and newly ordered medications with your provider and/or pharmacist. Follow medication instructions as directed by your provider and/or pharmacist. Please keep your medication list with you and share with your provider. Update the  information when medications are discontinued, doses are changed, or new medications (including over-the-counter products) are added; and carry medication information at all times in the event of emergency situations     Allergies:  CODEINE - Itching,Nausea     IMITREX - Shortness of Breath,Swelling     PENICILLINS - Rash,Vomiting     SULFA DRUGS - Shortness of Breath,Itching     AZITHROMYCIN - Itching     DILAUDID - Itching     SENSIPAR - Unspecified     IMIPRAMINE - Shortness of Breath,Itching     OTHER DRUG - (reactions not documented)     SEROQUEL - (reactions not documented)               Medications  Valid as of: January 25, 2017 -  4:14 PM    Generic Name Brand Name Tablet Size Instructions for use    Albuterol Sulfate (Aero Soln) albuterol 108 (90 BASE) MCG/ACT Inhale 2 Puffs by mouth every 6 hours as needed for Shortness of Breath.        Cholecalciferol (Tab) cholecalciferol 1000 UNIT Take 1,000 Units by mouth every day.        Cyanocobalamin (Tab) VITAMIN B12 1000 MCG Take 1 Tab by mouth every day.        Diclofenac Sodium (Gel) Diclofenac Sodium 1 %         Gabapentin (Cap) NEURONTIN 300 MG Take 300 mg by mouth 3 times a day.        Methocarbamol (Tab) ROBAXIN 750 MG Take 1,500 mg by mouth 2 Times a Day.        Propranolol HCl (Tab) INDERAL 40 MG Take 80 mg by mouth 2 times a day.        Rivaroxaban (Tab) XARELTO 20 MG Take 1 Tab by mouth with dinner.        TraMADol HCl (Tab) ULTRAM 50 MG Take 1-2 Tabs by mouth every 6 hours as needed for Mild Pain.        Venlafaxine HCl (CAPSULE SR 24 HR) EFFEXOR XR 37.5 MG Take 1 Cap by mouth 2 times a day. TAKE ONE CAPSULE BY MOUTH TWICE A DAY WITH MEALS        .                 Medicines prescribed today were sent to:     [facility] #[id number] - [place] - [address]    [address] [address]    Phone: [phone] Fax: [phone]    Open 24 Hours?: No      Medication refill instructions:       If your prescription bottle indicates you have medication  refills left, it is not necessary to call your provider’s office. Please contact your pharmacy and they will refill your medication.    If your prescription bottle indicates you do not have any refills left, you may request refills at any time through one of the following ways: The online [employer] system (except Urgent Care), by calling your provider’s office, or by asking your pharmacy to contact your provider’s office with a refill request. Medication refills are processed only during regular business hours and may not be available until the next business day. Your provider may request additional information or to have a follow-up visit with you prior to refilling your medication.   *Please Note: Medication refills are assigned a new Rx number when refilled electronically. Your pharmacy may indicate that no refills were authorized even though a new prescription for the same medication is available at the pharmacy. Please request the medicine by name with the pharmacy before contacting your provider for a refill.        Your To Do List     Future Labs/Procedures Complete By Expires    CBC WITH DIFFERENTIAL  As directed 1/25/2018    COMP METABOLIC PANEL  As directed 1/25/2018    VITAMIN B12  As directed 1/25/2018    VITAMIN D,25 HYDROXY  As directed 1/25/2018      Referral     A referral request has been sent to our patient care coordination department. Please allow 3-5 business days for us to process this request and contact you either by phone or mail. If you do not hear from us by the 5th business day, please call us at [phone].        Other Notes About Your Plan     4/6/16 Dr Quiroga appt for R hand pain, Fibromyalgia. Increase Gabapentin  300 mg TID. Wrist splint at night. F/u 3 months.  12/31/15 [facility] Neurology Consult-Dr Rima Quiroga. Carpal Tunnel, hand pain. Gabapentin, Hand exercises, consider NCS/EMG.  Fall risk done 10/15/15 RB           [employer] Access Code: Activation code not generated  Current  MyChart Status: Active

## 2017-01-25 NOTE — ASSESSMENT & PLAN NOTE
Pt reports when lies on left side notices her hear thumping more.  Feels tired and sleeping a lot.  Now on Xarelto and continued her on Propranol BID.

## 2017-01-25 NOTE — PROGRESS NOTES
"    Chief Complaint: Hospital follow up, new onset A-fib    HPI:  Miryam presents to the clinic for follow up on Hospital admit 1.8--> 1/11/17 for Sepsis and Pyelonephritis, with Paroxy Atrial Fib.    Her PMH is extensive and includes:    Anxiety,Depression, Bipolar Disorder  Fibromyalgia  Headaches  Hypertension  Hyperparathyroidism/High PTH level  Hyperlipidemia  Osteoarthritis  Osteopenia  Degenerative Disc Disease  Carpel Tunnel Syndrome  Cholecystectomy  Appendectomy  Tenosynovitis of Finger  Pyelonephritis w sepsis (new)  Paroxysmal Atrial Fibrillation ( new)  New Onset Atrial fibrillation (new)    She is established with  Neurology- Dr Quiroga  Nephrology- Dr Rafael Lloyd  Counseling-Psychology- [facility]  Surgeon-[facility] for Sebaceous cyst       Referral Approved:  Orthopedics-[facility] for bilat knee pains,     Review of Records show  1/8/17---> 1/11/17 Hospital Admit for Pyelonephritis, Sepsis, paroxysmal A-fib, HTN, Fibromyalgia, Headaches, Discharged on Cipro and Xarelto.  10/27/16 Clinic visit for Xray results, Tx of Skin Tags, Voltaren RX, bilat knee pains and Referral to Orthopedics([facility])  10/16/Clinic Visit for Left leg Sebaceous Cyst, knee pain--->Referred to Surgeon([facility])  7/28/16 clinic Visit for finger pains of left hand and for refills.    Acute pyelonephritis  Pt reports will complete her antibiotics (Cipro) tomorrow.  Recently hospitalized for sepsis and Pyelonephritis.   Reports since leaving hospital has felt \"not right\"  Sleeps on left side. Feels heart beating.    Heart palpitations  Pt reports when lies on left side notices her hear thumping more.  Feels tired and sleeping a lot. Reports recently dx with Atrial Fib.  No hx of this.  Echo while in hospital showed EF = ~ 65 %. EKG showing Afib w controlled rate.  Now on Xarelto and continued her on Propranol BID.    Low energy  Pt reports lack of energy since hospitalization.   Denies fever, " chills. Urinating okay.  We reviewed her lab work and in discharge slight anemia, normal MCV, so uncertain if due to illness or  IV hydration. Pt denies dark or bloody stools. Denies bleeding.  Will recheck CBC in a few weeks.      HTN (hypertension)  BP  146/84 in clinic today. Known hx of HTN.Pt stating that they have been taking their medication ( Inderal 40 mg BID) as prescribed without adverse effect. Pt denies HA, vision changes, neurologic deficits, CP, SOB.     Lump of skin of left lower extremity  Pt reports saw Vascular Surgeon and instructed by him to see Orthopedic MD or gen'l surgeon related to probable sebaceous cyst to left lower leg.  Pt asking for referral wanting it removed.       Patient Active Problem List    Diagnosis Date Noted   • Severe sepsis, POA (HCC) due to pyelonephritis 01/09/2017     Priority: High   • Tenosynovitis of finger 10/15/2015     Priority: High   • Acute respiratory failure with hypoxia (HCC) due to sepsis, POA 01/09/2017     Priority: Medium   • Gram-negative katharina bacteremia 01/09/2017     Priority: Medium   • Paroxysmal atrial fibrillation (CMS-[facility]) 01/09/2017     Priority: Medium   • Acute pyelonephritis 01/08/2017     Priority: Medium   • Hypercalcemia 10/20/2015     Priority: Medium   • HTN (hypertension) 01/09/2017     Priority: Low   • Chronic renal failure, stage 2 (mild) 01/09/2017     Priority: Low   • Fibromyalgia 01/09/2017     Priority: Low   • Heart palpitations 01/25/2017   • Low energy 01/25/2017   • Headache 01/11/2017   • Pain in both knees 10/06/2016   • Accessory skin tags 10/06/2016   • Lump of skin of left lower extremity 08/22/2016   • Pain of finger of left hand 07/28/2016   • Elevated PTHrP level (CMS-HCC) 07/28/2016   • Chronic pain 12/21/2015   • Vitamin B12 deficiency 11/12/2015   • Renal insufficiency 11/12/2015   • Acute pain of left wrist 11/12/2015   • Pain of right hand 10/15/2015   • Osteoarthritis, hand, primary localized 10/05/2015   •  Cellulitis of right hand 10/04/2015   • Chronic pain syndrome 11/24/2014   • Essential hypertension 08/12/2014   • Recurrent sinus infections 07/22/2014   • Lumbar disc disease with radiculopathy 09/14/2012   • Migraine headache    • Lumbar facet arthropathy (HCC)    • Rotator cuff syndrome of right shoulder 12/09/2011   • Allergic rhinitis due to pollen 08/26/2011   • Anxiety 07/15/2011   • Family history of breast cancer in mother    • Bipolar affective disorder (CMS-HCC) 05/22/2009   • Depression 05/22/2009       Allergies:Codeine; Imitrex; Penicillins; Sulfa drugs; Azithromycin; Dilaudid; Sensipar; Imipramine; Other drug; and Seroquel    Current Outpatient Prescriptions   Medication Sig Dispense Refill   • Diclofenac Sodium 1 % Gel      • vitamin D (CHOLECALCIFEROL) 1000 UNIT Tab Take 1,000 Units by mouth every day.     • tramadol (ULTRAM) 50 MG Tab Take 1-2 Tabs by mouth every 6 hours as needed for Mild Pain. 40 Tab 0   • rivaroxaban (XARELTO) 20 MG Tab tablet Take 1 Tab by mouth with dinner. 30 Tab 2   • gabapentin (NEURONTIN) 300 MG Cap Take 300 mg by mouth 3 times a day.     • methocarbamol (ROBAXIN) 750 MG Tab Take 1,500 mg by mouth 2 Times a Day.     • propranolol (INDERAL) 40 MG Tab Take 80 mg by mouth 2 times a day.     • albuterol (VENTOLIN HFA) 108 (90 BASE) MCG/ACT Aero Soln inhalation aerosol Inhale 2 Puffs by mouth every 6 hours as needed for Shortness of Breath. 8.5 g 3   • venlafaxine XR (EFFEXOR XR) 37.5 MG CAPSULE SR 24 HR Take 1 Cap by mouth 2 times a day. TAKE ONE CAPSULE BY MOUTH TWICE A DAY WITH MEALS 60 Cap 3   • cyanocobalamin (VITAMIN B12) 1000 MCG Tab Take 1 Tab by mouth every day. 30 Tab 3     No current facility-administered medications for this visit.       Social History   Substance Use Topics   • Smoking status: Former Smoker -- 1.00 packs/day for 20 years     Types: Cigarettes     Quit date: 01/01/1994   • Smokeless tobacco: Never Used   • Alcohol Use: Yes      Comment: 2-3 drinks  "per month       Family History   Problem Relation Age of Onset   • Cancer Mother      breast cancer, bilaterally   • GI Father      ulcer   • Heart Disease Father 40     MIs   • Psychiatry Father      anxiety   • Arthritis Sister      rheumatoid   • Stroke Sister 79     mid brain       ROS:  Review of Systems   See HPI Above        Exam:  Blood pressure 146/84, pulse 86, temperature 36.8 °C (98.2 °F), resp. rate 20, height 1.778 m (5' 10\"), weight 102.513 kg (226 lb), SpO2 94 %.  General:  Well nourished, over-weight, well developed female in NAD  HENT:Head is grossly normal. PERRL.  Neck: Supple. Trachea is midline.  Pulmonary: Clear to ausculation .  Normal effort. No rales, ronchi, or wheezing.   Cardiovascular: Irregular rhythm and normal rate  Abdomen-Abdomen is soft, No tenderness.  Upper extremities- Strong = . Good ROM. Hands slightly dry.  Lower extremities- neg for edema, redness, tenderness.  Neuro- A & O x 4. Speech clear and appropriate.     Current medications, allergies, and problem list reviewed with patient and updated in  [facility] today.    Assessment/Plan:  1. Acute pyelonephritis -REsolved POCT Urinalysis in cling completely normal.   2. Heart palpitations  REFERRAL TO CARDIOLOGY   3. Atrial fibrillation, new onset (CMS-HCC)  REFERRAL TO CARDIOLOGY  Continue Inderal BID, continue Xarelto   4. Low energy  CBC WITH DIFFERENTIAL    COMP METABOLIC PANEL   5. Encounter for vitamin deficiency screening  VITAMIN D,25 HYDROXY    VITAMIN B12   6. Essential hypertension  Continue Inderal BID.    7. Lump of skin of left lower extremity  REFERRAL TO GENERAL SURGERY    REFERRAL TO ORTHOPEDICS   8. Chronic pain of both knees  REFERRAL TO ORTHOPEDICS   9. Sebaceous cyst  REFERRAL TO GENERAL SURGERY   Pt instructed if fast heart rate, dizziness or chest pain to go to ER.  Follow up in 1 month. Call or return if questions, concerns, or worsening condition.  "

## 2017-03-01 ENCOUNTER — OFFICE VISIT (OUTPATIENT)
Dept: MEDICAL GROUP | Facility: MEDICAL CENTER | Age: 67
End: 2017-03-01
Attending: NURSE PRACTITIONER
Payer: MEDICAID

## 2017-03-01 VITALS
HEIGHT: 70 IN | DIASTOLIC BLOOD PRESSURE: 70 MMHG | OXYGEN SATURATION: 93 % | HEART RATE: 76 BPM | BODY MASS INDEX: 32.64 KG/M2 | SYSTOLIC BLOOD PRESSURE: 120 MMHG | WEIGHT: 228 LBS | RESPIRATION RATE: 20 BRPM | TEMPERATURE: 98.2 F

## 2017-03-01 DIAGNOSIS — G56.03 BILATERAL CARPAL TUNNEL SYNDROME: ICD-10-CM

## 2017-03-01 DIAGNOSIS — Z78.0 POST-MENOPAUSE: ICD-10-CM

## 2017-03-01 DIAGNOSIS — L68.0 FEMALE HIRSUTISM: ICD-10-CM

## 2017-03-01 DIAGNOSIS — I10 ESSENTIAL HYPERTENSION: ICD-10-CM

## 2017-03-01 DIAGNOSIS — M25.512 ACUTE PAIN OF LEFT SHOULDER: ICD-10-CM

## 2017-03-01 DIAGNOSIS — M25.561 CHRONIC PAIN OF BOTH KNEES: ICD-10-CM

## 2017-03-01 DIAGNOSIS — M79.7 FIBROMYALGIA: ICD-10-CM

## 2017-03-01 DIAGNOSIS — I48.0 PAROXYSMAL ATRIAL FIBRILLATION (HCC): ICD-10-CM

## 2017-03-01 DIAGNOSIS — G43.109 MIGRAINE WITH AURA AND WITHOUT STATUS MIGRAINOSUS, NOT INTRACTABLE: ICD-10-CM

## 2017-03-01 DIAGNOSIS — Z91.09 ENVIRONMENTAL ALLERGIES: ICD-10-CM

## 2017-03-01 DIAGNOSIS — M25.562 CHRONIC PAIN OF BOTH KNEES: ICD-10-CM

## 2017-03-01 DIAGNOSIS — G89.29 CHRONIC PAIN OF BOTH KNEES: ICD-10-CM

## 2017-03-01 PROCEDURE — 99213 OFFICE O/P EST LOW 20 MIN: CPT | Performed by: NURSE PRACTITIONER

## 2017-03-01 PROCEDURE — 99214 OFFICE O/P EST MOD 30 MIN: CPT | Performed by: NURSE PRACTITIONER

## 2017-03-01 RX ORDER — TRAMADOL HYDROCHLORIDE 50 MG/1
50 TABLET ORAL
Qty: 30 TAB | Refills: 0 | Status: SHIPPED | OUTPATIENT
Start: 2017-03-01 | End: 2017-06-06

## 2017-03-01 RX ORDER — FUROSEMIDE 20 MG/1
TABLET ORAL
COMMUNITY
Start: 2017-02-10 | End: 2017-03-01

## 2017-03-01 RX ORDER — LORATADINE 10 MG/1
10 TABLET ORAL DAILY
Qty: 30 TAB | Refills: 2 | Status: SHIPPED | OUTPATIENT
Start: 2017-03-01 | End: 2017-06-10 | Stop reason: SDUPTHER

## 2017-03-01 RX ORDER — ONDANSETRON 4 MG/1
4 TABLET, ORALLY DISINTEGRATING ORAL
Qty: 20 TAB | Refills: 2 | Status: SHIPPED | OUTPATIENT
Start: 2017-03-01 | End: 2019-01-08 | Stop reason: SDUPTHER

## 2017-03-01 RX ORDER — GABAPENTIN 300 MG/1
300 CAPSULE ORAL 3 TIMES DAILY
Qty: 90 CAP | Refills: 5 | Status: SHIPPED | OUTPATIENT
Start: 2017-03-01 | End: 2017-09-05 | Stop reason: SDUPTHER

## 2017-03-01 ASSESSMENT — PATIENT HEALTH QUESTIONNAIRE - PHQ9: CLINICAL INTERPRETATION OF PHQ2 SCORE: 2

## 2017-03-01 ASSESSMENT — PAIN SCALES - GENERAL: PAINLEVEL: 8=MODERATE-SEVERE PAIN

## 2017-03-01 NOTE — ASSESSMENT & PLAN NOTE
"Pt reprots menopause occurred at about \"late 50's\"  Pt complains of facial hair and on chin.  States having to pluck hairs weekly the past month and concerned about  This hirsutism  Will refer to GYN  Last PAP 5-6 years, normal   "

## 2017-03-01 NOTE — MR AVS SNAPSHOT
"        Miryam Veloz   3/1/2017 3:30 PM   Office Visit   MRN: [id number]    [facility]   Dept Phone:  [phone]    Description:  Female : 1950   Provider:  HODAN Sterling           Reason for Visit     Follow-Up     Shoulder Pain left       Allergies as of 3/1/2017     Allergen Noted Reactions    Codeine 2009   Itching, Nausea    Imitrex [Sumatriptan Succinate] 2010   Shortness of Breath, Swelling    Penicillins 2007   Rash, Vomiting    Sulfa Drugs 2007   Shortness of Breath, Itching    Azithromycin 2007   Itching    Dilaudid [Hydromorphone] 10/20/2015   Itching    sweating    Sensipar [Cinacalcet] 2016   Unspecified    Nightmares and leg cramping    Imipramine 2011   Shortness of Breath, Itching    anxious    Other Drug 2011       Z Pack    Seroquel [Quetiapine Fumarate] 2011         You were diagnosed with     Paroxysmal atrial fibrillation (CMS-HCC)   [097054]       Migraine with aura and without status migrainosus, not intractable   [545949]       Bilateral carpal tunnel syndrome   [665349]       Chronic pain of both knees   [3147162]       Fibromyalgia   [844301]       Environmental allergies   [500611]       Acute pain of left shoulder   [8891173]       Post-menopause   [473097]       Female hirsutism   [011944]         Vital Signs     Blood Pressure Pulse Temperature Respirations Height Weight    120/70 mmHg 76 36.8 °C (98.2 °F) 20 1.778 m (5' 10\") 103.42 kg (228 lb)    Body Mass Index Oxygen Saturation Smoking Status             32.71 kg/m2 93% Former Smoker         Basic Information     Date Of Birth Sex Race Ethnicity Preferred Language    1950 Female White Non- English      Your appointments     2017  2:30 PM   Established Patient with HODAN Sterling   [facility]    [address] [place] [id number]   [phone]           You will be receiving a " confirmation call a few days before your appointment from our automated call confirmation system.              Problem List              ICD-10-CM Priority Class Noted - Resolved    Bipolar affective disorder (CMS-HCC) F31.9   5/22/2009 - Present    Depression F32.9   5/22/2009 - Present    Family history of breast cancer in mother Z80.3   Unknown - Present    Anxiety F41.9   7/15/2011 - Present    Allergic rhinitis due to pollen J30.1   8/26/2011 - Present    Rotator cuff syndrome of right shoulder M75.101   12/9/2011 - Present    Migraine headache G43.909   Unknown - Present    Lumbar facet arthropathy (HCC) M12.88   Unknown - Present    Lumbar disc disease with radiculopathy M51.16   9/14/2012 - Present    Recurrent sinus infections J32.9   7/22/2014 - Present    Essential hypertension I10   8/12/2014 - Present    Chronic pain syndrome G89.4   11/24/2014 - Present    Cellulitis of right hand L03.113   10/4/2015 - Present    Osteoarthritis, hand, primary localized M19.049   10/5/2015 - Present    Pain of right hand M79.641   10/15/2015 - Present    Tenosynovitis of finger M65.9 High  10/15/2015 - Present    Hypercalcemia E83.52 Medium  10/20/2015 - Present    Vitamin B12 deficiency E53.8   11/12/2015 - Present    Renal insufficiency N28.9   11/12/2015 - Present    Acute pain of left wrist M25.532   11/12/2015 - Present    Chronic pain G89.29   12/21/2015 - Present    Pain of finger of left hand M79.645   7/28/2016 - Present    Elevated PTHrP level (CMS-HCC) E21.5   7/28/2016 - Present    Lump of skin of left lower extremity R22.42   8/22/2016 - Present    Pain in both knees M25.561, M25.562   10/6/2016 - Present    Accessory skin tags Q82.8   10/6/2016 - Present    Acute pyelonephritis N12 Medium  1/8/2017 - Present    Severe sepsis, POA (HCC) due to pyelonephritis A41.9, R65.20 High  1/9/2017 - Present    Acute respiratory failure with hypoxia (HCC) due to sepsis, POA J96.01 Medium  1/9/2017 - Present     Gram-negative katharina bacteremia R78.81 Medium  1/9/2017 - Present    HTN (hypertension) I10 Low  1/9/2017 - Present    Paroxysmal atrial fibrillation (CMS-HCC) I48.0 Medium  1/9/2017 - Present    Chronic renal failure, stage 2 (mild) N18.2 Low  1/9/2017 - Present    Fibromyalgia M79.7 Low  1/9/2017 - Present    Headache R51   1/11/2017 - Present    Heart palpitations R00.2   1/25/2017 - Present    Low energy R53.83   1/25/2017 - Present    Acute pain of left shoulder M25.512   3/1/2017 - Present    Post-menopause Z78.0   3/1/2017 - Present      Health Maintenance        Date Due Completion Dates    PAP SMEAR 9/20/1973 ---    IMM ZOSTER VACCINE 9/20/2012 ---    MAMMOGRAM 12/13/2017 12/13/2016, 5/29/2013, 8/12/2011, 7/17/2009, 7/17/2009, 7/23/2008, 6/27/2008, 6/27/2008, 12/9/2005    COLONOSCOPY 10/7/2021 10/7/2011 (Declined)    Override on 10/7/2011: Patient Declined (will do FIT test instead)    IMM DTaP/Tdap/Td Vaccine (2 - Td) 10/27/2026 10/27/2016            Current Immunizations     13-VALENT PCV PREVNAR 1/11/2017  4:06 PM    Influenza TIV (IM) 9/19/2015, 11/7/2014    Influenza Vaccine Adult HD 10/1/2016    Influenza Vaccine Pediatric 11/3/2010    Tdap Vaccine 10/27/2016      Below and/or attached are the medications your provider expects you to take. Review all of your home medications and newly ordered medications with your provider and/or pharmacist. Follow medication instructions as directed by your provider and/or pharmacist. Please keep your medication list with you and share with your provider. Update the information when medications are discontinued, doses are changed, or new medications (including over-the-counter products) are added; and carry medication information at all times in the event of emergency situations     Allergies:  CODEINE - Itching,Nausea     IMITREX - Shortness of Breath,Swelling     PENICILLINS - Rash,Vomiting     SULFA DRUGS - Shortness of Breath,Itching     AZITHROMYCIN - Itching      DILAUDID - Itching     SENSIPAR - Unspecified     IMIPRAMINE - Shortness of Breath,Itching     OTHER DRUG - (reactions not documented)     SEROQUEL - (reactions not documented)               Medications  Valid as of: March 01, 2017 -  3:52 PM    Generic Name Brand Name Tablet Size Instructions for use    Albuterol Sulfate (Aero Soln) albuterol 108 (90 BASE) MCG/ACT Inhale 2 Puffs by mouth every 6 hours as needed for Shortness of Breath.        Cholecalciferol (Tab) cholecalciferol 1000 UNIT Take 1,000 Units by mouth every day.        Cyanocobalamin (Tab) VITAMIN B12 1000 MCG Take 1 Tab by mouth every day.        Diclofenac Sodium (Gel) Diclofenac Sodium 1 % Apply twice a day to joints as needed        Furosemide (Tab) LASIX 20 MG         Gabapentin (Cap) NEURONTIN 300 MG Take 1 Cap by mouth 3 times a day.        Loratadine (Tab) CLARITIN 10 MG Take 1 Tab by mouth every day.        Methocarbamol (Tab) ROBAXIN 750 MG Take 1,500 mg by mouth 2 Times a Day.        Ondansetron (TABLET DISPERSIBLE) ZOFRAN ODT 4 MG Take 1 Tab by mouth every 24 hours as needed for Nausea/Vomiting.        Propranolol HCl (Tab) INDERAL 40 MG Take 80 mg by mouth 2 times a day.        Rivaroxaban (Tab) XARELTO 20 MG Take 1 Tab by mouth with dinner.        TraMADol HCl (Tab) ULTRAM 50 MG Take 1 Tab by mouth every 24 hours as needed for Mild Pain or Moderate Pain (headaches).        Venlafaxine HCl (CAPSULE SR 24 HR) EFFEXOR XR 37.5 MG Take 1 Cap by mouth 2 times a day. TAKE ONE CAPSULE BY MOUTH TWICE A DAY WITH MEALS        .                 Medicines prescribed today were sent to:     [facility] #[id number] - [place] [address] - [place] [address]    Phone: [phone] Fax: [phone]    Open 24 Hours?: No      Medication refill instructions:       If your prescription bottle indicates you have medication refills left, it is not necessary to call your provider’s office. Please contact your pharmacy and they will refill your  medication.    If your prescription bottle indicates you do not have any refills left, you may request refills at any time through one of the following ways: The online [facility] system (except Urgent Care), by calling your provider’s office, or by asking your pharmacy to contact your provider’s office with a refill request. Medication refills are processed only during regular business hours and may not be available until the next business day. Your provider may request additional information or to have a follow-up visit with you prior to refilling your medication.   *Please Note: Medication refills are assigned a new Rx number when refilled electronically. Your pharmacy may indicate that no refills were authorized even though a new prescription for the same medication is available at the pharmacy. Please request the medicine by name with the pharmacy before contacting your provider for a refill.        Your To Do List     Future Labs/Procedures Complete By Expires    DX-SHOULDER 2+ LEFT  As directed 3/1/2018      Referral     A referral request has been sent to our patient care coordination department. Please allow 3-5 business days for us to process this request and contact you either by phone or mail. If you do not hear from us by the 5th business day, please call us at [phone].        Other Notes About Your Plan     4/6/16 Dr Quiroga appt for R hand pain, Fibromyalgia. Increase Gabapentin  300 mg TID. Wrist splint at night. F/u 3 months.  12/31/15 [facility] Neurology Consult-Dr Rima Quiroga. Carpal Tunnel, hand pain. Gabapentin, Hand exercises, consider NCS/EMG.  Fall risk done 10/15/15            [employer] Access Code: Activation code not generated  Current [facility] Status: Active

## 2017-03-01 NOTE — ASSESSMENT & PLAN NOTE
Pt reports she often gets an aura and then gets migraines.  In past had problem with Imitrex and also with Topamax.   States wants Tramadol for headaches.  Is not able to take NSAID's due to kidney issues.  In past Pt has seen Dr Quiroga for bilat carpal tunnel issues but never for headaches.  Pt gets nausea and sometimes photophobia with headaches that occur about 2-3 x per month.   I discussed one time RX for Tramadol and some Zofran RX but recommend re-referral to Neurology to evaluate her headaches further for possible preventative strategies.

## 2017-03-01 NOTE — ASSESSMENT & PLAN NOTE
"Pt reports she talked to [facility] and was told they did not accept her Insurance at [facility]. She has left message with Leia at Referral Dept and has not heard back.   Pt reports feels \"okay\" but if lies on left side can feel heart beating,.  Feels more winded when exercising. Is taking INderal BID and Xarelto 20 mg/day.    "

## 2017-03-01 NOTE — ASSESSMENT & PLAN NOTE
Pt reports left shoulder pain for about week to 1  1/2 wks ago onset of pain to move shoulder. No known injury. Decreased ROM.

## 2017-03-01 NOTE — PROGRESS NOTES
"    Chief Complaint: \"migraines\", Left shoulder Pain, Facial hair issues,     HPI:  Miryam presents to the clinic for follow up on multiple medical conditions.    Her PMH includes:    Anxiety,Depression, Bipolar Disorder  Fibromyalgia  Headaches  Hypertension  Hyperparathyroidism/High PTH level  Hyperlipidemia  Osteoarthritis  bilat Knee pains  Osteopenia  Degenerative Disc Disease  Carpel Tunnel Syndrome  Cholecystectomy  Appendectomy  Tenosynovitis of Finger  Pyelonephritis w sepsis (new)  Paroxysmal Atrial Fibrillation ( new)  New Onset Atrial fibrillation (new)  Left Shoulder Pain ( new)  Facial hair-Hirsutism (new)    She is established with  Neurology- Dr Quiroga  Nephrology- Dr Rafael Lloyd  Counseling-Psychology- [facility]  Surgeon-[facility] for Sebaceous cyst ([facility])      Referral Approved:  Orthopedics-[facility] for bilat knee pains,   Cardiology - [facility]    Review of Records shows:    2/15/17 No Show Clinic  1/25/17 Clinic Visit for Hospital f/u an discussion of her new Atrial Fib issue. Referred to Cardiology, General Surgeon, and Orthopedics.  Labs ordered. Pt to continue Xarelto and Inderal BID.    --------------------------------------------------------------------------------------------------------------------------------------------------------------------------------------------  1/8/17---> 1/11/17 Hospital Admit for Pyelonephritis, Sepsis, paroxysmal A-fib, HTN, Fibromyalgia, Headaches, Discharged on Cipro and Xarelto.  10/27/16 Clinic visit for Xray results, Tx of Skin Tags, Voltaren RX, bilat knee pains and Referral to Orthopedics([facility])  10/16/Clinic Visit for Left leg Sebaceous Cyst, knee pain--->Referred to Surgeon([facility])  7/28/16 clinic Visit for finger pains of left hand and for refills.    REsults Review:  No lab tests completed by Patient.    [facility] Report:  1/15/17 Tramdol 50 mg # 40 by Hospitalist  9/6/16 Nor[place] 5/325 # 20 by " "Titi Recinos DDS  7/28/16 [place] 5/325 # 30 by me  ------------------------------------------------------------     Migraine Headaches  Pt reports she often gets an aura and then gets migraines.  In past had problem with Imitrex and also with Topamax.   States wants Tramadol for headaches.  Is not able to take NSAID's due to kidney issues.  In past Pt has seen Dr Quiroga for bilat carpal tunnel issues but never for headaches.  Pt gets nausea and sometimes photophobia with headaches that occur about 2-3 x per month.   I discussed one time RX for Tramadol and some Zofran RX but recommend re-referral to Neurology to evaluate her headaches further for possible preventative strategies.    Paroxysmal atrial fibrillation (CMS-HCC)  Pt reports she talked to [facility] and was told they did not accept her Insurance at [facility]. She has left message with Leia at Referral Dept and has not heard back.   Pt reports feels \"okay\" but if lies on left side can feel heart beating,.  Feels more winded when exercising. Is taking INderal BID and Xarelto 20 mg/day.      Acute pain of left shoulder  Pt reports left shoulder pain for about week to 1  1/2 wks ago onset of pain to move shoulder. No known injury. Decreased ROM.  Pt is point tender over AC joint laterally. Suspect possible bursitis.  Recommend alternate ice and heat. Not a candidate for NSAID. Will have her discuss with Orthopedic MD at upcoming   Visit in late March. X-ray to be completed prior to this.    Post-menopause  Pt reprots menopause occurred at about \"late 50's\"  Pt complains of facial hair and on chin.  States having to pluck hairs weekly the past month and concerned about  This hirsutism  Will refer to GYN  Last PAP 5-6 years, normal       Patient Active Problem List    Diagnosis Date Noted   • Severe sepsis, POA (HCC) due to pyelonephritis 01/09/2017     Priority: High   • Tenosynovitis of finger 10/15/2015     Priority: High   • Acute respiratory failure " with hypoxia (HCC) due to sepsis, POA 01/09/2017     Priority: Medium   • Gram-negative katharina bacteremia 01/09/2017     Priority: Medium   • Paroxysmal atrial fibrillation (CMS-HCC) 01/09/2017     Priority: Medium   • Acute pyelonephritis 01/08/2017     Priority: Medium   • Hypercalcemia 10/20/2015     Priority: Medium   • HTN (hypertension) 01/09/2017     Priority: Low   • Chronic renal failure, stage 2 (mild) 01/09/2017     Priority: Low   • Fibromyalgia 01/09/2017     Priority: Low   • Acute pain of left shoulder 03/01/2017   • Post-menopause 03/01/2017   • Heart palpitations 01/25/2017   • Low energy 01/25/2017   • Headache 01/11/2017   • Pain in both knees 10/06/2016   • Accessory skin tags 10/06/2016   • Lump of skin of left lower extremity 08/22/2016   • Pain of finger of left hand 07/28/2016   • Elevated PTHrP level (CMS-HCC) 07/28/2016   • Chronic pain 12/21/2015   • Vitamin B12 deficiency 11/12/2015   • Renal insufficiency 11/12/2015   • Acute pain of left wrist 11/12/2015   • Pain of right hand 10/15/2015   • Osteoarthritis, hand, primary localized 10/05/2015   • Cellulitis of right hand 10/04/2015   • Chronic pain syndrome 11/24/2014   • Essential hypertension 08/12/2014   • Recurrent sinus infections 07/22/2014   • Lumbar disc disease with radiculopathy 09/14/2012   • Migraine headache    • Lumbar facet arthropathy (HCC)    • Rotator cuff syndrome of right shoulder 12/09/2011   • Allergic rhinitis due to pollen 08/26/2011   • Anxiety 07/15/2011   • Family history of breast cancer in mother    • Bipolar affective disorder (CMS-HCC) 05/22/2009   • Depression 05/22/2009       Allergies:Codeine; Imitrex; Penicillins; Sulfa drugs; Azithromycin; Dilaudid; Sensipar; Imipramine; Other drug; and Seroquel    Current Outpatient Prescriptions   Medication Sig Dispense Refill   • Diclofenac Sodium 1 % Gel Apply twice a day to joints as needed 1 Tube 5   • tramadol (ULTRAM) 50 MG Tab Take 1 Tab by mouth every 24 hours  "as needed for Mild Pain or Moderate Pain (headaches). 30 Tab 0   • gabapentin (NEURONTIN) 300 MG Cap Take 1 Cap by mouth 3 times a day. 90 Cap 5   • loratadine (CLARITIN) 10 MG Tab Take 1 Tab by mouth every day. 30 Tab 2   • ondansetron (ZOFRAN ODT) 4 MG TABLET DISPERSIBLE Take 1 Tab by mouth every 24 hours as needed for Nausea/Vomiting. 20 Tab 2   • vitamin D (CHOLECALCIFEROL) 1000 UNIT Tab Take 1,000 Units by mouth every day.     • rivaroxaban (XARELTO) 20 MG Tab tablet Take 1 Tab by mouth with dinner. 30 Tab 2   • methocarbamol (ROBAXIN) 750 MG Tab Take 1,500 mg by mouth 2 Times a Day.     • propranolol (INDERAL) 40 MG Tab Take 80 mg by mouth 2 times a day.     • albuterol (VENTOLIN HFA) 108 (90 BASE) MCG/ACT Aero Soln inhalation aerosol Inhale 2 Puffs by mouth every 6 hours as needed for Shortness of Breath. 8.5 g 3   • venlafaxine XR (EFFEXOR XR) 37.5 MG CAPSULE SR 24 HR Take 1 Cap by mouth 2 times a day. TAKE ONE CAPSULE BY MOUTH TWICE A DAY WITH MEALS 60 Cap 3   • cyanocobalamin (VITAMIN B12) 1000 MCG Tab Take 1 Tab by mouth every day. 30 Tab 3   • furosemide (LASIX) 20 MG Tab        No current facility-administered medications for this visit.       Social History   Substance Use Topics   • Smoking status: Former Smoker -- 1.00 packs/day for 20 years     Types: Cigarettes     Quit date: 01/01/1994   • Smokeless tobacco: Never Used   • Alcohol Use: Yes      Comment: 2-3 drinks per month       Family History   Problem Relation Age of Onset   • Cancer Mother      breast cancer, bilaterally   • GI Father      ulcer   • Heart Disease Father 40     MIs   • Psychiatry Father      anxiety   • Arthritis Sister      rheumatoid   • Stroke Sister 79     mid brain       ROS:  Review of Systems   See HPI Above      Exam:  Blood pressure 120/70, pulse 76, temperature 36.8 °C (98.2 °F), resp. rate 20, height 1.778 m (5' 10\"), weight 103.42 kg (228 lb), SpO2 93 %.  General:  Well nourished, well developed female in mild " distress.  HENT:Head is grossly normal. PERRL.  Neck: Supple. Trachea is midline.  Pulmonary: Clear to ausculation .  Normal effort. No rales, ronchi, or wheezing.   Cardiovascular: Regular rate and rhythm.  Abdomen-Abdomen is soft, No tenderness.  Upper extremities- Strong = . Good ROM except decreased ROM of lifting L arm above head due to shoulder pain. No swelling noted.  Point tender over lateral AC joint of Left Shoulder.  Lower extremities- neg for edema, redness, tenderness.  Neuro- A & O x 4. Speech clear and appropriate.     Current medications, allergies, and problem list reviewed with patient and updated in  [facility] today.    Assessment/Plan:  1. Paroxysmal atrial fibrillation (CMS-HCC)  Continue Inderal and Xarelto. Pt to call our referral Dept ([facility]) about Cardiology referral so she can make an appt.  RE-REFER to CARDIOLOGY today.   2. Migraine with aura and without status migrainosus, not intractable  tramadol (ULTRAM) 50 MG Tab 1 x RX,  If requires more needs to agree to Controlled Subst Agreement and provide Urine for UDS.  Pt to see Neurology about headaches.    REFERRAL TO NEUROLOGY   3. Bilateral carpal tunnel syndrome  REFERRAL TO NEUROLOGY   4. Chronic pain of both knees  Diclofenac Sodium 1 % Gel-refill   5. Fibromyalgia  gabapentin (NEURONTIN) 300 MG Cap-refill   6. Environmental allergies  loratadine (CLARITIN) 10 MG Tab   7. Acute pain of left shoulder  DX-SHOULDER 2+ LEFT, Rest and ice  Pt to discuss w [facility] Ortho at March appt.   8. Post-menopause  REFERRAL TO GYNECOLOGY   9. Female hirsutism  REFERRAL TO GYNECOLOGY   Discussed HA red flags. To ER with neurologic symptoms, visual disturbances, jaw claudication, fever/chills, weight loss, nuchal rigidity, temporal artery tenderness.   Follow up in 6 weeks to review past due lab work (slips given to patient). Call or return if questions, concerns, or worsening condition.

## 2017-03-20 RX ORDER — VENLAFAXINE HYDROCHLORIDE 37.5 MG/1
CAPSULE, EXTENDED RELEASE ORAL
Qty: 60 CAP | Refills: 2 | Status: SHIPPED | OUTPATIENT
Start: 2017-03-20 | End: 2017-06-16 | Stop reason: SDUPTHER

## 2017-03-20 NOTE — TELEPHONE ENCOUNTER
Was the patient seen in the last year in this department? Yes     Does patient have an active prescription for medications requested? No     Received Request Via: Pharmacy   Future Appointments       Provider Department [place]    4/12/2017 2:30 PM HODAN Sterling [facility]

## 2017-04-04 ENCOUNTER — HOSPITAL ENCOUNTER (OUTPATIENT)
Dept: LAB | Facility: MEDICAL CENTER | Age: 67
End: 2017-04-04
Attending: NURSE PRACTITIONER
Payer: MEDICAID

## 2017-04-04 ENCOUNTER — HOSPITAL ENCOUNTER (OUTPATIENT)
Dept: LAB | Facility: MEDICAL CENTER | Age: 67
End: 2017-04-04
Attending: INTERNAL MEDICINE
Payer: MEDICAID

## 2017-04-04 DIAGNOSIS — R53.83 LOW ENERGY: ICD-10-CM

## 2017-04-04 DIAGNOSIS — Z13.21 ENCOUNTER FOR VITAMIN DEFICIENCY SCREENING: ICD-10-CM

## 2017-04-04 LAB
25(OH)D3 SERPL-MCNC: 36 NG/ML (ref 30–100)
25(OH)D3 SERPL-MCNC: 37 NG/ML (ref 30–100)
ALBUMIN SERPL BCP-MCNC: 4 G/DL (ref 3.2–4.9)
ALBUMIN SERPL BCP-MCNC: 4 G/DL (ref 3.2–4.9)
ALBUMIN/GLOB SERPL: 1.1 G/DL
ALBUMIN/GLOB SERPL: 1.1 G/DL
ALP SERPL-CCNC: 104 U/L (ref 30–99)
ALP SERPL-CCNC: 105 U/L (ref 30–99)
ALT SERPL-CCNC: 14 U/L (ref 2–50)
ALT SERPL-CCNC: 14 U/L (ref 2–50)
ANION GAP SERPL CALC-SCNC: 10 MMOL/L (ref 0–11.9)
ANION GAP SERPL CALC-SCNC: 6 MMOL/L (ref 0–11.9)
APPEARANCE UR: CLEAR
AST SERPL-CCNC: 12 U/L (ref 12–45)
AST SERPL-CCNC: 13 U/L (ref 12–45)
BASOPHILS # BLD AUTO: 1.4 % (ref 0–1.8)
BASOPHILS # BLD AUTO: 1.5 % (ref 0–1.8)
BASOPHILS # BLD: 0.11 K/UL (ref 0–0.12)
BASOPHILS # BLD: 0.11 K/UL (ref 0–0.12)
BILIRUB SERPL-MCNC: 0.7 MG/DL (ref 0.1–1.5)
BILIRUB SERPL-MCNC: 0.7 MG/DL (ref 0.1–1.5)
BILIRUB UR QL STRIP.AUTO: NEGATIVE
BUN SERPL-MCNC: 18 MG/DL (ref 8–22)
BUN SERPL-MCNC: 18 MG/DL (ref 8–22)
CALCIUM SERPL-MCNC: 10.7 MG/DL (ref 8.5–10.5)
CALCIUM SERPL-MCNC: 10.7 MG/DL (ref 8.5–10.5)
CHLORIDE SERPL-SCNC: 105 MMOL/L (ref 96–112)
CHLORIDE SERPL-SCNC: 107 MMOL/L (ref 96–112)
CHOLEST SERPL-MCNC: 196 MG/DL (ref 100–199)
CO2 SERPL-SCNC: 24 MMOL/L (ref 20–33)
CO2 SERPL-SCNC: 24 MMOL/L (ref 20–33)
COLOR UR: NORMAL
CREAT SERPL-MCNC: 0.99 MG/DL (ref 0.5–1.4)
CREAT SERPL-MCNC: 0.99 MG/DL (ref 0.5–1.4)
CREAT UR-MCNC: 65.7 MG/DL
EOSINOPHIL # BLD AUTO: 0.15 K/UL (ref 0–0.51)
EOSINOPHIL # BLD AUTO: 0.18 K/UL (ref 0–0.51)
EOSINOPHIL NFR BLD: 2 % (ref 0–6.9)
EOSINOPHIL NFR BLD: 2.4 % (ref 0–6.9)
ERYTHROCYTE [DISTWIDTH] IN BLOOD BY AUTOMATED COUNT: 46.4 FL (ref 35.9–50)
ERYTHROCYTE [DISTWIDTH] IN BLOOD BY AUTOMATED COUNT: 46.4 FL (ref 35.9–50)
GFR SERPL CREATININE-BSD FRML MDRD: 56 ML/MIN/1.73 M 2
GFR SERPL CREATININE-BSD FRML MDRD: 56 ML/MIN/1.73 M 2
GLOBULIN SER CALC-MCNC: 3.6 G/DL (ref 1.9–3.5)
GLOBULIN SER CALC-MCNC: 3.7 G/DL (ref 1.9–3.5)
GLUCOSE SERPL-MCNC: 108 MG/DL (ref 65–99)
GLUCOSE SERPL-MCNC: 108 MG/DL (ref 65–99)
GLUCOSE UR STRIP.AUTO-MCNC: NEGATIVE MG/DL
HCT VFR BLD AUTO: 47.8 % (ref 37–47)
HCT VFR BLD AUTO: 48.1 % (ref 37–47)
HDLC SERPL-MCNC: 35 MG/DL
HGB BLD-MCNC: 15.7 G/DL (ref 12–16)
HGB BLD-MCNC: 15.7 G/DL (ref 12–16)
IMM GRANULOCYTES # BLD AUTO: 0.02 K/UL (ref 0–0.11)
IMM GRANULOCYTES # BLD AUTO: 0.02 K/UL (ref 0–0.11)
IMM GRANULOCYTES NFR BLD AUTO: 0.3 % (ref 0–0.9)
IMM GRANULOCYTES NFR BLD AUTO: 0.3 % (ref 0–0.9)
KETONES UR STRIP.AUTO-MCNC: NEGATIVE MG/DL
LDLC SERPL CALC-MCNC: 99 MG/DL
LEUKOCYTE ESTERASE UR QL STRIP.AUTO: NEGATIVE
LYMPHOCYTES # BLD AUTO: 2.45 K/UL (ref 1–4.8)
LYMPHOCYTES # BLD AUTO: 2.49 K/UL (ref 1–4.8)
LYMPHOCYTES NFR BLD: 32.5 % (ref 22–41)
LYMPHOCYTES NFR BLD: 32.5 % (ref 22–41)
MAGNESIUM SERPL-MCNC: 2.1 MG/DL (ref 1.5–2.5)
MCH RBC QN AUTO: 31.2 PG (ref 27–33)
MCH RBC QN AUTO: 31.3 PG (ref 27–33)
MCHC RBC AUTO-ENTMCNC: 32.6 G/DL (ref 33.6–35)
MCHC RBC AUTO-ENTMCNC: 32.8 G/DL (ref 33.6–35)
MCV RBC AUTO: 95.2 FL (ref 81.4–97.8)
MCV RBC AUTO: 95.4 FL (ref 81.4–97.8)
MICRO URNS: NORMAL
MONOCYTES # BLD AUTO: 0.53 K/UL (ref 0–0.85)
MONOCYTES # BLD AUTO: 0.57 K/UL (ref 0–0.85)
MONOCYTES NFR BLD AUTO: 7 % (ref 0–13.4)
MONOCYTES NFR BLD AUTO: 7.5 % (ref 0–13.4)
NEUTROPHILS # BLD AUTO: 4.28 K/UL (ref 2–7.15)
NEUTROPHILS # BLD AUTO: 4.28 K/UL (ref 2–7.15)
NEUTROPHILS NFR BLD: 55.9 % (ref 44–72)
NEUTROPHILS NFR BLD: 56.7 % (ref 44–72)
NITRITE UR QL STRIP.AUTO: NEGATIVE
NRBC # BLD AUTO: 0 K/UL
NRBC # BLD AUTO: 0 K/UL
NRBC BLD AUTO-RTO: 0 /100 WBC
NRBC BLD AUTO-RTO: 0 /100 WBC
PH UR STRIP.AUTO: 6.5 [PH]
PHOSPHATE SERPL-MCNC: 2.7 MG/DL (ref 2.5–4.5)
PLATELET # BLD AUTO: 210 K/UL (ref 164–446)
PLATELET # BLD AUTO: 217 K/UL (ref 164–446)
PMV BLD AUTO: 12.4 FL (ref 9–12.9)
PMV BLD AUTO: 12.5 FL (ref 9–12.9)
POTASSIUM SERPL-SCNC: 4.6 MMOL/L (ref 3.6–5.5)
POTASSIUM SERPL-SCNC: 4.6 MMOL/L (ref 3.6–5.5)
PROT SERPL-MCNC: 7.6 G/DL (ref 6–8.2)
PROT SERPL-MCNC: 7.7 G/DL (ref 6–8.2)
PROT UR QL STRIP: NEGATIVE MG/DL
PROT UR-MCNC: 7.8 MG/DL (ref 0–15)
PROT/CREAT UR: 119 MG/G (ref 10–107)
PTH-INTACT SERPL-MCNC: 192.4 PG/ML (ref 14–72)
RBC # BLD AUTO: 5.02 M/UL (ref 4.2–5.4)
RBC # BLD AUTO: 5.04 M/UL (ref 4.2–5.4)
RBC UR QL AUTO: NEGATIVE
SODIUM SERPL-SCNC: 137 MMOL/L (ref 135–145)
SODIUM SERPL-SCNC: 139 MMOL/L (ref 135–145)
SP GR UR STRIP.AUTO: 1.01
TRIGL SERPL-MCNC: 308 MG/DL (ref 0–149)
URATE SERPL-MCNC: 7 MG/DL (ref 1.9–8.2)
VIT B12 SERPL-MCNC: 814 PG/ML (ref 211–911)
WBC # BLD AUTO: 7.5 K/UL (ref 4.8–10.8)
WBC # BLD AUTO: 7.7 K/UL (ref 4.8–10.8)

## 2017-04-04 PROCEDURE — 82607 VITAMIN B-12: CPT

## 2017-04-04 PROCEDURE — 80053 COMPREHEN METABOLIC PANEL: CPT | Mod: 91

## 2017-04-04 PROCEDURE — 80053 COMPREHEN METABOLIC PANEL: CPT

## 2017-04-04 PROCEDURE — 80061 LIPID PANEL: CPT

## 2017-04-04 PROCEDURE — 82570 ASSAY OF URINE CREATININE: CPT

## 2017-04-04 PROCEDURE — 81003 URINALYSIS AUTO W/O SCOPE: CPT

## 2017-04-04 PROCEDURE — 83735 ASSAY OF MAGNESIUM: CPT

## 2017-04-04 PROCEDURE — 84100 ASSAY OF PHOSPHORUS: CPT

## 2017-04-04 PROCEDURE — 82306 VITAMIN D 25 HYDROXY: CPT | Mod: 91

## 2017-04-04 PROCEDURE — 83970 ASSAY OF PARATHORMONE: CPT

## 2017-04-04 PROCEDURE — 36415 COLL VENOUS BLD VENIPUNCTURE: CPT

## 2017-04-04 PROCEDURE — 85025 COMPLETE CBC W/AUTO DIFF WBC: CPT

## 2017-04-04 PROCEDURE — 82306 VITAMIN D 25 HYDROXY: CPT

## 2017-04-04 PROCEDURE — 85025 COMPLETE CBC W/AUTO DIFF WBC: CPT | Mod: 91

## 2017-04-04 PROCEDURE — 84156 ASSAY OF PROTEIN URINE: CPT

## 2017-04-04 PROCEDURE — 84550 ASSAY OF BLOOD/URIC ACID: CPT

## 2017-04-11 ENCOUNTER — HOSPITAL ENCOUNTER (OUTPATIENT)
Dept: RADIOLOGY | Facility: MEDICAL CENTER | Age: 67
End: 2017-04-11
Attending: NURSE PRACTITIONER
Payer: MEDICAID

## 2017-04-11 DIAGNOSIS — M25.512 ACUTE PAIN OF LEFT SHOULDER: ICD-10-CM

## 2017-04-11 PROCEDURE — 73030 X-RAY EXAM OF SHOULDER: CPT | Mod: LT

## 2017-04-12 ENCOUNTER — OFFICE VISIT (OUTPATIENT)
Dept: MEDICAL GROUP | Facility: MEDICAL CENTER | Age: 67
End: 2017-04-12
Attending: NURSE PRACTITIONER
Payer: MEDICAID

## 2017-04-12 VITALS
RESPIRATION RATE: 16 BRPM | OXYGEN SATURATION: 93 % | TEMPERATURE: 98 F | SYSTOLIC BLOOD PRESSURE: 122 MMHG | HEART RATE: 80 BPM | WEIGHT: 228 LBS | BODY MASS INDEX: 32.64 KG/M2 | HEIGHT: 70 IN | DIASTOLIC BLOOD PRESSURE: 70 MMHG

## 2017-04-12 DIAGNOSIS — E66.9 OBESITY (BMI 30-39.9): ICD-10-CM

## 2017-04-12 DIAGNOSIS — R05.9 COUGH: ICD-10-CM

## 2017-04-12 DIAGNOSIS — J20.8 ACUTE BRONCHITIS DUE TO OTHER SPECIFIED ORGANISMS: ICD-10-CM

## 2017-04-12 DIAGNOSIS — G89.29 CHRONIC PAIN OF BOTH KNEES: ICD-10-CM

## 2017-04-12 DIAGNOSIS — E78.1 HYPERTRIGLYCERIDEMIA: ICD-10-CM

## 2017-04-12 DIAGNOSIS — R23.8 SKIN IRRITATION: ICD-10-CM

## 2017-04-12 DIAGNOSIS — M25.561 CHRONIC PAIN OF BOTH KNEES: ICD-10-CM

## 2017-04-12 DIAGNOSIS — M25.562 CHRONIC PAIN OF BOTH KNEES: ICD-10-CM

## 2017-04-12 DIAGNOSIS — R06.02 SHORTNESS OF BREATH: ICD-10-CM

## 2017-04-12 DIAGNOSIS — M25.512 ACUTE PAIN OF LEFT SHOULDER: ICD-10-CM

## 2017-04-12 PROCEDURE — 99214 OFFICE O/P EST MOD 30 MIN: CPT | Performed by: NURSE PRACTITIONER

## 2017-04-12 PROCEDURE — 99213 OFFICE O/P EST LOW 20 MIN: CPT | Performed by: NURSE PRACTITIONER

## 2017-04-12 RX ORDER — FUROSEMIDE 20 MG/1
TABLET ORAL
COMMUNITY
Start: 2017-03-15 | End: 2017-06-06

## 2017-04-12 RX ORDER — CODEINE PHOSPHATE/GUAIFENESIN 10-100MG/5
5 LIQUID (ML) ORAL 2 TIMES DAILY PRN
Qty: 120 ML | Refills: 0 | Status: SHIPPED | OUTPATIENT
Start: 2017-04-12 | End: 2017-06-06

## 2017-04-12 RX ORDER — PREDNISONE 10 MG/1
TABLET ORAL
Qty: 10 TAB | Refills: 0 | Status: SHIPPED | OUTPATIENT
Start: 2017-04-12 | End: 2017-06-06

## 2017-04-12 RX ORDER — DOXYCYCLINE 100 MG/1
100 CAPSULE ORAL 2 TIMES DAILY
Qty: 14 CAP | Refills: 0 | Status: SHIPPED | OUTPATIENT
Start: 2017-04-12 | End: 2017-06-05 | Stop reason: SDUPTHER

## 2017-04-12 RX ORDER — BENZONATATE 100 MG/1
100 CAPSULE ORAL 3 TIMES DAILY PRN
Qty: 30 CAP | Refills: 0 | Status: SHIPPED | OUTPATIENT
Start: 2017-04-12 | End: 2017-06-06

## 2017-04-12 RX ORDER — TRIAMCINOLONE ACETONIDE 1 MG/G
CREAM TOPICAL
Qty: 1 TUBE | Refills: 0 | Status: SHIPPED | OUTPATIENT
Start: 2017-04-12 | End: 2017-12-22

## 2017-04-12 ASSESSMENT — PAIN SCALES - GENERAL: PAINLEVEL: 5=MODERATE PAIN

## 2017-04-12 NOTE — ASSESSMENT & PLAN NOTE
Pt reports put icy hot on her left posterior neck a few days ago and felt burning. Reports mild itching to area and small rash.  Denies any other rash areas.  Has not tried any tx on rash area.

## 2017-04-12 NOTE — Clinical Note
April 12, 2017       Patient: Miryam Veloz   YOB: 1950   Date of Visit: 4/12/2017         To Whom It May Concern:  Please excuse Miryam Veloz from work on 4/6 and 4/7 due to illness and may return on 4/10/17.    If you have any questions or concerns, please don't hesitate to call [phone]          Sincerely,          PIPE SterlingPJETT.  Electronically Signed

## 2017-04-12 NOTE — ASSESSMENT & PLAN NOTE
We reviewed her high level of Triglycerides.  Discussed increasing fiber, Omega-FA's-Fish oil and increase exercise and cutting back on her intake of sugar/carbs.

## 2017-04-12 NOTE — ASSESSMENT & PLAN NOTE
We discussed her Left Shoulder Xrays showing mild osteoarthritis in her shoulder.  Pt reports she has lightened her purse and switched to carrying it on her right shoulder and her shoulder pain has resolved.

## 2017-04-12 NOTE — MR AVS SNAPSHOT
"        Miryam Juan Magdiel   2017 2:30 PM   Office Visit   MRN: [id number]    [facility]   Dept Phone:  [phone]    Description:  Female : 1950   Provider:  HODAN Sterling           Reason for Visit     Follow-Up lab/xray    Breathing Problem difficulty breathing/loss of voice x6 days      Allergies as of 2017     Allergen Noted Reactions    Codeine 2009   Itching, Nausea    Imitrex [Sumatriptan Succinate] 2010   Shortness of Breath, Swelling    Penicillins 2007   Rash, Vomiting    Sulfa Drugs 2007   Shortness of Breath, Itching    Azithromycin 2007   Itching    Dilaudid [Hydromorphone] 10/20/2015   Itching    sweating    Sensipar [Cinacalcet] 2016   Unspecified    Nightmares and leg cramping    Imipramine 2011   Shortness of Breath, Itching    anxious    Other Drug 2011       Z Pack    Seroquel [Quetiapine Fumarate] 2011         You were diagnosed with     Hypertriglyceridemia   [071543]       Acute pain of left shoulder   [5331826]       Shortness of breath   [786.05.ICD-9-CM]       Acute bronchitis due to other specified organisms   [8449166]       Cough   [786.2.ICD-9-CM]       Chronic pain of both knees   [1686728]       Skin irritation   [114502]         Vital Signs     Blood Pressure Pulse Temperature Respirations Height Weight    122/70 mmHg 80 36.7 °C (98 °F) 16 1.778 m (5' 10\") 103.42 kg (228 lb)    Body Mass Index Oxygen Saturation Smoking Status             32.71 kg/m2 93% Former Smoker         Basic Information     Date Of Birth Sex Race Ethnicity Preferred Language    1950 Female White Non- English      Your appointments     May 30, 2017  4:15 PM   NEW PATIENT with Wander Poole M.D.   [facility] (--)    [address], [address]  [place] [id number] [phone]              Problem List              ICD-10-CM Priority Class Noted - " Resolved    Bipolar affective disorder (CMS-[facility]) F31.9   5/22/2009 - Present    Depression F32.9   5/22/2009 - Present    Family history of breast cancer in mother Z80.3   Unknown - Present    Anxiety F41.9   7/15/2011 - Present    Allergic rhinitis due to pollen J30.1   8/26/2011 - Present    Rotator cuff syndrome of right shoulder M75.101   12/9/2011 - Present    Migraine headache G43.909   Unknown - Present    Lumbar facet arthropathy (HCC) M12.88   Unknown - Present    Lumbar disc disease with radiculopathy M51.16   9/14/2012 - Present    Recurrent sinus infections J32.9   7/22/2014 - Present    Essential hypertension I10   8/12/2014 - Present    Chronic pain syndrome G89.4   11/24/2014 - Present    Cellulitis of right hand L03.113   10/4/2015 - Present    Osteoarthritis, hand, primary localized M19.049   10/5/2015 - Present    Pain of right hand M79.641   10/15/2015 - Present    Tenosynovitis of finger M65.9 High  10/15/2015 - Present    Hypercalcemia E83.52 Medium  10/20/2015 - Present    Vitamin B12 deficiency E53.8   11/12/2015 - Present    Renal insufficiency N28.9   11/12/2015 - Present    Acute pain of left wrist M25.532   11/12/2015 - Present    Chronic pain G89.29   12/21/2015 - Present    Pain of finger of left hand M79.645   7/28/2016 - Present    Elevated PTHrP level (CMS-HCC) E21.5   7/28/2016 - Present    Lump of skin of left lower extremity R22.42   8/22/2016 - Present    Pain in both knees M25.561, M25.562   10/6/2016 - Present    Accessory skin tags Q82.8   10/6/2016 - Present    Acute pyelonephritis N12 Medium  1/8/2017 - Present    Severe sepsis, POA ([facility]) due to pyelonephritis A41.9, R65.20 High  1/9/2017 - Present    Acute respiratory failure with hypoxia (HCC) due to sepsis, POA J96.01 Medium  1/9/2017 - Present    Gram-negative katharina bacteremia R78.81 Medium  1/9/2017 - Present    HTN (hypertension) I10 Low  1/9/2017 - Present    Paroxysmal atrial fibrillation (CMS-HCC) I48.0 Medium   1/9/2017 - Present    Chronic renal failure, stage 2 (mild) N18.2 Low  1/9/2017 - Present    Fibromyalgia M79.7 Low  1/9/2017 - Present    Headache R51   1/11/2017 - Present    Heart palpitations R00.2   1/25/2017 - Present    Low energy R53.83   1/25/2017 - Present    Acute pain of left shoulder M25.512   3/1/2017 - Present    Post-menopause Z78.0   3/1/2017 - Present    Hypertriglyceridemia E78.1   4/12/2017 - Present    Shortness of breath R06.02   4/12/2017 - Present    Skin irritation R23.8   4/12/2017 - Present      Health Maintenance        Date Due Completion Dates    PAP SMEAR 9/20/1973 ---    IMM ZOSTER VACCINE 9/20/2012 ---    MAMMOGRAM 12/13/2017 12/13/2016, 5/29/2013, 8/12/2011, 7/17/2009, 7/17/2009, 7/23/2008, 6/27/2008, 6/27/2008, 12/9/2005    COLONOSCOPY 10/7/2021 10/7/2011 (Declined)    Override on 10/7/2011: Patient Declined (will do FIT test instead)    IMM DTaP/Tdap/Td Vaccine (2 - Td) 10/27/2026 10/27/2016            Current Immunizations     13-VALENT PCV PREVNAR 1/11/2017  4:06 PM    Influenza TIV (IM) 9/19/2015, 11/7/2014    Influenza Vaccine Adult HD 10/1/2016    Influenza Vaccine Pediatric 11/3/2010    Tdap Vaccine 10/27/2016      Below and/or attached are the medications your provider expects you to take. Review all of your home medications and newly ordered medications with your provider and/or pharmacist. Follow medication instructions as directed by your provider and/or pharmacist. Please keep your medication list with you and share with your provider. Update the information when medications are discontinued, doses are changed, or new medications (including over-the-counter products) are added; and carry medication information at all times in the event of emergency situations     Allergies:  CODEINE - Itching,Nausea     IMITREX - Shortness of Breath,Swelling     PENICILLINS - Rash,Vomiting     SULFA DRUGS - Shortness of Breath,Itching     AZITHROMYCIN - Itching     DILAUDID - Itching      SENSIPAR - Unspecified     IMIPRAMINE - Shortness of Breath,Itching     OTHER DRUG - (reactions not documented)     SEROQUEL - (reactions not documented)               Medications  Valid as of: April 12, 2017 -  3:37 PM    Generic Name Brand Name Tablet Size Instructions for use    Albuterol Sulfate (Aero Soln) albuterol 108 (90 BASE) MCG/ACT Inhale 2 Puffs by mouth every 6 hours as needed for Shortness of Breath.        Benzonatate (Cap) TESSALON 100 MG Take 1 Cap by mouth 3 times a day as needed.        Cholecalciferol (Tab) cholecalciferol 1000 UNIT Take 1,000 Units by mouth every day.        Cyanocobalamin (Tab) VITAMIN B12 1000 MCG Take 1 Tab by mouth every day.        Diclofenac Sodium (Gel) Diclofenac Sodium 1 % Apply twice a day to joints as needed        Doxycycline Monohydrate (Cap) MONODOX 100 MG Take 1 Cap by mouth 2 times a day.        Furosemide (Tab) LASIX 20 MG         Gabapentin (Cap) NEURONTIN 300 MG Take 1 Cap by mouth 3 times a day.        Guaifenesin-Codeine (Syrup) TUSSI-ORGANIDIN -10 MG/5ML Take 5 mL by mouth 2 times a day as needed.        Loratadine (Tab) CLARITIN 10 MG Take 1 Tab by mouth every day.        Methocarbamol (Tab) ROBAXIN 750 MG Take 1,500 mg by mouth 2 Times a Day.        Ondansetron (TABLET DISPERSIBLE) ZOFRAN ODT 4 MG Take 1 Tab by mouth every 24 hours as needed for Nausea/Vomiting.        PredniSONE (Tab) DELTASONE 10 MG Take 2 10 mg tabs daily for 2 days, then 1 10 mg for 6 days        Propranolol HCl (Tab) INDERAL 40 MG Take 80 mg by mouth 2 times a day.        Rivaroxaban (Tab) XARELTO 20 MG Take 1 Tab by mouth with dinner.        TraMADol HCl (Tab) ULTRAM 50 MG Take 1 Tab by mouth every 24 hours as needed for Mild Pain or Moderate Pain (headaches).        Triamcinolone Acetonide (Cream) KENALOG 0.1 % Apply to skin lesion on posterior neck twice a day as needed        Venlafaxine HCl (CAPSULE SR 24 HR) EFFEXOR XR 37.5 MG TAKE ONE CAPSULE BY MOUTH TWICE A DAY WITH  MEALS        .                 Medicines prescribed today were sent to:     [facility][id number] - [place], [place] - [address] [place] [address]    Phone: [phone] Fax: [phone]    Open 24 Hours?: No      Medication refill instructions:       If your prescription bottle indicates you have medication refills left, it is not necessary to call your provider’s office. Please contact your pharmacy and they will refill your medication.    If your prescription bottle indicates you do not have any refills left, you may request refills at any time through one of the following ways: The online ANPI system (except Urgent Care), by calling your provider’s office, or by asking your pharmacy to contact your provider’s office with a refill request. Medication refills are processed only during regular business hours and may not be available until the next business day. Your provider may request additional information or to have a follow-up visit with you prior to refilling your medication.   *Please Note: Medication refills are assigned a new Rx number when refilled electronically. Your pharmacy may indicate that no refills were authorized even though a new prescription for the same medication is available at the pharmacy. Please request the medicine by name with the pharmacy before contacting your provider for a refill.        Other Notes About Your Plan     4/6/16 Dr Quiroga appt for R hand pain, Fibromyalgia. Increase Gabapentin  300 mg TID. Wrist splint at night. F/u 3 months.  12/31/15 [facility] Neurology Consult-Dr Rima Quiroga. Carpal Tunnel, hand pain. Gabapentin, Hand exercises, consider NCS/EMG.  Fall risk done 10/15/15 RB           [employer] Access Code: Activation code not generated  Current ANPI Status: Active

## 2017-04-12 NOTE — ASSESSMENT & PLAN NOTE
Pt reports she has recently been losing her voice.  States about 5 days ago coughing started, green sputum, then Monday dark brown. States slight SOB and voice hoarse and slight upper chest tightness with deep breath.   In addition reports sore throat and frontal sinus pressure.  She is a former smoker 1ppd x 20 years or so.

## 2017-04-12 NOTE — PROGRESS NOTES
Chief Complaint: Results, shortness of breath and hoarse voice    HPI:  Miryam presents to the clinic for follow up on Migraine Headaches, left Shoulder Pain, and concern over facial hair.    Her PMH is extensive and includes:    Anxiety,Depression, Bipolar Disorder  Fibromyalgia  Headaches  Hypertension  Hyperparathyroidism/High PTH level  Hyperlipidemia  Osteoarthritis  bilat Knee pains  Osteopenia  Degenerative Disc Disease  Carpel Tunnel Syndrome  Cholecystectomy  Appendectomy  Tenosynovitis of Finger    Pyelonephritis w sepsis (new Jan 2017)  Paroxysmal Atrial Fibrillation ( new- Jan 2017)  New Onset Atrial fibrillation (new)  Left Shoulder Pain ( new MArch 2017)  Facial hair-Hirsutism (new March 2017)    She is established with  Neurology- Dr Quiroga  Nephrology- Dr Rafael Lloyd  Counseling-Psychology- [facility]  Surgeon-[facility] for Sebaceous cyst ([facility])  Orthopedics-[facility] for bilat knee pains,     Referrals Recently Approved:     Cardiology - [facility]  GYN-  [facility]  Neurology-Again for Dr Quiroga    Review of Records shows:    3/1/17 Clinic visit for Migraines and requesting Tramadol, Re-referred to Cardiology, Re-referred to Neurology to see Dr uQiroga again, Left Shoulder xray ordered.  Referral to GYN for her concern over Hirsutism.  2/15/17 No Show Clinic  1/25/17 Clinic Visit for Hospital f/u an discussion of her new Atrial Fib issue. Referred to Cardiology, General Surgeon, and Orthopedics.  Labs ordered. Pt to continue Xarelto and Inderal BID.  1/8/17---> 1/11/17 Hospital Admit for Pyelonephritis, Sepsis, paroxysmal A-fib, HTN, Fibromyalgia, Headaches, Discharged on Cipro and Xarelto.  10/27/16 Clinic visit for Xray results, Tx of Skin Tags, Voltaren RX, bilat knee pains and Referral to Orthopedics([facility])  10/16/Clinic Visit for Left leg Sebaceous Cyst, knee pain--->Referred to Surgeon([facility])  7/28/16 clinic Visit for finger pains  of left hand and for refills.      [place]  Report shows:  3/9/17 Tramadol 50 mg # 30 by me   1/15/17 Tramadol 50 mg # 40 by Dr carrera  9/6/16 [place] 5/325 # 40 by Titi Recinos (DDS)  7/28/16 Norco 5/325 # 30 by me  ----------------------------------------------------------  REsults Review:    4/4/17 Labs- CBC noraml, CMP normal except BS= 108, GFR= 56, Cholesterol Total= 196, Triglycerides= 308, HDL= 35, LDL= 99                        Vitamin D normal,  PTH elevated.  4/11/17 Left Shoulder Xray Report: Mild Osteoarthritis    Hypertriglyceridemia  We reviewed her high level of Triglycerides.  Discussed increasing fiber, Omega-FA's-Fish oil and increase exercise and cutting back on her intake of sugar/carbs.    Acute pain of left shoulder  We discussed her Left Shoulder Xrays showing mild osteoarthritis in her shoulder.  Pt reports she has lightened her purse and switched to carrying it on her right shoulder and her shoulder pain has resolved.    Shortness of breath  Pt reports she has recently been losing her voice.  States about 5 days ago coughing started, green sputum, then Monday dark brown. States slight SOB and voice hoarse and slight upper chest tightness with deep breath.   In addition reports sore throat and frontal sinus pressure.  She is a former smoker 1ppd x 20 years or so.    Skin irritation  Pt reports put icy hot on her left posterior neck a few days ago and felt burning. Reports mild itching to area and small rash.  Denies any other rash areas.  Has not tried any tx on rash area.      Patient Active Problem List    Diagnosis Date Noted   • Severe sepsis, POA (HCC) due to pyelonephritis 01/09/2017     Priority: High   • Tenosynovitis of finger 10/15/2015     Priority: High   • Acute respiratory failure with hypoxia (HCC) due to sepsis, POA 01/09/2017     Priority: Medium   • Gram-negative katharina bacteremia 01/09/2017     Priority: Medium   • Paroxysmal atrial fibrillation (CMS-HCC) 01/09/2017      Priority: Medium   • Acute pyelonephritis 01/08/2017     Priority: Medium   • Hypercalcemia 10/20/2015     Priority: Medium   • HTN (hypertension) 01/09/2017     Priority: Low   • Chronic renal failure, stage 2 (mild) 01/09/2017     Priority: Low   • Fibromyalgia 01/09/2017     Priority: Low   • Hypertriglyceridemia 04/12/2017   • Shortness of breath 04/12/2017   • Skin irritation 04/12/2017   • Obesity (BMI 30-39.9) 04/12/2017   • Acute pain of left shoulder 03/01/2017   • Post-menopause 03/01/2017   • Heart palpitations 01/25/2017   • Low energy 01/25/2017   • Headache 01/11/2017   • Pain in both knees 10/06/2016   • Accessory skin tags 10/06/2016   • Lump of skin of left lower extremity 08/22/2016   • Pain of finger of left hand 07/28/2016   • Elevated PTHrP level (CMS-HCC) 07/28/2016   • Chronic pain 12/21/2015   • Vitamin B12 deficiency 11/12/2015   • Renal insufficiency 11/12/2015   • Acute pain of left wrist 11/12/2015   • Pain of right hand 10/15/2015   • Osteoarthritis, hand, primary localized 10/05/2015   • Cellulitis of right hand 10/04/2015   • Chronic pain syndrome 11/24/2014   • Essential hypertension 08/12/2014   • Recurrent sinus infections 07/22/2014   • Lumbar disc disease with radiculopathy 09/14/2012   • Migraine headache    • Lumbar facet arthropathy (HCC)    • Rotator cuff syndrome of right shoulder 12/09/2011   • Allergic rhinitis due to pollen 08/26/2011   • Anxiety 07/15/2011   • Family history of breast cancer in mother    • Bipolar affective disorder (CMS-HCC) 05/22/2009   • Depression 05/22/2009       Allergies:Codeine; Imitrex; Penicillins; Sulfa drugs; Azithromycin; Dilaudid; Sensipar; Imipramine; Other drug; and Seroquel    Current Outpatient Prescriptions   Medication Sig Dispense Refill   • doxycycline (MONODOX) 100 MG capsule Take 1 Cap by mouth 2 times a day. 14 Cap 0   • predniSONE (DELTASONE) 10 MG Tab Take 2 10 mg tabs daily for 2 days, then 1 10 mg for 6 days 10 Tab 0   •  guaifenesin-codeine (TUSSI-ORGANIDIN NR) 100-10 MG/5ML syrup Take 5 mL by mouth 2 times a day as needed. 120 mL 0   • benzonatate (TESSALON) 100 MG Cap Take 1 Cap by mouth 3 times a day as needed. 30 Cap 0   • triamcinolone acetonide (KENALOG) 0.1 % Cream Apply to skin lesion on posterior neck twice a day as needed 1 Tube 0   • venlafaxine XR (EFFEXOR XR) 37.5 MG CAPSULE SR 24 HR TAKE ONE CAPSULE BY MOUTH TWICE A DAY WITH MEALS 60 Cap 2   • Diclofenac Sodium 1 % Gel Apply twice a day to joints as needed 1 Tube 5   • gabapentin (NEURONTIN) 300 MG Cap Take 1 Cap by mouth 3 times a day. 90 Cap 5   • loratadine (CLARITIN) 10 MG Tab Take 1 Tab by mouth every day. 30 Tab 2   • ondansetron (ZOFRAN ODT) 4 MG TABLET DISPERSIBLE Take 1 Tab by mouth every 24 hours as needed for Nausea/Vomiting. 20 Tab 2   • vitamin D (CHOLECALCIFEROL) 1000 UNIT Tab Take 1,000 Units by mouth every day.     • rivaroxaban (XARELTO) 20 MG Tab tablet Take 1 Tab by mouth with dinner. 30 Tab 2   • methocarbamol (ROBAXIN) 750 MG Tab Take 1,500 mg by mouth 2 Times a Day.     • propranolol (INDERAL) 40 MG Tab Take 80 mg by mouth 2 times a day.     • albuterol (VENTOLIN HFA) 108 (90 BASE) MCG/ACT Aero Soln inhalation aerosol Inhale 2 Puffs by mouth every 6 hours as needed for Shortness of Breath. 8.5 g 3   • cyanocobalamin (VITAMIN B12) 1000 MCG Tab Take 1 Tab by mouth every day. 30 Tab 3   • furosemide (LASIX) 20 MG Tab      • tramadol (ULTRAM) 50 MG Tab Take 1 Tab by mouth every 24 hours as needed for Mild Pain or Moderate Pain (headaches). 30 Tab 0     No current facility-administered medications for this visit.       Social History   Substance Use Topics   • Smoking status: Former Smoker -- 1.00 packs/day for 20 years     Types: Cigarettes     Quit date: 01/01/1994   • Smokeless tobacco: Never Used   • Alcohol Use: Yes      Comment: 2-3 drinks per month       Family History   Problem Relation Age of Onset   • Cancer Mother      breast cancer,  "bilaterally   • GI Father      ulcer   • Heart Disease Father 40     MIs   • Psychiatry Father      anxiety   • Arthritis Sister      rheumatoid   • Stroke Sister 79     mid brain       ROS:  Review of Systems   See HPI Above    Exam:  Blood pressure 122/70, pulse 80, temperature 36.7 °C (98 °F), resp. rate 16, height 1.778 m (5' 10\"), weight 103.42 kg (228 lb), SpO2 93 %.  General:  Well nourished, well developed female in NAD  HENT:Head is grossly normal. PERRL.  Neck: Supple. Trachea is midline.  Pulmonary: Clear to ausculation .  Normal effort. No rales, ronchi, or wheezing.   Cardiovascular: Regular rate and rhythm.  Abdomen-Abdomen is soft, No tenderness.  Upper extremities- Strong = . Good ROM  Lower extremities- neg for edema, redness, tenderness.  Neuro- A & O x 4. Speech clear and appropriate.     Current medications, allergies, and problem list reviewed with patient and updated in  [facility] today.    Assessment/Plan:  1. Hypertriglyceridemia  Pt to increase walking/exercise, Recommended Fish-Oil [place] # FA's 2-3 x per day. Increase fiber in diet. Decrease sugar and carbohydrates.   2. Acute pain of left shoulder , resolved Pt to continue to only carry a light purse and use right shoulder.   3. Shortness of breath  predniSONE (DELTASONE) 10 MG Tab   4. Acute bronchitis due to other specified organisms  doxycycline (MONODOX) 100 MG capsule   5. Cough  guaifenesin-codeine (TUSSI-ORGANIDIN NR) 100-10 MG/5ML syrup    benzonatate (TESSALON) 100 MG Cap   6. Chronic pain of both knees  Continue Diclofenac gel, ice as needed f/u [facility].   7. Skin irritation  Kenalog Rx sparingly   8. Obesity (BMI 30-39.9)  Patient identified as having weight management issue.  Appropriate orders and counseling given.   Follow up in 3 months. Call or return if questions, concerns, or worsening condition.  "

## 2017-05-10 ENCOUNTER — OFFICE VISIT (OUTPATIENT)
Dept: MEDICAL GROUP | Facility: MEDICAL CENTER | Age: 67
End: 2017-05-10
Attending: NURSE PRACTITIONER
Payer: MEDICAID

## 2017-05-10 VITALS
DIASTOLIC BLOOD PRESSURE: 78 MMHG | WEIGHT: 228 LBS | OXYGEN SATURATION: 93 % | TEMPERATURE: 98.1 F | RESPIRATION RATE: 16 BRPM | SYSTOLIC BLOOD PRESSURE: 124 MMHG | HEART RATE: 80 BPM | BODY MASS INDEX: 32.64 KG/M2 | HEIGHT: 70 IN

## 2017-05-10 DIAGNOSIS — M46.1 SI (SACROILIAC) JOINT INFLAMMATION (HCC): ICD-10-CM

## 2017-05-10 DIAGNOSIS — R00.2 HEART PALPITATIONS: ICD-10-CM

## 2017-05-10 DIAGNOSIS — R06.02 SHORTNESS OF BREATH: ICD-10-CM

## 2017-05-10 DIAGNOSIS — I48.0 PAROXYSMAL ATRIAL FIBRILLATION (HCC): ICD-10-CM

## 2017-05-10 DIAGNOSIS — M51.16 LUMBAR DISC DISEASE WITH RADICULOPATHY: ICD-10-CM

## 2017-05-10 DIAGNOSIS — I10 ESSENTIAL HYPERTENSION: ICD-10-CM

## 2017-05-10 PROCEDURE — 99213 OFFICE O/P EST LOW 20 MIN: CPT | Performed by: NURSE PRACTITIONER

## 2017-05-10 PROCEDURE — 93005 ELECTROCARDIOGRAM TRACING: CPT | Performed by: NURSE PRACTITIONER

## 2017-05-10 PROCEDURE — 93000 ELECTROCARDIOGRAM COMPLETE: CPT | Performed by: NURSE PRACTITIONER

## 2017-05-10 PROCEDURE — 99214 OFFICE O/P EST MOD 30 MIN: CPT | Mod: 25 | Performed by: NURSE PRACTITIONER

## 2017-05-10 RX ORDER — PROPRANOLOL HYDROCHLORIDE 40 MG/1
40 TABLET ORAL 3 TIMES DAILY
Qty: 90 TAB | Refills: 1 | Status: SHIPPED | OUTPATIENT
Start: 2017-05-10 | End: 2017-07-06 | Stop reason: SDUPTHER

## 2017-05-10 RX ORDER — PREDNISONE 10 MG/1
TABLET ORAL
Qty: 12 TAB | Refills: 0 | Status: SHIPPED | OUTPATIENT
Start: 2017-05-10 | End: 2017-06-06

## 2017-05-10 ASSESSMENT — PAIN SCALES - GENERAL: PAINLEVEL: 4=SLIGHT-MODERATE PAIN

## 2017-05-10 NOTE — MR AVS SNAPSHOT
"Josijohanny Juan Magdiel   5/10/2017 3:30 PM   Office Visit   MRN: [id number]    [facility]   Dept Phone:  [phone]    Description:  Female : 1950   Provider:  HODAN Sterling           Reason for Visit     Follow-Up short of breath      Allergies as of 5/10/2017     Allergen Noted Reactions    Codeine 2009   Itching, Nausea    Imitrex [Sumatriptan Succinate] 2010   Shortness of Breath, Swelling    Penicillins 2007   Rash, Vomiting    Sulfa Drugs 2007   Shortness of Breath, Itching    Azithromycin 2007   Itching    Dilaudid [Hydromorphone] 10/20/2015   Itching    sweating    Sensipar [Cinacalcet] 2016   Unspecified    Nightmares and leg cramping    Imipramine 2011   Shortness of Breath, Itching    anxious    Other Drug 2011       Z Pack    Seroquel [Quetiapine Fumarate] 2011         You were diagnosed with     Shortness of breath   [786.05.ICD-9-CM]       Essential hypertension   [7445939]       Heart palpitations   [768789]       Paroxysmal atrial fibrillation (CMS-HCC)   [828943]       SI (sacroiliac) joint inflammation (CMS-HCC)   [520620]         Vital Signs     Blood Pressure Pulse Temperature Respirations Height Weight    124/78 mmHg 80 36.7 °C (98.1 °F) 16 1.778 m (5' 10\") 103.42 kg (228 lb)    Body Mass Index Oxygen Saturation Breastfeeding? Smoking Status          32.71 kg/m2 93% No Former Smoker        Basic Information     Date Of Birth Sex Race Ethnicity Preferred Language    1950 Female White Non- English      Your appointments     May 30, 2017  4:15 PM   NEW PATIENT with Wander Poole M.D.   [facility] (--)    [address], [address]  [place] [id number] [phone]              Problem List              ICD-10-CM Priority Class Noted - Resolved    Bipolar affective disorder (CMS-HCC) F31.9   2009 - Present    Depression F32.9   2009 - " Present    Family history of breast cancer in mother Z80.3   Unknown - Present    Anxiety F41.9   7/15/2011 - Present    Allergic rhinitis due to pollen J30.1   8/26/2011 - Present    Rotator cuff syndrome of right shoulder M75.101   12/9/2011 - Present    Migraine headache G43.909   Unknown - Present    Lumbar facet arthropathy (HCC) M12.88   Unknown - Present    Lumbar disc disease with radiculopathy M51.16   9/14/2012 - Present    Recurrent sinus infections J32.9   7/22/2014 - Present    Essential hypertension I10   8/12/2014 - Present    Chronic pain syndrome G89.4   11/24/2014 - Present    Cellulitis of right hand L03.113   10/4/2015 - Present    Osteoarthritis, hand, primary localized M19.049   10/5/2015 - Present    Pain of right hand M79.641   10/15/2015 - Present    Tenosynovitis of finger M65.9 High  10/15/2015 - Present    Hypercalcemia E83.52 Medium  10/20/2015 - Present    Vitamin B12 deficiency E53.8   11/12/2015 - Present    Renal insufficiency N28.9   11/12/2015 - Present    Acute pain of left wrist M25.532   11/12/2015 - Present    Chronic pain G89.29   12/21/2015 - Present    Pain of finger of left hand M79.645   7/28/2016 - Present    Elevated PTHrP level (CMS-HCC) E21.5   7/28/2016 - Present    Lump of skin of left lower extremity R22.42   8/22/2016 - Present    Pain in both knees M25.561, M25.562   10/6/2016 - Present    Accessory skin tags Q82.8   10/6/2016 - Present    Acute pyelonephritis N12 Medium  1/8/2017 - Present    Severe sepsis, POA (HCC) due to pyelonephritis A41.9, R65.20 High  1/9/2017 - Present    Acute respiratory failure with hypoxia (HCC) due to sepsis, POA J96.01 Medium  1/9/2017 - Present    Gram-negative katharina bacteremia R78.81 Medium  1/9/2017 - Present    HTN (hypertension) I10 Low  1/9/2017 - Present    Paroxysmal atrial fibrillation (CMS-HCC) I48.0 Medium  1/9/2017 - Present    Chronic renal failure, stage 2 (mild) N18.2 Low  1/9/2017 - Present    Fibromyalgia M79.7 Low   1/9/2017 - Present    Headache R51   1/11/2017 - Present    Heart palpitations R00.2   1/25/2017 - Present    Low energy R53.83   1/25/2017 - Present    Acute pain of left shoulder M25.512   3/1/2017 - Present    Post-menopause Z78.0   3/1/2017 - Present    Hypertriglyceridemia E78.1   4/12/2017 - Present    Shortness of breath R06.02   4/12/2017 - Present    Skin irritation R23.8   4/12/2017 - Present    Obesity (BMI 30-39.9) E66.9   4/12/2017 - Present      Health Maintenance        Date Due Completion Dates    PAP SMEAR 9/20/1973 ---    IMM ZOSTER VACCINE 9/20/2012 ---    MAMMOGRAM 5/29/2014 5/29/2013, 8/12/2011, 7/17/2009, 7/17/2009, 7/23/2008, 6/27/2008, 6/27/2008, 12/9/2005    COLONOSCOPY 10/7/2021 10/7/2011 (Declined)    Override on 10/7/2011: Patient Declined (will do FIT test instead)    IMM DTaP/Tdap/Td Vaccine (2 - Td) 10/27/2026 10/27/2016            Results     EKG - Clinic Performed                   Current Immunizations     13-VALENT PCV PREVNAR 1/11/2017  4:06 PM    Influenza TIV (IM) 9/19/2015, 11/7/2014    Influenza Vaccine Adult HD 10/1/2016    Influenza Vaccine Pediatric 11/3/2010    Tdap Vaccine 10/27/2016      Below and/or attached are the medications your provider expects you to take. Review all of your home medications and newly ordered medications with your provider and/or pharmacist. Follow medication instructions as directed by your provider and/or pharmacist. Please keep your medication list with you and share with your provider. Update the information when medications are discontinued, doses are changed, or new medications (including over-the-counter products) are added; and carry medication information at all times in the event of emergency situations     Allergies:  CODEINE - Itching,Nausea     IMITREX - Shortness of Breath,Swelling     PENICILLINS - Rash,Vomiting     SULFA DRUGS - Shortness of Breath,Itching     AZITHROMYCIN - Itching     DILAUDID - Itching     SENSIPAR - Unspecified      IMIPRAMINE - Shortness of Breath,Itching     OTHER DRUG - (reactions not documented)     SEROQUEL - (reactions not documented)               Medications  Valid as of: May 10, 2017 -  4:31 PM    Generic Name Brand Name Tablet Size Instructions for use    Albuterol Sulfate (Aero Soln) albuterol 108 (90 BASE) MCG/ACT Inhale 2 Puffs by mouth every 6 hours as needed for Shortness of Breath.        Benzonatate (Cap) TESSALON 100 MG Take 1 Cap by mouth 3 times a day as needed.        Cholecalciferol (Tab) cholecalciferol 1000 UNIT Take 1,000 Units by mouth every day.        Cyanocobalamin (Tab) VITAMIN B12 1000 MCG Take 1 Tab by mouth every day.        Diclofenac Sodium (Gel) Diclofenac Sodium 1 % Apply twice a day to joints as needed        Doxycycline Monohydrate (Cap) MONODOX 100 MG Take 1 Cap by mouth 2 times a day.        Furosemide (Tab) LASIX 20 MG         Gabapentin (Cap) NEURONTIN 300 MG Take 1 Cap by mouth 3 times a day.        Guaifenesin-Codeine (Syrup) TUSSI-ORGANIDIN -10 MG/5ML Take 5 mL by mouth 2 times a day as needed.        Loratadine (Tab) CLARITIN 10 MG Take 1 Tab by mouth every day.        Methocarbamol (Tab) ROBAXIN 750 MG Take 1,500 mg by mouth 2 Times a Day.        Ondansetron (TABLET DISPERSIBLE) ZOFRAN ODT 4 MG Take 1 Tab by mouth every 24 hours as needed for Nausea/Vomiting.        PredniSONE (Tab) DELTASONE 10 MG Take 2 10 mg tabs daily for 2 days, then 1 10 mg for 6 days        PredniSONE (Tab) DELTASONE 10 MG Take 10 mg Twice a day for 5 days then once a day for 2 days        Propranolol HCl (Tab) INDERAL 40 MG Take 80 mg by mouth 2 times a day.        Propranolol HCl (Tab) INDERAL 40 MG Take 1 Tab by mouth 3 times a day.        Rivaroxaban (Tab) XARELTO 20 MG Take 1 Tab by mouth with dinner.        TraMADol HCl (Tab) ULTRAM 50 MG Take 1 Tab by mouth every 24 hours as needed for Mild Pain or Moderate Pain (headaches).        Triamcinolone Acetonide (Cream) KENALOG 0.1 % Apply to skin  lesion on posterior neck twice a day as needed        Venlafaxine HCl (CAPSULE SR 24 HR) EFFEXOR XR 37.5 MG TAKE ONE CAPSULE BY MOUTH TWICE A DAY WITH MEALS        .                 Medicines prescribed today were sent to:     [id number] [place] [address] [address]    Phone: [phone] Fax: [phone]    Open 24 Hours?: No      Medication refill instructions:       If your prescription bottle indicates you have medication refills left, it is not necessary to call your provider’s office. Please contact your pharmacy and they will refill your medication.    If your prescription bottle indicates you do not have any refills left, you may request refills at any time through one of the following ways: The online [employer]ent system (except Urgent Care), by calling your provider’s office, or by asking your pharmacy to contact your provider’s office with a refill request. Medication refills are processed only during regular business hours and may not be available until the next business day. Your provider may request additional information or to have a follow-up visit with you prior to refilling your medication.   *Please Note: Medication refills are assigned a new Rx number when refilled electronically. Your pharmacy may indicate that no refills were authorized even though a new prescription for the same medication is available at the pharmacy. Please request the medicine by name with the pharmacy before contacting your provider for a refill.        Other Notes About Your Plan     4/6/16 Dr Quiroga appt for R hand pain, Fibromyalgia. Increase Gabapentin  300 mg TID. Wrist splint at night. F/u 3 months.  12/31/15 [facility] Neurology Consult-Dr Rima Quiroga. Carpal Tunnel, hand pain. Gabapentin, Hand exercises, consider NCS/EMG.  Fall risk done 10/15/15 RB           TextHubhart Access Code: Activation code not generated  Current [employer]ent Status: Active

## 2017-05-10 NOTE — ASSESSMENT & PLAN NOTE
Pt reports the past 2 or so days she has felt her heart beating harder in her left chest area. Denies light-headed feeling but has had some mild SOB and mild ankle edema.  She was dx earlier this year w Paroxysmal Afib and has been taking Propranolol 40 mg BID for her migraine prevention and for rate control/ Paroxysmal Afib. In review of Discharge Summary from her Jan. Hospitalization for new onset Afib she was to take 80 mg BID.   Based on her current 12 lead EKG in Clinic showing NSR and rate 78-80 and stable BP I recommended we increase her Propranolol to 40 mg TID and order Holter Monitor as I suspect she is going in and out of A fib. She is taking Xarelto as prescribed and will continue.  Pt is against holter monitor at this point and wants to talk with Cardiology. I have instructed her if she gets fast heart rate and CP/SOB she is to go to ER for evaluation and treatment and Pt agrees.

## 2017-05-10 NOTE — PROGRESS NOTES
Chief Complaint: Episodes of SOB and Palpitations    HPI:  Miryam presents to the clinic as same day appointment for  Shortness of breath.      Her PMH includes    Anxiety,Depression, Bipolar Disorder  Fibromyalgia  Headaches  Hypertension  Hyperparathyroidism/High PTH level  Hyperlipidemia  Osteoarthritis  bilat Knee pains  Osteopenia  Degenerative Disc Disease  Carpel Tunnel Syndrome  Cholecystectomy  Appendectomy  Tenosynovitis of Finger    Pyelonephritis w sepsis (new Jan 2017)  Paroxysmal Atrial Fibrillation ( new- Jan 2017)  New Onset Atrial fibrillation (new)  Left Shoulder Pain ( new MArch 2017)  Facial hair-Hirsutism (new March 2017)    She is established with  Neurology- Dr Quiroga  Nephrology- Dr Rafael Lloyd  Counseling-Psychology- [facility]  Surgeon-[facility] for Sebaceous cyst ([facility])  Orthopedics-[facility] for bilat knee pains,      Referrals Recently Approved:     Cardiology - [facility]  GYN-  Lizbeth [facility]ows-[facility]  Neurology-Again for Dr Quiroga    Review of Records shows:    4/12/17 Clinic visit for Cough, SOB, Dx Bronchitis, RX for Robitussin and codeine, Tessalon, Doxycycline.  3/1/17 Clinic visit for Migraines and requesting Tramadol, Re-referred to Cardiology, Re-referred to Neurology to see Dr Quiroga again, Left Shoulder xray ordered.  Referral to GYN for her concern over Hirsutism.  2/15/17 No Show Clinic  1/25/17 Clinic Visit for Hospital f/u an discussion of her new Atrial Fib issue. Referred to Cardiology, General Surgeon, and Orthopedics.  Labs ordered. Pt to continue Xarelto and Inderal BID.  1/8/17---> 1/11/17 Hospital Admit for Pyelonephritis, Sepsis, paroxysmal A-fib, HTN, Fibromyalgia, Headaches, Discharged on Cipro and Xarelto.  10/27/16 Clinic visit for Xray results, Tx of Skin Tags, Voltaren RX, bilat knee pains and Referral to Orthopedics([facility])  10/16/Clinic Visit for Left leg Sebaceous Cyst, knee pain--->Referred to Surgeon([facility])  7/28/16 clinic Visit for finger pains of left hand and for refills.      [place]  Report shows:  3/9/17 Tramadol 50 mg # 30 by me    1/15/17 Tramadol 50 mg # 40 by Dr carrera  9/6/16 Forbes 5/325 # 40 by Titi Recinos (DDS)  7/28/16 Norco 5/325 # 30 by me  ----------------------------------------------------------      Shortness of breath  Pt reports 2 days ago short of breath and felt in left heart area \"rapid beating\"  Has been walking a mile a day.  Has appt to see Cardiologist for initial visit is on 5/30 w Dr Poole.   In Jan admitted for new onset Afib and was recommended she increase her Propranolol to 80 mg BID.  Reports slight had swelling to ankes over w/e and tool Lasix and helped.    1/11/17 Echo Report:    CONCLUSIONS  No prior study is available for comparison.   Normal left ventricular size, wall thickness, and systolic function.  Left ventricular ejection fraction is visually estimated to be 65%.  Diastolic function is difficult to assess with atrial fibrillation.  The right ventricle was normal in size and function.  Mild mitral regurgitation.    Heart palpitations  Pt reports the past 2 or so days she has felt her heart beating harder in her left chest area. Denies light-headed feeling but has had some mild SOB and mild ankle edema.  She was dx earlier this year w Paroxysmal Afib and has been taking Propranolol 40 mg BID for her migraine prevention and for rate control/ Paroxysmal Afib. In review of Discharge Summary from her Jan. Hospitalization for new onset Afib she was to take 80 mg BID.   Based on her current 12 lead EKG in Clinic showing NSR and rate 78-80 and no acute changes and stable BP I recommended we increase her Propranolol to 40 mg TID and order Holter Monitor as I suspect she is going in and out of A fib. She is taking Xarelto as prescribed and will continue.  Pt is against holter monitor at this point and wants to talk with Cardiology. I have instructed her if she gets fast " "heart rate and CP/SOB she is to go to ER for evaluation and treatment and Pt agrees.    Paroxysmal atrial fibrillation (CMS-HCC)  Pt has regular pulse and stable BP.  Denies any Chest pain currently.  Has had intermittent \"heart pounding\" in her left chest area that has bothered her. Denies sweating or n/v. Has had slight sensation at times of mild SOB. None currently.  Is waiting to see Cardiology and has 1st appt on May 30, 2017 w Dr Barrera.    Essential hypertension  BP  124/78 in clinic today. Known hx of HTN.Pt stating that they have been taking their medication as prescribed without adverse effect. Pt denies HA, vision changes, neurologic deficits, CP, SOB.   She did take a prn Lasix this w/e due to some mild ankle swelling and states \"it helped\"        Lumbar disc disease with radiculopathy  Pt reports pin point pains to left SI joint area and no relief with heat.  Worse with bending forward.  Not a candidate for NSAID due to kidney disease. Discussed low dose Prednisone and ice on and off to area. If no better, Pt to let me know and can refer  Back to Orthopedics or to Pain Management.      Patient Active Problem List    Diagnosis Date Noted   • Severe sepsis, POA (HCC) due to pyelonephritis 01/09/2017     Priority: High   • Tenosynovitis of finger 10/15/2015     Priority: High   • Acute respiratory failure with hypoxia (HCC) due to sepsis, POA 01/09/2017     Priority: Medium   • Gram-negative katharina bacteremia 01/09/2017     Priority: Medium   • Paroxysmal atrial fibrillation (CMS-HCC) 01/09/2017     Priority: Medium   • Acute pyelonephritis 01/08/2017     Priority: Medium   • Hypercalcemia 10/20/2015     Priority: Medium   • HTN (hypertension) 01/09/2017     Priority: Low   • Chronic renal failure, stage 2 (mild) 01/09/2017     Priority: Low   • Fibromyalgia 01/09/2017     Priority: Low   • Hypertriglyceridemia 04/12/2017   • Shortness of breath 04/12/2017   • Skin irritation 04/12/2017   • Obesity (BMI " 30-39.9) 04/12/2017   • Acute pain of left shoulder 03/01/2017   • Post-menopause 03/01/2017   • Heart palpitations 01/25/2017   • Low energy 01/25/2017   • Headache 01/11/2017   • Pain in both knees 10/06/2016   • Accessory skin tags 10/06/2016   • Lump of skin of left lower extremity 08/22/2016   • Pain of finger of left hand 07/28/2016   • Elevated PTHrP level (CMS-HCC) 07/28/2016   • Chronic pain 12/21/2015   • Vitamin B12 deficiency 11/12/2015   • Renal insufficiency 11/12/2015   • Acute pain of left wrist 11/12/2015   • Pain of right hand 10/15/2015   • Osteoarthritis, hand, primary localized 10/05/2015   • Cellulitis of right hand 10/04/2015   • Chronic pain syndrome 11/24/2014   • Essential hypertension 08/12/2014   • Recurrent sinus infections 07/22/2014   • Lumbar disc disease with radiculopathy 09/14/2012   • Migraine headache    • Lumbar facet arthropathy (HCC)    • Rotator cuff syndrome of right shoulder 12/09/2011   • Allergic rhinitis due to pollen 08/26/2011   • Anxiety 07/15/2011   • Family history of breast cancer in mother    • Bipolar affective disorder (CMS-HCC) 05/22/2009   • Depression 05/22/2009       Allergies:Codeine; Imitrex; Penicillins; Sulfa drugs; Azithromycin; Dilaudid; Sensipar; Imipramine; Other drug; and Seroquel    Current Outpatient Prescriptions   Medication Sig Dispense Refill   • propranolol (INDERAL) 40 MG Tab Take 1 Tab by mouth 3 times a day. 90 Tab 1   • predniSONE (DELTASONE) 10 MG Tab Take 10 mg Twice a day for 5 days then once a day for 2 days 12 Tab 0   • furosemide (LASIX) 20 MG Tab      • doxycycline (MONODOX) 100 MG capsule Take 1 Cap by mouth 2 times a day. 14 Cap 0   • predniSONE (DELTASONE) 10 MG Tab Take 2 10 mg tabs daily for 2 days, then 1 10 mg for 6 days 10 Tab 0   • guaifenesin-codeine (TUSSI-ORGANIDIN NR) 100-10 MG/5ML syrup Take 5 mL by mouth 2 times a day as needed. 120 mL 0   • benzonatate (TESSALON) 100 MG Cap Take 1 Cap by mouth 3 times a day as  needed. 30 Cap 0   • triamcinolone acetonide (KENALOG) 0.1 % Cream Apply to skin lesion on posterior neck twice a day as needed 1 Tube 0   • venlafaxine XR (EFFEXOR XR) 37.5 MG CAPSULE SR 24 HR TAKE ONE CAPSULE BY MOUTH TWICE A DAY WITH MEALS 60 Cap 2   • Diclofenac Sodium 1 % Gel Apply twice a day to joints as needed 1 Tube 5   • tramadol (ULTRAM) 50 MG Tab Take 1 Tab by mouth every 24 hours as needed for Mild Pain or Moderate Pain (headaches). 30 Tab 0   • gabapentin (NEURONTIN) 300 MG Cap Take 1 Cap by mouth 3 times a day. 90 Cap 5   • loratadine (CLARITIN) 10 MG Tab Take 1 Tab by mouth every day. 30 Tab 2   • ondansetron (ZOFRAN ODT) 4 MG TABLET DISPERSIBLE Take 1 Tab by mouth every 24 hours as needed for Nausea/Vomiting. 20 Tab 2   • vitamin D (CHOLECALCIFEROL) 1000 UNIT Tab Take 1,000 Units by mouth every day.     • rivaroxaban (XARELTO) 20 MG Tab tablet Take 1 Tab by mouth with dinner. 30 Tab 2   • methocarbamol (ROBAXIN) 750 MG Tab Take 1,500 mg by mouth 2 Times a Day.     • propranolol (INDERAL) 40 MG Tab Take 80 mg by mouth 2 times a day.     • albuterol (VENTOLIN HFA) 108 (90 BASE) MCG/ACT Aero Soln inhalation aerosol Inhale 2 Puffs by mouth every 6 hours as needed for Shortness of Breath. 8.5 g 3   • cyanocobalamin (VITAMIN B12) 1000 MCG Tab Take 1 Tab by mouth every day. 30 Tab 3     No current facility-administered medications for this visit.       Social History   Substance Use Topics   • Smoking status: Former Smoker -- 1.00 packs/day for 20 years     Types: Cigarettes     Quit date: 01/01/1994   • Smokeless tobacco: Never Used   • Alcohol Use: Yes      Comment: 2-3 drinks per month       Family History   Problem Relation Age of Onset   • Cancer Mother      breast cancer, bilaterally   • GI Father      ulcer   • Heart Disease Father 40     MIs   • Psychiatry Father      anxiety   • Arthritis Sister      rheumatoid   • Stroke Sister 79     mid brain       ROS:  Review of Systems   See HPI  "Above      Exam:  Blood pressure 124/78, pulse 80, temperature 36.7 °C (98.1 °F), resp. rate 16, height 1.778 m (5' 10\"), weight 103.42 kg (228 lb), SpO2 93 %, not currently breastfeeding.  General:  Well nourished, over-weight,well developed female in mild distress.  HENT:Head is grossly normal. PERRL.  Neck: Supple. Trachea is midline.  Pulmonary: Clear to ausculation .  Normal effort. No rales, ronchi, or wheezing.   Cardiovascular: Regular rate and rhythm.  Abdomen-Abdomen is soft, No tenderness.  Upper extremities- Strong = . Good ROM  Lower extremities- neg for edema, redness, tenderness.  Neuro- A & O x 4. Speech clear and appropriate.     Current medications, allergies, and problem list reviewed with patient and updated in  EPIC today.    Assessment/Plan:  1. Shortness of breath  EKG - Clinic Performed- NSR with no acute changes.    2. Essential hypertension  Continue medication   3. Heart palpitations  EKG - Clinic Performed    propranolol (INDERAL) 40 MG Tab- increase in dose to 40 mg TID. Pt not amenable to Holter 24 hr monitor. Pt to keep appt with Cardiology-Dr Poole set for 5/30/17   4. Paroxysmal atrial fibrillation (CMS-HCC)  propranolol (INDERAL) 40 MG Tab   5. SI (sacroiliac) joint inflammation (CMS-HCC)  predniSONE (DELTASONE) 10 MG Tab        Pt instructed if CP/SOB or fast heart rate to go to ER.  Follow up in 6 weeks. Call or return if questions, concerns, or worsening condition.  "

## 2017-05-10 NOTE — ASSESSMENT & PLAN NOTE
"BP  124/78 in clinic today. Known hx of HTN.Pt stating that they have been taking their medication as prescribed without adverse effect. Pt denies HA, vision changes, neurologic deficits, CP, SOB.   She did take a prn Lasix this w/e due to some mild ankle swelling and states \"it helped\"  "

## 2017-05-10 NOTE — ASSESSMENT & PLAN NOTE
Pt reports pin point pains to left SI joint area and no relief with heat.  Worse with bending forward.  Not a candidate for NSAID due to kidney disease. Discussed low dose Prednisone and ice on and off to area. If no better, Pt to let me know and can refer  Back to Orthopedics or to Pain Management.

## 2017-05-10 NOTE — ASSESSMENT & PLAN NOTE
"Pt reports 2 days ago short of breath and felt in left heart area \"rapid beating\"  Has been walking a mile a day.  Has appt to see Cardiologist for initial visit is on 5/30 w Dr Poole.   In Jan admitted for new onset Afib and was recommended she increase her Propranolol to 80 mg BID.  Reports slight had swelling to ankes over w/e and tool Lasix and helped.    1/11/17 Echo Report:    CONCLUSIONS  No prior study is available for comparison.   Normal left ventricular size, wall thickness, and systolic function.  Left ventricular ejection fraction is visually estimated to be 65%.  Diastolic function is difficult to assess with atrial fibrillation.  The right ventricle was normal in size and function.  Mild mitral regurgitation.    "

## 2017-05-10 NOTE — ASSESSMENT & PLAN NOTE
"Pt has regular pulse and stable BP.  Denies any Chest pain currently.  Has had intermittent \"heart pounding\" in her left chest area that has bothered her. Denies sweating or n/v. Has had slight sensation at times of mild SOB. None currently.  Is waiting to see Cardiology and has 1st appt on May 30, 2017 w Dr Barrera.  "

## 2017-05-17 RX ORDER — FUROSEMIDE 20 MG/1
TABLET ORAL
Qty: 30 TAB | Refills: 2 | Status: SHIPPED | OUTPATIENT
Start: 2017-05-17 | End: 2017-08-11 | Stop reason: SDUPTHER

## 2017-05-17 NOTE — TELEPHONE ENCOUNTER
Was the patient seen in the last year in this department? Yes     Does patient have an active prescription for medications requested? No     Received Request Via: Pharmacy   Future Appointments       Provider Department [place]    5/30/2017 4:15 PM Wander Poole M.D. [facility]

## 2017-05-30 ENCOUNTER — OFFICE VISIT (OUTPATIENT)
Dept: MEDICAL GROUP | Facility: MEDICAL CENTER | Age: 67
End: 2017-05-30
Attending: NURSE PRACTITIONER
Payer: MEDICAID

## 2017-05-30 VITALS
RESPIRATION RATE: 16 BRPM | WEIGHT: 236 LBS | OXYGEN SATURATION: 96 % | BODY MASS INDEX: 33.79 KG/M2 | HEART RATE: 82 BPM | HEIGHT: 70 IN | TEMPERATURE: 97.9 F | SYSTOLIC BLOOD PRESSURE: 120 MMHG | DIASTOLIC BLOOD PRESSURE: 90 MMHG

## 2017-05-30 DIAGNOSIS — M25.561 ACUTE PAIN OF RIGHT KNEE: ICD-10-CM

## 2017-05-30 DIAGNOSIS — M51.16 LUMBAR DISC DISEASE WITH RADICULOPATHY: ICD-10-CM

## 2017-05-30 PROCEDURE — 99213 OFFICE O/P EST LOW 20 MIN: CPT | Performed by: NURSE PRACTITIONER

## 2017-05-30 RX ORDER — TRAMADOL HYDROCHLORIDE 50 MG/1
50 TABLET ORAL
Qty: 20 TAB | Refills: 0 | Status: SHIPPED | OUTPATIENT
Start: 2017-05-30 | End: 2017-12-22

## 2017-05-30 RX ORDER — BACLOFEN 10 MG/1
10 TABLET ORAL 2 TIMES DAILY
Qty: 60 TAB | Refills: 2 | Status: SHIPPED | OUTPATIENT
Start: 2017-05-30 | End: 2017-12-22

## 2017-05-30 RX ORDER — CHLORAL HYDRATE 500 MG
1000 CAPSULE ORAL DAILY
COMMUNITY
End: 2020-06-09

## 2017-05-30 ASSESSMENT — PAIN SCALES - GENERAL: PAINLEVEL: 6=MODERATE PAIN

## 2017-05-30 NOTE — PROGRESS NOTES
Chief Complaint:    HPI:  Miryam presents to the clinic for back and knee pains after gardening.    Her PMH includes    Anxiety,Depression, Bipolar Disorder  Fibromyalgia  Headaches  Hypertension  Hyperparathyroidism/High PTH level  Hyperlipidemia  Mild Renal Insufficiency/CKD-mild  Osteoarthritis  bilat Knee pains  Osteopenia  Degenerative Disc Disease  Carpel Tunnel Syndrome  Cholecystectomy  Appendectomy  Tenosynovitis of Finger  Pyelonephritis w sepsis (new Jan 2017)  Paroxysmal Atrial Fibrillation ( new- Jan 2017)  New Onset Atrial fibrillation (new)  Left Shoulder Pain ( new MArch 2017)  Facial hair-Hirsutism (new March 2017)    She is established with  Neurology- Dr Quiroga  Nephrology- Dr Rafael Lloyd  Counseling-Psychology- [facility]  Surgeon-[facility] for Sebaceous cyst ([facility])  Orthopedics-[facility] for bilat knee pains,      Referrals Recently Approved:     Cardiology - [facility]---> Appt set for 5/30/17 but canceled  GYN-  Lizbeth Meadows-David  Neurology-Again for Dr Quiroga    Review of Records shows:    5/10/17 Clinic visit for SOB and Palpitations. Adjustment of her Inderal ( increase) and instructed to keep appt w Cardiology on 5/30/17 to establish r/t paroxysmal Atrial Fib/Palpitations.  4/12/17 Clinic visit for Cough, SOB, Dx Bronchitis, RX for Robitussin and codeine, Tessalon, Doxycycline.  3/1/17 Clinic visit for Migraines and requesting Tramadol, Re-referred to Cardiology, Re-referred to Neurology to see Dr Quiroga again, Left Shoulder xray ordered.  Referral to GYN for her concern over Hirsutism.  2/15/17 No Show Clinic  1/25/17 Clinic Visit for Hospital f/u an discussion of her new Atrial Fib issue. Referred to Cardiology, General Surgeon, and Orthopedics.  Labs ordered. Pt to continue Xarelto and Inderal BID.  1/8/17---> 1/11/17 Hospital Admit for Pyelonephritis, Sepsis, paroxysmal A-fib, HTN, Fibromyalgia, Headaches, Discharged on Cipro and  "Xarelto.  10/27/16 Clinic visit for Xray results, Tx of Skin Tags, Voltaren RX, bilat knee pains and Referral to Orthopedics([facility])  10/16/Clinic Visit for Left leg Sebaceous Cyst, knee pain--->Referred to Surgeon([facility])  7/28/16 clinic Visit for finger pains of left hand and for refills.      [place]  Report shows:  3/9/17 Tramadol 50 mg # 30 by me    1/15/17 Tramadol 50 mg # 40 by Dr carrera  9/6/16 [place] 5/325 # 40 by Titi Recinos (DDS)  7/28/16 [place] 5/325 # 30 by me  ----------------------------------------------------------      Lumbar disc disease with radiculopathy  Pt has chronic low back pain. States always has pain in low back.  Typically in low back and feels hard, Now denying sciatica.  She reports doing gardening and felt pain to right low back.  States using Robaxin at night.  Not able to take NSAIDS due to mild chronic kidney disease.  States muscles in right low back were \"hard\" and now not so much.  Pt interested in \"injection\" into back.    Acute pain of right knee  Pt has chronic bilat knee pain with mild medial joint space narrowing per xrays taken 10/21/17.  She has  Seen [facility] in the past for her knee pains.  She reports while gardening about a week ago felt pain to her right knee.  States digging hole in ground and felt pain to knee. Used ice and heat and no relief.         Patient Active Problem List    Diagnosis Date Noted   • Severe sepsis, POA (HCC) due to pyelonephritis 01/09/2017     Priority: High   • Tenosynovitis of finger 10/15/2015     Priority: High   • Acute respiratory failure with hypoxia (HCC) due to sepsis, POA 01/09/2017     Priority: Medium   • Gram-negative katharina bacteremia 01/09/2017     Priority: Medium   • Paroxysmal atrial fibrillation (CMS-HCC) 01/09/2017     Priority: Medium   • Acute pyelonephritis 01/08/2017     Priority: Medium   • Hypercalcemia 10/20/2015     Priority: Medium   • HTN (hypertension) 01/09/2017     Priority: Low   • Chronic " renal failure, stage 2 (mild) 01/09/2017     Priority: Low   • Fibromyalgia 01/09/2017     Priority: Low   • Acute pain of right knee 05/30/2017   • Hypertriglyceridemia 04/12/2017   • Shortness of breath 04/12/2017   • Skin irritation 04/12/2017   • Obesity (BMI 30-39.9) 04/12/2017   • Acute pain of left shoulder 03/01/2017   • Post-menopause 03/01/2017   • Heart palpitations 01/25/2017   • Low energy 01/25/2017   • Headache 01/11/2017   • Pain in both knees 10/06/2016   • Accessory skin tags 10/06/2016   • Lump of skin of left lower extremity 08/22/2016   • Pain of finger of left hand 07/28/2016   • Elevated PTHrP level (CMS-HCC) 07/28/2016   • Chronic pain 12/21/2015   • Vitamin B12 deficiency 11/12/2015   • Renal insufficiency 11/12/2015   • Acute pain of left wrist 11/12/2015   • Pain of right hand 10/15/2015   • Osteoarthritis, hand, primary localized 10/05/2015   • Cellulitis of right hand 10/04/2015   • Chronic pain syndrome 11/24/2014   • Essential hypertension 08/12/2014   • Recurrent sinus infections 07/22/2014   • Lumbar disc disease with radiculopathy 09/14/2012   • Migraine headache    • Lumbar facet arthropathy (HCC)    • Rotator cuff syndrome of right shoulder 12/09/2011   • Allergic rhinitis due to pollen 08/26/2011   • Anxiety 07/15/2011   • Family history of breast cancer in mother    • Bipolar affective disorder (CMS-HCC) 05/22/2009   • Depression 05/22/2009       Allergies:Codeine; Imitrex; Penicillins; Sulfa drugs; Azithromycin; Dilaudid; Sensipar; Imipramine; Other drug; and Seroquel    Current Outpatient Prescriptions   Medication Sig Dispense Refill   • Omega-3 Fatty Acids (FISH OIL) 1000 MG Cap capsule Take 1,000 mg by mouth every day.     • baclofen (LIORESAL) 10 MG Tab Take 1 Tab by mouth 2 times a day. 60 Tab 2   • tramadol (ULTRAM) 50 MG Tab Take 1 Tab by mouth every 24 hours as needed for Moderate Pain or Severe Pain. 20 Tab 0   • propranolol (INDERAL) 40 MG Tab Take 1 Tab by mouth 3  times a day. 90 Tab 1   • triamcinolone acetonide (KENALOG) 0.1 % Cream Apply to skin lesion on posterior neck twice a day as needed 1 Tube 0   • venlafaxine XR (EFFEXOR XR) 37.5 MG CAPSULE SR 24 HR TAKE ONE CAPSULE BY MOUTH TWICE A DAY WITH MEALS 60 Cap 2   • Diclofenac Sodium 1 % Gel Apply twice a day to joints as needed 1 Tube 5   • tramadol (ULTRAM) 50 MG Tab Take 1 Tab by mouth every 24 hours as needed for Mild Pain or Moderate Pain (headaches). 30 Tab 0   • gabapentin (NEURONTIN) 300 MG Cap Take 1 Cap by mouth 3 times a day. 90 Cap 5   • loratadine (CLARITIN) 10 MG Tab Take 1 Tab by mouth every day. 30 Tab 2   • ondansetron (ZOFRAN ODT) 4 MG TABLET DISPERSIBLE Take 1 Tab by mouth every 24 hours as needed for Nausea/Vomiting. 20 Tab 2   • vitamin D (CHOLECALCIFEROL) 1000 UNIT Tab Take 1,000 Units by mouth every day.     • rivaroxaban (XARELTO) 20 MG Tab tablet Take 1 Tab by mouth with dinner. 30 Tab 2   • methocarbamol (ROBAXIN) 750 MG Tab Take 1,500 mg by mouth 2 Times a Day.     • albuterol (VENTOLIN HFA) 108 (90 BASE) MCG/ACT Aero Soln inhalation aerosol Inhale 2 Puffs by mouth every 6 hours as needed for Shortness of Breath. 8.5 g 3   • cyanocobalamin (VITAMIN B12) 1000 MCG Tab Take 1 Tab by mouth every day. 30 Tab 3   • furosemide (LASIX) 20 MG Tab TAKE ONE TABLET BY MOUTH DAILY (GENERINC LASIX) 30 Tab 2   • predniSONE (DELTASONE) 10 MG Tab Take 10 mg Twice a day for 5 days then once a day for 2 days 12 Tab 0   • furosemide (LASIX) 20 MG Tab      • doxycycline (MONODOX) 100 MG capsule Take 1 Cap by mouth 2 times a day. 14 Cap 0   • predniSONE (DELTASONE) 10 MG Tab Take 2 10 mg tabs daily for 2 days, then 1 10 mg for 6 days 10 Tab 0   • guaifenesin-codeine (TUSSI-ORGANIDIN NR) 100-10 MG/5ML syrup Take 5 mL by mouth 2 times a day as needed. 120 mL 0   • benzonatate (TESSALON) 100 MG Cap Take 1 Cap by mouth 3 times a day as needed. 30 Cap 0   • propranolol (INDERAL) 40 MG Tab Take 80 mg by mouth 2 times a  "day.       No current facility-administered medications for this visit.       Social History   Substance Use Topics   • Smoking status: Former Smoker -- 1.00 packs/day for 20 years     Types: Cigarettes     Quit date: 01/01/1994   • Smokeless tobacco: Never Used   • Alcohol Use: Yes      Comment: 2-3 drinks per month       Family History   Problem Relation Age of Onset   • Cancer Mother      breast cancer, bilaterally   • GI Father      ulcer   • Heart Disease Father 40     MIs   • Psychiatry Father      anxiety   • Arthritis Sister      rheumatoid   • Stroke Sister 79     mid brain       ROS:  Review of Systems   See HPI Above        Exam:  Blood pressure 120/90, pulse 82, temperature 36.6 °C (97.9 °F), resp. rate 16, height 1.778 m (5' 10\"), weight 107.049 kg (236 lb), SpO2 96 %.  General:  Well nourished, well developed female in NAD  HENT:Head is grossly normal. PERRL.  Neck: Supple. Trachea is midline.  Pulmonary: Clear to ausculation .  Normal effort. No rales, ronchi, or wheezing.   Cardiovascular: Regular rate and rhythm.  Abdomen-Abdomen is soft, No tenderness.  Upper extremities- Strong = . Good ROM  Lower extremities- neg for edema, redness.Mild tenderness to right anterior knee. Good ROM of both knees. Walks slowly with good balance.  Neuro- A & O x 4. Speech clear and appropriate.     Current medications, allergies, and problem list reviewed with patient and updated in  [facility] today.    Assessment/Plan:  1. Lumbar disc disease with radiculopathy  REFERRAL TO ORTHOPEDICS    DX-LUMBAR SPINE-2 OR 3 VIEWS    baclofen (LIORESAL) 10 MG Tab    tramadol (ULTRAM) 50 MG Tab( discussed not an ongoing RX for narcs)   2. Acute pain of right knee  REFERRAL TO ORTHOPEDICS    tramadol (ULTRAM) 50 MG Tab   Follow up in 6 weeks on low back pain, knee pain. Call or return if questions, concerns, or worsening condition.  "

## 2017-05-30 NOTE — Clinical Note
May 30, 2017       Patient: Miryam Veloz   YOB: 1950   Date of Visit: 5/30/2017         To Whom It May Concern:     Miryam Veloz was seen in the clinic today. Please excuse her from work 5/30/ through 5/31/17 and may return to work on 6/1/17.    If you have any questions or concerns, please don't hesitate to call [phone]          Sincerely,          BELINDA Sterling.  Electronically Signed

## 2017-05-30 NOTE — MR AVS SNAPSHOT
"        Miryam Juan Magdiel   2017 8:30 AM   Office Visit   MRN: [id number]    [facility]   Dept Phone:  [phone]    Description:  Female : 1950   Provider:  HODAN Sterling           Reason for Visit     Low Back Pain     Knee Pain right       Allergies as of 2017     Allergen Noted Reactions    Codeine 2009   Itching, Nausea    Imitrex [Sumatriptan Succinate] 2010   Shortness of Breath, Swelling    Penicillins 2007   Rash, Vomiting    Sulfa Drugs 2007   Shortness of Breath, Itching    Azithromycin 2007   Itching    Dilaudid [Hydromorphone] 10/20/2015   Itching    sweating    Sensipar [Cinacalcet] 2016   Unspecified    Nightmares and leg cramping    Imipramine 2011   Shortness of Breath, Itching    anxious    Other Drug 2011       Z Pack    Seroquel [Quetiapine Fumarate] 2011         You were diagnosed with     Lumbar disc disease with radiculopathy   [431990]       Acute pain of right knee   [5545067]         Vital Signs     Blood Pressure Pulse Temperature Respirations Height Weight    120/90 mmHg 82 36.6 °C (97.9 °F) 16 1.778 m (5' 10\") 107.049 kg (236 lb)    Body Mass Index Oxygen Saturation Smoking Status             33.86 kg/m2 96% Former Smoker         Basic Information     Date Of Birth Sex Race Ethnicity Preferred Language    1950 Female White Non- English      Problem List              ICD-10-CM Priority Class Noted - Resolved    Bipolar affective disorder (CMS-HCC) F31.9   2009 - Present    Depression F32.9   2009 - Present    Family history of breast cancer in mother Z80.3   Unknown - Present    Anxiety F41.9   7/15/2011 - Present    Allergic rhinitis due to pollen J30.1   2011 - Present    Rotator cuff syndrome of right shoulder M75.101   2011 - Present    Migraine headache G43.909   Unknown - Present    Lumbar facet arthropathy (HCC) M12.88   Unknown - Present   " Lumbar disc disease with radiculopathy M51.16   9/14/2012 - Present    Recurrent sinus infections J32.9   7/22/2014 - Present    Essential hypertension I10   8/12/2014 - Present    Chronic pain syndrome G89.4   11/24/2014 - Present    Cellulitis of right hand L03.113   10/4/2015 - Present    Osteoarthritis, hand, primary localized M19.049   10/5/2015 - Present    Pain of right hand M79.641   10/15/2015 - Present    Tenosynovitis of finger M65.9 High  10/15/2015 - Present    Hypercalcemia E83.52 Medium  10/20/2015 - Present    Vitamin B12 deficiency E53.8   11/12/2015 - Present    Renal insufficiency N28.9   11/12/2015 - Present    Acute pain of left wrist M25.532   11/12/2015 - Present    Chronic pain G89.29   12/21/2015 - Present    Pain of finger of left hand M79.645   7/28/2016 - Present    Elevated PTHrP level (CMS-HCC) E21.5   7/28/2016 - Present    Lump of skin of left lower extremity R22.42   8/22/2016 - Present    Pain in both knees M25.561, M25.562   10/6/2016 - Present    Accessory skin tags Q82.8   10/6/2016 - Present    Acute pyelonephritis N12 Medium  1/8/2017 - Present    Severe sepsis, POA (HCC) due to pyelonephritis A41.9, R65.20 High  1/9/2017 - Present    Acute respiratory failure with hypoxia (HCC) due to sepsis, POA J96.01 Medium  1/9/2017 - Present    Gram-negative katharina bacteremia R78.81 Medium  1/9/2017 - Present    HTN (hypertension) I10 Low  1/9/2017 - Present    Paroxysmal atrial fibrillation (CMS-[facility]) I48.0 Medium  1/9/2017 - Present    Chronic renal failure, stage 2 (mild) N18.2 Low  1/9/2017 - Present    Fibromyalgia M79.7 Low  1/9/2017 - Present    Headache R51   1/11/2017 - Present    Heart palpitations R00.2   1/25/2017 - Present    Low energy R53.83   1/25/2017 - Present    Acute pain of left shoulder M25.512   3/1/2017 - Present    Post-menopause Z78.0   3/1/2017 - Present    Hypertriglyceridemia E78.1   4/12/2017 - Present    Shortness of breath R06.02   4/12/2017 - Present    Skin  irritation R23.8   4/12/2017 - Present    Obesity (BMI 30-39.9) E66.9   4/12/2017 - Present    Acute pain of right knee M25.561   5/30/2017 - Present      Health Maintenance        Date Due Completion Dates    PAP SMEAR 9/20/1973 ---    IMM ZOSTER VACCINE 9/20/2012 ---    MAMMOGRAM 5/29/2014 5/29/2013, 8/12/2011, 7/17/2009, 7/17/2009, 7/23/2008, 6/27/2008, 6/27/2008, 12/9/2005    COLONOSCOPY 10/7/2021 10/7/2011 (Declined)    Override on 10/7/2011: Patient Declined (will do FIT test instead)    IMM DTaP/Tdap/Td Vaccine (2 - Td) 10/27/2026 10/27/2016            Current Immunizations     13-VALENT PCV PREVNAR 1/11/2017  4:06 PM    Influenza TIV (IM) 9/19/2015, 11/7/2014    Influenza Vaccine Adult HD 10/1/2016    Influenza Vaccine Pediatric 11/3/2010    Tdap Vaccine 10/27/2016      Below and/or attached are the medications your provider expects you to take. Review all of your home medications and newly ordered medications with your provider and/or pharmacist. Follow medication instructions as directed by your provider and/or pharmacist. Please keep your medication list with you and share with your provider. Update the information when medications are discontinued, doses are changed, or new medications (including over-the-counter products) are added; and carry medication information at all times in the event of emergency situations     Allergies:  CODEINE - Itching,Nausea     IMITREX - Shortness of Breath,Swelling     PENICILLINS - Rash,Vomiting     SULFA DRUGS - Shortness of Breath,Itching     AZITHROMYCIN - Itching     DILAUDID - Itching     SENSIPAR - Unspecified     IMIPRAMINE - Shortness of Breath,Itching     OTHER DRUG - (reactions not documented)     SEROQUEL - (reactions not documented)               Medications  Valid as of: May 30, 2017 -  8:51 AM    Generic Name Brand Name Tablet Size Instructions for use    Albuterol Sulfate (Aero Soln) albuterol 108 (90 BASE) MCG/ACT Inhale 2 Puffs by mouth every 6 hours as  needed for Shortness of Breath.        Baclofen (Tab) LIORESAL 10 MG Take 1 Tab by mouth 2 times a day.        Benzonatate (Cap) TESSALON 100 MG Take 1 Cap by mouth 3 times a day as needed.        Cholecalciferol (Tab) cholecalciferol 1000 UNIT Take 1,000 Units by mouth every day.        Cyanocobalamin (Tab) VITAMIN B12 1000 MCG Take 1 Tab by mouth every day.        Diclofenac Sodium (Gel) Diclofenac Sodium 1 % Apply twice a day to joints as needed        Doxycycline Monohydrate (Cap) MONODOX 100 MG Take 1 Cap by mouth 2 times a day.        Furosemide (Tab) LASIX 20 MG         Furosemide (Tab) LASIX 20 MG TAKE ONE TABLET BY MOUTH DAILY (GENERINC LASIX)        Gabapentin (Cap) NEURONTIN 300 MG Take 1 Cap by mouth 3 times a day.        Guaifenesin-Codeine (Syrup) TUSSI-ORGANIDIN -10 MG/5ML Take 5 mL by mouth 2 times a day as needed.        Loratadine (Tab) CLARITIN 10 MG Take 1 Tab by mouth every day.        Methocarbamol (Tab) ROBAXIN 750 MG Take 1,500 mg by mouth 2 Times a Day.        Omega-3 Fatty Acids (Cap) fish oil 1000 MG Take 1,000 mg by mouth every day.        Ondansetron (TABLET DISPERSIBLE) ZOFRAN ODT 4 MG Take 1 Tab by mouth every 24 hours as needed for Nausea/Vomiting.        PredniSONE (Tab) DELTASONE 10 MG Take 2 10 mg tabs daily for 2 days, then 1 10 mg for 6 days        PredniSONE (Tab) DELTASONE 10 MG Take 10 mg Twice a day for 5 days then once a day for 2 days        Propranolol HCl (Tab) INDERAL 40 MG Take 80 mg by mouth 2 times a day.        Propranolol HCl (Tab) INDERAL 40 MG Take 1 Tab by mouth 3 times a day.        Rivaroxaban (Tab) XARELTO 20 MG Take 1 Tab by mouth with dinner.        TraMADol HCl (Tab) ULTRAM 50 MG Take 1 Tab by mouth every 24 hours as needed for Mild Pain or Moderate Pain (headaches).        TraMADol HCl (Tab) ULTRAM 50 MG Take 1 Tab by mouth every 24 hours as needed for Moderate Pain or Severe Pain.        Triamcinolone Acetonide (Cream) KENALOG 0.1 % Apply to skin  lesion on posterior neck twice a day as needed        Venlafaxine HCl (CAPSULE SR 24 HR) EFFEXOR XR 37.5 MG TAKE ONE CAPSULE BY MOUTH TWICE A DAY WITH MEALS        .                 Medicines prescribed today were sent to:     [facility] #[id number] - [address] [place] [address]    Phone: [phone] Fax: [phone]    Open 24 Hours?: No      Medication refill instructions:       If your prescription bottle indicates you have medication refills left, it is not necessary to call your provider’s office. Please contact your pharmacy and they will refill your medication.    If your prescription bottle indicates you do not have any refills left, you may request refills at any time through one of the following ways: The online [employer] system (except Urgent Care), by calling your provider’s office, or by asking your pharmacy to contact your provider’s office with a refill request. Medication refills are processed only during regular business hours and may not be available until the next business day. Your provider may request additional information or to have a follow-up visit with you prior to refilling your medication.   *Please Note: Medication refills are assigned a new Rx number when refilled electronically. Your pharmacy may indicate that no refills were authorized even though a new prescription for the same medication is available at the pharmacy. Please request the medicine by name with the pharmacy before contacting your provider for a refill.        Your To Do List     Future Labs/Procedures Complete By Expires    DX-LUMBAR SPINE-2 OR 3 VIEWS  As directed 5/30/2018      Referral     A referral request has been sent to our patient care coordination department. Please allow 3-5 business days for us to process this request and contact you either by phone or mail. If you do not hear from us by the 5th business day, please call us at [phone].        Other Notes About Your Plan        4/6/16 Dr Quiroga appt for R hand pain, Fibromyalgia. Increase Gabapentin  300 mg TID. Wrist splint at night. F/u 3 months.  12/31/15 [facility] Neurology Consult-Dr Rima Quiroga. Carpal Tunnel, hand pain. Gabapentin, Hand exercises, consider NCS/EMG.  Fall risk done 10/15/15 [facility]           MyChart Access Code: Activation code not generated  Current [employer] Status: Active

## 2017-05-30 NOTE — ASSESSMENT & PLAN NOTE
Pt has chronic bilat knee pain with mild medial joint space narrowing per xrays taken 10/21/17.  She has  Seen [facility] in the past for her knee pains.  She reports while gardening about a week ago felt pain to her right knee.  States digging hole in ground and felt pain to knee. Used ice and heat and no relief.

## 2017-05-30 NOTE — ASSESSMENT & PLAN NOTE
"Pt has chronic low back pain. States always has pain in low back.  Typically in low back and feels hard, Now denying sciatica.  She reports doing gardening and felt pain to right low back.  States using Robaxin at night.  Not able to take NSAIDS due to mild chronic kidney disease.  States muscles in right low back were \"hard\" and now not so much.  Pt interested in \"injection\" into back.  "

## 2017-06-05 ENCOUNTER — OFFICE VISIT (OUTPATIENT)
Dept: MEDICAL GROUP | Facility: MEDICAL CENTER | Age: 67
End: 2017-06-05
Attending: NURSE PRACTITIONER
Payer: MEDICAID

## 2017-06-05 VITALS
RESPIRATION RATE: 16 BRPM | WEIGHT: 232 LBS | TEMPERATURE: 99.3 F | BODY MASS INDEX: 33.21 KG/M2 | DIASTOLIC BLOOD PRESSURE: 60 MMHG | SYSTOLIC BLOOD PRESSURE: 122 MMHG | HEIGHT: 70 IN | OXYGEN SATURATION: 94 % | HEART RATE: 64 BPM

## 2017-06-05 DIAGNOSIS — J01.01 ACUTE RECURRENT MAXILLARY SINUSITIS: ICD-10-CM

## 2017-06-05 DIAGNOSIS — R05.9 COUGH: ICD-10-CM

## 2017-06-05 DIAGNOSIS — J30.1 SEASONAL ALLERGIC RHINITIS DUE TO POLLEN: ICD-10-CM

## 2017-06-05 DIAGNOSIS — R09.81 SINUS CONGESTION: ICD-10-CM

## 2017-06-05 PROCEDURE — 96372 THER/PROPH/DIAG INJ SC/IM: CPT | Performed by: NURSE PRACTITIONER

## 2017-06-05 PROCEDURE — 700111 HCHG RX REV CODE 636 W/ 250 OVERRIDE (IP): Performed by: NURSE PRACTITIONER

## 2017-06-05 PROCEDURE — 99214 OFFICE O/P EST MOD 30 MIN: CPT | Performed by: NURSE PRACTITIONER

## 2017-06-05 PROCEDURE — 99212 OFFICE O/P EST SF 10 MIN: CPT | Performed by: NURSE PRACTITIONER

## 2017-06-05 RX ORDER — MONTELUKAST SODIUM 10 MG/1
10 TABLET ORAL DAILY
Qty: 30 TAB | Refills: 3 | Status: SHIPPED | OUTPATIENT
Start: 2017-06-05 | End: 2017-12-22

## 2017-06-05 RX ORDER — DOXYCYCLINE 100 MG/1
100 CAPSULE ORAL 2 TIMES DAILY
Qty: 14 CAP | Refills: 0 | Status: SHIPPED | OUTPATIENT
Start: 2017-06-05 | End: 2017-12-26

## 2017-06-05 RX ORDER — TRIAMCINOLONE ACETONIDE 40 MG/ML
40 INJECTION, SUSPENSION INTRA-ARTICULAR; INTRAMUSCULAR ONCE
Qty: 1 VIAL | Refills: 0 | OUTPATIENT
Start: 2017-06-05 | End: 2017-06-05 | Stop reason: CLARIF

## 2017-06-05 RX ORDER — FLUTICASONE PROPIONATE 50 MCG
1 SPRAY, SUSPENSION (ML) NASAL 2 TIMES DAILY
Qty: 1 BOTTLE | Refills: 2 | Status: SHIPPED | OUTPATIENT
Start: 2017-06-05 | End: 2017-06-06 | Stop reason: SDUPTHER

## 2017-06-05 RX ORDER — CODEINE PHOSPHATE/GUAIFENESIN 10-100MG/5
5 LIQUID (ML) ORAL 2 TIMES DAILY PRN
Qty: 120 ML | Refills: 0 | Status: SHIPPED | OUTPATIENT
Start: 2017-06-05 | End: 2017-12-22

## 2017-06-05 RX ORDER — TRIAMCINOLONE ACETONIDE 40 MG/ML
40 INJECTION, SUSPENSION INTRA-ARTICULAR; INTRAMUSCULAR ONCE
Status: COMPLETED | OUTPATIENT
Start: 2017-06-05 | End: 2017-06-05

## 2017-06-05 RX ADMIN — TRIAMCINOLONE ACETONIDE 40 MG: 40 INJECTION, SUSPENSION INTRA-ARTICULAR; INTRAMUSCULAR at 16:21

## 2017-06-05 ASSESSMENT — PAIN SCALES - GENERAL: PAINLEVEL: 8=MODERATE-SEVERE PAIN

## 2017-06-05 NOTE — PROGRESS NOTES
"    Chief Complaint: nasal congestion \"allergies\", sinus drainage is \"green\", cough    HPI:  Miryam presents to the clinic as a same day appt for Sinus symptoms.    Her PMH includes    Anxiety,Depression, Bipolar Disorder  Fibromyalgia  Headaches  Hypertension  Hyperparathyroidism/High PTH level  Hyperlipidemia  Osteoarthritis  bilat Knee pains  Osteopenia  Degenerative Disc Disease  Carpel Tunnel Syndrome  Cholecystectomy  Appendectomy  Tenosynovitis of Finger    Pyelonephritis w sepsis (new Jan 2017)  Paroxysmal Atrial Fibrillation ( new- Jan 2017)  New Onset Atrial fibrillation (new)  Left Shoulder Pain ( new MArch 2017)  Facial hair-Hirsutism (new March 2017)    She is established with  Neurology- Dr Quiroga  Nephrology- Dr Rafael Lloyd  Counseling-Psychology- [facility]  Surgeon-[facility] for Sebaceous cyst ([facility])  Orthopedics-[facility] for bilat knee pains,      Referrals Recently Approved:   Orthopedics-[facility]  Cardiology - [facility]  GYN-  Lizbeth Meadows-David  Neurology-Again for Dr Quiroga      Review of Records shows:  5/30/17 Clinic Visit for back and knee pains. Referred to Orthopedics, Rx for Baclofen and Tramadol (one time RX, not for ongoing), Lumbar Spine Xray ordered.  4/12/17 Clinic visit for Cough, SOB, Dx Bronchitis, RX for Robitussin and codeine, Tessalon, Doxycycline.  3/1/17 Clinic visit for Migraines and requesting Tramadol, Re-referred to Cardiology, Re-referred to Neurology to see Dr Quiroga again, Left Shoulder xray ordered.  Referral to GYN for her concern over Hirsutism.  2/15/17 No Show Clinic  1/25/17 Clinic Visit for Hospital f/u an discussion of her new Atrial Fib issue. Referred to Cardiology, General Surgeon, and Orthopedics.  Labs ordered. Pt to continue Xarelto and Inderal BID.  1/8/17---> 1/11/17 Hospital Admit for Pyelonephritis, Sepsis, paroxysmal A-fib, HTN, Fibromyalgia, Headaches, Discharged on Cipro and Xarelto.  10/27/16 Clinic visit for Xray " "results, Tx of Skin Tags, Voltaren RX, bilat knee pains and Referral to Orthopedics([facility])  10/16/Clinic Visit for Left leg Sebaceous Cyst, knee pain--->Referred to Surgeon([facility])  7/28/16 clinic Visit for finger pains of left hand and for refills.      [place]  Report shows:  3/9/17 Tramadol 50 mg # 30 by me    1/15/17 Tramadol 50 mg # 40 by Dr carrera  9/6/16 [place] 5/325 # 40 by Titi Recinos (DDS)  7/28/16 [place] 5/325 # 30 by me  ----------------------------------------------------------      Sinus congestion  Pt reports sinus congestion over the w/e  Yesterday \"brown green crud\" and frontal headache.   Hoarse throat today. Pt points to maxillary sinus area as area of pain.  Pt reports has multiple allergies and then leads to sinus problem.  Pt reports two sinus infections in past.      Allergic rhinitis due to pollen  Pt reports has allergies year round.   Nose is running, lots of blowing nose, slight ear itching.  Asking for Kenalog shot.  PND.       Patient Active Problem List    Diagnosis Date Noted   • Severe sepsis, POA (HCC) due to pyelonephritis 01/09/2017     Priority: High   • Tenosynovitis of finger 10/15/2015     Priority: High   • Acute respiratory failure with hypoxia (HCC) due to sepsis, POA 01/09/2017     Priority: Medium   • Gram-negative katharina bacteremia 01/09/2017     Priority: Medium   • Paroxysmal atrial fibrillation (CMS-HCC) 01/09/2017     Priority: Medium   • Acute pyelonephritis 01/08/2017     Priority: Medium   • Hypercalcemia 10/20/2015     Priority: Medium   • HTN (hypertension) 01/09/2017     Priority: Low   • Chronic renal failure, stage 2 (mild) 01/09/2017     Priority: Low   • Fibromyalgia 01/09/2017     Priority: Low   • Sinus congestion 06/05/2017   • Acute pain of right knee 05/30/2017   • Hypertriglyceridemia 04/12/2017   • Shortness of breath 04/12/2017   • Skin irritation 04/12/2017   • Obesity (BMI 30-39.9) 04/12/2017   • Acute pain of left shoulder 03/01/2017 "   • Post-menopause 03/01/2017   • Heart palpitations 01/25/2017   • Low energy 01/25/2017   • Headache 01/11/2017   • Pain in both knees 10/06/2016   • Accessory skin tags 10/06/2016   • Lump of skin of left lower extremity 08/22/2016   • Pain of finger of left hand 07/28/2016   • Elevated PTHrP level (CMS-HCC) 07/28/2016   • Chronic pain 12/21/2015   • Vitamin B12 deficiency 11/12/2015   • Renal insufficiency 11/12/2015   • Acute pain of left wrist 11/12/2015   • Pain of right hand 10/15/2015   • Osteoarthritis, hand, primary localized 10/05/2015   • Cellulitis of right hand 10/04/2015   • Chronic pain syndrome 11/24/2014   • Essential hypertension 08/12/2014   • Recurrent sinus infections 07/22/2014   • Lumbar disc disease with radiculopathy 09/14/2012   • Migraine headache    • Lumbar facet arthropathy (HCC)    • Rotator cuff syndrome of right shoulder 12/09/2011   • Allergic rhinitis due to pollen 08/26/2011   • Anxiety 07/15/2011   • Family history of breast cancer in mother    • Bipolar affective disorder (CMS-HCC) 05/22/2009   • Depression 05/22/2009       Allergies:Codeine; Imitrex; Penicillins; Sulfa drugs; Azithromycin; Dilaudid; Sensipar; Imipramine; Other drug; and Seroquel    Current Outpatient Prescriptions   Medication Sig Dispense Refill   • fluticasone (FLONASE ALLERGY RELIEF) 50 MCG/ACT nasal spray Spray 1 Spray in nose 2 times a day. 1 Bottle 2   • doxycycline (MONODOX) 100 MG capsule Take 1 Cap by mouth 2 times a day. 14 Cap 0   • montelukast (SINGULAIR) 10 MG Tab Take 1 Tab by mouth every day. 30 Tab 3   • guaifenesin-codeine (TUSSI-ORGANIDIN NR) 100-10 MG/5ML syrup Take 5 mL by mouth 2 times a day as needed. 120 mL 0   • Omega-3 Fatty Acids (FISH OIL) 1000 MG Cap capsule Take 1,000 mg by mouth every day.     • baclofen (LIORESAL) 10 MG Tab Take 1 Tab by mouth 2 times a day. 60 Tab 2   • tramadol (ULTRAM) 50 MG Tab Take 1 Tab by mouth every 24 hours as needed for Moderate Pain or Severe Pain. 20  Tab 0   • furosemide (LASIX) 20 MG Tab TAKE ONE TABLET BY MOUTH DAILY (GENERINC LASIX) 30 Tab 2   • propranolol (INDERAL) 40 MG Tab Take 1 Tab by mouth 3 times a day. 90 Tab 1   • predniSONE (DELTASONE) 10 MG Tab Take 10 mg Twice a day for 5 days then once a day for 2 days 12 Tab 0   • furosemide (LASIX) 20 MG Tab      • predniSONE (DELTASONE) 10 MG Tab Take 2 10 mg tabs daily for 2 days, then 1 10 mg for 6 days 10 Tab 0   • guaifenesin-codeine (TUSSI-ORGANIDIN NR) 100-10 MG/5ML syrup Take 5 mL by mouth 2 times a day as needed. 120 mL 0   • benzonatate (TESSALON) 100 MG Cap Take 1 Cap by mouth 3 times a day as needed. 30 Cap 0   • triamcinolone acetonide (KENALOG) 0.1 % Cream Apply to skin lesion on posterior neck twice a day as needed 1 Tube 0   • venlafaxine XR (EFFEXOR XR) 37.5 MG CAPSULE SR 24 HR TAKE ONE CAPSULE BY MOUTH TWICE A DAY WITH MEALS 60 Cap 2   • Diclofenac Sodium 1 % Gel Apply twice a day to joints as needed 1 Tube 5   • tramadol (ULTRAM) 50 MG Tab Take 1 Tab by mouth every 24 hours as needed for Mild Pain or Moderate Pain (headaches). 30 Tab 0   • gabapentin (NEURONTIN) 300 MG Cap Take 1 Cap by mouth 3 times a day. 90 Cap 5   • loratadine (CLARITIN) 10 MG Tab Take 1 Tab by mouth every day. 30 Tab 2   • ondansetron (ZOFRAN ODT) 4 MG TABLET DISPERSIBLE Take 1 Tab by mouth every 24 hours as needed for Nausea/Vomiting. 20 Tab 2   • vitamin D (CHOLECALCIFEROL) 1000 UNIT Tab Take 1,000 Units by mouth every day.     • rivaroxaban (XARELTO) 20 MG Tab tablet Take 1 Tab by mouth with dinner. 30 Tab 2   • methocarbamol (ROBAXIN) 750 MG Tab Take 1,500 mg by mouth 2 Times a Day.     • propranolol (INDERAL) 40 MG Tab Take 80 mg by mouth 2 times a day.     • albuterol (VENTOLIN HFA) 108 (90 BASE) MCG/ACT Aero Soln inhalation aerosol Inhale 2 Puffs by mouth every 6 hours as needed for Shortness of Breath. 8.5 g 3   • cyanocobalamin (VITAMIN B12) 1000 MCG Tab Take 1 Tab by mouth every day. 30 Tab 3     No current  "facility-administered medications for this visit.       Social History   Substance Use Topics   • Smoking status: Former Smoker -- 1.00 packs/day for 20 years     Types: Cigarettes     Quit date: 01/01/1994   • Smokeless tobacco: Never Used   • Alcohol Use: Yes      Comment: 2-3 drinks per month       Family History   Problem Relation Age of Onset   • Cancer Mother      breast cancer, bilaterally   • GI Father      ulcer   • Heart Disease Father 40     MIs   • Psychiatry Father      anxiety   • Arthritis Sister      rheumatoid   • Stroke Sister 79     mid brain       ROS:  Review of Systems   See HPI Above        Exam:  Blood pressure 122/60, pulse 64, temperature 37.4 °C (99.3 °F), resp. rate 16, height 1.778 m (5' 10\"), weight 105.235 kg (232 lb), SpO2 94 %.  General:  Well nourished, well developed female in NAD  HENT:Head is grossly normal. PERRL. Posterior pharynx slightly red, No exudates. Nasal mucosa wnl.  Ear canals clear. Slight flattening of both TM's triangle of light, non red TM's  Neck: Supple. Trachea is midline. No swollen or tender lymph nodes to neck .  Pulmonary: Scattered rhonchi to ausculation.  Clears w forceful cough.  Normal effort. No rales or wheezing.   Cardiovascular: Regular rate and rhythm.  Abdomen-Abdomen is soft, No tenderness.  Upper extremities- . Good ROM  Lower extremities- neg for edema, redness, tenderness.  Neuro- A & O x 4. Speech clear., mild hoarseness, and appropriate.     Current medications, allergies, and problem list reviewed with patient and updated in  [facility] today.    Assessment/Plan:  1. Sinus congestion  fluticasone (FLONASE ALLERGY RELIEF) 50 MCG/ACT nasal spray    REFERRAL TO ENT    guaifenesin-codeine (TUSSI-ORGANIDIN NR) 100-10 MG/5ML syrup   2. Seasonal allergic rhinitis due to pollen  fluticasone (FLONASE ALLERGY RELIEF) 50 MCG/ACT nasal spray    REFERRAL TO ENT     montelukast (SINGULAIR) 10 MG Tab  Continue Claritin daily    triamcinolone acetonide " (KENALOG-40) injection 40 mg       3. Cough  guaifenesin-codeine (TUSSI-ORGANIDIN NR) 100-10 MG/5ML syrup   4. Acute recurrent Maxillary Sinusitis                                Doxycycline RX, Referral to ENT  >25min spent face to face with patient, >50% of time spent counseling, coordinating care, reviewing records, discussing POC, educating patient

## 2017-06-05 NOTE — MR AVS SNAPSHOT
"Josijohanny Juan Magdiel   2017 3:50 PM   Office Visit   MRN: [id number]    [facility]   Dept Phone:  [phone]    Description:  Female : 1950   Provider:  HODAN Sterling           Reason for Visit     Sinus Problem           Allergies as of 2017     Allergen Noted Reactions    Codeine 2009   Itching, Nausea    Imitrex [Sumatriptan Succinate] 2010   Shortness of Breath, Swelling    Penicillins 2007   Rash, Vomiting    Sulfa Drugs 2007   Shortness of Breath, Itching    Azithromycin 2007   Itching    Dilaudid [Hydromorphone] 10/20/2015   Itching    sweating    Sensipar [Cinacalcet] 2016   Unspecified    Nightmares and leg cramping    Imipramine 2011   Shortness of Breath, Itching    anxious    Other Drug 2011       Z Pack    Seroquel [Quetiapine Fumarate] 2011         You were diagnosed with     Sinus congestion   [786741]       Seasonal allergic rhinitis due to pollen   [2737140]       Cough   [786.2.ICD-9-CM]         Vital Signs     Blood Pressure Pulse Temperature Respirations Height Weight    122/60 mmHg 64 37.4 °C (99.3 °F) 16 1.778 m (5' 10\") 105.235 kg (232 lb)    Body Mass Index Oxygen Saturation Smoking Status             33.29 kg/m2 94% Former Smoker         Basic Information     Date Of Birth Sex Race Ethnicity Preferred Language    1950 Female White Non- English      Your appointments     2017 12:45 PM   NEW PATIENT with Marian Ambrocio M.D.   [facility]    [address]  [address]  [facility] [address] [phone]            Jul 10, 2017 10:30 AM   Established Patient with HODAN Sterling   [facility]    [address]  [facility] [id number] [phone]           You will be receiving a confirmation call a few days before your appointment from our automated call confirmation " system.              Problem List              ICD-10-CM Priority Class Noted - Resolved    Bipolar affective disorder (CMS-HCC) F31.9   5/22/2009 - Present    Depression F32.9   5/22/2009 - Present    Family history of breast cancer in mother Z80.3   Unknown - Present    Anxiety F41.9   7/15/2011 - Present    Allergic rhinitis due to pollen J30.1   8/26/2011 - Present    Rotator cuff syndrome of right shoulder M75.101   12/9/2011 - Present    Migraine headache G43.909   Unknown - Present    Lumbar facet arthropathy (HCC) M12.88   Unknown - Present    Lumbar disc disease with radiculopathy M51.16   9/14/2012 - Present    Recurrent sinus infections J32.9   7/22/2014 - Present    Essential hypertension I10   8/12/2014 - Present    Chronic pain syndrome G89.4   11/24/2014 - Present    Cellulitis of right hand L03.113   10/4/2015 - Present    Osteoarthritis, hand, primary localized M19.049   10/5/2015 - Present    Pain of right hand M79.641   10/15/2015 - Present    Tenosynovitis of finger M65.9 High  10/15/2015 - Present    Hypercalcemia E83.52 Medium  10/20/2015 - Present    Vitamin B12 deficiency E53.8   11/12/2015 - Present    Renal insufficiency N28.9   11/12/2015 - Present    Acute pain of left wrist M25.532   11/12/2015 - Present    Chronic pain G89.29   12/21/2015 - Present    Pain of finger of left hand M79.645   7/28/2016 - Present    Elevated PTHrP level (CMS-[facility]) E21.5   7/28/2016 - Present    Lump of skin of left lower extremity R22.42   8/22/2016 - Present    Pain in both knees M25.561, M25.562   10/6/2016 - Present    Accessory skin tags Q82.8   10/6/2016 - Present    Acute pyelonephritis N12 Medium  1/8/2017 - Present    Severe sepsis, POA (HCC) due to pyelonephritis A41.9, R65.20 High  1/9/2017 - Present    Acute respiratory failure with hypoxia (HCC) due to sepsis, POA J96.01 Medium  1/9/2017 - Present    Gram-negative katharina bacteremia R78.81 Medium  1/9/2017 - Present    HTN (hypertension) I10 Low   1/9/2017 - Present    Paroxysmal atrial fibrillation (CMS-[facility]) I48.0 Medium  1/9/2017 - Present    Chronic renal failure, stage 2 (mild) N18.2 Low  1/9/2017 - Present    Fibromyalgia M79.7 Low  1/9/2017 - Present    Headache R51   1/11/2017 - Present    Heart palpitations R00.2   1/25/2017 - Present    Low energy R53.83   1/25/2017 - Present    Acute pain of left shoulder M25.512   3/1/2017 - Present    Post-menopause Z78.0   3/1/2017 - Present    Hypertriglyceridemia E78.1   4/12/2017 - Present    Shortness of breath R06.02   4/12/2017 - Present    Skin irritation R23.8   4/12/2017 - Present    Obesity (BMI 30-39.9) E66.9   4/12/2017 - Present    Acute pain of right knee M25.561   5/30/2017 - Present    Sinus congestion R09.81   6/5/2017 - Present      Health Maintenance        Date Due Completion Dates    PAP SMEAR 9/20/1973 ---    IMM ZOSTER VACCINE 9/20/2012 ---    MAMMOGRAM 5/29/2014 5/29/2013, 8/12/2011, 7/17/2009, 7/17/2009, 7/23/2008, 6/27/2008, 6/27/2008, 12/9/2005    COLONOSCOPY 10/7/2021 10/7/2011 (Declined)    Override on 10/7/2011: Patient Declined (will do FIT test instead)    IMM DTaP/Tdap/Td Vaccine (2 - Td) 10/27/2026 10/27/2016            Current Immunizations     13-VALENT PCV PREVNAR 1/11/2017  4:06 PM    Influenza TIV (IM) 9/19/2015, 11/7/2014    Influenza Vaccine Adult HD 10/1/2016    Influenza Vaccine Pediatric 11/3/2010    Tdap Vaccine 10/27/2016      Below and/or attached are the medications your provider expects you to take. Review all of your home medications and newly ordered medications with your provider and/or pharmacist. Follow medication instructions as directed by your provider and/or pharmacist. Please keep your medication list with you and share with your provider. Update the information when medications are discontinued, doses are changed, or new medications (including over-the-counter products) are added; and carry medication information at all times in the event of emergency  situations     Allergies:  CODEINE - Itching,Nausea     IMITREX - Shortness of Breath,Swelling     PENICILLINS - Rash,Vomiting     SULFA DRUGS - Shortness of Breath,Itching     AZITHROMYCIN - Itching     DILAUDID - Itching     SENSIPAR - Unspecified     IMIPRAMINE - Shortness of Breath,Itching     OTHER DRUG - (reactions not documented)     SEROQUEL - (reactions not documented)               Medications  Valid as of: June 05, 2017 -  4:15 PM    Generic Name Brand Name Tablet Size Instructions for use    Albuterol Sulfate (Aero Soln) albuterol 108 (90 BASE) MCG/ACT Inhale 2 Puffs by mouth every 6 hours as needed for Shortness of Breath.        Baclofen (Tab) LIORESAL 10 MG Take 1 Tab by mouth 2 times a day.        Benzonatate (Cap) TESSALON 100 MG Take 1 Cap by mouth 3 times a day as needed.        Cholecalciferol (Tab) cholecalciferol 1000 UNIT Take 1,000 Units by mouth every day.        Cyanocobalamin (Tab) VITAMIN B12 1000 MCG Take 1 Tab by mouth every day.        Diclofenac Sodium (Gel) Diclofenac Sodium 1 % Apply twice a day to joints as needed        Doxycycline Monohydrate (Cap) MONODOX 100 MG Take 1 Cap by mouth 2 times a day.        Fluticasone Propionate (Suspension) FLONASE 50 MCG/ACT Spray 1 Spray in nose 2 times a day.        Furosemide (Tab) LASIX 20 MG         Furosemide (Tab) LASIX 20 MG TAKE ONE TABLET BY MOUTH DAILY (GENERINC LASIX)        Gabapentin (Cap) NEURONTIN 300 MG Take 1 Cap by mouth 3 times a day.        Guaifenesin-Codeine (Syrup) TUSSI-ORGANIDIN -10 MG/5ML Take 5 mL by mouth 2 times a day as needed.        Guaifenesin-Codeine (Syrup) TUSSI-ORGANIDIN -10 MG/5ML Take 5 mL by mouth 2 times a day as needed.        Loratadine (Tab) CLARITIN 10 MG Take 1 Tab by mouth every day.        Methocarbamol (Tab) ROBAXIN 750 MG Take 1,500 mg by mouth 2 Times a Day.        Montelukast Sodium (Tab) SINGULAIR 10 MG Take 1 Tab by mouth every day.        Omega-3 Fatty Acids (Cap) fish oil 1000  MG Take 1,000 mg by mouth every day.        Ondansetron (TABLET DISPERSIBLE) ZOFRAN ODT 4 MG Take 1 Tab by mouth every 24 hours as needed for Nausea/Vomiting.        PredniSONE (Tab) DELTASONE 10 MG Take 2 10 mg tabs daily for 2 days, then 1 10 mg for 6 days        PredniSONE (Tab) DELTASONE 10 MG Take 10 mg Twice a day for 5 days then once a day for 2 days        Propranolol HCl (Tab) INDERAL 40 MG Take 80 mg by mouth 2 times a day.        Propranolol HCl (Tab) INDERAL 40 MG Take 1 Tab by mouth 3 times a day.        Rivaroxaban (Tab) XARELTO 20 MG Take 1 Tab by mouth with dinner.        TraMADol HCl (Tab) ULTRAM 50 MG Take 1 Tab by mouth every 24 hours as needed for Mild Pain or Moderate Pain (headaches).        TraMADol HCl (Tab) ULTRAM 50 MG Take 1 Tab by mouth every 24 hours as needed for Moderate Pain or Severe Pain.        Triamcinolone Acetonide (Cream) KENALOG 0.1 % Apply to skin lesion on posterior neck twice a day as needed        Venlafaxine HCl (CAPSULE SR 24 HR) EFFEXOR XR 37.5 MG TAKE ONE CAPSULE BY MOUTH TWICE A DAY WITH MEALS        .                 Medicines prescribed today were sent to:     [id number] - [place] - [address]    [address] [place] [address]    Phone: [phone] Fax: [phone]    Open 24 Hours?: No      Medication refill instructions:       If your prescription bottle indicates you have medication refills left, it is not necessary to call your provider’s office. Please contact your pharmacy and they will refill your medication.    If your prescription bottle indicates you do not have any refills left, you may request refills at any time through one of the following ways: The online [facility] system (except Urgent Care), by calling your provider’s office, or by asking your pharmacy to contact your provider’s office with a refill request. Medication refills are processed only during regular business hours and may not be available until the next business day. Your  provider may request additional information or to have a follow-up visit with you prior to refilling your medication.   *Please Note: Medication refills are assigned a new Rx number when refilled electronically. Your pharmacy may indicate that no refills were authorized even though a new prescription for the same medication is available at the pharmacy. Please request the medicine by name with the pharmacy before contacting your provider for a refill.        Referral     A referral request has been sent to our patient care coordination department. Please allow 3-5 business days for us to process this request and contact you either by phone or mail. If you do not hear from us by the 5th business day, please call us at [phone].        Other Notes About Your Plan     4/6/16 Dr Quiroga appt for R hand pain, Fibromyalgia. Increase Gabapentin  300 mg TID. Wrist splint at night. F/u 3 months.  12/31/15 [facility] Neurology Consult-Dr Rima Quiroga. Carpal Tunnel, hand pain. Gabapentin, Hand exercises, consider NCS/EMG.  Fall risk done 10/15/15 [facility]           [email or web address] Access Code: Activation code not generated  Current [email or web address] Status: Active

## 2017-06-06 ENCOUNTER — TELEPHONE (OUTPATIENT)
Dept: MEDICAL GROUP | Facility: MEDICAL CENTER | Age: 67
End: 2017-06-06

## 2017-06-06 ENCOUNTER — OFFICE VISIT (OUTPATIENT)
Dept: CARDIOLOGY | Facility: MEDICAL CENTER | Age: 67
End: 2017-06-06
Payer: MEDICAID

## 2017-06-06 VITALS
HEIGHT: 70 IN | DIASTOLIC BLOOD PRESSURE: 62 MMHG | OXYGEN SATURATION: 90 % | SYSTOLIC BLOOD PRESSURE: 112 MMHG | HEART RATE: 66 BPM

## 2017-06-06 DIAGNOSIS — I77.810 ASCENDING AORTA DILATATION (HCC): ICD-10-CM

## 2017-06-06 DIAGNOSIS — R09.81 SINUS CONGESTION: ICD-10-CM

## 2017-06-06 DIAGNOSIS — Z79.01 CHRONIC ANTICOAGULATION: ICD-10-CM

## 2017-06-06 DIAGNOSIS — I10 ESSENTIAL HYPERTENSION: ICD-10-CM

## 2017-06-06 DIAGNOSIS — J30.1 SEASONAL ALLERGIC RHINITIS DUE TO POLLEN: ICD-10-CM

## 2017-06-06 DIAGNOSIS — E78.1 HYPERTRIGLYCERIDEMIA: ICD-10-CM

## 2017-06-06 DIAGNOSIS — I48.0 PAROXYSMAL A-FIB (HCC): ICD-10-CM

## 2017-06-06 PROCEDURE — 99244 OFF/OP CNSLTJ NEW/EST MOD 40: CPT | Performed by: INTERNAL MEDICINE

## 2017-06-06 RX ORDER — FLUTICASONE PROPIONATE 50 MCG
1 SPRAY, SUSPENSION (ML) NASAL 2 TIMES DAILY
Qty: 1 BOTTLE | Refills: 2 | Status: SHIPPED | OUTPATIENT
Start: 2017-06-06 | End: 2017-06-08 | Stop reason: SDUPTHER

## 2017-06-06 ASSESSMENT — ENCOUNTER SYMPTOMS
BLURRED VISION: 0
MYALGIAS: 0
LOSS OF CONSCIOUSNESS: 0
PND: 0
BRUISES/BLEEDS EASILY: 0
DEPRESSION: 0
ABDOMINAL PAIN: 0
PALPITATIONS: 0
FEVER: 0
CLAUDICATION: 0
ORTHOPNEA: 0
DIZZINESS: 0
SHORTNESS OF BREATH: 0
SPEECH CHANGE: 0
NERVOUS/ANXIOUS: 0

## 2017-06-06 NOTE — TELEPHONE ENCOUNTER
Please call alexa and let her know her pharmacy was not able to fill  The Flonase Allergy Relief spray although that brand is typically covered.  I will re-write a hard copy and she should come pick it up at our   Pharmacy which will fill it.    Thanks  Deni

## 2017-06-06 NOTE — PROGRESS NOTES
Subjective:   Miryam Veloz is a 66 y.o. Female with known history of hypertension, bipolar affective disorder, depression, paroxysmal atrial fibrillation now here to establish care with us.    First episode of MINDY. fib was in Jan of 2017 in setting of sepsis due to pyelonephritis. Since then patient has been having intermittent episodes of fluttering sensation in the chest but after initiation of propranolol those symptoms have resolved. No bleeding issues while on anticoagulation. Denied any complaints of exertional chest pain pressure or tightness. No complaints of orthopnea or PND. Denied any complaints of dizziness lightheadedness or LOC.    Past Medical History   Diagnosis Date   • Headache, frequent episodic tension-type    • Bipolar affective disorder (CMS-HCC) 1995   • Depression 5/22/2009   • Lumbar facet arthropathy (HCC) 2004     lumbar disc disease   • Chronic knee pain 2000   • IBS (irritable bowel syndrome)    • Family history of breast cancer in mother    • Rotator cuff syndrome of right shoulder 2008   • Migraine    • Hypertension      Past Surgical History   Procedure Laterality Date   • Cholecystectomy  1999   • Finger or hand incision and drainage Right 10/20/2015     Procedure: FINGER OR HAND INCISION AND DRAINAGE- 4th finger;  Surgeon: Eric Pritchett M.D.;  Location: SURGERY [facility];  Service:      Family History   Problem Relation Age of Onset   • Cancer Mother      breast cancer, bilaterally   • GI Father      ulcer   • Heart Disease Father 40     MIs   • Psychiatry Father      anxiety   • Arthritis Sister      rheumatoid   • Stroke Sister 79     mid brain     History   Smoking status   • Former Smoker -- 1.00 packs/day for 20 years   • Types: Cigarettes   • Quit date: 01/01/1994   Smokeless tobacco   • Never Used     Allergies   Allergen Reactions   • Codeine Itching and Nausea   • Imitrex [Sumatriptan Succinate] Shortness of Breath and Swelling   • Penicillins Rash and  Vomiting   • Sulfa Drugs Shortness of Breath and Itching   • Azithromycin Itching   • Dilaudid [Hydromorphone] Itching     sweating   • Sensipar [Cinacalcet] Unspecified     Nightmares and leg cramping   • Imipramine Shortness of Breath and Itching     anxious   • Other Drug      Z Pack   • Seroquel [Quetiapine Fumarate]      Outpatient Encounter Prescriptions as of 6/6/2017   Medication Sig Dispense Refill   • fluticasone (FLONASE ALLERGY RELIEF) 50 MCG/ACT nasal spray Spray 1 Spray in nose 2 times a day. 1 Bottle 2   • doxycycline (MONODOX) 100 MG capsule Take 1 Cap by mouth 2 times a day. 14 Cap 0   • montelukast (SINGULAIR) 10 MG Tab Take 1 Tab by mouth every day. 30 Tab 3   • guaifenesin-codeine (TUSSI-ORGANIDIN NR) 100-10 MG/5ML syrup Take 5 mL by mouth 2 times a day as needed. 120 mL 0   • Omega-3 Fatty Acids (FISH OIL) 1000 MG Cap capsule Take 1,000 mg by mouth every day.     • baclofen (LIORESAL) 10 MG Tab Take 1 Tab by mouth 2 times a day. 60 Tab 2   • tramadol (ULTRAM) 50 MG Tab Take 1 Tab by mouth every 24 hours as needed for Moderate Pain or Severe Pain. 20 Tab 0   • propranolol (INDERAL) 40 MG Tab Take 1 Tab by mouth 3 times a day. 90 Tab 1   • venlafaxine XR (EFFEXOR XR) 37.5 MG CAPSULE SR 24 HR TAKE ONE CAPSULE BY MOUTH TWICE A DAY WITH MEALS 60 Cap 2   • gabapentin (NEURONTIN) 300 MG Cap Take 1 Cap by mouth 3 times a day. 90 Cap 5   • loratadine (CLARITIN) 10 MG Tab Take 1 Tab by mouth every day. 30 Tab 2   • vitamin D (CHOLECALCIFEROL) 1000 UNIT Tab Take 1,000 Units by mouth every day.     • rivaroxaban (XARELTO) 20 MG Tab tablet Take 1 Tab by mouth with dinner. 30 Tab 2   • methocarbamol (ROBAXIN) 750 MG Tab Take 1,500 mg by mouth 2 Times a Day.     • albuterol (VENTOLIN HFA) 108 (90 BASE) MCG/ACT Aero Soln inhalation aerosol Inhale 2 Puffs by mouth every 6 hours as needed for Shortness of Breath. 8.5 g 3   • cyanocobalamin (VITAMIN B12) 1000 MCG Tab Take 1 Tab by mouth every day. 30 Tab 3   •  [DISCONTINUED] furosemide (LASIX) 20 MG Tab TAKE ONE TABLET BY MOUTH DAILY (GENERINC LASIX) 30 Tab 2   • [DISCONTINUED] predniSONE (DELTASONE) 10 MG Tab Take 10 mg Twice a day for 5 days then once a day for 2 days 12 Tab 0   • triamcinolone acetonide (KENALOG) 0.1 % Cream Apply to skin lesion on posterior neck twice a day as needed 1 Tube 0   • [DISCONTINUED] furosemide (LASIX) 20 MG Tab      • [DISCONTINUED] predniSONE (DELTASONE) 10 MG Tab Take 2 10 mg tabs daily for 2 days, then 1 10 mg for 6 days 10 Tab 0   • [DISCONTINUED] guaifenesin-codeine (TUSSI-ORGANIDIN NR) 100-10 MG/5ML syrup Take 5 mL by mouth 2 times a day as needed. 120 mL 0   • [DISCONTINUED] benzonatate (TESSALON) 100 MG Cap Take 1 Cap by mouth 3 times a day as needed. 30 Cap 0   • Diclofenac Sodium 1 % Gel Apply twice a day to joints as needed 1 Tube 5   • ondansetron (ZOFRAN ODT) 4 MG TABLET DISPERSIBLE Take 1 Tab by mouth every 24 hours as needed for Nausea/Vomiting. 20 Tab 2   • [DISCONTINUED] tramadol (ULTRAM) 50 MG Tab Take 1 Tab by mouth every 24 hours as needed for Mild Pain or Moderate Pain (headaches). 30 Tab 0   • [DISCONTINUED] propranolol (INDERAL) 40 MG Tab Take 80 mg by mouth 2 times a day.       No facility-administered encounter medications on file as of 6/6/2017.     Review of Systems   Constitutional: Negative for fever.   HENT: Negative for congestion.    Eyes: Negative for blurred vision.   Respiratory: Negative for shortness of breath.    Cardiovascular: Negative for chest pain, palpitations, orthopnea, claudication, leg swelling and PND.   Gastrointestinal: Negative for abdominal pain.   Genitourinary: Negative for hematuria.   Musculoskeletal: Negative for myalgias and joint pain.   Skin: Negative for rash.   Neurological: Negative for dizziness, speech change and loss of consciousness.   Endo/Heme/Allergies: Does not bruise/bleed easily.   Psychiatric/Behavioral: Negative for depression. The patient is not nervous/anxious.   "  All other systems reviewed and are negative.       Objective:   /62 mmHg  Pulse 66  Ht 1.778 m (5' 10\")  Wt   SpO2 90%    Physical Exam   Constitutional: She is oriented to person, place, and time. She appears well-developed. No distress.   HENT:   Mouth/Throat: Mucous membranes are normal.   Eyes: Conjunctivae and EOM are normal.   Neck: No JVD present. No tracheal deviation present. No thyroid mass present.   Cardiovascular: Normal rate, regular rhythm and intact distal pulses.    No murmur heard.  Pulmonary/Chest: Effort normal and breath sounds normal. No respiratory distress. She exhibits no tenderness.   Abdominal: Soft. There is no tenderness.   Musculoskeletal: Normal range of motion. She exhibits no edema.   Neurological: She is alert and oriented to person, place, and time. She has normal strength. She displays no tremor.   Skin: Skin is warm and dry. She is not diaphoretic.   Psychiatric: She has a normal mood and affect. Her behavior is normal.   Vitals reviewed.      Results for LIS JOSEPH (MRN [id number]) as of 6/6/2017 13:16   4/4/2017 4/4/2017    Sodium 139 137   Potassium 4.6 4.6   Chloride 105 107   Co2 24 24   Anion Gap 10.0 6.0   Glucose 108 (H) 108 (H)   Bun 18 18   Creatinine 0.99 0.99   GFR If Non  56 (A) 56 (A)   Calcium 10.7 (H) 10.7 (H)   AST(SGOT) 13 12   ALT(SGPT) 14 14   Alkaline Phosphatase 105 (H) 104 (H)   Cholesterol,Tot  196   Triglycerides  308 (H)   HDL  35 (A)   LDL  99     TTE: 1/11/7  No prior study is available for comparison.   Normal left ventricular size, wall thickness, and systolic function.  Left ventricular ejection fraction is visually estimated to be 65%.  Diastolic function is difficult to assess with atrial fibrillation.  The right ventricle was normal in size and function.  Mild mitral regurgitation.  Borderline dilated ascending aorta, 4.0 cm  Assessment:     1. Paroxysmal a-fib (CMS-HCC)  OVERNIGHT PULSE OXIMETRY   2. " Essential hypertension     3. Hypertriglyceridemia     4. Chronic anticoagulation     5. Ascending aorta dilatation (CMS-HCC)  CT-THORACIC AORTA EVALUATION    CANCELED: Echocardiogram Comp w/o Cont       Medical Decision Making:  Today's Assessment / Status / Plan:     1. No further breakthrough episodes of atrial fibrillation.  Continue propranolol.  2. Blood pressure well controlled at the present visit.  Continue propranolol.  3. Recommended dietary modification and recheck labs in 6 months.  4.  No bleeding issues while on Xarelto.  Continue Xarelto 20 mg qHs.  5. CT aorta prior to next visit.    Follow-up in 6 weeks.  CT aorta prior to next visit.    Thank you for allowing me to participate in taking care of patient.    Marian Jeter MD.

## 2017-06-06 NOTE — MR AVS SNAPSHOT
"        Miryam Juan Magdiel   2017 12:45 PM   Office Visit   MRN: [id number]    Department:  [facility]   Dept Phone:  [phone]    Description:  Female : 1950   Provider:  Marian Ambrocio M.D.           Reason for Visit     New Patient           Allergies as of 2017     Allergen Noted Reactions    Codeine 2009   Itching, Nausea    Imitrex [Sumatriptan Succinate] 2010   Shortness of Breath, Swelling    Penicillins 2007   Rash, Vomiting    Sulfa Drugs 2007   Shortness of Breath, Itching    Azithromycin 2007   Itching    Dilaudid [Hydromorphone] 10/20/2015   Itching    sweating    Sensipar [Cinacalcet] 2016   Unspecified    Nightmares and leg cramping    Imipramine 2011   Shortness of Breath, Itching    anxious    Other Drug 2011       Z Pack    Seroquel [Quetiapine Fumarate] 2011         You were diagnosed with     Paroxysmal a-fib (CMS-[facility])   [119203]       Essential hypertension   [5307001]       Hypertriglyceridemia   [051704]       Chronic anticoagulation   [664013]       Ascending aorta dilatation (CMS-[facility])   [401142]         Vital Signs     Blood Pressure Pulse Height Oxygen Saturation Smoking Status       112/62 mmHg 66 1.778 m (5' 10\") 90% Former Smoker       Basic Information     Date Of Birth Sex Race Ethnicity Preferred Language    1950 Female White Non- English      Your appointments     Jul 10, 2017 10:30 AM   Established Patient with HODAN Sterling   [facility] ([facility])    [address] [place] [id number]   [phone]           You will be receiving a confirmation call a few days before your appointment from our automated call confirmation system.              Problem List              ICD-10-CM Priority Class Noted - Resolved    Bipolar affective disorder (CMS-HCC) F31.9   2009 - Present    Depression F32.9   2009 - Present    Family history of breast cancer in " mother Z80.3   Unknown - Present    Anxiety F41.9   7/15/2011 - Present    Allergic rhinitis due to pollen J30.1   8/26/2011 - Present    Rotator cuff syndrome of right shoulder M75.101   12/9/2011 - Present    Migraine headache G43.909   Unknown - Present    Lumbar facet arthropathy (HCC) M12.88   Unknown - Present    Lumbar disc disease with radiculopathy M51.16   9/14/2012 - Present    Recurrent sinus infections J32.9   7/22/2014 - Present    Essential hypertension I10   8/12/2014 - Present    Chronic pain syndrome G89.4   11/24/2014 - Present    Cellulitis of right hand L03.113   10/4/2015 - Present    Osteoarthritis, hand, primary localized M19.049   10/5/2015 - Present    Pain of right hand M79.641   10/15/2015 - Present    Tenosynovitis of finger M65.9 High  10/15/2015 - Present    Hypercalcemia E83.52 Medium  10/20/2015 - Present    Vitamin B12 deficiency E53.8   11/12/2015 - Present    Renal insufficiency N28.9   11/12/2015 - Present    Acute pain of left wrist M25.532   11/12/2015 - Present    Chronic pain G89.29   12/21/2015 - Present    Pain of finger of left hand M79.645   7/28/2016 - Present    Elevated PTHrP level (CMS-[facility]) E21.5   7/28/2016 - Present    Lump of skin of left lower extremity R22.42   8/22/2016 - Present    Pain in both knees M25.561, M25.562   10/6/2016 - Present    Accessory skin tags Q82.8   10/6/2016 - Present    Acute pyelonephritis N12 Medium  1/8/2017 - Present    Severe sepsis, POA (HCC) due to pyelonephritis A41.9, R65.20 High  1/9/2017 - Present    Acute respiratory failure with hypoxia (HCC) due to sepsis, POA J96.01 Medium  1/9/2017 - Present    Gram-negative katharina bacteremia R78.81 Medium  1/9/2017 - Present    HTN (hypertension) I10 Low  1/9/2017 - Present    Paroxysmal atrial fibrillation (CMS-HCC) I48.0 Medium  1/9/2017 - Present    Chronic renal failure, stage 2 (mild) N18.2 Low  1/9/2017 - Present    Fibromyalgia M79.7 Low  1/9/2017 - Present    Headache R51   1/11/2017  - Present    Heart palpitations R00.2   1/25/2017 - Present    Low energy R53.83   1/25/2017 - Present    Acute pain of left shoulder M25.512   3/1/2017 - Present    Post-menopause Z78.0   3/1/2017 - Present    Hypertriglyceridemia E78.1   4/12/2017 - Present    Shortness of breath R06.02   4/12/2017 - Present    Skin irritation R23.8   4/12/2017 - Present    Obesity (BMI 30-39.9) E66.9   4/12/2017 - Present    Acute pain of right knee M25.561   5/30/2017 - Present    Sinus congestion R09.81   6/5/2017 - Present      Health Maintenance        Date Due Completion Dates    PAP SMEAR 9/20/1973 ---    IMM ZOSTER VACCINE 9/20/2012 ---    MAMMOGRAM 5/29/2014 5/29/2013, 8/12/2011, 7/17/2009, 7/17/2009, 7/23/2008, 6/27/2008, 6/27/2008, 12/9/2005    COLONOSCOPY 10/7/2021 10/7/2011 (Declined)    Override on 10/7/2011: Patient Declined (will do FIT test instead)    IMM DTaP/Tdap/Td Vaccine (2 - Td) 10/27/2026 10/27/2016            Current Immunizations     13-VALENT PCV PREVNAR 1/11/2017  4:06 PM    Influenza TIV (IM) 9/19/2015, 11/7/2014    Influenza Vaccine Adult HD 10/1/2016    Influenza Vaccine Pediatric 11/3/2010    Tdap Vaccine 10/27/2016      Below and/or attached are the medications your provider expects you to take. Review all of your home medications and newly ordered medications with your provider and/or pharmacist. Follow medication instructions as directed by your provider and/or pharmacist. Please keep your medication list with you and share with your provider. Update the information when medications are discontinued, doses are changed, or new medications (including over-the-counter products) are added; and carry medication information at all times in the event of emergency situations     Allergies:  CODEINE - Itching,Nausea     IMITREX - Shortness of Breath,Swelling     PENICILLINS - Rash,Vomiting     SULFA DRUGS - Shortness of Breath,Itching     AZITHROMYCIN - Itching     DILAUDID - Itching     SENSIPAR -  Unspecified     IMIPRAMINE - Shortness of Breath,Itching     OTHER DRUG - (reactions not documented)     SEROQUEL - (reactions not documented)               Medications  Valid as of: June 06, 2017 -  1:04 PM    Generic Name Brand Name Tablet Size Instructions for use    Albuterol Sulfate (Aero Soln) albuterol 108 (90 BASE) MCG/ACT Inhale 2 Puffs by mouth every 6 hours as needed for Shortness of Breath.        Baclofen (Tab) LIORESAL 10 MG Take 1 Tab by mouth 2 times a day.        Cholecalciferol (Tab) cholecalciferol 1000 UNIT Take 1,000 Units by mouth every day.        Cyanocobalamin (Tab) VITAMIN B12 1000 MCG Take 1 Tab by mouth every day.        Diclofenac Sodium (Gel) Diclofenac Sodium 1 % Apply twice a day to joints as needed        Doxycycline Monohydrate (Cap) MONODOX 100 MG Take 1 Cap by mouth 2 times a day.        Fluticasone Propionate (Suspension) FLONASE 50 MCG/ACT Spray 1 Spray in nose 2 times a day.        Gabapentin (Cap) NEURONTIN 300 MG Take 1 Cap by mouth 3 times a day.        Guaifenesin-Codeine (Syrup) TUSSI-ORGANIDIN -10 MG/5ML Take 5 mL by mouth 2 times a day as needed.        Loratadine (Tab) CLARITIN 10 MG Take 1 Tab by mouth every day.        Methocarbamol (Tab) ROBAXIN 750 MG Take 1,500 mg by mouth 2 Times a Day.        Montelukast Sodium (Tab) SINGULAIR 10 MG Take 1 Tab by mouth every day.        Omega-3 Fatty Acids (Cap) fish oil 1000 MG Take 1,000 mg by mouth every day.        Ondansetron (TABLET DISPERSIBLE) ZOFRAN ODT 4 MG Take 1 Tab by mouth every 24 hours as needed for Nausea/Vomiting.        Propranolol HCl (Tab) INDERAL 40 MG Take 1 Tab by mouth 3 times a day.        Rivaroxaban (Tab) XARELTO 20 MG Take 1 Tab by mouth with dinner.        TraMADol HCl (Tab) ULTRAM 50 MG Take 1 Tab by mouth every 24 hours as needed for Moderate Pain or Severe Pain.        Triamcinolone Acetonide (Cream) KENALOG 0.1 % Apply to skin lesion on posterior neck twice a day as needed         Venlafaxine HCl (CAPSULE SR 24 HR) EFFEXOR XR 37.5 MG TAKE ONE CAPSULE BY MOUTH TWICE A DAY WITH MEALS        .                 Medicines prescribed today were sent to:     [facility][id number] - [place] [address]    Phone: [phone] Fax: [phone]    Open 24 Hours?: No      Medication refill instructions:       If your prescription bottle indicates you have medication refills left, it is not necessary to call your provider’s office. Please contact your pharmacy and they will refill your medication.    If your prescription bottle indicates you do not have any refills left, you may request refills at any time through one of the following ways: The online [employer] system (except Urgent Care), by calling your provider’s office, or by asking your pharmacy to contact your provider’s office with a refill request. Medication refills are processed only during regular business hours and may not be available until the next business day. Your provider may request additional information or to have a follow-up visit with you prior to refilling your medication.   *Please Note: Medication refills are assigned a new Rx number when refilled electronically. Your pharmacy may indicate that no refills were authorized even though a new prescription for the same medication is available at the pharmacy. Please request the medicine by name with the pharmacy before contacting your provider for a refill.        Your To Do List     Future Labs/Procedures Complete By Expires    CT-THORACIC AORTA EVALUATION  As directed 6/6/2018      Other Notes About Your Plan     4/6/16 Dr Quiroga appt for R hand pain, Fibromyalgia. Increase Gabapentin  300 mg TID. Wrist splint at night. F/u 3 months.  12/31/15 [facility] Neurology Consult-Dr Rima Quiroga. Carpal Tunnel, hand pain. Gabapentin, Hand exercises, consider NCS/EMG.  Fall risk done 10/15/15 [facility]           [employer] Access Code: Activation code not generated  Current  MyChart Status: Active

## 2017-06-06 NOTE — TELEPHONE ENCOUNTER
Called patient, no answer. Left message about rx. Rx left for [facility] pharmacy

## 2017-06-06 NOTE — Clinical Note
[facility]   [address] [facility]   [address]  [place] [address]  Phone: [phone]  Fax: [phone]              Miryam Veloz  1950    Encounter Date: 6/6/2017    Marian Ambrocio M.D.          PROGRESS NOTE:  Subjective:   Miryam Veloz is a 66 y.o. Female with known history of hypertension, bipolar affective disorder, depression, paroxysmal atrial fibrillation now here to establish care with us.    First episode of A. fib was in Jan of 2017 in setting of sepsis due to pyelonephritis. Since then patient has been having intermittent episodes of fluttering sensation in the chest but after initiation of propranolol those symptoms have resolved. No bleeding issues while on anticoagulation. Denied any complaints of exertional chest pain pressure or tightness. No complaints of orthopnea or PND. Denied any complaints of dizziness lightheadedness or LOC.    Past Medical History   Diagnosis Date   • Headache, frequent episodic tension-type    • Bipolar affective disorder (CMS-HCC) 1995   • Depression 5/22/2009   • Lumbar facet arthropathy ([facility]) 2004     lumbar disc disease   • Chronic knee pain 2000   • IBS (irritable bowel syndrome)    • Family history of breast cancer in mother    • Rotator cuff syndrome of right shoulder 2008   • Migraine    • Hypertension      Past Surgical History   Procedure Laterality Date   • Cholecystectomy  1999   • Finger or hand incision and drainage Right 10/20/2015     Procedure: FINGER OR HAND INCISION AND DRAINAGE- 4th finger;  Surgeon: Eric Pritchett M.D.;  Location: SURGERY [facility];  Service:      Family History   Problem Relation Age of Onset   • Cancer Mother      breast cancer, bilaterally   • GI Father      ulcer   • Heart Disease Father 40     MIs   • Psychiatry Father      anxiety   • Arthritis Sister      rheumatoid   • Stroke Sister 79     mid brain     History   Smoking status     • Former Smoker -- 1.00 packs/day for 20 years   • Types: Cigarettes   • Quit date: 01/01/1994   Smokeless tobacco   • Never Used     Allergies   Allergen Reactions   • Codeine Itching and Nausea   • Imitrex [Sumatriptan Succinate] Shortness of Breath and Swelling   • Penicillins Rash and Vomiting   • Sulfa Drugs Shortness of Breath and Itching   • Azithromycin Itching   • Dilaudid [Hydromorphone] Itching     sweating   • Sensipar [Cinacalcet] Unspecified     Nightmares and leg cramping   • Imipramine Shortness of Breath and Itching     anxious   • Other Drug      Z Pack   • Seroquel [Quetiapine Fumarate]      Outpatient Encounter Prescriptions as of 6/6/2017   Medication Sig Dispense Refill   • fluticasone (FLONASE ALLERGY RELIEF) 50 MCG/ACT nasal spray Spray 1 Spray in nose 2 times a day. 1 Bottle 2   • doxycycline (MONODOX) 100 MG capsule Take 1 Cap by mouth 2 times a day. 14 Cap 0   • montelukast (SINGULAIR) 10 MG Tab Take 1 Tab by mouth every day. 30 Tab 3   • guaifenesin-codeine (TUSSI-ORGANIDIN NR) 100-10 MG/5ML syrup Take 5 mL by mouth 2 times a day as needed. 120 mL 0   • Omega-3 Fatty Acids (FISH OIL) 1000 MG Cap capsule Take 1,000 mg by mouth every day.     • baclofen (LIORESAL) 10 MG Tab Take 1 Tab by mouth 2 times a day. 60 Tab 2   • tramadol (ULTRAM) 50 MG Tab Take 1 Tab by mouth every 24 hours as needed for Moderate Pain or Severe Pain. 20 Tab 0   • propranolol (INDERAL) 40 MG Tab Take 1 Tab by mouth 3 times a day. 90 Tab 1   • venlafaxine XR (EFFEXOR XR) 37.5 MG CAPSULE SR 24 HR TAKE ONE CAPSULE BY MOUTH TWICE A DAY WITH MEALS 60 Cap 2   • gabapentin (NEURONTIN) 300 MG Cap Take 1 Cap by mouth 3 times a day. 90 Cap 5   • loratadine (CLARITIN) 10 MG Tab Take 1 Tab by mouth every day. 30 Tab 2   • vitamin D (CHOLECALCIFEROL) 1000 UNIT Tab Take 1,000 Units by mouth every day.     • rivaroxaban (XARELTO) 20 MG Tab tablet Take 1 Tab by mouth with dinner. 30 Tab 2   • methocarbamol (ROBAXIN) 750 MG Tab  Take 1,500 mg by mouth 2 Times a Day.     • albuterol (VENTOLIN HFA) 108 (90 BASE) MCG/ACT Aero Soln inhalation aerosol Inhale 2 Puffs by mouth every 6 hours as needed for Shortness of Breath. 8.5 g 3   • cyanocobalamin (VITAMIN B12) 1000 MCG Tab Take 1 Tab by mouth every day. 30 Tab 3   • [DISCONTINUED] furosemide (LASIX) 20 MG Tab TAKE ONE TABLET BY MOUTH DAILY (GENERINC LASIX) 30 Tab 2   • [DISCONTINUED] predniSONE (DELTASONE) 10 MG Tab Take 10 mg Twice a day for 5 days then once a day for 2 days 12 Tab 0   • triamcinolone acetonide (KENALOG) 0.1 % Cream Apply to skin lesion on posterior neck twice a day as needed 1 Tube 0   • [DISCONTINUED] furosemide (LASIX) 20 MG Tab      • [DISCONTINUED] predniSONE (DELTASONE) 10 MG Tab Take 2 10 mg tabs daily for 2 days, then 1 10 mg for 6 days 10 Tab 0   • [DISCONTINUED] guaifenesin-codeine (TUSSI-ORGANIDIN NR) 100-10 MG/5ML syrup Take 5 mL by mouth 2 times a day as needed. 120 mL 0   • [DISCONTINUED] benzonatate (TESSALON) 100 MG Cap Take 1 Cap by mouth 3 times a day as needed. 30 Cap 0   • Diclofenac Sodium 1 % Gel Apply twice a day to joints as needed 1 Tube 5   • ondansetron (ZOFRAN ODT) 4 MG TABLET DISPERSIBLE Take 1 Tab by mouth every 24 hours as needed for Nausea/Vomiting. 20 Tab 2   • [DISCONTINUED] tramadol (ULTRAM) 50 MG Tab Take 1 Tab by mouth every 24 hours as needed for Mild Pain or Moderate Pain (headaches). 30 Tab 0   • [DISCONTINUED] propranolol (INDERAL) 40 MG Tab Take 80 mg by mouth 2 times a day.       No facility-administered encounter medications on file as of 6/6/2017.     Review of Systems   Constitutional: Negative for fever.   HENT: Negative for congestion.    Eyes: Negative for blurred vision.   Respiratory: Negative for shortness of breath.    Cardiovascular: Negative for chest pain, palpitations, orthopnea, claudication, leg swelling and PND.   Gastrointestinal: Negative for abdominal pain.   Genitourinary: Negative for hematuria.    "  Musculoskeletal: Negative for myalgias and joint pain.   Skin: Negative for rash.   Neurological: Negative for dizziness, speech change and loss of consciousness.   Endo/Heme/Allergies: Does not bruise/bleed easily.   Psychiatric/Behavioral: Negative for depression. The patient is not nervous/anxious.    All other systems reviewed and are negative.       Objective:   /62 mmHg  Pulse 66  Ht 1.778 m (5' 10\")  Wt   SpO2 90%    Physical Exam   Constitutional: She is oriented to person, place, and time. She appears well-developed. No distress.   HENT:   Mouth/Throat: Mucous membranes are normal.   Eyes: Conjunctivae and EOM are normal.   Neck: No JVD present. No tracheal deviation present. No thyroid mass present.   Cardiovascular: Normal rate, regular rhythm and intact distal pulses.    No murmur heard.  Pulmonary/Chest: Effort normal and breath sounds normal. No respiratory distress. She exhibits no tenderness.   Abdominal: Soft. There is no tenderness.   Musculoskeletal: Normal range of motion. She exhibits no edema.   Neurological: She is alert and oriented to person, place, and time. She has normal strength. She displays no tremor.   Skin: Skin is warm and dry. She is not diaphoretic.   Psychiatric: She has a normal mood and affect. Her behavior is normal.   Vitals reviewed.      Results for LIS JOSEPH (MRN [id number]) as of 6/6/2017 13:16   4/4/2017 4/4/2017    Sodium 139 137   Potassium 4.6 4.6   Chloride 105 107   Co2 24 24   Anion Gap 10.0 6.0   Glucose 108 (H) 108 (H)   Bun 18 18   Creatinine 0.99 0.99   GFR If Non  56 (A) 56 (A)   Calcium 10.7 (H) 10.7 (H)   AST(SGOT) 13 12   ALT(SGPT) 14 14   Alkaline Phosphatase 105 (H) 104 (H)   Cholesterol,Tot  196   Triglycerides  308 (H)   HDL  35 (A)   LDL  99     TTE: 1/11/7  No prior study is available for comparison.   Normal left ventricular size, wall thickness, and systolic function.  Left ventricular ejection fraction is " visually estimated to be 65%.  Diastolic function is difficult to assess with atrial fibrillation.  The right ventricle was normal in size and function.  Mild mitral regurgitation.  Borderline dilated ascending aorta, 4.0 cm  Assessment:     1. Paroxysmal a-fib (CMS-HCC)  OVERNIGHT PULSE OXIMETRY   2. Essential hypertension     3. Hypertriglyceridemia     4. Chronic anticoagulation     5. Ascending aorta dilatation (CMS-HCC)  CT-THORACIC AORTA EVALUATION    CANCELED: Echocardiogram Comp w/o Cont       Medical Decision Making:  Today's Assessment / Status / Plan:     1. No further breakthrough episodes of atrial fibrillation.  Continue propranolol.  2. Blood pressure well controlled at the present visit.  Continue propranolol.  3. Recommended dietary modification and recheck labs in 6 months.  4.  No bleeding issues while on Xarelto.  Continue Xarelto 20 mg qHs.  5. CT aorta prior to next visit.    Follow-up in 6 weeks.  CT aorta prior to next visit.    Thank you for allowing me to participate in taking care of patient.    Marian Ambrocio. MD.        Walter Bonds, A.P.N.  [address] [address]  VIA In Basket

## 2017-06-07 NOTE — ADDENDUM NOTE
Encounter addended by: Rachelle Kelly R.N. on: 6/6/2017  6:05 PM<BR>     Documentation filed: Inpatient Document Flowsheet

## 2017-06-08 ENCOUNTER — TELEPHONE (OUTPATIENT)
Dept: MEDICAL GROUP | Facility: MEDICAL CENTER | Age: 67
End: 2017-06-08

## 2017-06-08 DIAGNOSIS — J30.1 SEASONAL ALLERGIC RHINITIS DUE TO POLLEN: ICD-10-CM

## 2017-06-08 DIAGNOSIS — R09.81 SINUS CONGESTION: ICD-10-CM

## 2017-06-08 RX ORDER — FLUTICASONE PROPIONATE 50 MCG
1 SPRAY, SUSPENSION (ML) NASAL 2 TIMES DAILY
Qty: 1 BOTTLE | Refills: 2 | Status: SHIPPED | OUTPATIENT
Start: 2017-06-08 | End: 2017-12-22

## 2017-06-08 NOTE — TELEPHONE ENCOUNTER
Please call Miryam and let her know her pharmacy rejected the RX for  Flonase Allergy Relief nasal spray, and that exact brand is covered   By her insurance, but many pharmacies lump that brand in with  Regular Flonase brand and deny it.  I have printed out an RX that she can  her for our pharmacy that should fill it here.  Thanks  noel

## 2017-06-09 ENCOUNTER — TELEPHONE (OUTPATIENT)
Dept: CARDIOLOGY | Facility: MEDICAL CENTER | Age: 67
End: 2017-06-09

## 2017-06-12 RX ORDER — LORATADINE 10 MG/1
TABLET ORAL
Qty: 30 TAB | Refills: 11 | Status: SHIPPED | OUTPATIENT
Start: 2017-06-12 | End: 2017-12-22

## 2017-06-12 NOTE — TELEPHONE ENCOUNTER
Was the patient seen in the last year in this department? Yes     Does patient have an active prescription for medications requested? No     Received Request Via: Pharmacy   Future Appointments       Provider [facility]    7/10/2017 10:30 AM HODAN Sterling [facility]    8/1/2017 2:00 PM Marian [facility]rocio M.D. [facility]

## 2017-06-19 RX ORDER — VENLAFAXINE HYDROCHLORIDE 37.5 MG/1
CAPSULE, EXTENDED RELEASE ORAL
Qty: 60 CAP | Refills: 1 | Status: SHIPPED | OUTPATIENT
Start: 2017-06-19 | End: 2017-08-14 | Stop reason: SDUPTHER

## 2017-06-19 NOTE — TELEPHONE ENCOUNTER
Was the patient seen in the last year in this department? Yes     Does patient have an active prescription for medications requested? No     Received Request Via: Pharmacy   Future Appointments       Provider Department [place]    6/26/2017 9:30 AM [facility]    7/10/2017 10:30 AM BELINDA [facility]. [facility]    8/1/2017 2:00 PM Marian [facility] M.D. [facility]

## 2017-07-03 DIAGNOSIS — I10 ESSENTIAL HYPERTENSION: ICD-10-CM

## 2017-07-03 DIAGNOSIS — E78.1 HYPERTRIGLYCERIDEMIA: ICD-10-CM

## 2017-07-06 RX ORDER — PROPRANOLOL HYDROCHLORIDE 40 MG/1
TABLET ORAL
Qty: 90 TAB | Refills: 0 | Status: SHIPPED | OUTPATIENT
Start: 2017-07-06 | End: 2017-08-02 | Stop reason: SDUPTHER

## 2017-07-06 NOTE — TELEPHONE ENCOUNTER
Was the patient seen in the last year in this department? Yes     Does patient have an active prescription for medications requested? No     Received Request Via: Pharmacy   Future Appointments       Provider [facility]    7/10/2017 10:30 AM HODAN Sterling [facility]    8/1/2017 2:00 PM Marian Ambrocio M.D. [facility]

## 2017-07-10 ENCOUNTER — OFFICE VISIT (OUTPATIENT)
Dept: MEDICAL GROUP | Facility: MEDICAL CENTER | Age: 67
End: 2017-07-10
Attending: NURSE PRACTITIONER
Payer: MEDICAID

## 2017-07-10 VITALS
BODY MASS INDEX: 33.36 KG/M2 | RESPIRATION RATE: 16 BRPM | OXYGEN SATURATION: 93 % | DIASTOLIC BLOOD PRESSURE: 80 MMHG | SYSTOLIC BLOOD PRESSURE: 134 MMHG | TEMPERATURE: 97.9 F | HEIGHT: 70 IN | HEART RATE: 60 BPM | WEIGHT: 233 LBS

## 2017-07-10 DIAGNOSIS — G89.29 CHRONIC PAIN OF BOTH KNEES: ICD-10-CM

## 2017-07-10 DIAGNOSIS — M75.101 ROTATOR CUFF SYNDROME OF RIGHT SHOULDER: ICD-10-CM

## 2017-07-10 DIAGNOSIS — I48.0 PAROXYSMAL ATRIAL FIBRILLATION (HCC): ICD-10-CM

## 2017-07-10 DIAGNOSIS — M25.562 CHRONIC PAIN OF BOTH KNEES: ICD-10-CM

## 2017-07-10 DIAGNOSIS — M25.561 CHRONIC PAIN OF BOTH KNEES: ICD-10-CM

## 2017-07-10 DIAGNOSIS — M25.561 ACUTE PAIN OF RIGHT KNEE: ICD-10-CM

## 2017-07-10 PROCEDURE — 99213 OFFICE O/P EST LOW 20 MIN: CPT | Performed by: NURSE PRACTITIONER

## 2017-07-10 RX ORDER — DIAZEPAM 2 MG/1
TABLET ORAL
Qty: 2 TAB | Refills: 0 | Status: SHIPPED | OUTPATIENT
Start: 2017-07-10 | End: 2017-12-22

## 2017-07-10 ASSESSMENT — PAIN SCALES - GENERAL: PAINLEVEL: 9=SEVERE PAIN

## 2017-07-10 NOTE — PROGRESS NOTES
Chief Complaint: Right Shoulder Pain    HPI:  Miryam presents to the clinic for f/u on back and knee pain.      Her PMH includes    Anxiety,Depression, Bipolar Disorder  Fibromyalgia  Headaches  Hypertension  Hyperparathyroidism/High PTH level  Hyperlipidemia  Osteoarthritis  bilat Knee pains  Osteopenia  Degenerative Disc Disease  Carpel Tunnel Syndrome  Cholecystectomy  Appendectomy  Tenosynovitis of Finger  Right Shoulder Pain    Pyelonephritis w sepsis (new Jan 2017)  Paroxysmal Atrial Fibrillation ( new- Jan 2017)  New Onset Atrial fibrillation (new)  Left Shoulder Pain ( new MArch 2017)  Facial hair-Hirsutism (new March 2017)    She is established with  Neurology- Dr Quiroga  Nephrology- Dr Rafael Lloyd  Counseling-Psychology- [facility]  Surgeon-[facility] for Sebaceous cyst ([facility])  Orthopedics-[facility] for bilat knee pains,      Referrals Recently Approved:   Orthopedics-[facility]- DR young  Cardiology - [facility]  GYN-  Lizbeth Meadows-David  Neurology-Again for Dr Quiroga      Review of Records shows:  6/15/17 ENT Dr Melo Consult---Referred to Allergist, CT Sinuses, Ear plugging/Eust tube dysfunction, Hearing Test and TM's normal.  Possible Acid Reflux, --> Ordered Barium Swallow  6/6/17 Cardiology appt, Continue Beta blocker, OPO ordered, CT thoracic Aorta ordered. F/u 6 wks  6/5/17 Clinic for sinus drainage, cough, RX for flonase, Robitussin codeine, Singulair, Kenalog shot,Doxycycline, Referral to ENT  5/30/17 Clinic Visit for back and knee pains. Referred to Orthopedics, Rx for Baclofen and Tramadol (one time RX, not for ongoing), Lumbar Spine Xray ordered.  4/12/17 Clinic visit for Cough, SOB, Dx Bronchitis, RX for Robitussin and codeine, Tessalon, Doxycycline.  3/1/17 Clinic visit for Migraines and requesting Tramadol, Re-referred to Cardiology, Re-referred to Neurology to see Dr Quiroga again, Left Shoulder xray ordered.  Referral to GYN for her concern over  "Hirsutism.  2/15/17 No Show Clinic  1/25/17 Clinic Visit for Hospital f/u an discussion of her new Atrial Fib issue. Referred to Cardiology, General Surgeon, and Orthopedics.  Labs ordered. Pt to continue Xarelto and Inderal BID.  1/8/17---> 1/11/17 Hospital Admit for Pyelonephritis, Sepsis, paroxysmal A-fib, HTN, Fibromyalgia, Headaches, Discharged on Cipro and Xarelto.  10/27/16 Clinic visit for Xray results, Tx of Skin Tags, Voltaren RX, bilat knee pains and Referral to Orthopedics([facility])  10/16/Clinic Visit for Left leg Sebaceous Cyst, knee pain--->Referred to Surgeon([facility])  7/28/16 clinic Visit for finger pains of left hand and for refills.      [place]  Report shows:  6/5/17 Tramadol 50 mg # 20 by me  3/9/17 Tramadol 50 mg # 30 by me    1/15/17 Tramadol 50 mg # 40 by Dr carrera  9/6/16 [place] 5/325 # 40 by Titi Recinos (DDS)  7/28/16 [place] 5/325 # 30 by me  ----------------------------------------------------------      Right Shoulder Pain x 1 week, Hx of Rotator cuff syndrome of right shoulder  Pt has hx of rotator cuff syndrome and intermittent chronic shoulder pain.  Pt reports about a week ago decreased ROM and \"started with an ache\" and not able to reach up only of brings across body and then helps lift it up.  Pt unable to reach behind back, unable to reach above chest level without using other arm to raise her right arm.  Never has had surgery on it.      Acute pain of right knee/chronic knee pain  Pt reprots saw Dr Medel r/t her right knee pain and believes \"torn meniscus\"  And ordered an MRI and suspects she will need surgery.        Paroxysmal atrial fibrillation (CMS-HCC)  Pt did see Cardiologist on 6/6/17 and is to continue Inderal. Pt denies palpitations or chest pain.  Has plans to have CT Thoracic Aorta and OPO study in near future as recommended by Dr Ambrocio.  Denies SOB or any peripheral edema.      Patient Active Problem List    Diagnosis Date Noted   • Severe sepsis, POA " (HCC) due to pyelonephritis 01/09/2017     Priority: High   • Tenosynovitis of finger 10/15/2015     Priority: High   • Acute respiratory failure with hypoxia (HCC) due to sepsis, POA 01/09/2017     Priority: Medium   • Gram-negative katharina bacteremia 01/09/2017     Priority: Medium   • Paroxysmal atrial fibrillation (CMS-HCC) 01/09/2017     Priority: Medium   • Acute pyelonephritis 01/08/2017     Priority: Medium   • Hypercalcemia 10/20/2015     Priority: Medium   • HTN (hypertension) 01/09/2017     Priority: Low   • Chronic renal failure, stage 2 (mild) 01/09/2017     Priority: Low   • Fibromyalgia 01/09/2017     Priority: Low   • Sinus congestion 06/05/2017   • Acute pain of right knee 05/30/2017   • Hypertriglyceridemia 04/12/2017   • Shortness of breath 04/12/2017   • Skin irritation 04/12/2017   • Obesity (BMI 30-39.9) 04/12/2017   • Acute pain of left shoulder 03/01/2017   • Post-menopause 03/01/2017   • Heart palpitations 01/25/2017   • Low energy 01/25/2017   • Headache 01/11/2017   • Pain in both knees 10/06/2016   • Accessory skin tags 10/06/2016   • Lump of skin of left lower extremity 08/22/2016   • Pain of finger of left hand 07/28/2016   • Elevated PTHrP level (CMS-HCC) 07/28/2016   • Chronic pain 12/21/2015   • Vitamin B12 deficiency 11/12/2015   • Renal insufficiency 11/12/2015   • Acute pain of left wrist 11/12/2015   • Pain of right hand 10/15/2015   • Osteoarthritis, hand, primary localized 10/05/2015   • Cellulitis of right hand 10/04/2015   • Chronic pain syndrome 11/24/2014   • Essential hypertension 08/12/2014   • Recurrent sinus infections 07/22/2014   • Lumbar disc disease with radiculopathy 09/14/2012   • Migraine headache    • Lumbar facet arthropathy (HCC)    • Rotator cuff syndrome of right shoulder 12/09/2011   • Allergic rhinitis due to pollen 08/26/2011   • Anxiety 07/15/2011   • Family history of breast cancer in mother    • Bipolar affective disorder (CMS-HCC) 05/22/2009   •  Depression 05/22/2009       Allergies:Codeine; Imitrex; Penicillins; Sulfa drugs; Azithromycin; Dilaudid; Sensipar; Imipramine; Other drug; and Seroquel    Current Outpatient Prescriptions   Medication Sig Dispense Refill   • diazepam (VALIUM) 2 MG Tab Take one tablet 30 mins prior to MRI.  If needed, take 2nd tablet for exam 2 Tab 0   • rivaroxaban (XARELTO) 20 MG Tab tablet Take 1 Tab by mouth with dinner. 30 Tab 5   • Diclofenac Sodium 1 % Gel Apply twice a day to joints as needed 1 Tube 5   • propranolol (INDERAL) 40 MG Tab TAKE ONE TABLET BY MOUTH THREE TIMES A DAY (GENERIC FOR INDERAL) 90 Tab 0   • venlafaxine XR (EFFEXOR XR) 37.5 MG CAPSULE SR 24 HR TAKE ONE CAPSULE BY MOUTH TWICE A DAY WITH MEALS 60 Cap 1   • loratadine (CLARITIN) 10 MG Tab TAKE ONE TABLET BY MOUTH DAILY (GENERIC CLARITIN) 30 Tab 11   • fluticasone (FLONASE ALLERGY RELIEF) 50 MCG/ACT nasal spray Spray 1 Spray in nose 2 times a day. 1 Bottle 2   • doxycycline (MONODOX) 100 MG capsule Take 1 Cap by mouth 2 times a day. 14 Cap 0   • montelukast (SINGULAIR) 10 MG Tab Take 1 Tab by mouth every day. 30 Tab 3   • guaifenesin-codeine (TUSSI-ORGANIDIN NR) 100-10 MG/5ML syrup Take 5 mL by mouth 2 times a day as needed. 120 mL 0   • Omega-3 Fatty Acids (FISH OIL) 1000 MG Cap capsule Take 1,000 mg by mouth every day.     • baclofen (LIORESAL) 10 MG Tab Take 1 Tab by mouth 2 times a day. 60 Tab 2   • tramadol (ULTRAM) 50 MG Tab Take 1 Tab by mouth every 24 hours as needed for Moderate Pain or Severe Pain. 20 Tab 0   • triamcinolone acetonide (KENALOG) 0.1 % Cream Apply to skin lesion on posterior neck twice a day as needed 1 Tube 0   • gabapentin (NEURONTIN) 300 MG Cap Take 1 Cap by mouth 3 times a day. 90 Cap 5   • ondansetron (ZOFRAN ODT) 4 MG TABLET DISPERSIBLE Take 1 Tab by mouth every 24 hours as needed for Nausea/Vomiting. 20 Tab 2   • vitamin D (CHOLECALCIFEROL) 1000 UNIT Tab Take 1,000 Units by mouth every day.     • methocarbamol (ROBAXIN) 750  "MG Tab Take 1,500 mg by mouth 2 Times a Day.     • albuterol (VENTOLIN HFA) 108 (90 BASE) MCG/ACT Aero Soln inhalation aerosol Inhale 2 Puffs by mouth every 6 hours as needed for Shortness of Breath. 8.5 g 3   • cyanocobalamin (VITAMIN B12) 1000 MCG Tab Take 1 Tab by mouth every day. 30 Tab 3     No current facility-administered medications for this visit.       Social History   Substance Use Topics   • Smoking status: Former Smoker -- 1.00 packs/day for 20 years     Types: Cigarettes     Quit date: 01/01/1994   • Smokeless tobacco: Never Used   • Alcohol Use: Yes      Comment: 2-3 drinks per month       Family History   Problem Relation Age of Onset   • Cancer Mother      breast cancer, bilaterally   • GI Father      ulcer   • Heart Disease Father 40     MIs   • Psychiatry Father      anxiety   • Arthritis Sister      rheumatoid   • Stroke Sister 79     mid brain       ROS:  Review of Systems   See HPI Above    Exam:  Blood pressure 134/80, pulse 60, temperature 36.6 °C (97.9 °F), resp. rate 16, height 1.778 m (5' 10\"), weight 105.688 kg (233 lb), SpO2 93 %.  General:  Well nourished, over-weight, well developed female in moderate distress.  HENT:Head is grossly normal. PERRL.  Neck: Supple. Trachea is midline.  Pulmonary: Clear to ausculation .  Normal effort. No rales, ronchi, or wheezing.   Cardiovascular: Regular rate and rhythm.  Abdomen-Abdomen is soft, No tenderness.  Upper extremities- Strong = . Drastically reduced ROM of Right shoulder, Unable to reach above head or behind back.  Is only able to get right arm over head by bringing arm across mid body and lifting right arm up with her left arm.  Pressure downward on Right arm and Pt has worse pain. Point tender to right Anterior and lateral shoulder.  Lower extremities- neg for edema, redness, tenderness.  Neuro- A & O x 4. Speech clear and appropriate.     Current medications, allergies, and problem list reviewed with patient and updated in  EPIC " today.    Assessment/Plan:  1. Rotator cuff syndrome of right shoulder   Shoulder Pain, Right. Reduced ROM. MR-SHOULDER-W/O RIGHT  May need new referral to Orthopedic specifically for right shoulder if no improvement or findings on MRI. Ice on and off. Voltaren gel to shoulder.   Avoid lifting objects with right arm.      diazepam (VALIUM) 2 MG Tab- Pre- MRI only    Diclofenac Sodium 1 % Gel       2. Acute pain of right knee  Diclofenac Sodium 1 % Gel       3. Chronic pain of both knees  Diclofenac Sodium 1 % Gel       4. Paroxysmal atrial fibrillation (CMS-HCC)  rivaroxaban (XARELTO) 20 MG Tab tablet refill   Follow up in 4-6 weeks. Call or return if questions, concerns, or worsening condition.

## 2017-07-10 NOTE — ASSESSMENT & PLAN NOTE
"Pt has hx of rotator cuff syndrome and intermittent chronic shoulder pain.  Pt reports about a week ago decreased ROM and \"started with an ache\" and not able to reach up only of brings across body and then helps lift it up.  Pt unable to reach behind back, unable to reach above chest level without using other arm to raise her right arm.  Never has had surgery on it.    "

## 2017-07-10 NOTE — ASSESSMENT & PLAN NOTE
Pt did see Cardiologist on 6/6/17 and is to continue Inderal. Pt denies palpitations or chest pain.  Has plans to have CT Thoracic Aorta and OPO study in near future as recommended by Dr Ambrocio.  Denies SOB or any peripheral edema.

## 2017-07-10 NOTE — ASSESSMENT & PLAN NOTE
"Pt reprots saw Dr Medel r/t her right knee pain and believes \"torn meniscus\"  And ordered an MRI and suspects she will need surgery.    "

## 2017-07-10 NOTE — MR AVS SNAPSHOT
"        Miryam Juan Magdiel   7/10/2017 10:30 AM   Office Visit   MRN: [id number]    [facility]   Dept Phone:  [phone]    Description:  Female : 1950   Provider:  HODAN Sterling           Reason for Visit     Follow-Up           Allergies as of 7/10/2017     Allergen Noted Reactions    Codeine 2009   Itching, Nausea    Imitrex [Sumatriptan Succinate] 2010   Shortness of Breath, Swelling    Penicillins 2007   Rash, Vomiting    Sulfa Drugs 2007   Shortness of Breath, Itching    Azithromycin 2007   Itching    Dilaudid [Hydromorphone] 10/20/2015   Itching    sweating    Sensipar [Cinacalcet] 2016   Unspecified    Nightmares and leg cramping    Imipramine 2011   Shortness of Breath, Itching    anxious    Other Drug 2011       Z Pack    Seroquel [Quetiapine Fumarate] 2011         You were diagnosed with     Rotator cuff syndrome of right shoulder   [481061]       Acute pain of right knee   [9187217]       Chronic pain of both knees   [3600484]       Paroxysmal atrial fibrillation (CMS-[facility])   [338963]         Vital Signs     Blood Pressure Pulse Temperature Respirations Height Weight    134/80 mmHg 60 36.6 °C (97.9 °F) 16 1.778 m (5' 10\") 105.688 kg (233 lb)    Body Mass Index Oxygen Saturation Smoking Status             33.43 kg/m2 93% Former Smoker         Basic Information     Date Of Birth Sex Race Ethnicity Preferred Language    1950 Female White Non- English      Your appointments     Aug 01, 2017  2:00 PM   [facility]--[facility]    [address]  [address]  [place] [address] [phone]              Problem List              ICD-10-CM Priority Class Noted - Resolved    Bipolar affective disorder (CMS-HCC) F31.9   2009 - Present    Depression F32.9   2009 - Present    Family history of breast cancer in " mother Z80.3   Unknown - Present    Anxiety F41.9   7/15/2011 - Present    Allergic rhinitis due to pollen J30.1   8/26/2011 - Present    Rotator cuff syndrome of right shoulder M75.101   12/9/2011 - Present    Migraine headache G43.909   Unknown - Present    Lumbar facet arthropathy (HCC) M12.88   Unknown - Present    Lumbar disc disease with radiculopathy M51.16   9/14/2012 - Present    Recurrent sinus infections J32.9   7/22/2014 - Present    Essential hypertension I10   8/12/2014 - Present    Chronic pain syndrome G89.4   11/24/2014 - Present    Cellulitis of right hand L03.113   10/4/2015 - Present    Osteoarthritis, hand, primary localized M19.049   10/5/2015 - Present    Pain of right hand M79.641   10/15/2015 - Present    Tenosynovitis of finger M65.9 High  10/15/2015 - Present    Hypercalcemia E83.52 Medium  10/20/2015 - Present    Vitamin B12 deficiency E53.8   11/12/2015 - Present    Renal insufficiency N28.9   11/12/2015 - Present    Acute pain of left wrist M25.532   11/12/2015 - Present    Chronic pain G89.29   12/21/2015 - Present    Pain of finger of left hand M79.645   7/28/2016 - Present    Elevated PTHrP level (CMS-[facility]) E21.5   7/28/2016 - Present    Lump of skin of left lower extremity R22.42   8/22/2016 - Present    Pain in both knees M25.561, M25.562   10/6/2016 - Present    Accessory skin tags Q82.8   10/6/2016 - Present    Acute pyelonephritis N12 Medium  1/8/2017 - Present    Severe sepsis, POA (HCC) due to pyelonephritis A41.9, R65.20 High  1/9/2017 - Present    Acute respiratory failure with hypoxia (HCC) due to sepsis, POA J96.01 Medium  1/9/2017 - Present    Gram-negative katharina bacteremia R78.81 Medium  1/9/2017 - Present    HTN (hypertension) I10 Low  1/9/2017 - Present    Paroxysmal atrial fibrillation (CMS-HCC) I48.0 Medium  1/9/2017 - Present    Chronic renal failure, stage 2 (mild) N18.2 Low  1/9/2017 - Present    Fibromyalgia M79.7 Low  1/9/2017 - Present    Headache R51   1/11/2017  - Present    Heart palpitations R00.2   1/25/2017 - Present    Low energy R53.83   1/25/2017 - Present    Acute pain of left shoulder M25.512   3/1/2017 - Present    Post-menopause Z78.0   3/1/2017 - Present    Hypertriglyceridemia E78.1   4/12/2017 - Present    Shortness of breath R06.02   4/12/2017 - Present    Skin irritation R23.8   4/12/2017 - Present    Obesity (BMI 30-39.9) E66.9   4/12/2017 - Present    Acute pain of right knee M25.561   5/30/2017 - Present    Sinus congestion R09.81   6/5/2017 - Present      Health Maintenance        Date Due Completion Dates    PAP SMEAR 9/20/1973 ---    IMM ZOSTER VACCINE 9/20/2012 ---    MAMMOGRAM 5/29/2014 5/29/2013, 8/12/2011, 7/17/2009, 7/17/2009, 7/23/2008, 6/27/2008, 6/27/2008, 12/9/2005    IMM INFLUENZA (1) 9/1/2017 10/1/2016, 9/19/2015, 11/7/2014, 11/3/2010    COLONOSCOPY 10/7/2021 10/7/2011 (Declined)    Override on 10/7/2011: Patient Declined (will do FIT test instead)    IMM DTaP/Tdap/Td Vaccine (2 - Td) 10/27/2026 10/27/2016            Current Immunizations     13-VALENT PCV PREVNAR 1/11/2017  4:06 PM    Influenza TIV (IM) 9/19/2015, 11/7/2014    Influenza Vaccine Adult HD 10/1/2016    Influenza Vaccine Pediatric 11/3/2010    Tdap Vaccine 10/27/2016      Below and/or attached are the medications your provider expects you to take. Review all of your home medications and newly ordered medications with your provider and/or pharmacist. Follow medication instructions as directed by your provider and/or pharmacist. Please keep your medication list with you and share with your provider. Update the information when medications are discontinued, doses are changed, or new medications (including over-the-counter products) are added; and carry medication information at all times in the event of emergency situations     Allergies:  CODEINE - Itching,Nausea     IMITREX - Shortness of Breath,Swelling     PENICILLINS - Rash,Vomiting     SULFA DRUGS - Shortness of  Breath,Itching     AZITHROMYCIN - Itching     DILAUDID - Itching     SENSIPAR - Unspecified     IMIPRAMINE - Shortness of Breath,Itching     OTHER DRUG - (reactions not documented)     SEROQUEL - (reactions not documented)               Medications  Valid as of: July 10, 2017 - 11:13 AM    Generic Name Brand Name Tablet Size Instructions for use    Albuterol Sulfate (Aero Soln) albuterol 108 (90 BASE) MCG/ACT Inhale 2 Puffs by mouth every 6 hours as needed for Shortness of Breath.        Baclofen (Tab) LIORESAL 10 MG Take 1 Tab by mouth 2 times a day.        Cholecalciferol (Tab) cholecalciferol 1000 UNIT Take 1,000 Units by mouth every day.        Cyanocobalamin (Tab) VITAMIN B12 1000 MCG Take 1 Tab by mouth every day.        DiazePAM (Tab) VALIUM 2 MG Take one tablet 30 mins prior to MRI.  If needed, take 2nd tablet for exam        Diclofenac Sodium (Gel) Diclofenac Sodium 1 % Apply twice a day to joints as needed        Doxycycline Monohydrate (Cap) MONODOX 100 MG Take 1 Cap by mouth 2 times a day.        Fluticasone Propionate (Suspension) FLONASE 50 MCG/ACT Spray 1 Spray in nose 2 times a day.        Gabapentin (Cap) NEURONTIN 300 MG Take 1 Cap by mouth 3 times a day.        Guaifenesin-Codeine (Syrup) TUSSI-ORGANIDIN -10 MG/5ML Take 5 mL by mouth 2 times a day as needed.        Loratadine (Tab) CLARITIN 10 MG TAKE ONE TABLET BY MOUTH DAILY (GENERIC CLARITIN)        Methocarbamol (Tab) ROBAXIN 750 MG Take 1,500 mg by mouth 2 Times a Day.        Montelukast Sodium (Tab) SINGULAIR 10 MG Take 1 Tab by mouth every day.        Omega-3 Fatty Acids (Cap) fish oil 1000 MG Take 1,000 mg by mouth every day.        Ondansetron (TABLET DISPERSIBLE) ZOFRAN ODT 4 MG Take 1 Tab by mouth every 24 hours as needed for Nausea/Vomiting.        Propranolol HCl (Tab) INDERAL 40 MG TAKE ONE TABLET BY MOUTH THREE TIMES A DAY (GENERIC FOR INDERAL)        Rivaroxaban (Tab) XARELTO 20 MG Take 1 Tab by mouth with dinner.         TraMADol HCl (Tab) ULTRAM 50 MG Take 1 Tab by mouth every 24 hours as needed for Moderate Pain or Severe Pain.        Triamcinolone Acetonide (Cream) KENALOG 0.1 % Apply to skin lesion on posterior neck twice a day as needed        Venlafaxine HCl (CAPSULE SR 24 HR) EFFEXOR XR 37.5 MG TAKE ONE CAPSULE BY MOUTH TWICE A DAY WITH MEALS        .                 Medicines prescribed today were sent to:     [facility] #[id number] - [place] [address]    Phone: [phone] Fax: [phone]    Open 24 Hours?: No      Medication refill instructions:       If your prescription bottle indicates you have medication refills left, it is not necessary to call your provider’s office. Please contact your pharmacy and they will refill your medication.    If your prescription bottle indicates you do not have any refills left, you may request refills at any time through one of the following ways: The online [employer] system (except Urgent Care), by calling your provider’s office, or by asking your pharmacy to contact your provider’s office with a refill request. Medication refills are processed only during regular business hours and may not be available until the next business day. Your provider may request additional information or to have a follow-up visit with you prior to refilling your medication.   *Please Note: Medication refills are assigned a new Rx number when refilled electronically. Your pharmacy may indicate that no refills were authorized even though a new prescription for the same medication is available at the pharmacy. Please request the medicine by name with the pharmacy before contacting your provider for a refill.        Your To Do List     Future Labs/Procedures Complete By Expires    MR-SHOULDER-W/O RIGHT  As directed 7/10/2018      Other Notes About Your Plan     6/15/17 ENT Dr Melo consult- Allergies-->Refer Allergist, CT Sinuses, EAr plugging/Eustachion tube dysfunction,  Hearing test and TM's normal. Possible Acid Reflux--> Barium Swallow.  6/6/17 Cardiology- Dr Ambrocio APPt. HTN, Parox Atrial Fib, Borderline Dilated Ascending Aorta by AISLINN Jan 2017, Plan Continue Beta blocker, OPO, ordered, CT Thoracic Aorta, f/u 6 wks  4/6/16 Dr Quiroga appt for R hand pain, Fibromyalgia. Increase Gabapentin  300 mg TID. Wrist splint at night. F/u 3 months.  12/31/15 [facility] Neurology Consult-Dr Rima Quiroga. Carpal Tunnel, hand pain. Gabapentin, Hand exercises, consider NCS/EMG.  Fall risk done 10/15/15 RB           [employer] Access Code: Activation code not generated  Current "[employer] Status: Active

## 2017-07-11 ENCOUNTER — TELEPHONE (OUTPATIENT)
Dept: MEDICAL GROUP | Facility: MEDICAL CENTER | Age: 67
End: 2017-07-11

## 2017-07-11 ENCOUNTER — HOSPITAL ENCOUNTER (OUTPATIENT)
Dept: LAB | Facility: MEDICAL CENTER | Age: 67
End: 2017-07-11
Attending: INTERNAL MEDICINE
Payer: MEDICAID

## 2017-07-11 DIAGNOSIS — I10 ESSENTIAL HYPERTENSION: ICD-10-CM

## 2017-07-11 DIAGNOSIS — E78.1 HYPERTRIGLYCERIDEMIA: ICD-10-CM

## 2017-07-11 LAB
ALBUMIN SERPL BCP-MCNC: 3.8 G/DL (ref 3.2–4.9)
ALBUMIN/GLOB SERPL: 1.1 G/DL
ALP SERPL-CCNC: 88 U/L (ref 30–99)
ALT SERPL-CCNC: 13 U/L (ref 2–50)
ANION GAP SERPL CALC-SCNC: 5 MMOL/L (ref 0–11.9)
AST SERPL-CCNC: 13 U/L (ref 12–45)
BILIRUB SERPL-MCNC: 0.6 MG/DL (ref 0.1–1.5)
BUN SERPL-MCNC: 19 MG/DL (ref 8–22)
CALCIUM SERPL-MCNC: 11.2 MG/DL (ref 8.5–10.5)
CHLORIDE SERPL-SCNC: 110 MMOL/L (ref 96–112)
CHOLEST SERPL-MCNC: 171 MG/DL (ref 100–199)
CO2 SERPL-SCNC: 28 MMOL/L (ref 20–33)
CREAT SERPL-MCNC: 1.1 MG/DL (ref 0.5–1.4)
GFR SERPL CREATININE-BSD FRML MDRD: 50 ML/MIN/1.73 M 2
GLOBULIN SER CALC-MCNC: 3.4 G/DL (ref 1.9–3.5)
GLUCOSE SERPL-MCNC: 103 MG/DL (ref 65–99)
HDLC SERPL-MCNC: 37 MG/DL
LDLC SERPL CALC-MCNC: 99 MG/DL
POTASSIUM SERPL-SCNC: 5.4 MMOL/L (ref 3.6–5.5)
PROT SERPL-MCNC: 7.2 G/DL (ref 6–8.2)
SODIUM SERPL-SCNC: 143 MMOL/L (ref 135–145)
TRIGL SERPL-MCNC: 177 MG/DL (ref 0–149)

## 2017-07-11 PROCEDURE — 80053 COMPREHEN METABOLIC PANEL: CPT

## 2017-07-11 PROCEDURE — 36415 COLL VENOUS BLD VENIPUNCTURE: CPT

## 2017-07-11 PROCEDURE — 80061 LIPID PANEL: CPT

## 2017-07-11 NOTE — TELEPHONE ENCOUNTER
1. Caller Name: [facility]                                         Call Back Number: [phone]      Patient approves a detailed voicemail message: yes    [facility] needs a clarification on the Diclofenac Gel that was sent. Need to state how many GM to apply BID. It can be from 1GM to 4GM. Please advise.

## 2017-07-14 NOTE — PROGRESS NOTES
CT Aorta: 7/18/17  1.  Maximal diameter of ascending thoracic aorta 3.7 cm. No dissection appreciated.  2.  Peripheral calcification in the nondilated abdominal aorta.  3.  Hypodense left thyroid lobe nodule with central calcification.  4.  Stable 1 cm hypodensity in the superior spleen, which is nonspecific.  5.  Previously identified suspected masslike area in the right renal upper pole anterior cortex is not well seen on arterial phase images. There is no imaging study in the patient's record subsequent to the 1/8/2017 examination which would reflect   imaging follow-up of this finding. Ultrasound may be helpful for further clarification.    Results for LIS JOSEPH (MRN [id number]) as of 7/14/2017 16:11   4/4/2017 7/11/2017   Sodium 137 143   Potassium 4.6 5.4   Chloride 107 110   Co2 24 28   Anion Gap 6.0 5.0   Glucose 108 (H) 103 (H)   Bun 18 19   Creatinine 0.99 1.10   GFR If Non  56 (A) 50 (A)   Calcium 10.7 (H) 11.2 (H)   AST(SGOT) 12 13   ALT(SGPT) 14 13   Alkaline Phosphatase 104 (H) 88   Cholesterol,Tot 196 171   Triglycerides 308 (H) 177 (H)   HDL 35 (A) 37 (A)   LDL 99 99

## 2017-07-18 ENCOUNTER — HOSPITAL ENCOUNTER (OUTPATIENT)
Dept: RADIOLOGY | Facility: MEDICAL CENTER | Age: 67
End: 2017-07-18
Attending: INTERNAL MEDICINE
Payer: MEDICAID

## 2017-07-18 DIAGNOSIS — I77.810 ASCENDING AORTA DILATATION (HCC): ICD-10-CM

## 2017-07-18 PROCEDURE — 74175 CTA ABDOMEN W/CONTRAST: CPT

## 2017-07-21 ENCOUNTER — TELEPHONE (OUTPATIENT)
Dept: CARDIOLOGY | Facility: MEDICAL CENTER | Age: 67
End: 2017-07-21

## 2017-07-21 NOTE — TELEPHONE ENCOUNTER
Left patient a voicemail with instructions to call the office for test results (CTA 7/18/2017).    EMIL RN

## 2017-07-21 NOTE — TELEPHONE ENCOUNTER
----- Message from Marian Ambrocio M.D. sent at 7/19/2017  8:55 PM PDT -----  Dilated thoracic aorta which is borderline will continue to monitor for now.  On the CAT scan masslike area in the right renal upper pole  Ultrasound may be helpful for further clarification.    Please order renal USG and patient should f/u with PCP    Marian    ----- Message -----     From: Cipriano Rosa     Sent: 7/18/2017   1:25 PM       To: Marian Ambrocio M.D.    F/U on  8/1

## 2017-07-24 ENCOUNTER — HOSPITAL ENCOUNTER (OUTPATIENT)
Dept: LAB | Facility: MEDICAL CENTER | Age: 67
End: 2017-07-24
Attending: OBSTETRICS & GYNECOLOGY
Payer: MEDICAID

## 2017-07-24 LAB
BASOPHILS # BLD AUTO: 1.3 % (ref 0–1.8)
BASOPHILS # BLD: 0.1 K/UL (ref 0–0.12)
DHEA-S SERPL-MCNC: 34.4 UG/DL (ref 9.4–246)
EOSINOPHIL # BLD AUTO: 0.15 K/UL (ref 0–0.51)
EOSINOPHIL NFR BLD: 1.9 % (ref 0–6.9)
ERYTHROCYTE [DISTWIDTH] IN BLOOD BY AUTOMATED COUNT: 45.8 FL (ref 35.9–50)
ESTRADIOL SERPL-MCNC: <20 PG/ML
FSH SERPL-ACNC: 40.5 MIU/ML
HCT VFR BLD AUTO: 44.5 % (ref 37–47)
HGB BLD-MCNC: 15.4 G/DL (ref 12–16)
IMM GRANULOCYTES # BLD AUTO: 0.03 K/UL (ref 0–0.11)
IMM GRANULOCYTES NFR BLD AUTO: 0.4 % (ref 0–0.9)
LH SERPL-ACNC: 25 IU/L
LYMPHOCYTES # BLD AUTO: 2.22 K/UL (ref 1–4.8)
LYMPHOCYTES NFR BLD: 27.8 % (ref 22–41)
MCH RBC QN AUTO: 33.7 PG (ref 27–33)
MCHC RBC AUTO-ENTMCNC: 34.6 G/DL (ref 33.6–35)
MCV RBC AUTO: 97.4 FL (ref 81.4–97.8)
MONOCYTES # BLD AUTO: 0.59 K/UL (ref 0–0.85)
MONOCYTES NFR BLD AUTO: 7.4 % (ref 0–13.4)
NEUTROPHILS # BLD AUTO: 4.91 K/UL (ref 2–7.15)
NEUTROPHILS NFR BLD: 61.2 % (ref 44–72)
NRBC # BLD AUTO: 0 K/UL
NRBC BLD AUTO-RTO: 0 /100 WBC
PLATELET # BLD AUTO: 254 K/UL (ref 164–446)
PMV BLD AUTO: 12.6 FL (ref 9–12.9)
PROGEST SERPL-MCNC: 0.24 NG/ML
RBC # BLD AUTO: 4.57 M/UL (ref 4.2–5.4)
T4 FREE SERPL-MCNC: 1.27 NG/DL (ref 0.53–1.43)
TSH SERPL DL<=0.005 MIU/L-ACNC: 0.47 UIU/ML (ref 0.3–3.7)
WBC # BLD AUTO: 8 K/UL (ref 4.8–10.8)

## 2017-07-24 PROCEDURE — 83001 ASSAY OF GONADOTROPIN (FSH): CPT

## 2017-07-24 PROCEDURE — 84144 ASSAY OF PROGESTERONE: CPT

## 2017-07-24 PROCEDURE — 82670 ASSAY OF TOTAL ESTRADIOL: CPT

## 2017-07-24 PROCEDURE — 83002 ASSAY OF GONADOTROPIN (LH): CPT

## 2017-07-24 PROCEDURE — 36415 COLL VENOUS BLD VENIPUNCTURE: CPT

## 2017-07-24 PROCEDURE — 82627 DEHYDROEPIANDROSTERONE: CPT

## 2017-07-24 PROCEDURE — 84270 ASSAY OF SEX HORMONE GLOBUL: CPT

## 2017-07-24 PROCEDURE — 84403 ASSAY OF TOTAL TESTOSTERONE: CPT

## 2017-07-24 PROCEDURE — 84439 ASSAY OF FREE THYROXINE: CPT

## 2017-07-24 PROCEDURE — 83498 ASY HYDROXYPROGESTERONE 17-D: CPT

## 2017-07-24 PROCEDURE — 85025 COMPLETE CBC W/AUTO DIFF WBC: CPT

## 2017-07-24 PROCEDURE — 84443 ASSAY THYROID STIM HORMONE: CPT

## 2017-07-25 ENCOUNTER — OFFICE VISIT (OUTPATIENT)
Dept: MEDICAL GROUP | Facility: MEDICAL CENTER | Age: 67
End: 2017-07-25
Attending: NURSE PRACTITIONER
Payer: MEDICAID

## 2017-07-25 VITALS
OXYGEN SATURATION: 92 % | HEART RATE: 64 BPM | RESPIRATION RATE: 16 BRPM | BODY MASS INDEX: 33.36 KG/M2 | TEMPERATURE: 99.7 F | HEIGHT: 70 IN | WEIGHT: 233 LBS | SYSTOLIC BLOOD PRESSURE: 124 MMHG | DIASTOLIC BLOOD PRESSURE: 74 MMHG

## 2017-07-25 DIAGNOSIS — G89.29 CHRONIC PAIN OF BOTH KNEES: ICD-10-CM

## 2017-07-25 DIAGNOSIS — M75.101 ROTATOR CUFF SYNDROME OF RIGHT SHOULDER: ICD-10-CM

## 2017-07-25 DIAGNOSIS — M25.531 WRIST PAIN, ACUTE, RIGHT: ICD-10-CM

## 2017-07-25 DIAGNOSIS — M25.561 CHRONIC PAIN OF BOTH KNEES: ICD-10-CM

## 2017-07-25 DIAGNOSIS — M25.562 CHRONIC PAIN OF BOTH KNEES: ICD-10-CM

## 2017-07-25 DIAGNOSIS — R93.89 ABNORMAL CT SCAN: ICD-10-CM

## 2017-07-25 DIAGNOSIS — M25.561 ACUTE PAIN OF RIGHT KNEE: ICD-10-CM

## 2017-07-25 DIAGNOSIS — R60.9 PERIPHERAL EDEMA: ICD-10-CM

## 2017-07-25 PROBLEM — R60.0 PERIPHERAL EDEMA: Status: ACTIVE | Noted: 2017-07-25

## 2017-07-25 PROBLEM — M25.539 WRIST PAIN, ACUTE: Status: ACTIVE | Noted: 2017-07-25

## 2017-07-25 PROCEDURE — 99213 OFFICE O/P EST LOW 20 MIN: CPT | Performed by: NURSE PRACTITIONER

## 2017-07-25 PROCEDURE — 99214 OFFICE O/P EST MOD 30 MIN: CPT | Performed by: NURSE PRACTITIONER

## 2017-07-25 RX ORDER — FUROSEMIDE 20 MG/1
TABLET ORAL
COMMUNITY
Start: 2017-07-12 | End: 2017-12-22

## 2017-07-25 RX ORDER — PREDNISONE 10 MG/1
TABLET ORAL
Qty: 12 TAB | Refills: 0 | Status: SHIPPED | OUTPATIENT
Start: 2017-07-25 | End: 2017-12-22

## 2017-07-25 RX ORDER — METHOCARBAMOL 750 MG/1
1500 TABLET, FILM COATED ORAL 2 TIMES DAILY
Qty: 120 TAB | Refills: 0 | Status: SHIPPED | OUTPATIENT
Start: 2017-07-25 | End: 2017-08-22 | Stop reason: SDUPTHER

## 2017-07-25 RX ORDER — TRAMADOL HYDROCHLORIDE 50 MG/1
50 TABLET ORAL 3 TIMES DAILY
Qty: 21 TAB | Refills: 0 | Status: SHIPPED | OUTPATIENT
Start: 2017-07-25 | End: 2017-12-22

## 2017-07-25 NOTE — Clinical Note
July 25, 2017       Patient: Miraym Veloz   YOB: 1950   Date of Visit: 7/25/2017         To Whom It May Concern:    It is my medical opinion that Miryam Veloz should be excused from work   8/7 and 8/8/17 due to orthopedic issues and will see her Orthopedic MD on 8/8/17 to obtain work release or re-evaluate.    If you have any questions or concerns, please don't hesitate to call [phone]          Sincerely,          MINDY Sterling.P.NATO.  Electronically Signed

## 2017-07-25 NOTE — ASSESSMENT & PLAN NOTE
Pt reports due to her right knee pain she has been having right wrist pain the past week as she believes she is using her arms to push up a lot.  No trauma or falls. States pain is 'bad'.

## 2017-07-25 NOTE — MR AVS SNAPSHOT
"        Miryam Juan Magdiel   2017 10:50 AM   Office Visit   MRN: [id number]    [facility]   Dept Phone:  [phone]    Description:  Female : 1950   Provider:  HODAN Sterling           Reason for Visit     Knee Pain           Allergies as of 2017     Allergen Noted Reactions    Codeine 2009   Itching, Nausea    Imitrex [Sumatriptan Succinate] 2010   Shortness of Breath, Swelling    Penicillins 2007   Rash, Vomiting    Sulfa Drugs 2007   Shortness of Breath, Itching    Azithromycin 2007   Itching    Dilaudid [Hydromorphone] 10/20/2015   Itching    sweating    Sensipar [Cinacalcet] 2016   Unspecified    Nightmares and leg cramping    Imipramine 2011   Shortness of Breath, Itching    anxious    Other Drug 2011       Z Pack    Seroquel [Quetiapine Fumarate] 2011         You were diagnosed with     Acute pain of right knee   [6454493]       Wrist pain, acute, right   [299874]       Peripheral edema   [807484]       Abnormal CT scan   [[id number]]         Vital Signs     Blood Pressure Pulse Temperature Respirations Height Weight    124/74 mmHg 64 37.6 °C (99.7 °F) 16 1.778 m (5' 10\") 105.688 kg (233 lb)    Body Mass Index Oxygen Saturation Smoking Status             33.43 kg/m2 92% Former Smoker         Basic Information     Date Of Birth Sex Race Ethnicity Preferred Language    1950 Female White Non- English      Your appointments     Aug 01, 2017  2:00 PM   FOLLOW UP with Marian Ambrocio M.D.   [facility] (--)    [address]  [address]  [place] [address] [phone]              Problem List              ICD-10-CM Priority Class Noted - Resolved    Bipolar affective disorder (CMS-HCC) F31.9   2009 - Present    Depression F32.9   2009 - Present    Family history of breast cancer in mother Z80.3   Unknown - Present    Anxiety F41.9  "  7/15/2011 - Present    Allergic rhinitis due to pollen J30.1   8/26/2011 - Present    Rotator cuff syndrome of right shoulder M75.101   12/9/2011 - Present    Migraine headache G43.909   Unknown - Present    Lumbar facet arthropathy (HCC) M12.88   Unknown - Present    Lumbar disc disease with radiculopathy M51.16   9/14/2012 - Present    Recurrent sinus infections J32.9   7/22/2014 - Present    Essential hypertension I10   8/12/2014 - Present    Chronic pain syndrome G89.4   11/24/2014 - Present    Cellulitis of right hand L03.113   10/4/2015 - Present    Osteoarthritis, hand, primary localized M19.049   10/5/2015 - Present    Pain of right hand M79.641   10/15/2015 - Present    Tenosynovitis of finger M65.9 High  10/15/2015 - Present    Hypercalcemia E83.52 Medium  10/20/2015 - Present    Vitamin B12 deficiency E53.8   11/12/2015 - Present    Renal insufficiency N28.9   11/12/2015 - Present    Acute pain of left wrist M25.532   11/12/2015 - Present    Chronic pain G89.29   12/21/2015 - Present    Pain of finger of left hand M79.645   7/28/2016 - Present    Elevated PTHrP level (CMS-HCC) E21.5   7/28/2016 - Present    Lump of skin of left lower extremity R22.42   8/22/2016 - Present    Pain in both knees M25.561, M25.562   10/6/2016 - Present    Accessory skin tags Q82.8   10/6/2016 - Present    Acute pyelonephritis N12 Medium  1/8/2017 - Present    Severe sepsis, POA (HCC) due to pyelonephritis A41.9, R65.20 High  1/9/2017 - Present    Acute respiratory failure with hypoxia (HCC) due to sepsis, POA J96.01 Medium  1/9/2017 - Present    Gram-negative katharina bacteremia R78.81 Medium  1/9/2017 - Present    HTN (hypertension) I10 Low  1/9/2017 - Present    Paroxysmal atrial fibrillation (CMS-HCC) I48.0 Medium  1/9/2017 - Present    Chronic renal failure, stage 2 (mild) N18.2 Low  1/9/2017 - Present    Fibromyalgia M79.7 Low  1/9/2017 - Present    Headache R51   1/11/2017 - Present    Heart palpitations R00.2   1/25/2017 -  Present    Low energy R53.83   1/25/2017 - Present    Acute pain of left shoulder M25.512   3/1/2017 - Present    Post-menopause Z78.0   3/1/2017 - Present    Hypertriglyceridemia E78.1   4/12/2017 - Present    Shortness of breath R06.02   4/12/2017 - Present    Skin irritation R23.8   4/12/2017 - Present    Obesity (BMI 30-39.9) E66.9   4/12/2017 - Present    Acute pain of right knee M25.561   5/30/2017 - Present    Sinus congestion R09.81   6/5/2017 - Present    Wrist pain, acute M25.539   7/25/2017 - Present    Peripheral edema R60.9   7/25/2017 - Present    Abnormal CT scan R93.8   7/25/2017 - Present      Health Maintenance        Date Due Completion Dates    PAP SMEAR 9/20/1973 ---    IMM ZOSTER VACCINE 9/20/2012 ---    MAMMOGRAM 5/29/2014 5/29/2013, 8/12/2011, 7/17/2009, 7/17/2009, 7/23/2008, 6/27/2008, 6/27/2008, 12/9/2005    IMM INFLUENZA (1) 9/1/2017 10/1/2016, 9/19/2015, 11/7/2014, 11/3/2010    COLONOSCOPY 10/7/2021 10/7/2011 (Declined)    Override on 10/7/2011: Patient Declined (will do FIT test instead)    IMM DTaP/Tdap/Td Vaccine (2 - Td) 10/27/2026 10/27/2016            Current Immunizations     13-VALENT PCV PREVNAR 1/11/2017  4:06 PM    Influenza TIV (IM) 9/19/2015, 11/7/2014    Influenza Vaccine Adult HD 10/1/2016    Influenza Vaccine Pediatric 11/3/2010    Tdap Vaccine 10/27/2016      Below and/or attached are the medications your provider expects you to take. Review all of your home medications and newly ordered medications with your provider and/or pharmacist. Follow medication instructions as directed by your provider and/or pharmacist. Please keep your medication list with you and share with your provider. Update the information when medications are discontinued, doses are changed, or new medications (including over-the-counter products) are added; and carry medication information at all times in the event of emergency situations     Allergies:  CODEINE - Itching,Nausea     IMITREX - Shortness  of Breath,Swelling     PENICILLINS - Rash,Vomiting     SULFA DRUGS - Shortness of Breath,Itching     AZITHROMYCIN - Itching     DILAUDID - Itching     SENSIPAR - Unspecified     IMIPRAMINE - Shortness of Breath,Itching     OTHER DRUG - (reactions not documented)     SEROQUEL - (reactions not documented)               Medications  Valid as of: July 25, 2017 - 11:12 AM    Generic Name Brand Name Tablet Size Instructions for use    Albuterol Sulfate (Aero Soln) albuterol 108 (90 BASE) MCG/ACT Inhale 2 Puffs by mouth every 6 hours as needed for Shortness of Breath.        Baclofen (Tab) LIORESAL 10 MG Take 1 Tab by mouth 2 times a day.        Cholecalciferol (Tab) cholecalciferol 1000 UNIT Take 1,000 Units by mouth every day.        Cyanocobalamin (Tab) VITAMIN B12 1000 MCG Take 1 Tab by mouth every day.        DiazePAM (Tab) VALIUM 2 MG Take one tablet 30 mins prior to MRI.  If needed, take 2nd tablet for exam        Diclofenac Sodium (Gel) Diclofenac Sodium 1 % Apply twice a day to joints as needed        Doxycycline Monohydrate (Cap) MONODOX 100 MG Take 1 Cap by mouth 2 times a day.        Fluticasone Propionate (Suspension) FLONASE 50 MCG/ACT Spray 1 Spray in nose 2 times a day.        Furosemide (Tab) LASIX 20 MG         Gabapentin (Cap) NEURONTIN 300 MG Take 1 Cap by mouth 3 times a day.        Guaifenesin-Codeine (Syrup) TUSSI-ORGANIDIN -10 MG/5ML Take 5 mL by mouth 2 times a day as needed.        Loratadine (Tab) CLARITIN 10 MG TAKE ONE TABLET BY MOUTH DAILY (GENERIC CLARITIN)        Methocarbamol (Tab) ROBAXIN 750 MG Take 1,500 mg by mouth 2 Times a Day.        Montelukast Sodium (Tab) SINGULAIR 10 MG Take 1 Tab by mouth every day.        Omega-3 Fatty Acids (Cap) fish oil 1000 MG Take 1,000 mg by mouth every day.        Ondansetron (TABLET DISPERSIBLE) ZOFRAN ODT 4 MG Take 1 Tab by mouth every 24 hours as needed for Nausea/Vomiting.        Propranolol HCl (Tab) INDERAL 40 MG TAKE ONE TABLET BY MOUTH  THREE TIMES A DAY (GENERIC FOR INDERAL)        Rivaroxaban (Tab) XARELTO 20 MG Take 1 Tab by mouth with dinner.        TraMADol HCl (Tab) ULTRAM 50 MG Take 1 Tab by mouth every 24 hours as needed for Moderate Pain or Severe Pain.        TraMADol HCl (Tab) ULTRAM 50 MG Take 1 Tab by mouth 3 times a day.        Triamcinolone Acetonide (Cream) KENALOG 0.1 % Apply to skin lesion on posterior neck twice a day as needed        Venlafaxine HCl (CAPSULE SR 24 HR) EFFEXOR XR 37.5 MG TAKE ONE CAPSULE BY MOUTH TWICE A DAY WITH MEALS        .                 Medicines prescribed today were sent to:     [facility] #[id number] - [place] - [address]    [address] [place] [address]    Phone: [phone] Fax: [phone]    Open 24 Hours?: No      Medication refill instructions:       If your prescription bottle indicates you have medication refills left, it is not necessary to call your provider’s office. Please contact your pharmacy and they will refill your medication.    If your prescription bottle indicates you do not have any refills left, you may request refills at any time through one of the following ways: The online [employer] system (except Urgent Care), by calling your provider’s office, or by asking your pharmacy to contact your provider’s office with a refill request. Medication refills are processed only during regular business hours and may not be available until the next business day. Your provider may request additional information or to have a follow-up visit with you prior to refilling your medication.   *Please Note: Medication refills are assigned a new Rx number when refilled electronically. Your pharmacy may indicate that no refills were authorized even though a new prescription for the same medication is available at the pharmacy. Please request the medicine by name with the pharmacy before contacting your provider for a refill.        Your To Do List     Future Labs/Procedures Complete By Expires     US-RENAL  As directed 7/25/2018      Other Notes About Your Plan     6/15/17 ENT Dr Melo consult- Allergies-->Refer Allergist, CT Sinuses, EAr plugging/Eustachion tube dysfunction, Hearing test and TM's normal. Possible Acid Reflux--> Barium Swallow.  6/6/17 Cardiology- Dr Ambrocio APPt. HTN, Parox Atrial Fib, Borderline Dilated Ascending Aorta by AISLINN Jan 2017, Plan Continue Beta blocker, OPO, ordered, CT Thoracic Aorta, f/u 6 wks  4/6/16 Dr Quiroga appt for R hand pain, Fibromyalgia. Increase Gabapentin  300 mg TID. Wrist splint at night. F/u 3 months.  12/31/15 [facility] Neurology Consult-Dr Rima Quiroga. Carpal Tunnel, hand pain. Gabapentin, Hand exercises, consider NCS/EMG.  Fall risk done 10/15/15 RB           MyChart Access Code: Activation code not generated  Current MyChart Status: Active

## 2017-07-25 NOTE — ASSESSMENT & PLAN NOTE
"Pt seen on 7/10 here and reported she had seen the [facility] MD, Dr Medel and suspected  \"torn meniscus\" and was ordering an MRI and Pt reported she believed she would need surgery  To correct this.  Today she is here wanting for review of MRI of her Right Knee, but I explained that KRISTINA did the MRI and she  Will need to call Dr Medel's office and given his office number.    "

## 2017-07-25 NOTE — ASSESSMENT & PLAN NOTE
Pt Cardiologist ordered CT aorta recently and findings show Radiologist recommended f/u U/s due to abnormality in right kidney;  Findings-  Previously identified suspected masslike area in the right renal upper pole anterior cortex is not well seen on arterial phase images. There is no imaging study in the patient's record subsequent to the 1/8/2017 examination which would reflect   imaging follow-up of this finding. Ultrasound may be helpful for further clarification

## 2017-07-25 NOTE — PROGRESS NOTES
Chief Complaint:    Right wrist pain and Right knee pain.    HPI:  Miryam presents to the clinic for  Right wrist pain and Right knee pain.    Her PMH includes:    Anxiety,Depression, Bipolar Disorder  Fibromyalgia  Headaches  Hypertension  Hyperparathyroidism/High PTH level  Hyperlipidemia  Osteoarthritis  bilat Knee pains  Osteopenia  Degenerative Disc Disease  Carpel Tunnel Syndrome  Cholecystectomy  Appendectomy  Tenosynovitis of Finger  Right Shoulder Pain    Pyelonephritis w sepsis (new Jan 2017)  Paroxysmal Atrial Fibrillation ( new- Jan 2017)  New Onset Atrial fibrillation (new)  Left Shoulder Pain ( new MArch 2017)  Facial hair-Hirsutism (new March 2017)    She is established with  Neurology- Dr Quiroga  Nephrology- Dr Rafael Lloyd  Counseling-Psychology- [facility]  Surgeon-[facility] for Sebaceous cyst ([facility])  Orthopedics-[facility] for bilat knee pains,      Referrals Recently Approved:   Orthopedics-KRISTINA- DR young  Cardiology - [facility]  GYN-  Lizbeth Meadows-David  Neurology-Again for Dr Quiroga      Review of Records shows:  7/10/17 Clinic appt for Right shoulder pain and f/u on back and knee pains. MRI of Right Shoulder ordered and RX for Valium for PRE-MRI use only.  Voltaren Gel RX refill. Refill of Xarelto.    6/15/17 ENT Dr Melo Consult---Referred to Allergist, CT Sinuses, Ear plugging/Eust tube dysfunction, Hearing Test and TM's normal.  Possible Acid Reflux, --> Ordered Barium Swallow  6/6/17 Cardiology appt, Continue Beta blocker, OPO ordered, CT thoracic Aorta ordered. F/u 6 wks  6/5/17 Clinic for sinus drainage, cough, RX for flonase, Robitussin codeine, Singulair, Kenalog shot,Doxycycline, Referral to ENT  5/30/17 Clinic Visit for back and knee pains. Referred to Orthopedics, Rx for Baclofen and Tramadol (one time RX, not for ongoing), Lumbar Spine Xray ordered.  4/12/17 Clinic visit for Cough, SOB, Dx Bronchitis, RX for Robitussin and codeine, Tessalon,  "Doxycycline.  3/1/17 Clinic visit for Migraines and requesting Tramadol, Re-referred to Cardiology, Re-referred to Neurology to see Dr Quiroga again, Left Shoulder xray ordered.  Referral to GYN for her concern over Hirsutism.  2/15/17 No Show Clinic  1/25/17 Clinic Visit for Hospital f/u an discussion of her new Atrial Fib issue. Referred to Cardiology, General Surgeon, and Orthopedics.  Labs ordered. Pt to continue Xarelto and Inderal BID.  1/8/17---> 1/11/17 Hospital Admit for Pyelonephritis, Sepsis, paroxysmal A-fib, HTN, Fibromyalgia, Headaches, Discharged on Cipro and Xarelto.  10/27/16 Clinic visit for Xray results, Tx of Skin Tags, Voltaren RX, bilat knee pains and Referral to Orthopedics([facility])  10/16/Clinic Visit for Left leg Sebaceous Cyst, knee pain--->Referred to Surgeon([facility])  7/28/16 clinic Visit for finger pains of left hand and for refills.      [place]  Report shows:  7/13/17 Valium 2 mg # 2 by me for use pre-MRI of Right Shoulder  6/5/17 Tramadol 50 mg # 20 by me  3/9/17 Tramadol 50 mg # 30 by me    1/15/17 Tramadol 50 mg # 40 by Dr carrera  9/6/16 [place] 5/325 # 40 by Titi Recinos (DDS)  7/28/16 [place] 5/325 # 30 by me  ----------------------------------------------------------      Acute pain of right knee  Pt seen on 7/10 here and reported she had seen the [facility] MD, Dr Medel and suspected  \"torn meniscus\" and was ordering an MRI and Pt reported she believed she would need surgery  To correct this.  Today she is here wanting for review of MRI of her Right Knee, but I explained that KRISTINA did the MRI and she  Will need to call Dr Medel's office and given his office number.      Wrist pain, acute  Pt reports due to her right knee pain she has been having right wrist pain the past week as she believes she is using her arms to push up a lot.  No trauma or falls. States pain is 'bad'.       Abnormal CT scan  Pt Cardiologist, Dr Ambrocio ordered CT aorta recently and findings show Radiologist " recommended f/u U/s due to abnormality in right kidney;  Findings-  Previously identified suspected masslike area in the right renal upper pole anterior cortex is not well seen on arterial phase images. There is no imaging study in the patient's record subsequent to the 1/8/2017 examination which would reflect   imaging follow-up of this finding. Ultrasound may be helpful for further clarification    I have ordered this test and asked patient to discuss full CT report with Dr Ambrocio on her upcoming appt on 8/1/17.        Patient Active Problem List    Diagnosis Date Noted   • Severe sepsis, POA (HCC) due to pyelonephritis 01/09/2017     Priority: High   • Tenosynovitis of finger 10/15/2015     Priority: High   • Acute respiratory failure with hypoxia (HCC) due to sepsis, POA 01/09/2017     Priority: Medium   • Gram-negative katharina bacteremia 01/09/2017     Priority: Medium   • Paroxysmal atrial fibrillation (CMS-HCC) 01/09/2017     Priority: Medium   • Acute pyelonephritis 01/08/2017     Priority: Medium   • Hypercalcemia 10/20/2015     Priority: Medium   • HTN (hypertension) 01/09/2017     Priority: Low   • Chronic renal failure, stage 2 (mild) 01/09/2017     Priority: Low   • Fibromyalgia 01/09/2017     Priority: Low   • Wrist pain, acute 07/25/2017   • Peripheral edema 07/25/2017   • Abnormal CT scan 07/25/2017   • Sinus congestion 06/05/2017   • Acute pain of right knee 05/30/2017   • Hypertriglyceridemia 04/12/2017   • Shortness of breath 04/12/2017   • Skin irritation 04/12/2017   • Obesity (BMI 30-39.9) 04/12/2017   • Acute pain of left shoulder 03/01/2017   • Post-menopause 03/01/2017   • Heart palpitations 01/25/2017   • Low energy 01/25/2017   • Headache 01/11/2017   • Pain in both knees 10/06/2016   • Accessory skin tags 10/06/2016   • Lump of skin of left lower extremity 08/22/2016   • Pain of finger of left hand 07/28/2016   • Elevated PTHrP level (CMS-HCC) 07/28/2016   • Chronic pain 12/21/2015   •  Vitamin B12 deficiency 11/12/2015   • Renal insufficiency 11/12/2015   • Acute pain of left wrist 11/12/2015   • Pain of right hand 10/15/2015   • Osteoarthritis, hand, primary localized 10/05/2015   • Cellulitis of right hand 10/04/2015   • Chronic pain syndrome 11/24/2014   • Essential hypertension 08/12/2014   • Recurrent sinus infections 07/22/2014   • Lumbar disc disease with radiculopathy 09/14/2012   • Migraine headache    • Lumbar facet arthropathy (HCC)    • Rotator cuff syndrome of right shoulder 12/09/2011   • Allergic rhinitis due to pollen 08/26/2011   • Anxiety 07/15/2011   • Family history of breast cancer in mother    • Bipolar affective disorder (CMS-HCC) 05/22/2009   • Depression 05/22/2009       Allergies:Codeine; Imitrex; Penicillins; Sulfa drugs; Azithromycin; Dilaudid; Sensipar; Imipramine; Other drug; and Seroquel    Current Outpatient Prescriptions   Medication Sig Dispense Refill   • tramadol (ULTRAM) 50 MG Tab Take 1 Tab by mouth 3 times a day. 21 Tab 0   • predniSONE (DELTASONE) 10 MG Tab Take 10 mg twice a day for 5 days, then 10 mg once a day for 2 days 12 Tab 0   • furosemide (LASIX) 20 MG Tab      • methocarbamol (ROBAXIN) 750 MG Tab Take 2 Tabs by mouth 2 Times a Day. 120 Tab 0   • Diclofenac Sodium 1 % Gel Apply  2 grams twice a day to joints as needed 1 Tube 5   • diazepam (VALIUM) 2 MG Tab Take one tablet 30 mins prior to MRI.  If needed, take 2nd tablet for exam 2 Tab 0   • rivaroxaban (XARELTO) 20 MG Tab tablet Take 1 Tab by mouth with dinner. 30 Tab 5   • propranolol (INDERAL) 40 MG Tab TAKE ONE TABLET BY MOUTH THREE TIMES A DAY (GENERIC FOR INDERAL) 90 Tab 0   • venlafaxine XR (EFFEXOR XR) 37.5 MG CAPSULE SR 24 HR TAKE ONE CAPSULE BY MOUTH TWICE A DAY WITH MEALS 60 Cap 1   • loratadine (CLARITIN) 10 MG Tab TAKE ONE TABLET BY MOUTH DAILY (GENERIC CLARITIN) 30 Tab 11   • fluticasone (FLONASE ALLERGY RELIEF) 50 MCG/ACT nasal spray Spray 1 Spray in nose 2 times a day. 1 Bottle 2    • doxycycline (MONODOX) 100 MG capsule Take 1 Cap by mouth 2 times a day. 14 Cap 0   • montelukast (SINGULAIR) 10 MG Tab Take 1 Tab by mouth every day. 30 Tab 3   • guaifenesin-codeine (TUSSI-ORGANIDIN NR) 100-10 MG/5ML syrup Take 5 mL by mouth 2 times a day as needed. 120 mL 0   • Omega-3 Fatty Acids (FISH OIL) 1000 MG Cap capsule Take 1,000 mg by mouth every day.     • baclofen (LIORESAL) 10 MG Tab Take 1 Tab by mouth 2 times a day. 60 Tab 2   • tramadol (ULTRAM) 50 MG Tab Take 1 Tab by mouth every 24 hours as needed for Moderate Pain or Severe Pain. 20 Tab 0   • triamcinolone acetonide (KENALOG) 0.1 % Cream Apply to skin lesion on posterior neck twice a day as needed 1 Tube 0   • gabapentin (NEURONTIN) 300 MG Cap Take 1 Cap by mouth 3 times a day. 90 Cap 5   • ondansetron (ZOFRAN ODT) 4 MG TABLET DISPERSIBLE Take 1 Tab by mouth every 24 hours as needed for Nausea/Vomiting. 20 Tab 2   • vitamin D (CHOLECALCIFEROL) 1000 UNIT Tab Take 1,000 Units by mouth every day.     • albuterol (VENTOLIN HFA) 108 (90 BASE) MCG/ACT Aero Soln inhalation aerosol Inhale 2 Puffs by mouth every 6 hours as needed for Shortness of Breath. 8.5 g 3   • cyanocobalamin (VITAMIN B12) 1000 MCG Tab Take 1 Tab by mouth every day. 30 Tab 3     No current facility-administered medications for this visit.       Social History   Substance Use Topics   • Smoking status: Former Smoker -- 1.00 packs/day for 20 years     Types: Cigarettes     Quit date: 01/01/1994   • Smokeless tobacco: Never Used   • Alcohol Use: Yes      Comment: 2-3 drinks per month       Family History   Problem Relation Age of Onset   • Cancer Mother      breast cancer, bilaterally   • GI Father      ulcer   • Heart Disease Father 40     MIs   • Psychiatry Father      anxiety   • Arthritis Sister      rheumatoid   • Stroke Sister 79     mid brain       ROS:  Review of Systems   See HPI Above    Exam:  Blood pressure 124/74, pulse 64, temperature 37.6 °C (99.7 °F), resp. rate 16,  "height 1.778 m (5' 10\"), weight 105.688 kg (233 lb), SpO2 92 %.  General:  Well nourished, well developed female in moderate distress.  HENT:Head is grossly normal. PERRL.  Neck: Supple. Trachea is midline.  Pulmonary: Clear to ausculation .  Normal effort.   Cardiovascular: Regular rate and rhythm.  Abdomen-Abdomen is soft, No tenderness.  Upper extremities- Strong = . Good ROM except for decreased ROM of Right shoulder, tender ulnar aspect of right wrist w scant swelling.  Lower extremities- neg for edema, redness, tenderness.  Neuro- A & O x 4. Speech clear and appropriate.     Current medications, allergies, and problem list reviewed with patient and updated in  EPIC today.    Assessment/Plan:  1. Acute pain of right knee  tramadol (ULTRAM) 50 MG Tab  F/u w Orthopedic MD-Dr Medel for review of MRI and plan of care   2. Wrist pain, acute, right  tramadol (ULTRAM) 50 MG Tab    predniSONE (DELTASONE) 10 MG Tab   3. Peripheral edema  Pt to discuss restarting Lasix with her Cardiologist, DR Ambrocio on 8/1/17 appt.  No swelling noted to legs today in clinic.   4. Abnormal CT scan  US-RENAL (CC Dr Ambrocio)   Follow up in 6 weeks. Call or return if questions, concerns, or worsening condition.  "

## 2017-07-27 ENCOUNTER — TELEPHONE (OUTPATIENT)
Dept: CARDIOLOGY | Facility: MEDICAL CENTER | Age: 67
End: 2017-07-27

## 2017-07-27 LAB
SHBG SERPL-SCNC: 44 NMOL/L (ref 30–135)
TESTOST FREE SERPL-MCNC: 5.1 PG/ML (ref 0.6–3.8)
TESTOST SERPL-MCNC: 36 NG/DL (ref 5–32)

## 2017-07-28 LAB — 17OHP SERPL-MCNC: 21.4 NG/DL

## 2017-07-31 ENCOUNTER — TELEPHONE (OUTPATIENT)
Dept: CARDIOLOGY | Facility: MEDICAL CENTER | Age: 67
End: 2017-07-31

## 2017-07-31 NOTE — TELEPHONE ENCOUNTER
----- Message from Marain Ambrocio M.D. sent at 7/19/2017  8:55 PM PDT -----  Dilated thoracic aorta which is borderline will continue to monitor for now.  On the CAT scan masslike area in the right renal upper pole  Ultrasound may be helpful for further clarification.    Please order renal USG and patient should f/u with PCP    Marian    ----- Message -----     From: Cipriano Rosa     Sent: 7/18/2017   1:25 PM       To: Marian Ambrocio M.D.    F/U on  8/1

## 2017-07-31 NOTE — TELEPHONE ENCOUNTER
Patient saw PCP on 7/25 and he ordered a renal ultrasound - patient will discuss results in further detail with AM at appt on 7/1

## 2017-08-01 ENCOUNTER — OFFICE VISIT (OUTPATIENT)
Dept: CARDIOLOGY | Facility: MEDICAL CENTER | Age: 67
End: 2017-08-01
Payer: MEDICAID

## 2017-08-01 VITALS
HEIGHT: 70 IN | SYSTOLIC BLOOD PRESSURE: 122 MMHG | BODY MASS INDEX: 33.79 KG/M2 | DIASTOLIC BLOOD PRESSURE: 72 MMHG | OXYGEN SATURATION: 91 % | WEIGHT: 236 LBS | HEART RATE: 66 BPM

## 2017-08-01 DIAGNOSIS — E78.5 DYSLIPIDEMIA: ICD-10-CM

## 2017-08-01 DIAGNOSIS — R06.02 SOB (SHORTNESS OF BREATH) ON EXERTION: ICD-10-CM

## 2017-08-01 DIAGNOSIS — I48.0 PAROXYSMAL ATRIAL FIBRILLATION (HCC): ICD-10-CM

## 2017-08-01 DIAGNOSIS — Z79.01 CHRONIC ANTICOAGULATION: ICD-10-CM

## 2017-08-01 DIAGNOSIS — I10 ESSENTIAL HYPERTENSION: ICD-10-CM

## 2017-08-01 DIAGNOSIS — Z01.810 PRE-OPERATIVE CARDIOVASCULAR EXAMINATION: ICD-10-CM

## 2017-08-01 PROCEDURE — 99214 OFFICE O/P EST MOD 30 MIN: CPT | Performed by: INTERNAL MEDICINE

## 2017-08-01 ASSESSMENT — ENCOUNTER SYMPTOMS
LOSS OF CONSCIOUSNESS: 0
DEPRESSION: 0
PALPITATIONS: 0
FEVER: 0
ABDOMINAL PAIN: 0
SHORTNESS OF BREATH: 1
BLURRED VISION: 0
BRUISES/BLEEDS EASILY: 0
NERVOUS/ANXIOUS: 0
ORTHOPNEA: 0
SPEECH CHANGE: 0
MYALGIAS: 0
CLAUDICATION: 0
PND: 0
DIZZINESS: 0

## 2017-08-01 NOTE — MR AVS SNAPSHOT
"        Miryam Juan Magdiel   2017 2:00 PM   Office Visit   MRN: [id number]    Department:  Heart [facility]   Dept Phone:  [phone]    Description:  Female : 1950   Provider:  Marian Ambrocio M.D.           Allergies as of 2017     Allergen Noted Reactions    Codeine 2009   Itching, Nausea    Imitrex [Sumatriptan Succinate] 2010   Shortness of Breath, Swelling    Penicillins 2007   Rash, Vomiting    Sulfa Drugs 2007   Shortness of Breath, Itching    Azithromycin 2007   Itching    Dilaudid [Hydromorphone] 10/20/2015   Itching    sweating    Sensipar [Cinacalcet] 2016   Unspecified    Nightmares and leg cramping    Imipramine 2011   Shortness of Breath, Itching    anxious    Other Drug 2011       Z Pack    Seroquel [Quetiapine Fumarate] 2011         You were diagnosed with     SOB (shortness of breath) on exertion   [607358]       Essential hypertension   [7495660]       Paroxysmal atrial fibrillation (CMS-HCC)   [513479]       Chronic anticoagulation   [549278]       Dyslipidemia   [665052]         Vital Signs     Blood Pressure Pulse Height Weight Body Mass Index Oxygen Saturation    122/92 mmHg 66 1.778 m (5' 10\") 107.049 kg (236 lb) 33.86 kg/m2 91%    Smoking Status                   Former Smoker           Basic Information     Date Of Birth Sex Race Ethnicity Preferred Language    1950 Female White Non- English      Your appointments     Aug 03, 2017  2:30 PM   ESOPHAGUS-BARIUM SWALLOW with [address] DX 3, RADIOLOGIST, NNR   [facility] - DIAGNOSTIC - [address] ([address])    [address]  [place] [address] [phone]           Some exams require specific prep instructions that would have been given to you at time of scheduling. If you have any additional questions about the prep instructions, please call Imaging Scheduling at [phone] and press #2.            Aug 07, 2017  9:30 AM   NM CARDIAC STRESS TEST " (30) with [facility] NM DSPECT 2   [facility] - NUC [facility] ([facility])    [address] [facility]  [place] [address] [phone]           Some exams require specific prep instructions that would have been given to you at time of scheduling. If you have any additional questions about the prep instructions, please call Imaging Scheduling at [phone] and press #2.              Problem List              ICD-10-CM Priority Class Noted - Resolved    Bipolar affective disorder (CMS-HCC) F31.9   5/22/2009 - Present    Depression F32.9   5/22/2009 - Present    Family history of breast cancer in mother Z80.3   Unknown - Present    Anxiety F41.9   7/15/2011 - Present    Allergic rhinitis due to pollen J30.1   8/26/2011 - Present    Rotator cuff syndrome of right shoulder M75.101   12/9/2011 - Present    Migraine headache G43.909   Unknown - Present    Lumbar facet arthropathy (HCC) M12.88   Unknown - Present    Lumbar disc disease with radiculopathy M51.16   9/14/2012 - Present    Recurrent sinus infections J32.9   7/22/2014 - Present    Essential hypertension I10   8/12/2014 - Present    Chronic pain syndrome G89.4   11/24/2014 - Present    Cellulitis of right hand L03.113   10/4/2015 - Present    Osteoarthritis, hand, primary localized M19.049   10/5/2015 - Present    Pain of right hand M79.641   10/15/2015 - Present    Tenosynovitis of finger M65.9 High  10/15/2015 - Present    Hypercalcemia E83.52 Medium  10/20/2015 - Present    Vitamin B12 deficiency E53.8   11/12/2015 - Present    Renal insufficiency N28.9   11/12/2015 - Present    Acute pain of left wrist M25.532   11/12/2015 - Present    Chronic pain G89.29   12/21/2015 - Present    Pain of finger of left hand M79.645   7/28/2016 - Present    Elevated PTHrP level (CMS-HCC) E21.5   7/28/2016 - Present    Lump of skin of left lower extremity R22.42   8/22/2016 - Present    Pain in both knees M25.561, M25.562   10/6/2016 - Present    Accessory skin  tags Q82.8   10/6/2016 - Present    Acute pyelonephritis N12 Medium  1/8/2017 - Present    Severe sepsis, POA (HCC) due to pyelonephritis A41.9, R65.20 High  1/9/2017 - Present    Acute respiratory failure with hypoxia (HCC) due to sepsis, POA J96.01 Medium  1/9/2017 - Present    Gram-negative katharina bacteremia R78.81 Medium  1/9/2017 - Present    HTN (hypertension) I10 Low  1/9/2017 - Present    Paroxysmal atrial fibrillation (CMS-HCC) I48.0 Medium  1/9/2017 - Present    Chronic renal failure, stage 2 (mild) N18.2 Low  1/9/2017 - Present    Fibromyalgia M79.7 Low  1/9/2017 - Present    Headache R51   1/11/2017 - Present    Heart palpitations R00.2   1/25/2017 - Present    Low energy R53.83   1/25/2017 - Present    Acute pain of left shoulder M25.512   3/1/2017 - Present    Post-menopause Z78.0   3/1/2017 - Present    Hypertriglyceridemia E78.1   4/12/2017 - Present    Shortness of breath R06.02   4/12/2017 - Present    Skin irritation R23.8   4/12/2017 - Present    Obesity (BMI 30-39.9) E66.9   4/12/2017 - Present    Acute pain of right knee M25.561   5/30/2017 - Present    Sinus congestion R09.81   6/5/2017 - Present    Wrist pain, acute M25.539   7/25/2017 - Present    Peripheral edema R60.9   7/25/2017 - Present    Abnormal CT scan R93.8   7/25/2017 - Present      Health Maintenance        Date Due Completion Dates    PAP SMEAR 9/20/1973 ---    IMM ZOSTER VACCINE 9/20/2012 ---    MAMMOGRAM 5/29/2014 5/29/2013, 8/12/2011, 7/17/2009, 7/17/2009, 7/23/2008, 6/27/2008, 6/27/2008, 12/9/2005    IMM INFLUENZA (1) 9/1/2017 10/1/2016, 9/19/2015, 11/7/2014, 11/3/2010    COLONOSCOPY 10/7/2021 10/7/2011 (Declined)    Override on 10/7/2011: Patient Declined (will do FIT test instead)    IMM DTaP/Tdap/Td Vaccine (2 - Td) 10/27/2026 10/27/2016            Current Immunizations     13-VALENT PCV PREVNAR 1/11/2017  4:06 PM    Influenza TIV (IM) 9/19/2015, 11/7/2014    Influenza Vaccine Adult HD 10/1/2016    Influenza Vaccine  Pediatric 11/3/2010    Tdap Vaccine 10/27/2016      Below and/or attached are the medications your provider expects you to take. Review all of your home medications and newly ordered medications with your provider and/or pharmacist. Follow medication instructions as directed by your provider and/or pharmacist. Please keep your medication list with you and share with your provider. Update the information when medications are discontinued, doses are changed, or new medications (including over-the-counter products) are added; and carry medication information at all times in the event of emergency situations     Allergies:  CODEINE - Itching,Nausea     IMITREX - Shortness of Breath,Swelling     PENICILLINS - Rash,Vomiting     SULFA DRUGS - Shortness of Breath,Itching     AZITHROMYCIN - Itching     DILAUDID - Itching     SENSIPAR - Unspecified     IMIPRAMINE - Shortness of Breath,Itching     OTHER DRUG - (reactions not documented)     SEROQUEL - (reactions not documented)               Medications  Valid as of: August 01, 2017 -  2:24 PM    Generic Name Brand Name Tablet Size Instructions for use    Albuterol Sulfate (Aero Soln) albuterol 108 (90 BASE) MCG/ACT Inhale 2 Puffs by mouth every 6 hours as needed for Shortness of Breath.        Baclofen (Tab) LIORESAL 10 MG Take 1 Tab by mouth 2 times a day.        Cholecalciferol (Tab) cholecalciferol 1000 UNIT Take 1,000 Units by mouth every day.        Cyanocobalamin (Tab) VITAMIN B12 1000 MCG Take 1 Tab by mouth every day.        DiazePAM (Tab) VALIUM 2 MG Take one tablet 30 mins prior to MRI.  If needed, take 2nd tablet for exam        Diclofenac Sodium (Gel) Diclofenac Sodium 1 % Apply  2 grams twice a day to joints as needed        Doxycycline Monohydrate (Cap) MONODOX 100 MG Take 1 Cap by mouth 2 times a day.        Fluticasone Propionate (Suspension) FLONASE 50 MCG/ACT Spray 1 Spray in nose 2 times a day.        Furosemide (Tab) LASIX 20 MG         Gabapentin (Cap)  NEURONTIN 300 MG Take 1 Cap by mouth 3 times a day.        Guaifenesin-Codeine (Syrup) TUSSI-ORGANIDIN -10 MG/5ML Take 5 mL by mouth 2 times a day as needed.        Loratadine (Tab) CLARITIN 10 MG TAKE ONE TABLET BY MOUTH DAILY (GENERIC CLARITIN)        Methocarbamol (Tab) ROBAXIN 750 MG Take 2 Tabs by mouth 2 Times a Day.        Montelukast Sodium (Tab) SINGULAIR 10 MG Take 1 Tab by mouth every day.        Omega-3 Fatty Acids (Cap) fish oil 1000 MG Take 1,000 mg by mouth every day.        Ondansetron (TABLET DISPERSIBLE) ZOFRAN ODT 4 MG Take 1 Tab by mouth every 24 hours as needed for Nausea/Vomiting.        PredniSONE (Tab) DELTASONE 10 MG Take 10 mg twice a day for 5 days, then 10 mg once a day for 2 days        Propranolol HCl (Tab) INDERAL 40 MG TAKE ONE TABLET BY MOUTH THREE TIMES A DAY (GENERIC FOR INDERAL)        Rivaroxaban (Tab) XARELTO 20 MG Take 1 Tab by mouth with dinner.        TraMADol HCl (Tab) ULTRAM 50 MG Take 1 Tab by mouth every 24 hours as needed for Moderate Pain or Severe Pain.        TraMADol HCl (Tab) ULTRAM 50 MG Take 1 Tab by mouth 3 times a day.        Triamcinolone Acetonide (Cream) KENALOG 0.1 % Apply to skin lesion on posterior neck twice a day as needed        Venlafaxine HCl (CAPSULE SR 24 HR) EFFEXOR XR 37.5 MG TAKE ONE CAPSULE BY MOUTH TWICE A DAY WITH MEALS        .                 Medicines prescribed today were sent to:     [facility][id number] - [place] - [address]    [address] [place] [address]    Phone: [phone] Fax: [phone]    Open 24 Hours?: No      Medication refill instructions:       If your prescription bottle indicates you have medication refills left, it is not necessary to call your provider’s office. Please contact your pharmacy and they will refill your medication.    If your prescription bottle indicates you do not have any refills left, you may request refills at any time through one of the following ways: The online [employer] system (except  Urgent Care), by calling your provider’s office, or by asking your pharmacy to contact your provider’s office with a refill request. Medication refills are processed only during regular business hours and may not be available until the next business day. Your provider may request additional information or to have a follow-up visit with you prior to refilling your medication.   *Please Note: Medication refills are assigned a new Rx number when refilled electronically. Your pharmacy may indicate that no refills were authorized even though a new prescription for the same medication is available at the pharmacy. Please request the medicine by name with the pharmacy before contacting your provider for a refill.        Your To Do List     Future Labs/Procedures Complete By Expires    COMP METABOLIC PANEL  As directed 8/1/2018    LIPID PROFILE  As directed 8/1/2018    NM-CARDIAC STRESS TEST  As directed 8/1/2018      Other Notes About Your Plan     6/15/17 ENT Dr Melo consult- Allergies-->Refer Allergist, CT Sinuses, EAr plugging/Eustachion tube dysfunction, Hearing test and TM's normal. Possible Acid Reflux--> Barium Swallow.  6/6/17 Cardiology- Dr Ambrocio APPt. HTN, Parox Atrial Fib, Borderline Dilated Ascending Aorta by AISLINN Jan 2017, Plan Continue Beta blocker, OPO, ordered, CT Thoracic Aorta, f/u 6 wks  4/6/16 Dr Quiroga appt for R hand pain, Fibromyalgia. Increase Gabapentin  300 mg TID. Wrist splint at night. F/u 3 months.  12/31/15 [facility] Consult-Dr Rima Quiroga. Carpal Tunnel, hand pain. Gabapentin, Hand exercises, consider NCS/EMG.  Fall risk done 10/15/15 RB           MyChart Access Code: Activation code not generated  Current MyChart Status: Active

## 2017-08-01 NOTE — Clinical Note
[facility]   [address]   [address]  [place] [address]  Phone: [phone]  Fax: [phone]              Miryam Veloz  1950    Encounter Date: 8/1/2017    Marian Ambrocio M.D.          PROGRESS NOTE:  Subjective:   Miryam Veloz is a 66 y.o. Female with known history of hypertension, bipolar affective disorder, depression, paroxysmal atrial fibrillation presenting today for follow-up evaluation of atrial fibrillation and dilated ascending aorta.    Patient denied any complaints of exertional chest pain, pressure or tightness. No complaints of palpitations orthopnea or PND. Complaining of new onset dyspnea on exertion. No complaints of bleeding issues while on anticoagulation with Xarelto. Patient is currently limping while walking due to pain in right knee diagnosed with meniscal tear.       Past Medical History   Diagnosis Date   • Headache, frequent episodic tension-type    • Bipolar affective disorder (CMS-HCC) 1995   • Depression 5/22/2009   • Lumbar facet arthropathy (HCC) 2004     lumbar disc disease   • Chronic knee pain 2000   • IBS (irritable bowel syndrome)    • Family history of breast cancer in mother    • Rotator cuff syndrome of right shoulder 2008   • Migraine    • Hypertension      Past Surgical History   Procedure Laterality Date   • Cholecystectomy  1999   • Finger or hand incision and drainage Right 10/20/2015     Procedure: FINGER OR HAND INCISION AND DRAINAGE- 4th finger;  Surgeon: Eric Pritchett M.D.;  Location: SURGERY [facility];  Service:      Family History   Problem Relation Age of Onset   • Cancer Mother      breast cancer, bilaterally   • GI Father      ulcer   • Heart Disease Father 40     MIs   • Psychiatry Father      anxiety   • Arthritis Sister      rheumatoid   • Stroke Sister 79     mid brain     History   Smoking status   • Former Smoker -- 1.00 packs/day for 20 years      • Types: Cigarettes   • Quit date: 01/01/1994   Smokeless tobacco   • Never Used     Allergies   Allergen Reactions   • Codeine Itching and Nausea   • Imitrex [Sumatriptan Succinate] Shortness of Breath and Swelling   • Penicillins Rash and Vomiting   • Sulfa Drugs Shortness of Breath and Itching   • Azithromycin Itching   • Dilaudid [Hydromorphone] Itching     sweating   • Sensipar [Cinacalcet] Unspecified     Nightmares and leg cramping   • Imipramine Shortness of Breath and Itching     anxious   • Other Drug      Z Pack   • Seroquel [Quetiapine Fumarate]      Outpatient Encounter Prescriptions as of 8/1/2017   Medication Sig Dispense Refill   • methocarbamol (ROBAXIN) 750 MG Tab Take 2 Tabs by mouth 2 Times a Day. 120 Tab 0   • Diclofenac Sodium 1 % Gel Apply  2 grams twice a day to joints as needed 1 Tube 5   • rivaroxaban (XARELTO) 20 MG Tab tablet Take 1 Tab by mouth with dinner. 30 Tab 5   • propranolol (INDERAL) 40 MG Tab TAKE ONE TABLET BY MOUTH THREE TIMES A DAY (GENERIC FOR INDERAL) 90 Tab 0   • venlafaxine XR (EFFEXOR XR) 37.5 MG CAPSULE SR 24 HR TAKE ONE CAPSULE BY MOUTH TWICE A DAY WITH MEALS 60 Cap 1   • loratadine (CLARITIN) 10 MG Tab TAKE ONE TABLET BY MOUTH DAILY (GENERIC CLARITIN) 30 Tab 11   • fluticasone (FLONASE ALLERGY RELIEF) 50 MCG/ACT nasal spray Spray 1 Spray in nose 2 times a day. 1 Bottle 2   • montelukast (SINGULAIR) 10 MG Tab Take 1 Tab by mouth every day. 30 Tab 3   • Omega-3 Fatty Acids (FISH OIL) 1000 MG Cap capsule Take 1,000 mg by mouth every day.     • tramadol (ULTRAM) 50 MG Tab Take 1 Tab by mouth every 24 hours as needed for Moderate Pain or Severe Pain. 20 Tab 0   • gabapentin (NEURONTIN) 300 MG Cap Take 1 Cap by mouth 3 times a day. 90 Cap 5   • ondansetron (ZOFRAN ODT) 4 MG TABLET DISPERSIBLE Take 1 Tab by mouth every 24 hours as needed for Nausea/Vomiting. 20 Tab 2   • vitamin D (CHOLECALCIFEROL) 1000 UNIT Tab Take 1,000 Units by mouth every day.     • albuterol  "(VENTOLIN HFA) 108 (90 BASE) MCG/ACT Aero Soln inhalation aerosol Inhale 2 Puffs by mouth every 6 hours as needed for Shortness of Breath. 8.5 g 3   • cyanocobalamin (VITAMIN B12) 1000 MCG Tab Take 1 Tab by mouth every day. 30 Tab 3   • furosemide (LASIX) 20 MG Tab      • tramadol (ULTRAM) 50 MG Tab Take 1 Tab by mouth 3 times a day. 21 Tab 0   • predniSONE (DELTASONE) 10 MG Tab Take 10 mg twice a day for 5 days, then 10 mg once a day for 2 days 12 Tab 0   • diazepam (VALIUM) 2 MG Tab Take one tablet 30 mins prior to MRI.  If needed, take 2nd tablet for exam 2 Tab 0   • doxycycline (MONODOX) 100 MG capsule Take 1 Cap by mouth 2 times a day. 14 Cap 0   • guaifenesin-codeine (TUSSI-ORGANIDIN NR) 100-10 MG/5ML syrup Take 5 mL by mouth 2 times a day as needed. 120 mL 0   • baclofen (LIORESAL) 10 MG Tab Take 1 Tab by mouth 2 times a day. 60 Tab 2   • triamcinolone acetonide (KENALOG) 0.1 % Cream Apply to skin lesion on posterior neck twice a day as needed 1 Tube 0     No facility-administered encounter medications on file as of 8/1/2017.     Review of Systems   Constitutional: Negative for fever.   HENT: Negative for congestion.    Eyes: Negative for blurred vision.   Respiratory: Positive for shortness of breath.    Cardiovascular: Negative for chest pain, palpitations, orthopnea, claudication, leg swelling and PND.   Gastrointestinal: Negative for abdominal pain.   Genitourinary: Negative for hematuria.   Musculoskeletal: Positive for joint pain. Negative for myalgias.   Skin: Negative for rash.   Neurological: Negative for dizziness, speech change and loss of consciousness.   Endo/Heme/Allergies: Does not bruise/bleed easily.   Psychiatric/Behavioral: Negative for depression. The patient is not nervous/anxious.    All other systems reviewed and are negative.       Objective:   /92 mmHg  Pulse 66  Ht 1.778 m (5' 10\")  Wt 107.049 kg (236 lb)  BMI 33.86 kg/m2  SpO2 91%    Physical Exam   Constitutional: She is " oriented to person, place, and time. She appears well-developed. No distress.   HENT:   Mouth/Throat: Mucous membranes are normal.   Eyes: Conjunctivae and EOM are normal.   Neck: No JVD present. No tracheal deviation present. No thyroid mass present.   Cardiovascular: Normal rate, regular rhythm and intact distal pulses.    No murmur heard.  Pulmonary/Chest: Effort normal and breath sounds normal. No respiratory distress. She exhibits no tenderness.   Abdominal: Soft. There is no tenderness.   Musculoskeletal: Normal range of motion. She exhibits no edema.   Neurological: She is alert and oriented to person, place, and time. She has normal strength. She displays no tremor.   Skin: Skin is warm and dry. She is not diaphoretic.   Psychiatric: She has a normal mood and affect. Her behavior is normal.   Vitals reviewed.      CT Aorta: 7/18/17  1.  Maximal diameter of ascending thoracic aorta 3.7 cm. No dissection appreciated.  2.  Peripheral calcification in the nondilated abdominal aorta.  3.  Hypodense left thyroid lobe nodule with central calcification.  4.  Stable 1 cm hypodensity in the superior spleen, which is nonspecific.  5.  Previously identified suspected masslike area in the right renal upper pole anterior cortex is not well seen on arterial phase images. There is no imaging study in the patient's record subsequent to the 1/8/2017 examination which would reflect   imaging follow-up of this finding. Ultrasound may be helpful for further clarification.    Results for LIS JOSEPH (MRN [id number]) as of 7/14/2017 16:11   4/4/2017 7/11/2017   Sodium 137 143   Potassium 4.6 5.4   Chloride 107 110   Co2 24 28   Anion Gap 6.0 5.0   Glucose 108 (H) 103 (H)   Bun 18 19   Creatinine 0.99 1.10   GFR If Non  56 (A) 50 (A)   Calcium 10.7 (H) 11.2 (H)   AST(SGOT) 12 13   ALT(SGPT) 14 13   Alkaline Phosphatase 104 (H) 88   Cholesterol,Tot 196 171   Triglycerides 308 (H) 177 (H)   HDL 35 (A) 37 (A)  "  LDL 99 99     TTE: 1/11/7  No prior study is available for comparison.   Normal left ventricular size, wall thickness, and systolic function.  Left ventricular ejection fraction is visually estimated to be 65%.  Diastolic function is difficult to assess with atrial fibrillation.  The right ventricle was normal in size and function.  Mild mitral regurgitation.  Borderline dilated ascending aorta, 4.0 cm  Assessment:     1. SOB (shortness of breath) on exertion  NM-CARDIAC STRESS TEST   2. Essential hypertension     3. Paroxysmal atrial fibrillation (CMS-HCC)     4. Chronic anticoagulation     5. Dyslipidemia  COMP METABOLIC PANEL    LIPID PROFILE   6. Ascending aorta 3.7 cm  7. Must like lesion seen in the right renal upper pole   Medical Decision Making:  Today's Assessment / Status / Plan:     1. Nuclear stress tests now.  If stress test is negative for ischemia follow-up in one year.  2. A blood pressure well controlled at the present visit.  Continue propranolol.   3. Denied any breakthrough episodes of atrial fibrillation.   4.  No bleeding issues while on Xarelto.  Continue Xarelto 20 mg qHs.  5. Continue dietary modification.  6. Aortic diameter may be appropriate for the patient's height (5'7\").  7. Patient is scheduled for renal artery Doppler.    If stress test negative for ischemia okay to follow-up in one year.  Labs prior to next visit.  Patient may be going for meniscal repair if stress test negative for ischemia no further workup needed prior to surgery. Okay to hold Xarelto prior to surgery.    Thank you for allowing me to participate in taking care of patient.    Marian Ambrocio. MD.        Walter Bonds, A.P.N.  [address] [address]  VIA In Basket                   "

## 2017-08-01 NOTE — PROGRESS NOTES
Subjective:   Miryam Veloz is a 66 y.o. Female with known history of hypertension, bipolar affective disorder, depression, paroxysmal atrial fibrillation presenting today for follow-up evaluation of atrial fibrillation and dilated ascending aorta.    Patient denied any complaints of exertional chest pain, pressure or tightness. No complaints of palpitations orthopnea or PND. Complaining of new onset dyspnea on exertion. No complaints of bleeding issues while on anticoagulation with Xarelto. Patient is currently limping while walking due to pain in right knee diagnosed with meniscal tear.       Past Medical History   Diagnosis Date   • Headache, frequent episodic tension-type    • Bipolar affective disorder (CMS-HCC) 1995   • Depression 5/22/2009   • Lumbar facet arthropathy (HCC) 2004     lumbar disc disease   • Chronic knee pain 2000   • IBS (irritable bowel syndrome)    • Family history of breast cancer in mother    • Rotator cuff syndrome of right shoulder 2008   • Migraine    • Hypertension      Past Surgical History   Procedure Laterality Date   • Cholecystectomy  1999   • Finger or hand incision and drainage Right 10/20/2015     Procedure: FINGER OR HAND INCISION AND DRAINAGE- 4th finger;  Surgeon: Eric Pritchett M.D.;  Location: SURGERY [facility];  Service:      Family History   Problem Relation Age of Onset   • Cancer Mother      breast cancer, bilaterally   • GI Father      ulcer   • Heart Disease Father 40     MIs   • Psychiatry Father      anxiety   • Arthritis Sister      rheumatoid   • Stroke Sister 79     mid brain     History   Smoking status   • Former Smoker -- 1.00 packs/day for 20 years   • Types: Cigarettes   • Quit date: 01/01/1994   Smokeless tobacco   • Never Used     Allergies   Allergen Reactions   • Codeine Itching and Nausea   • Imitrex [Sumatriptan Succinate] Shortness of Breath and Swelling   • Penicillins Rash and Vomiting   • Sulfa Drugs Shortness of Breath and  Itching   • Azithromycin Itching   • Dilaudid [Hydromorphone] Itching     sweating   • Sensipar [Cinacalcet] Unspecified     Nightmares and leg cramping   • Imipramine Shortness of Breath and Itching     anxious   • Other Drug      Z Pack   • Seroquel [Quetiapine Fumarate]      Outpatient Encounter Prescriptions as of 8/1/2017   Medication Sig Dispense Refill   • methocarbamol (ROBAXIN) 750 MG Tab Take 2 Tabs by mouth 2 Times a Day. 120 Tab 0   • Diclofenac Sodium 1 % Gel Apply  2 grams twice a day to joints as needed 1 Tube 5   • rivaroxaban (XARELTO) 20 MG Tab tablet Take 1 Tab by mouth with dinner. 30 Tab 5   • propranolol (INDERAL) 40 MG Tab TAKE ONE TABLET BY MOUTH THREE TIMES A DAY (GENERIC FOR INDERAL) 90 Tab 0   • venlafaxine XR (EFFEXOR XR) 37.5 MG CAPSULE SR 24 HR TAKE ONE CAPSULE BY MOUTH TWICE A DAY WITH MEALS 60 Cap 1   • loratadine (CLARITIN) 10 MG Tab TAKE ONE TABLET BY MOUTH DAILY (GENERIC CLARITIN) 30 Tab 11   • fluticasone (FLONASE ALLERGY RELIEF) 50 MCG/ACT nasal spray Spray 1 Spray in nose 2 times a day. 1 Bottle 2   • montelukast (SINGULAIR) 10 MG Tab Take 1 Tab by mouth every day. 30 Tab 3   • Omega-3 Fatty Acids (FISH OIL) 1000 MG Cap capsule Take 1,000 mg by mouth every day.     • tramadol (ULTRAM) 50 MG Tab Take 1 Tab by mouth every 24 hours as needed for Moderate Pain or Severe Pain. 20 Tab 0   • gabapentin (NEURONTIN) 300 MG Cap Take 1 Cap by mouth 3 times a day. 90 Cap 5   • ondansetron (ZOFRAN ODT) 4 MG TABLET DISPERSIBLE Take 1 Tab by mouth every 24 hours as needed for Nausea/Vomiting. 20 Tab 2   • vitamin D (CHOLECALCIFEROL) 1000 UNIT Tab Take 1,000 Units by mouth every day.     • albuterol (VENTOLIN HFA) 108 (90 BASE) MCG/ACT Aero Soln inhalation aerosol Inhale 2 Puffs by mouth every 6 hours as needed for Shortness of Breath. 8.5 g 3   • cyanocobalamin (VITAMIN B12) 1000 MCG Tab Take 1 Tab by mouth every day. 30 Tab 3   • furosemide (LASIX) 20 MG Tab      • tramadol (ULTRAM) 50 MG Tab  "Take 1 Tab by mouth 3 times a day. 21 Tab 0   • predniSONE (DELTASONE) 10 MG Tab Take 10 mg twice a day for 5 days, then 10 mg once a day for 2 days 12 Tab 0   • diazepam (VALIUM) 2 MG Tab Take one tablet 30 mins prior to MRI.  If needed, take 2nd tablet for exam 2 Tab 0   • doxycycline (MONODOX) 100 MG capsule Take 1 Cap by mouth 2 times a day. 14 Cap 0   • guaifenesin-codeine (TUSSI-ORGANIDIN NR) 100-10 MG/5ML syrup Take 5 mL by mouth 2 times a day as needed. 120 mL 0   • baclofen (LIORESAL) 10 MG Tab Take 1 Tab by mouth 2 times a day. 60 Tab 2   • triamcinolone acetonide (KENALOG) 0.1 % Cream Apply to skin lesion on posterior neck twice a day as needed 1 Tube 0     No facility-administered encounter medications on file as of 8/1/2017.     Review of Systems   Constitutional: Negative for fever.   HENT: Negative for congestion.    Eyes: Negative for blurred vision.   Respiratory: Positive for shortness of breath.    Cardiovascular: Negative for chest pain, palpitations, orthopnea, claudication, leg swelling and PND.   Gastrointestinal: Negative for abdominal pain.   Genitourinary: Negative for hematuria.   Musculoskeletal: Positive for joint pain. Negative for myalgias.   Skin: Negative for rash.   Neurological: Negative for dizziness, speech change and loss of consciousness.   Endo/Heme/Allergies: Does not bruise/bleed easily.   Psychiatric/Behavioral: Negative for depression. The patient is not nervous/anxious.    All other systems reviewed and are negative.       Objective:   /92 mmHg  Pulse 66  Ht 1.778 m (5' 10\")  Wt 107.049 kg (236 lb)  BMI 33.86 kg/m2  SpO2 91%    Physical Exam   Constitutional: She is oriented to person, place, and time. She appears well-developed. No distress.   HENT:   Mouth/Throat: Mucous membranes are normal.   Eyes: Conjunctivae and EOM are normal.   Neck: No JVD present. No tracheal deviation present. No thyroid mass present.   Cardiovascular: Normal rate, regular rhythm " and intact distal pulses.    No murmur heard.  Pulmonary/Chest: Effort normal and breath sounds normal. No respiratory distress. She exhibits no tenderness.   Abdominal: Soft. There is no tenderness.   Musculoskeletal: Normal range of motion. She exhibits no edema.   Neurological: She is alert and oriented to person, place, and time. She has normal strength. She displays no tremor.   Skin: Skin is warm and dry. She is not diaphoretic.   Psychiatric: She has a normal mood and affect. Her behavior is normal.   Vitals reviewed.      CT Aorta: 7/18/17  1.  Maximal diameter of ascending thoracic aorta 3.7 cm. No dissection appreciated.  2.  Peripheral calcification in the nondilated abdominal aorta.  3.  Hypodense left thyroid lobe nodule with central calcification.  4.  Stable 1 cm hypodensity in the superior spleen, which is nonspecific.  5.  Previously identified suspected masslike area in the right renal upper pole anterior cortex is not well seen on arterial phase images. There is no imaging study in the patient's record subsequent to the 1/8/2017 examination which would reflect   imaging follow-up of this finding. Ultrasound may be helpful for further clarification.    Results for LIS JOSEPH (MRN [id number]) as of 7/14/2017 16:11   4/4/2017 7/11/2017   Sodium 137 143   Potassium 4.6 5.4   Chloride 107 110   Co2 24 28   Anion Gap 6.0 5.0   Glucose 108 (H) 103 (H)   Bun 18 19   Creatinine 0.99 1.10   GFR If Non  56 (A) 50 (A)   Calcium 10.7 (H) 11.2 (H)   AST(SGOT) 12 13   ALT(SGPT) 14 13   Alkaline Phosphatase 104 (H) 88   Cholesterol,Tot 196 171   Triglycerides 308 (H) 177 (H)   HDL 35 (A) 37 (A)   LDL 99 99     TTE: 1/11/7  No prior study is available for comparison.   Normal left ventricular size, wall thickness, and systolic function.  Left ventricular ejection fraction is visually estimated to be 65%.  Diastolic function is difficult to assess with atrial fibrillation.  The right  "ventricle was normal in size and function.  Mild mitral regurgitation.  Borderline dilated ascending aorta, 4.0 cm  Assessment:     1. SOB (shortness of breath) on exertion  NM-CARDIAC STRESS TEST   2. Essential hypertension     3. Paroxysmal atrial fibrillation (CMS-HCC)     4. Chronic anticoagulation     5. Dyslipidemia  COMP METABOLIC PANEL    LIPID PROFILE   6. Ascending aorta 3.7 cm  7. Must like lesion seen in the right renal upper pole   8. Preop cardiac evaluation   Medical Decision Making:  Today's Assessment / Status / Plan:     1. Nuclear stress tests now.  If stress test is negative for ischemia follow-up in one year.  2. A blood pressure well controlled at the present visit.  Continue propranolol.   3. Denied any breakthrough episodes of atrial fibrillation.   4.  No bleeding issues while on Xarelto.  Continue Xarelto 20 mg qHs.  5. Continue dietary modification.  6. Aortic diameter may be appropriate for the patient's height (5'7\").  7. Patient is scheduled for renal artery Doppler.  8. If stress is negative for ischemia patient is a low to intermediate risk candidate being scheduled for a low-risk procedure no further cardiac workup needed prior to surgery.    If stress test negative for ischemia okay to follow-up in one year.  Labs prior to next visit.  Patient may be going for meniscal repair if stress test negative for ischemia no further workup needed prior to surgery. Okay to hold Xarelto prior to surgery.    Thank you for allowing me to participate in taking care of patient.    Marian Jeter MD.    "

## 2017-08-02 RX ORDER — PROPRANOLOL HYDROCHLORIDE 40 MG/1
TABLET ORAL
Qty: 90 TAB | Refills: 0 | Status: SHIPPED | OUTPATIENT
Start: 2017-08-02 | End: 2017-08-28 | Stop reason: SDUPTHER

## 2017-08-07 ENCOUNTER — HOSPITAL ENCOUNTER (OUTPATIENT)
Dept: RADIOLOGY | Facility: MEDICAL CENTER | Age: 67
End: 2017-08-07
Attending: INTERNAL MEDICINE
Payer: MEDICAID

## 2017-08-07 DIAGNOSIS — R06.02 SOB (SHORTNESS OF BREATH) ON EXERTION: ICD-10-CM

## 2017-08-07 PROCEDURE — A9502 TC99M TETROFOSMIN: HCPCS

## 2017-08-07 PROCEDURE — 700111 HCHG RX REV CODE 636 W/ 250 OVERRIDE (IP)

## 2017-08-07 RX ORDER — REGADENOSON 0.08 MG/ML
INJECTION, SOLUTION INTRAVENOUS
Status: COMPLETED
Start: 2017-08-07 | End: 2017-08-07

## 2017-08-07 RX ADMIN — REGADENOSON 0.4 MG: 0.08 INJECTION, SOLUTION INTRAVENOUS at 10:45

## 2017-08-11 NOTE — PROGRESS NOTES
Nuclear stress test: 8/7/17  Negative stress ECG for ischemia.  No evidence of significant jeopardized viable myocardium or prior myocardial infarction.  Normal myocardial perfusion with no ischemia.  Normal left ventricular size, ejection fraction, and wall motion.  Normal left ventricular wall motion.  LV ejection fraction = 62%.  ECG INTERPRETATION  Negative stress ECG for ischemia

## 2017-08-14 RX ORDER — VENLAFAXINE HYDROCHLORIDE 37.5 MG/1
CAPSULE, EXTENDED RELEASE ORAL
Qty: 180 CAP | Refills: 0 | Status: SHIPPED | OUTPATIENT
Start: 2017-08-14 | End: 2018-06-11 | Stop reason: SDUPTHER

## 2017-08-14 RX ORDER — FUROSEMIDE 20 MG/1
TABLET ORAL
Qty: 90 TAB | Refills: 1 | Status: SHIPPED | OUTPATIENT
Start: 2017-08-14 | End: 2018-06-15 | Stop reason: SDUPTHER

## 2017-08-22 RX ORDER — METHOCARBAMOL 750 MG/1
TABLET, FILM COATED ORAL
Qty: 120 TAB | Refills: 0 | Status: SHIPPED | OUTPATIENT
Start: 2017-08-22 | End: 2017-09-17 | Stop reason: SDUPTHER

## 2017-09-05 DIAGNOSIS — M79.7 FIBROMYALGIA: ICD-10-CM

## 2017-09-05 RX ORDER — GABAPENTIN 300 MG/1
300 CAPSULE ORAL 3 TIMES DAILY
Qty: 90 CAP | Refills: 0 | Status: SHIPPED | OUTPATIENT
Start: 2017-09-05 | End: 2017-12-22 | Stop reason: SDUPTHER

## 2017-10-30 DIAGNOSIS — I48.0 PAROXYSMAL ATRIAL FIBRILLATION (HCC): ICD-10-CM

## 2017-12-22 ENCOUNTER — OFFICE VISIT (OUTPATIENT)
Dept: MEDICAL GROUP | Facility: PHYSICIAN GROUP | Age: 67
End: 2017-12-22
Payer: COMMERCIAL

## 2017-12-22 VITALS
DIASTOLIC BLOOD PRESSURE: 66 MMHG | BODY MASS INDEX: 33.64 KG/M2 | HEART RATE: 56 BPM | WEIGHT: 235 LBS | HEIGHT: 70 IN | TEMPERATURE: 99.7 F | SYSTOLIC BLOOD PRESSURE: 122 MMHG | OXYGEN SATURATION: 92 %

## 2017-12-22 DIAGNOSIS — G56.03 CARPAL TUNNEL SYNDROME ON BOTH SIDES: ICD-10-CM

## 2017-12-22 DIAGNOSIS — Z12.39 ENCOUNTER FOR SCREENING FOR MALIGNANT NEOPLASM OF BREAST: ICD-10-CM

## 2017-12-22 DIAGNOSIS — E66.9 OBESITY (BMI 30-39.9): ICD-10-CM

## 2017-12-22 DIAGNOSIS — F41.9 ANXIETY: ICD-10-CM

## 2017-12-22 DIAGNOSIS — Z79.01 CHRONIC ANTICOAGULATION: ICD-10-CM

## 2017-12-22 DIAGNOSIS — S83.206A TEAR OF MENISCUS OF RIGHT KNEE AS CURRENT INJURY, UNSPECIFIED MENISCUS, UNSPECIFIED TEAR TYPE, INITIAL ENCOUNTER: ICD-10-CM

## 2017-12-22 DIAGNOSIS — N18.2 CHRONIC RENAL FAILURE, STAGE 2 (MILD): ICD-10-CM

## 2017-12-22 DIAGNOSIS — G43.109 MIGRAINE WITH AURA AND WITHOUT STATUS MIGRAINOSUS, NOT INTRACTABLE: ICD-10-CM

## 2017-12-22 DIAGNOSIS — R79.89 ELEVATED PTHRP LEVEL: ICD-10-CM

## 2017-12-22 DIAGNOSIS — R93.89 ABNORMAL CT SCAN: ICD-10-CM

## 2017-12-22 DIAGNOSIS — E53.8 VITAMIN B12 DEFICIENCY: ICD-10-CM

## 2017-12-22 DIAGNOSIS — E83.52 HYPERCALCEMIA: ICD-10-CM

## 2017-12-22 DIAGNOSIS — F31.70 BIPOLAR DISORDER IN FULL REMISSION, MOST RECENT EPISODE UNSPECIFIED TYPE (HCC): ICD-10-CM

## 2017-12-22 DIAGNOSIS — J30.1 CHRONIC SEASONAL ALLERGIC RHINITIS DUE TO POLLEN: ICD-10-CM

## 2017-12-22 DIAGNOSIS — M51.9 LUMBAR DISC DISEASE: ICD-10-CM

## 2017-12-22 DIAGNOSIS — E78.5 DYSLIPIDEMIA: ICD-10-CM

## 2017-12-22 DIAGNOSIS — I48.0 PAROXYSMAL ATRIAL FIBRILLATION (HCC): ICD-10-CM

## 2017-12-22 DIAGNOSIS — J45.20 MILD INTERMITTENT ASTHMA WITHOUT COMPLICATION: ICD-10-CM

## 2017-12-22 DIAGNOSIS — M79.7 FIBROMYALGIA: ICD-10-CM

## 2017-12-22 DIAGNOSIS — R60.9 PERIPHERAL EDEMA: ICD-10-CM

## 2017-12-22 DIAGNOSIS — I10 ESSENTIAL HYPERTENSION: ICD-10-CM

## 2017-12-22 PROBLEM — N12 PYELONEPHRITIS: Status: RESOLVED | Noted: 2017-01-08 | Resolved: 2017-12-22

## 2017-12-22 PROBLEM — R65.20 SEVERE SEPSIS (HCC): Status: RESOLVED | Noted: 2017-01-09 | Resolved: 2017-12-22

## 2017-12-22 PROBLEM — E78.1 HYPERTRIGLYCERIDEMIA: Status: RESOLVED | Noted: 2017-04-12 | Resolved: 2017-12-22

## 2017-12-22 PROBLEM — R00.2 HEART PALPITATIONS: Status: RESOLVED | Noted: 2017-01-25 | Resolved: 2017-12-22

## 2017-12-22 PROBLEM — J96.01 ACUTE RESPIRATORY FAILURE WITH HYPOXIA (HCC): Status: RESOLVED | Noted: 2017-01-09 | Resolved: 2017-12-22

## 2017-12-22 PROBLEM — R53.83 LOW ENERGY: Status: RESOLVED | Noted: 2017-01-25 | Resolved: 2017-12-22

## 2017-12-22 PROBLEM — Z78.0 POST-MENOPAUSE: Status: RESOLVED | Noted: 2017-03-01 | Resolved: 2017-12-22

## 2017-12-22 PROBLEM — R51.9 HEADACHE: Status: RESOLVED | Noted: 2017-01-11 | Resolved: 2017-12-22

## 2017-12-22 PROBLEM — A41.9 SEVERE SEPSIS (HCC): Status: RESOLVED | Noted: 2017-01-09 | Resolved: 2017-12-22

## 2017-12-22 PROBLEM — R78.81 GRAM-NEGATIVE BACTEREMIA: Status: RESOLVED | Noted: 2017-01-09 | Resolved: 2017-12-22

## 2017-12-22 PROBLEM — R09.81 SINUS CONGESTION: Status: RESOLVED | Noted: 2017-06-05 | Resolved: 2017-12-22

## 2017-12-22 PROBLEM — R23.8 SKIN IRRITATION: Status: RESOLVED | Noted: 2017-04-12 | Resolved: 2017-12-22

## 2017-12-22 PROBLEM — M25.512 ACUTE PAIN OF LEFT SHOULDER: Status: RESOLVED | Noted: 2017-03-01 | Resolved: 2017-12-22

## 2017-12-22 PROBLEM — R06.02 SHORTNESS OF BREATH: Status: RESOLVED | Noted: 2017-04-12 | Resolved: 2017-12-22

## 2017-12-22 PROBLEM — M25.539 WRIST PAIN, ACUTE: Status: RESOLVED | Noted: 2017-07-25 | Resolved: 2017-12-22

## 2017-12-22 PROCEDURE — 99215 OFFICE O/P EST HI 40 MIN: CPT | Performed by: PHYSICIAN ASSISTANT

## 2017-12-22 RX ORDER — TRIAMCINOLONE ACETONIDE 0.25 MG/G
CREAM TOPICAL
Qty: 1 TUBE | Refills: 3 | Status: SHIPPED
Start: 2017-12-22 | End: 2020-06-09

## 2017-12-22 RX ORDER — GABAPENTIN 300 MG/1
300 CAPSULE ORAL 3 TIMES DAILY
Qty: 270 CAP | Refills: 1 | Status: SHIPPED | OUTPATIENT
Start: 2017-12-22 | End: 2018-09-16 | Stop reason: SDUPTHER

## 2017-12-22 RX ORDER — ELETRIPTAN HYDROBROMIDE 20 MG/1
20 TABLET, FILM COATED ORAL
Qty: 10 TAB | Refills: 2 | Status: SHIPPED | OUTPATIENT
Start: 2017-12-22 | End: 2020-06-09 | Stop reason: SDUPTHER

## 2017-12-22 NOTE — ASSESSMENT & PLAN NOTE
Takes Effexor XR 37.5 mg daily. Feels her symptoms have been very stable. Used to follow with psychiatrist but he closed his practice. Declines new referral at this time but will consider if her symptoms worsen.

## 2017-12-22 NOTE — LETTER
[facility]  Dori Mccray P.A.-C.  [address]  [place] [address]  Fax: [phone]   Authorization for Release/Disclosure of   Protected Health Information   Name: MIRYAM JOSEPH : 1950 SSN: [id number]   Address: [address] [place] [id number] Phone:    [phone] (home)    I authorize the entity listed below to release/disclose the PHI below to:   [facility]/Dori Mccray P.A.-C. and Dori Mccray P.A.-C.   Provider or Entity Name:  Nuha fernandez. Dr. Lloyd   Address   [facility], [facility] [[email or web address]]   Phone:      Fax:  [phone]   Reason for request: continuity of care   Information to be released:    [  ] LAST COLONOSCOPY,  including any PATH REPORT and follow-up  [  ] LAST FIT/COLOGUARD RESULT [  ] LAST DEXA  [  ] LAST MAMMOGRAM  [  ] LAST PAP  [  ] LAST LABS [  ] RETINA EXAM REPORT  [  ] IMMUNIZATION RECORDS  [  ] Release all info      [  ] Check here and initial the line next to each item to release ALL health information INCLUDING  _____ Care and treatment for drug and / or alcohol abuse  _____ HIV testing, infection status, or AIDS  _____ Genetic Testing    DATES OF SERVICE OR TIME PERIOD TO BE DISCLOSED: _____________  I understand and acknowledge that:  * This Authorization may be revoked at any time by you in writing, except if your health information has already been used or disclosed.  * Your health information that will be used or disclosed as a result of you signing this authorization could be re-disclosed by the recipient. If this occurs, your re-disclosed health information may no longer be protected by State or Federal laws.  * You may refuse to sign this Authorization. Your refusal will not affect your ability to obtain treatment.  * This Authorization becomes effective upon signing and will  on (date) __________.      If no date is indicated, this Authorization will  one (1) year from the signature date.    Name: Miryam  Yessica Veloz    Signature:   Date:     12/22/2017       PLEASE FAX REQUESTED RECORDS BACK TO: [phone]

## 2017-12-22 NOTE — PROGRESS NOTES
"Subjective:   Miryam Veloz is a 67 y.o. female here today for weight concern, facial/chest itching. She is a new patient to me and is also establishing care today.    Previous PCP: GEMINI Sterling    HPI: Patient has the following current medical problems/concerns:    Essential hypertension  On Propranolol 40 mg TID.    Peripheral edema  Takes Lasix 20 mg daily PRN edema in feet/ankles and hands    Migraine headache  Migraines a couple times per month. Sometimes just gets visual aura without headache. Associated symptoms include nausea. Takes Relpax and Zofran as needed. Was following with [facility], Dr. Quiroga, but can no longer see her due to insurance reasons. Would like referral to new neurologist.    Mild intermittent asthma without complication  Uses albuterol inhaler as needed. Has had to use very sparingly--one per month at the most.    Allergic rhinitis due to pollen  Year-round allergies. Prefers not to take medications. \"I just deal with it.\"    Fibromyalgia  Takes Methocarbamol nightly and Gabapentin 300 mg TID to help control pain. Was following with [facility], Dr. Quiroga, but can no longer see her due to insurance reasons. Would like referral to new neurologist.    Chronic renal failure, stage 2 (mild)  Follows with [facility], Dr. Lloyd. Sees every 6 month. Due to see next in January 2018.    Dyslipidemia  Not currently on medication. Cholesterol last checked in July 2017. Showed elevated triglycerides and low HDL. Followed by [facility], Dr. Ambrocio.    Carpal tunnel syndrome on both sides  Only notices with repetitive hand movement. Used to do  and had a lot of problems at the time, but no longer has this job. Pain not currently a concern.    Acute pain of right knee  Injured right knee in June 2017. Found to have meniscal tear on MRI. Has been using Diclofenac gel as needed and orthopedist recently prescribed Norco for severe " pain. Due to have surgery on 1/5/18 with Dr. Henry Medel at [facility].    Anxiety  Takes Effexor XR 37.5 mg daily for her bipolar disorder, which also helps to control anxiety.    Hypercalcemia  Ongoing issue. States nephrologist is recommending excision of 3 parathyroid glands. Will be following up with specialist in the near future.     Paroxysmal atrial fibrillation (CMS-HCC)  Follows with [facility], Dr. Ambrocio, annually. Complains of intermittent palpitations and shortness of breath. On Xarelto for anticoagulation.    Bipolar affective disorder  Takes Effexor XR 37.5 mg daily. Feels her symptoms have been very stable. Used to follow with psychiatrist but he closed his practice. Declines new referral at this time but will consider if her symptoms worsen.    Lumbar disc disease  Endorses chronic low back pain. States has had prior work-up and found to have disc disease. Denies any sciatica/lower extremity symptoms.     Vitamin B12 deficiency  Takes vitamin B supplementation 1,000 mcg daily.    Elevated PTHrP level (CMS-HCC)  Has associated hypercalcemia. Will be following up with specialist for parathyroid excision.    Obesity (BMI 30-39.9)  Patient expresses frustration at inability to lose anymore weight. Previously was successful at losing 100 lbs, but since then has stagnated. Hard to exercise right now due to right knee pain. Is interested in referral for weight management program.    Abnormal CT scan  Per chart, had incidental finding of right kidney mass on prior Ct. US was recommended for further evaluation. States this study has been ordered but still has to have done.    Chronic anticoagulation  On chronic Xarelto for A fib.    Patient also complains of itching of both sides of her cheeks and upper chest. States she can see little red bumps. Has been trying moisturizing lotion without relief.     Current medicines (including changes today)  Current Outpatient Prescriptions   Medication Sig Dispense  Refill   • eletriptan (RELPAX) 20 MG Tab Take 1 Tab by mouth Once PRN for Migraine for up to 1 dose. 10 Tab 2   • gabapentin (NEURONTIN) 300 MG Cap Take 1 Cap by mouth 3 times a day. 270 Cap 1   • triamcinolone acetonide (KENALOG) 0.025 % Cream Apply to affected areas on face and chest twice daily as needed, 2 weeks on, 2 weeks off 1 Tube 3   • rivaroxaban (XARELTO) 20 MG Tab tablet Take 1 Tab by mouth with dinner. 30 Tab 0   • methocarbamol (ROBAXIN) 750 MG Tab TAKE TWO TABLETS BY MOUTH TWICE A DAY (GENERIC ROBAXIN) 120 Tab 2   • propranolol (INDERAL) 40 MG Tab TAKE ONE TABLET BY MOUTH THREE TIMES A DAY  GENERIC FOR INDERAL 90 Tab 5   • venlafaxine XR (EFFEXOR XR) 37.5 MG CAPSULE SR 24 HR TAKE ONE CAPSULE BY MOUTH TWICE A DAY WITH MEALS 180 Cap 0   • Omega-3 Fatty Acids (FISH OIL) 1000 MG Cap capsule Take 1,000 mg by mouth every day.     • vitamin D (CHOLECALCIFEROL) 1000 UNIT Tab Take 1,000 Units by mouth every day.     • cyanocobalamin (VITAMIN B12) 1000 MCG Tab Take 1 Tab by mouth every day. 30 Tab 3   • furosemide (LASIX) 20 MG Tab TAKE ONE TABLET BY MOUTH DAILY 90 Tab 1   • Diclofenac Sodium 1 % Gel Apply  2 grams twice a day to joints as needed 1 Tube 5   • doxycycline (MONODOX) 100 MG capsule Take 1 Cap by mouth 2 times a day. 14 Cap 0   • ondansetron (ZOFRAN ODT) 4 MG TABLET DISPERSIBLE Take 1 Tab by mouth every 24 hours as needed for Nausea/Vomiting. 20 Tab 2   • albuterol (VENTOLIN HFA) 108 (90 BASE) MCG/ACT Aero Soln inhalation aerosol Inhale 2 Puffs by mouth every 6 hours as needed for Shortness of Breath. 8.5 g 3     No current facility-administered medications for this visit.      She  has a past medical history of Bipolar affective disorder (CMS-HCC) (1995); Chronic knee pain (2000); Depression (5/22/2009); Family history of breast cancer in mother; Headache, frequent episodic tension-type; Hypertension; IBS (irritable bowel syndrome); Lumbar facet arthropathy (2004); Migraine; and Rotator cuff  "syndrome of right shoulder (2008). She also has no past medical history of Breast cancer (CMS-HCC) or IBD (inflammatory bowel disease).         Objective:     Blood pressure 122/66, pulse (!) 56, temperature 37.6 °C (99.7 °F), height 1.778 m (5' 10\"), weight 106.6 kg (235 lb), SpO2 92 %. Body mass index is 33.72 kg/m².   Physical Exam:  Constitutional: Alert, no distress.  Skin: Warm, dry, good turgor. A few very small erythematous papules noted on the left lateral cheek. No rash noted on right cheek or upper chest.   Eye: Conjunctiva clear, lids normal.  ENMT: Lips without lesions, moist mucus membranes.        Assessment and Plan:   The following treatment plan was discussed    1. Paroxysmal atrial fibrillation (CMS-HCC)  Stable, continue Xarelto, continue to follow annually with cardiology.    2. Bipolar disorder in full remission, most recent episode unspecified type (CMS-HCC)  Stable, continue Effexor XR. If symptoms worsen, will refer to new psychiatrist.    3. Essential hypertension  Stable, /66 today. Continue Propranolol TID.    4. Peripheral edema  Intermittent, controlled with PRN Lasix.    5. Migraine with aura and without status migrainosus, not intractable  Stable, Eletriptan refilled. Will refer to new neurologist.  - eletriptan (RELPAX) 20 MG Tab; Take 1 Tab by mouth Once PRN for Migraine for up to 1 dose.  Dispense: 10 Tab; Refill: 2  - REFERRAL TO NEUROLOGY    6. Mild intermittent asthma without complication  Stable, well-controlled. Using albuterol very sparingly for intermittent symptoms.    7. Chronic seasonal allergic rhinitis due to pollen  No current therapy despite symptoms per patient preference.    8. Fibromyalgia  Stable on current medications. Will refer to new neurologist.  - gabapentin (NEURONTIN) 300 MG Cap; Take 1 Cap by mouth 3 times a day.  Dispense: 270 Cap; Refill: 1  - REFERRAL TO NEUROLOGY    9. Chronic renal failure, stage 2 (mild)  Stable, continue to follow with " nephrology every 6 months.    10. Dyslipidemia  Last lipid panel reviewed. Not on medication. Followed by cardiology.    11. Carpal tunnel syndrome on both sides  No current symptoms per patient. Will continue to monitor.    12. Tear of meniscus of right knee as current injury, unspecified meniscus, unspecified tear type, initial encounter  Having upcoming surgery. Follow up with ortho as recommended.  - Patient identified as fall risk.  Appropriate orders and counseling given.    13. Anxiety  Stable, continue Effexor XR. If symptoms worsen, will refer to new psychiatrist.    14. Hypercalcemia  Follow up with specialist as recommended by nephrology for parathryoid surgery.    15. Elevated PTHrP level (CMS-HCC)  Follow up with specialist as recommended by nephrology for parathryoid surgery.    16. Obesity (BMI 30-39.9)  Patient interested in [facility] Weight Management Program. Referral placed.  - Patient identified as having weight management issue.  Appropriate orders and counseling given.  - REFERRAL TO [facility] [employer] IMPROVEMENT PROGRAMS (HIP) Services Requested: Medical Weight Management Program, Registered Dietitian for Medical Nutrition Therapy; Reason for Referral? BMI>30; Reason for Visit: Overweight/Obesity; Future    17. Lumbar disc disease  Chronic issue, stable, will continue to monitor.    18. Vitamin B12 deficiency  Stable, continue vitamin B12 supplementation.    19. Abnormal CT scan  Patient planning on having US study done for further evaluation of possible kidney mass seen on CT.    20. Chronic anticoagulation  Continue Xarelto for A fib/stroke prevention    21. Encounter for screening for malignant neoplasm of breast  - MA-SCREEN MAMMO W/CAD-BILAT; Future    Total 40 minutes face-to-face time spent with patient, with greater than 50% of the total time discussing patient's issues and symptoms as listed above in assessment and plan, as well as managing coordination of care for future evaluation  and treatment.      Followup: Return in about 6 months (around 6/22/2018) for routine f/u; Short.    Dori Mccray P.A.-C.

## 2017-12-22 NOTE — ASSESSMENT & PLAN NOTE
Migraines a couple times per month. Sometimes just gets visual aura without headache. Associated symptoms include nausea. Takes Relpax and Zofran as needed. Was following with [facility], Dr. Quiroga, but can no longer see her due to insurance reasons. Would like referral to new neurologist.

## 2017-12-22 NOTE — ASSESSMENT & PLAN NOTE
Takes Methocarbamol nightly and Gabapentin 300 mg TID to help control pain. Was following with [facility], Dr. Quiroga, but can no longer see her due to insurance reasons. Would like referral to new neurologist.

## 2017-12-22 NOTE — ASSESSMENT & PLAN NOTE
Ongoing issue. States nephrologist is recommending excision of 3 parathyroid glands. Will be following up with specialist in the near future.

## 2017-12-22 NOTE — ASSESSMENT & PLAN NOTE
Only notices with repetitive hand movement. Used to do  and had a lot of problems at the time, but no longer has this job. Pain not currently a concern.

## 2017-12-22 NOTE — ASSESSMENT & PLAN NOTE
Patient expresses frustration at inability to lose anymore weight. Previously was successful at losing 100 lbs, but since then has stagnated. Hard to exercise right now due to right knee pain. Is interested in referral for weight management program.

## 2017-12-22 NOTE — ASSESSMENT & PLAN NOTE
Per chart, had incidental finding of right kidney mass on prior Ct. US was recommended for further evaluation. States this study has been ordered but still has to have done.

## 2017-12-22 NOTE — LETTER
[facility]  Dori Mccray P.A.-C.  [address]  [facility] [address]  Fax: [phone]   Authorization for Release/Disclosure of   Protected Health Information   Name: MIRYAM JOSEPH : 1950 SSN: [id number]   Address: [address] [place] [id number] Phone:    [phone] (home)    I authorize the entity listed below to release/disclose the PHI below to:   [facility]/Dori Mccray P.A.-C. and Dori Mccray P.A.-C.   Provider or Entity Name:  [facility]. Dr. Quiroga    [facility]   Phone:      Fax:  [phone]   Reason for request: continuity of care   Information to be released:    [  ] LAST COLONOSCOPY,  including any PATH REPORT and follow-up  [  ] LAST FIT/COLOGUARD RESULT [  ] LAST DEXA  [  ] LAST MAMMOGRAM  [  ] LAST PAP  [  ] LAST LABS [  ] RETINA EXAM REPORT  [  ] IMMUNIZATION RECORDS  [  ] Release all info      [  ] Check here and initial the line next to each item to release ALL health information INCLUDING  _____ Care and treatment for drug and / or alcohol abuse  _____ HIV testing, infection status, or AIDS  _____ Genetic Testing    DATES OF SERVICE OR TIME PERIOD TO BE DISCLOSED: _____________  I understand and acknowledge that:  * This Authorization may be revoked at any time by you in writing, except if your health information has already been used or disclosed.  * Your health information that will be used or disclosed as a result of you signing this authorization could be re-disclosed by the recipient. If this occurs, your re-disclosed health information may no longer be protected by State or Federal laws.  * You may refuse to sign this Authorization. Your refusal will not affect your ability to obtain treatment.  * This Authorization becomes effective upon signing and will  on (date) __________.      If no date is indicated, this Authorization will  one (1) year from the signature date.    Name: Miryam  Yessica Veloz    Signature:   Date:     12/22/2017       PLEASE FAX REQUESTED RECORDS BACK TO: [phone]

## 2017-12-22 NOTE — ASSESSMENT & PLAN NOTE
Injured right knee in June 2017. Found to have meniscal tear on MRI. Has been using Diclofenac gel as needed and orthopedist recently prescribed Norco for severe pain. Due to have surgery on 1/5/18 with Dr. Henry Medel at [facility].

## 2017-12-22 NOTE — ASSESSMENT & PLAN NOTE
Follows with [facility], Dr. Ambrocio, annually. Complains of intermittent palpitations and shortness of breath. On Xarelto for anticoagulation.

## 2017-12-22 NOTE — ASSESSMENT & PLAN NOTE
Endorses chronic low back pain. States has had prior work-up and found to have disc disease. Denies any sciatica/lower extremity symptoms.

## 2017-12-22 NOTE — ASSESSMENT & PLAN NOTE
Not currently on medication. Cholesterol last checked in July 2017. Showed elevated triglycerides and low HDL. Followed by [facility], Dr. Ambrocio.

## 2017-12-26 DIAGNOSIS — Z01.812 PRE-OPERATIVE LABORATORY EXAMINATION: ICD-10-CM

## 2017-12-26 DIAGNOSIS — Z01.810 PRE-OPERATIVE CARDIOVASCULAR EXAMINATION: ICD-10-CM

## 2017-12-26 LAB
ALBUMIN SERPL BCP-MCNC: 3.8 G/DL (ref 3.2–4.9)
ALBUMIN/GLOB SERPL: 1.2 G/DL
ALP SERPL-CCNC: 97 U/L (ref 30–99)
ALT SERPL-CCNC: 14 U/L (ref 2–50)
ANION GAP SERPL CALC-SCNC: 8 MMOL/L (ref 0–11.9)
AST SERPL-CCNC: 16 U/L (ref 12–45)
BILIRUB SERPL-MCNC: 0.4 MG/DL (ref 0.1–1.5)
BUN SERPL-MCNC: 15 MG/DL (ref 8–22)
CALCIUM SERPL-MCNC: 11.5 MG/DL (ref 8.5–10.5)
CHLORIDE SERPL-SCNC: 109 MMOL/L (ref 96–112)
CO2 SERPL-SCNC: 26 MMOL/L (ref 20–33)
CREAT SERPL-MCNC: 0.87 MG/DL (ref 0.5–1.4)
ERYTHROCYTE [DISTWIDTH] IN BLOOD BY AUTOMATED COUNT: 45.6 FL (ref 35.9–50)
GFR SERPL CREATININE-BSD FRML MDRD: >60 ML/MIN/1.73 M 2
GLOBULIN SER CALC-MCNC: 3.3 G/DL (ref 1.9–3.5)
GLUCOSE SERPL-MCNC: 96 MG/DL (ref 65–99)
HCT VFR BLD AUTO: 46.4 % (ref 37–47)
HGB BLD-MCNC: 15.2 G/DL (ref 12–16)
MCH RBC QN AUTO: 31.3 PG (ref 27–33)
MCHC RBC AUTO-ENTMCNC: 32.8 G/DL (ref 33.6–35)
MCV RBC AUTO: 95.7 FL (ref 81.4–97.8)
PLATELET # BLD AUTO: 204 K/UL (ref 164–446)
PMV BLD AUTO: 12.3 FL (ref 9–12.9)
POTASSIUM SERPL-SCNC: 4.2 MMOL/L (ref 3.6–5.5)
PROT SERPL-MCNC: 7.1 G/DL (ref 6–8.2)
RBC # BLD AUTO: 4.85 M/UL (ref 4.2–5.4)
SODIUM SERPL-SCNC: 143 MMOL/L (ref 135–145)
WBC # BLD AUTO: 7.7 K/UL (ref 4.8–10.8)

## 2017-12-26 PROCEDURE — 36415 COLL VENOUS BLD VENIPUNCTURE: CPT

## 2017-12-26 PROCEDURE — 85027 COMPLETE CBC AUTOMATED: CPT

## 2017-12-26 PROCEDURE — 80053 COMPREHEN METABOLIC PANEL: CPT

## 2017-12-26 RX ORDER — DIAZEPAM 2 MG/1
2 TABLET ORAL EVERY 6 HOURS PRN
COMMUNITY
End: 2023-09-24

## 2017-12-26 NOTE — OR NURSING
"Pre-admit appointment completed. \"Preparing for your procedure\" sheet given to pt along with verbal and written instructions. Pt instructed to continue regularly prescribed medications through the day before surgery. Pt instructed to take the following medications the day of surgery with a sip of water, per anesthesia protocol; neurontin, robaxin, inderal. If needed-albuterol MDI, valium, relpax, zofran.    Pt to bring albuterol MDI DOS.    Pt to verify with cardiologist and surgeon how long to hold xarelto prior to surgery.   "

## 2017-12-28 ENCOUNTER — TELEPHONE (OUTPATIENT)
Dept: CARDIOLOGY | Facility: MEDICAL CENTER | Age: 67
End: 2017-12-28

## 2017-12-28 NOTE — TELEPHONE ENCOUNTER
Received MyChart request from pt stating she is having knee arthroscopy on 1/5/18 with Dr. Henry Medel and needs to know what to do about her Xarelto.    Called Dr. Medel's office, p: [phone], LM for his MA to call back or fax clearance request to [phone].

## 2017-12-29 ENCOUNTER — TELEPHONE (OUTPATIENT)
Dept: CARDIOLOGY | Facility: MEDICAL CENTER | Age: 67
End: 2017-12-29

## 2017-12-29 NOTE — TELEPHONE ENCOUNTER
Patient is a low to intermediate risk candidate being scheduled for a low to intermediate risk procedure no further cardiac workup needed prior to surgery.  Okay to hold Xarelto 24-48 hours prior to surgery.  Marian

## 2018-01-05 ENCOUNTER — HOSPITAL ENCOUNTER (OUTPATIENT)
Facility: MEDICAL CENTER | Age: 68
End: 2018-01-05
Attending: ORTHOPAEDIC SURGERY | Admitting: ORTHOPAEDIC SURGERY
Payer: COMMERCIAL

## 2018-01-05 VITALS
HEIGHT: 71 IN | TEMPERATURE: 97.2 F | OXYGEN SATURATION: 95 % | RESPIRATION RATE: 16 BRPM | SYSTOLIC BLOOD PRESSURE: 131 MMHG | DIASTOLIC BLOOD PRESSURE: 68 MMHG | WEIGHT: 237.66 LBS | BODY MASS INDEX: 33.27 KG/M2

## 2018-01-05 PROCEDURE — 700105 HCHG RX REV CODE 258: Performed by: ORTHOPAEDIC SURGERY

## 2018-01-05 PROCEDURE — 160025 RECOVERY II MINUTES (STATS): Performed by: ORTHOPAEDIC SURGERY

## 2018-01-05 PROCEDURE — A6222 GAUZE <=16 IN NO W/SAL W/O B: HCPCS | Performed by: ORTHOPAEDIC SURGERY

## 2018-01-05 PROCEDURE — 160009 HCHG ANES TIME/MIN: Performed by: ORTHOPAEDIC SURGERY

## 2018-01-05 PROCEDURE — 160036 HCHG PACU - EA ADDL 30 MINS PHASE I: Performed by: ORTHOPAEDIC SURGERY

## 2018-01-05 PROCEDURE — 160002 HCHG RECOVERY MINUTES (STAT): Performed by: ORTHOPAEDIC SURGERY

## 2018-01-05 PROCEDURE — 501838 HCHG SUTURE GENERAL: Performed by: ORTHOPAEDIC SURGERY

## 2018-01-05 PROCEDURE — 502580 HCHG PACK, KNEE ARTHROSCOPY: Performed by: ORTHOPAEDIC SURGERY

## 2018-01-05 PROCEDURE — 700102 HCHG RX REV CODE 250 W/ 637 OVERRIDE(OP)

## 2018-01-05 PROCEDURE — 700111 HCHG RX REV CODE 636 W/ 250 OVERRIDE (IP)

## 2018-01-05 PROCEDURE — 160046 HCHG PACU - 1ST 60 MINS PHASE II: Performed by: ORTHOPAEDIC SURGERY

## 2018-01-05 PROCEDURE — 160029 HCHG SURGERY MINUTES - 1ST 30 MINS LEVEL 4: Performed by: ORTHOPAEDIC SURGERY

## 2018-01-05 PROCEDURE — 700101 HCHG RX REV CODE 250

## 2018-01-05 PROCEDURE — A9270 NON-COVERED ITEM OR SERVICE: HCPCS

## 2018-01-05 PROCEDURE — 160041 HCHG SURGERY MINUTES - EA ADDL 1 MIN LEVEL 4: Performed by: ORTHOPAEDIC SURGERY

## 2018-01-05 PROCEDURE — 160048 HCHG OR STATISTICAL LEVEL 1-5: Performed by: ORTHOPAEDIC SURGERY

## 2018-01-05 PROCEDURE — 160035 HCHG PACU - 1ST 60 MINS PHASE I: Performed by: ORTHOPAEDIC SURGERY

## 2018-01-05 RX ORDER — BUPIVACAINE HYDROCHLORIDE AND EPINEPHRINE 5; 5 MG/ML; UG/ML
INJECTION, SOLUTION EPIDURAL; INTRACAUDAL; PERINEURAL
Status: DISCONTINUED | OUTPATIENT
Start: 2018-01-05 | End: 2018-01-05 | Stop reason: HOSPADM

## 2018-01-05 RX ORDER — DIPHENHYDRAMINE HYDROCHLORIDE 50 MG/ML
25 INJECTION INTRAMUSCULAR; INTRAVENOUS EVERY 6 HOURS PRN
Status: DISCONTINUED | OUTPATIENT
Start: 2018-01-05 | End: 2018-01-05 | Stop reason: HOSPADM

## 2018-01-05 RX ORDER — LIDOCAINE HYDROCHLORIDE 10 MG/ML
INJECTION, SOLUTION INFILTRATION; PERINEURAL
Status: COMPLETED
Start: 2018-01-05 | End: 2018-01-05

## 2018-01-05 RX ORDER — SODIUM CHLORIDE, SODIUM LACTATE, POTASSIUM CHLORIDE, CALCIUM CHLORIDE 600; 310; 30; 20 MG/100ML; MG/100ML; MG/100ML; MG/100ML
INJECTION, SOLUTION INTRAVENOUS CONTINUOUS
Status: DISCONTINUED | OUTPATIENT
Start: 2018-01-05 | End: 2018-01-05 | Stop reason: HOSPADM

## 2018-01-05 RX ORDER — HALOPERIDOL 5 MG/ML
1 INJECTION INTRAMUSCULAR EVERY 6 HOURS PRN
Status: DISCONTINUED | OUTPATIENT
Start: 2018-01-05 | End: 2018-01-05 | Stop reason: HOSPADM

## 2018-01-05 RX ORDER — OXYCODONE HCL 5 MG/5 ML
SOLUTION, ORAL ORAL
Status: COMPLETED
Start: 2018-01-05 | End: 2018-01-05

## 2018-01-05 RX ORDER — DEXAMETHASONE SODIUM PHOSPHATE 4 MG/ML
4 INJECTION, SOLUTION INTRA-ARTICULAR; INTRALESIONAL; INTRAMUSCULAR; INTRAVENOUS; SOFT TISSUE
Status: DISCONTINUED | OUTPATIENT
Start: 2018-01-05 | End: 2018-01-05 | Stop reason: HOSPADM

## 2018-01-05 RX ORDER — SCOLOPAMINE TRANSDERMAL SYSTEM 1 MG/1
1 PATCH, EXTENDED RELEASE TRANSDERMAL
Status: DISCONTINUED | OUTPATIENT
Start: 2018-01-05 | End: 2018-01-05 | Stop reason: HOSPADM

## 2018-01-05 RX ORDER — ONDANSETRON 2 MG/ML
4 INJECTION INTRAMUSCULAR; INTRAVENOUS EVERY 4 HOURS PRN
Status: DISCONTINUED | OUTPATIENT
Start: 2018-01-05 | End: 2018-01-05 | Stop reason: HOSPADM

## 2018-01-05 RX ADMIN — OXYCODONE HYDROCHLORIDE 5 MG: 5 SOLUTION ORAL at 11:30

## 2018-01-05 RX ADMIN — FENTANYL CITRATE 25 MCG: 50 INJECTION, SOLUTION INTRAMUSCULAR; INTRAVENOUS at 11:30

## 2018-01-05 RX ADMIN — LIDOCAINE HYDROCHLORIDE: 10 INJECTION, SOLUTION INFILTRATION; PERINEURAL at 09:23

## 2018-01-05 RX ADMIN — FENTANYL CITRATE 25 MCG: 50 INJECTION, SOLUTION INTRAMUSCULAR; INTRAVENOUS at 12:00

## 2018-01-05 RX ADMIN — SODIUM CHLORIDE, POTASSIUM CHLORIDE, SODIUM LACTATE AND CALCIUM CHLORIDE: 600; 310; 30; 20 INJECTION, SOLUTION INTRAVENOUS at 09:23

## 2018-01-05 RX ADMIN — FENTANYL CITRATE 25 MCG: 50 INJECTION, SOLUTION INTRAMUSCULAR; INTRAVENOUS at 11:42

## 2018-01-05 ASSESSMENT — PAIN SCALES - GENERAL
PAINLEVEL_OUTOF10: 6
PAINLEVEL_OUTOF10: 4
PAINLEVEL_OUTOF10: ASSUMED PAIN PRESENT
PAINLEVEL_OUTOF10: 5
PAINLEVEL_OUTOF10: ASSUMED PAIN PRESENT
PAINLEVEL_OUTOF10: 5

## 2018-01-05 NOTE — OR NURSING
1104 Pt to PACU from OR via gurney, respirations spontaneous and non-labored via OPA. Right knee surgical sites clean, dry and intact, pt not arousable on calling, VSS. Ice pack placed on right knee surgical sight, 2+ right pedal pulse noted and cap refill < 2 seconds to right toes.   1111 Report to YIN Cotto.

## 2018-01-05 NOTE — OR NURSING
1250: Settled in recliner post short ambulation from [facility], pain is tolerable, no nausea, awake and alert. Dressing is CDI.  1319: D/Barrie to care of family post uneventful stay in PACU 2.

## 2018-01-05 NOTE — OR NURSING
1111 Received report from YIN Bundy Right knee surgical sites clean, dry and intact, pt not arousable on calling, VSS.  2+ right pedal pulse noted and cap refill < 2 seconds to right toes.  Ice pack in place to right knee   1123 OPA dc'd at this time. VSS. Pt awake, alert and oriented. Pt denies pain and nausea at this time   1135  VSS no change   1150 Pt c/o 6/10 pain. Plan to medicate. See MAR.   1200 instructed pt on use of IS. VSS. Pt still c/o 5/10 pain. Plan to continue to medicate. See MAR   1215 Pt states pain is tolerable. VSS. Pt denies nausea   1230 No change   1250 Pt meets criteria for stage two at this time. VSS. Gave report to YIN Ballesteros

## 2018-01-05 NOTE — DISCHARGE INSTRUCTIONS
ACTIVITY: Rest and take it easy for the first 24 hours.  A responsible adult is recommended to remain with you during that time.  It is normal to feel sleepy.  We encourage you to not do anything that requires balance, judgment or coordination.    MILD FLU-LIKE SYMPTOMS ARE NORMAL. YOU MAY EXPERIENCE GENERALIZED MUSCLE ACHES, THROAT IRRITATION, HEADACHE AND/OR SOME NAUSEA.    FOR 24 HOURS DO NOT:  Drive, operate machinery or run household appliances.  Drink beer or alcoholic beverages.   Make important decisions or sign legal documents.    SPECIAL INSTRUCTIONS: Weight bearing as tolerated to Right leg.     DIET: To avoid nausea, slowly advance diet as tolerated, avoiding spicy or greasy foods for the first day.  Add more substantial food to your diet according to your physician's instructions.  Babies can be fed formula or breast milk as soon as they are hungry.  INCREASE FLUIDS AND FIBER TO AVOID CONSTIPATION.    SURGICAL DRESSING/BATHING: May shower with wound covered post operative day three (Monday)      FOLLOW-UP APPOINTMENT:  A follow-up appointment should be arranged with your doctor ; call to schedule.    You should CALL YOUR PHYSICIAN if you develop:  Fever greater than 101 degrees F.  Pain not relieved by medication, or persistent nausea or vomiting.  Excessive bleeding (blood soaking through dressing) or unexpected drainage from the wound.  Extreme redness or swelling around the incision site, drainage of pus or foul smelling drainage.  Inability to urinate or empty your bladder within 8 hours.  Problems with breathing or chest pain.    You should call 911 if you develop problems with breathing or chest pain.  If you are unable to contact your doctor or surgical center, you should go to the nearest emergency room or urgent care center.  Physician's telephone #: Dr. Medel [phone]    If any questions arise, call your doctor.  If your doctor is not available, please feel free to call the Surgical Center  at [phone].  The Center is open Monday through Friday from 7AM to 7PM.  You can also call the HEALTH HOTLINE open 24 hours/day, 7 days/week and speak to a nurse at [phone], or toll free at [phone].    A registered nurse may call you a few days after your surgery to see how you are doing after your procedure.    MEDICATIONS: Resume taking daily medication.  Take prescribed pain medication with food.  If no medication is prescribed, you may take non-aspirin pain medication if needed.  PAIN MEDICATION CAN BE VERY CONSTIPATING.  Take a stool softener or laxative such as senokot, pericolace, or milk of magnesia if needed.    Prescription given for Preoperatively.  Last pain medication given at 1130 10mg oxycodone .    If your physician has prescribed pain medication that includes Acetaminophen (Tylenol), do not take additional Acetaminophen (Tylenol) while taking the prescribed medication.    Depression / Suicide Risk    As you are discharged from this [facility] facility, it is important to learn how to keep safe from harming yourself.    Recognize the warning signs:  · Abrupt changes in personality, positive or negative- including increase in energy   · Giving away possessions  · Change in eating patterns- significant weight changes-  positive or negative  · Change in sleeping patterns- unable to sleep or sleeping all the time   · Unwillingness or inability to communicate  · Depression  · Unusual sadness, discouragement and loneliness  · Talk of wanting to die  · Neglect of personal appearance   · Rebelliousness- reckless behavior  · Withdrawal from people/activities they love  · Confusion- inability to concentrate     If you or a loved one observes any of these behaviors or has concerns about self-harm, here's what you can do:  · Talk about it- your feelings and reasons for harming yourself  · Remove any means that you might use to hurt yourself (examples: pills, rope, extension cords,  firearm)  · Get professional help from the community (Mental Health, Substance Abuse, psychological counseling)  · Do not be alone:Call your Safe Contact- someone whom you trust who will be there for you.  · Call your local CRISIS HOTLINE 245-7303 or 064-218-7411  · Call your local Children's Mobile Crisis Response Team [place] (830) 710-8785 or www.[employer]  · Call the toll free National Suicide Prevention Hotlines   · National Suicide Prevention Lifeline 565-992-ZJFD (7738)  · National Hope Line Network 800-SUICIDE (703-2295)

## 2018-01-05 NOTE — OP REPORT
DATE OF SERVICE:  01/05/2018    PREOPERATIVE DIAGNOSIS:  Lateral meniscus tear, right knee.    POSTOPERATIVE DIAGNOSES:  Lateral meniscus tear, right knee with medial   meniscus tear and arthritis.    PROCEDURE:  Arthroscopy, partial medial and lateral meniscectomy, debridement   of medial and lateral condyles and patella.    SURGEON:  Gabi Medel MD    ANESTHESIA:  General.    ANESTHESIOLOGIST:  Alan Reid MD    SUMMARY:  After satisfactory general anesthesia, the patient's right leg was   prepped and draped in sterile fashion.  Tourniquet was applied, but was not   inflated.  Arthroscopic portals were made.  Inspection of the patella revealed   some diffuse grade III chondromalacia, which I debrided with a shaver.  The   medial compartment revealed a small tear of the posterior horn of the medial   meniscus, which I debrided with a shaver.  There was diffuse grade III   chondromalacia of the medial femoral condyle, which I debrided with a shaver.    The ACL was normal.  The lateral compartment revealed a complex tear of the   lateral meniscus extending to the midsubstance all the way to the anterior   horn.  There was also diffuse grade III chondromalacia of the lateral tibial   plateau and femoral condyle, which I debrided.  The wounds were injected with   0.5% Marcaine with epinephrine and closed with 4-0 chromic subcuticular,   dressings were applied and the patient was returned to the recovery room in   good condition.  There were no complications.       ____________________________________     GABI MEDEL MD    NBY / NTS    DD:  01/05/2018 11:05:54  DT:  01/05/2018 11:28:40    D#:  [id number]  Job#:  [id number]

## 2018-01-05 NOTE — OR SURGEON
Immediate Post OP Note    PreOp Diagnosis: Lateral men tear right knee with DJd    PostOp Diagnosis: same with med men tear    Procedure(s):  KNEE ARTHROSCOPY - Wound Class: Clean  MENISCECTOMY-partial medial and lateral - Wound Class: Clean  DEBRIDEMENT-medial, lateral, and patellar chondyle - Wound Class: Clean    Surgeon(s):  Henry Medel M.D.    Anesthesiologist/Type of Anesthesia:  Anesthesiologist: Alan Reid M.D./General    Surgical Staff:  Circulator: Tiffany Jackson R.N.  Scrub Person: Vikash Luque    Specimens:  * No specimens in log *    Estimated Blood Loss: 0    Findings: 0    Complications: 0        1/5/2018 11:11 AM Henry Medel

## 2018-01-31 ENCOUNTER — APPOINTMENT (OUTPATIENT)
Dept: HEALTH INFORMATION MANAGEMENT | Facility: MEDICAL CENTER | Age: 68
End: 2018-01-31
Payer: COMMERCIAL

## 2018-04-02 ENCOUNTER — OFFICE VISIT (OUTPATIENT)
Dept: HEALTH INFORMATION MANAGEMENT | Facility: MEDICAL CENTER | Age: 68
End: 2018-04-02
Payer: COMMERCIAL

## 2018-04-02 VITALS
SYSTOLIC BLOOD PRESSURE: 128 MMHG | BODY MASS INDEX: 35.53 KG/M2 | DIASTOLIC BLOOD PRESSURE: 76 MMHG | OXYGEN SATURATION: 95 % | WEIGHT: 248.2 LBS | HEIGHT: 70 IN | HEART RATE: 58 BPM

## 2018-04-02 VITALS — HEIGHT: 70 IN | BODY MASS INDEX: 35.53 KG/M2 | WEIGHT: 248.2 LBS

## 2018-04-02 DIAGNOSIS — M25.561 CHRONIC PAIN OF RIGHT KNEE: ICD-10-CM

## 2018-04-02 DIAGNOSIS — F31.70 BIPOLAR DISORDER IN FULL REMISSION, MOST RECENT EPISODE UNSPECIFIED TYPE (HCC): ICD-10-CM

## 2018-04-02 DIAGNOSIS — R63.5 WEIGHT GAIN: ICD-10-CM

## 2018-04-02 DIAGNOSIS — E78.5 DYSLIPIDEMIA: ICD-10-CM

## 2018-04-02 DIAGNOSIS — G89.29 CHRONIC PAIN OF RIGHT KNEE: ICD-10-CM

## 2018-04-02 DIAGNOSIS — I10 ESSENTIAL HYPERTENSION: ICD-10-CM

## 2018-04-02 DIAGNOSIS — E66.9 OBESITY (BMI 30-39.9): ICD-10-CM

## 2018-04-02 DIAGNOSIS — N18.2 CHRONIC RENAL FAILURE, STAGE 2 (MILD): ICD-10-CM

## 2018-04-02 DIAGNOSIS — F41.9 ANXIETY: ICD-10-CM

## 2018-04-02 PROCEDURE — 93000 ELECTROCARDIOGRAM COMPLETE: CPT | Performed by: INTERNAL MEDICINE

## 2018-04-02 PROCEDURE — 97802 MEDICAL NUTRITION INDIV IN: CPT | Performed by: DIETITIAN, REGISTERED

## 2018-04-02 PROCEDURE — 99205 OFFICE O/P NEW HI 60 MIN: CPT | Mod: 25 | Performed by: INTERNAL MEDICINE

## 2018-04-02 NOTE — PATIENT INSTRUCTIONS
One Robard meal replacement in am (two bars)  High protein/low carb lunch and dinner and two snacks  Keep kcal <1600 to start, better is <1200 kcal per day  64+ oz water per day    Track intake  Consider weaning off Gabapentin  Consider walking program

## 2018-04-02 NOTE — PROGRESS NOTES
"4/2/2018   Referring Provider: Dori Mccray P.A.-C.       Time in/out: 8574 - 2776    Anthropometrics/Objective  Vitals:    04/02/18 1133   Weight: 112.6 kg (248 lb 3.2 oz)   Height: 1.778 m (5' 10\")     BMI: Body mass index is 35.61 kg/m².  Stated Goal Weight: < 200#  See comprehensive patient history form for further information     Subjective:  Pt is here today for the initial screening visit for the medical weight management program.   -Lost 100# in the last two years through improved dietary choices, like no soda  -Regained ~30# in the last three months once she restarted eating fast food out of convenience  -Is moving to a different location for work that is ~4 miles from her home  -Wants to eat lunch from home more often than she currently is  -Currently skips breakfast daily    See Medical Questionnaire for more detailed diet history     Nutrition Diagnosis (PES Statement)  · Obesity related to excessive energy intake and inadequate energy expenditure as evidenced by BMI >30     Biochemical data, medical test and procedures  Lab Results   Component Value Date/Time    CHOLSTRLTOT 171 07/11/2017 09:48 AM    LDL 99 07/11/2017 09:48 AM    HDL 37 (A) 07/11/2017 09:48 AM    TRIGLYCERIDE 177 (H) 07/11/2017 09:48 AM         Nutrition Intervention  Nutrition Prescription  Recommended Daily Kcals: 1600 Kcal based on REE of 1681   Recommended Daily Protein: ~80 grams/day (20% kcal)     Meal Plan Recommendation   Low Calorie Diet with 1 meal replacements per day     Comprehensive Nutrition Education Instruction or training leading to in-depth nutrition related knowledge about:  Benefits to following meal plan, Combine carb, protein and fat at each meal, Meal timing and spacing, Portion control, Sweets and alcohol in moderation   Handouts provided regarding topics discussed     Monitoring & Evaluation Plan    Behavioral-Environmental:  Behavior: Keep a food journal and bring to next appointment   Physical activity: " increase as tolerated with upper body exercises    Food / Nutrient Intake:  Food intake: Follow meal plan as discussed (see above). Avoid concentrated sweets and processed carbs.  Use Robard meal replacement bars for breakfast daily.  Eat lunch from home daily, consisting of protein, vegetables, and up to 1/2 cup CHO.  Fluid intake: Consume at least 64 oz water per day. Avoid all unsweetened beverages. Limit coffee to 1 cup a day (ideally no cream or sugar). Avoid alcohol.    Physical Signs / Symptoms:  Weight change:  Weight loss to goal      Assessment Notes:  I do not want to overload [place] with changes at this time so we are mainly focusing on improving her meal consistency at breakfast and her food choices at lunch.  The Robard meal replacement bars will be helpful for eating breakfast daily and she purchased one week's worth to try them out; she will buy more if she likes them.  She understands that she needs to eat breakfast daily and we can walk through foods choices with her at some point.    As for her lunch, she typically eats fast food for lunch because she is so far from home - the move to a location closer to her house will eliminate that excuse for eating fast food.  She does not like many meats and will eat more cottage cheese, peanut butter, and eggs for protein at lunch time.  She admits that she needs to eat CHO at meals so I have asked her to limit herself to no more than 1/2 cup at a meal and if she cannot limit herself to 1/2 cup then she is not to eat any.    We will move onto improving dinner as well as lunch at our next appointment.      Follow up 2 weeks
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
## 2018-04-30 DIAGNOSIS — I48.0 PAROXYSMAL ATRIAL FIBRILLATION (HCC): ICD-10-CM

## 2018-05-01 RX ORDER — RIVAROXABAN 20 MG/1
TABLET, FILM COATED ORAL
Qty: 30 TAB | Refills: 5 | Status: SHIPPED | OUTPATIENT
Start: 2018-05-01 | End: 2018-12-05 | Stop reason: SDUPTHER

## 2018-06-12 RX ORDER — VENLAFAXINE HYDROCHLORIDE 37.5 MG/1
37.5 CAPSULE, EXTENDED RELEASE ORAL 2 TIMES DAILY
Qty: 180 CAP | Refills: 1 | Status: ON HOLD | OUTPATIENT
Start: 2018-06-12 | End: 2018-09-24

## 2018-06-14 ENCOUNTER — TELEPHONE (OUTPATIENT)
Dept: MEDICAL GROUP | Facility: PHYSICIAN GROUP | Age: 68
End: 2018-06-14

## 2018-06-14 NOTE — TELEPHONE ENCOUNTER
Future Appointments       Provider Department Center    6/15/2018 3:20 PM RESOURCE PROVIDER [facility]        ESTABLISHED PATIENT PRE-VISIT PLANNING     Note: Patient will not be contacted if there is no indication to call.     1.  Reviewed notes from the last few office visits within the medical group: Yes    2.  If any orders were placed at last visit or intended to be done for this visit (i.e. 6 mos follow-up), do we have Results/Consult Notes?        •  Labs - Labs ordered, completed on 12/26/17 and results are in chart.       •  Imaging - Imaging ordered, NOT completed. Patient advised to complete prior to next appointment.       •  Referrals - Referral ordered, patient has NOT been seen.    3. Is this appointment scheduled as a Hospital Follow-Up? No    4.  Immunizations were updated in [facility] using WebIZ?: Yes       •  Web Iz Recommendations: TD and ZOSTAVAX (Shingles)    5.  Patient is due for the following Health Maintenance Topics:   Health Maintenance Due   Topic Date Due   • PAP SMEAR  09/20/1973   • MAMMOGRAM  05/29/2014   • IMM PNEUMOCOCCAL (2 of 2 - PPSV23) 01/11/2018       6.  MDX printed for Provider? NO INS [facility]    7.  Patient was informed to arrive 15 min prior to their scheduled appointment and bring in their medication bottles. Confirmed through automated call

## 2018-06-15 ENCOUNTER — OFFICE VISIT (OUTPATIENT)
Dept: MEDICAL GROUP | Facility: PHYSICIAN GROUP | Age: 68
End: 2018-06-15
Payer: COMMERCIAL

## 2018-06-15 VITALS
HEART RATE: 83 BPM | SYSTOLIC BLOOD PRESSURE: 140 MMHG | HEIGHT: 70 IN | TEMPERATURE: 98.6 F | WEIGHT: 242 LBS | OXYGEN SATURATION: 93 % | BODY MASS INDEX: 34.65 KG/M2 | DIASTOLIC BLOOD PRESSURE: 70 MMHG

## 2018-06-15 DIAGNOSIS — R60.9 PERIPHERAL EDEMA: ICD-10-CM

## 2018-06-15 DIAGNOSIS — F31.76 BIPOLAR DISORDER, IN FULL REMISSION, MOST RECENT EPISODE DEPRESSED (HCC): ICD-10-CM

## 2018-06-15 DIAGNOSIS — L82.1 SEBORRHEIC KERATOSES: ICD-10-CM

## 2018-06-15 DIAGNOSIS — G43.109 MIGRAINE WITH AURA AND WITHOUT STATUS MIGRAINOSUS, NOT INTRACTABLE: ICD-10-CM

## 2018-06-15 DIAGNOSIS — M79.7 FIBROMYALGIA: ICD-10-CM

## 2018-06-15 PROCEDURE — 99214 OFFICE O/P EST MOD 30 MIN: CPT | Performed by: NURSE PRACTITIONER

## 2018-06-15 RX ORDER — METHOCARBAMOL 750 MG/1
TABLET, FILM COATED ORAL
Qty: 120 TAB | Refills: 2 | Status: SHIPPED | OUTPATIENT
Start: 2018-06-15 | End: 2018-11-21

## 2018-06-15 RX ORDER — FUROSEMIDE 20 MG/1
TABLET ORAL
Qty: 90 TAB | Refills: 1 | Status: SHIPPED | OUTPATIENT
Start: 2018-06-15 | End: 2018-11-21

## 2018-06-15 ASSESSMENT — PATIENT HEALTH QUESTIONNAIRE - PHQ9
5. POOR APPETITE OR OVEREATING: 3 - NEARLY EVERY DAY
CLINICAL INTERPRETATION OF PHQ2 SCORE: 6
SUM OF ALL RESPONSES TO PHQ QUESTIONS 1-9: 17

## 2018-06-15 NOTE — PROGRESS NOTES
Chief Complaint   Patient presents with   • Medication Refill     Robaxin and Lasix    • Referral Needed     Nuerology        HISTORY OF PRESENT ILLNESS: Patient is a 67 y.o. female established patient who presents today to request refill of medication and report increased depression recently.   She also notes itchy skin lesions on her trunk and left arm.         Bipolar affective disorder  Her depression has been worse the past three to four weeks. Her counselor has moved to [place].  She has had some disagreements with her son .She denies suicidal ideation. She has coping mechanisms that she can use. She will begin doing those things. She agrees to a counseling referral .     Fibromyalgia  She has more pain the past few weeks. She can't take her medication during the day , the Gabapentin and robaxin is helpful but causes sedation.     Migraine headache  Her referral to neurology has  and she needs a new one.     Peripheral edema  She has decreased the Lasix to as needed on the advise of her nephrologist.       Patient Active Problem List    Diagnosis Date Noted   • Paroxysmal atrial fibrillation (HCC) 2017     Priority: Medium   • Hypercalcemia 10/20/2015     Priority: Medium   • Chronic renal failure, stage 2 (mild) 2017     Priority: Low   • Fibromyalgia 2017     Priority: Low   • Mild intermittent asthma without complication 2017   • Carpal tunnel syndrome on both sides 2017   • Chronic anticoagulation 2017   • Dyslipidemia 2017   • Peripheral edema 2017   • Abnormal CT scan 2017   • Acute pain of right knee 2017   • Obesity (BMI 30-39.9) 2017   • Elevated PTHrP level ([facility]) 2016   • Vitamin B12 deficiency 2015   • Essential hypertension 2014   • Lumbar disc disease 2012   • Migraine headache    • Lumbar facet arthropathy (HCC)    • Allergic rhinitis due to pollen 2011   • Anxiety 07/15/2011   • Bipolar affective  disorder ([facility]) 05/22/2009       Allergies:Codeine; Imitrex [sumatriptan succinate]; Penicillins; Sulfa drugs; Azithromycin; Dilaudid [hydromorphone]; Other drug; Sensipar [cinacalcet]; Imipramine; and Seroquel [quetiapine fumarate]    Current Outpatient Prescriptions   Medication Sig Dispense Refill   • methocarbamol (ROBAXIN) 750 MG Tab TAKE TWO TABLETS BY MOUTH TWICE A DAY (GENERIC ROBAXIN) 120 Tab 2   • furosemide (LASIX) 20 MG Tab TAKE ONE TABLET BY MOUTH DAILY PRN 90 Tab 1   • venlafaxine XR (EFFEXOR XR) 37.5 MG CAPSULE SR 24 HR Take 1 Cap by mouth 2 times a day. 180 Cap 1   • XARELTO 20 MG Tab tablet TAKE ONE TABLET BY MOUTH DAILY WITH DINNER 30 Tab 5   • diazepam (VALIUM) 2 MG Tab Take 2 mg by mouth every 6 hours as needed for Anxiety.     • eletriptan (RELPAX) 20 MG Tab Take 1 Tab by mouth Once PRN for Migraine for up to 1 dose. 10 Tab 2   • gabapentin (NEURONTIN) 300 MG Cap Take 1 Cap by mouth 3 times a day. 270 Cap 1   • triamcinolone acetonide (KENALOG) 0.025 % Cream Apply to affected areas on face and chest twice daily as needed, 2 weeks on, 2 weeks off 1 Tube 3   • propranolol (INDERAL) 40 MG Tab TAKE ONE TABLET BY MOUTH THREE TIMES A DAY  GENERIC FOR INDERAL 90 Tab 5   • Diclofenac Sodium 1 % Gel Apply  2 grams twice a day to joints as needed 1 Tube 5   • Omega-3 Fatty Acids (FISH OIL) 1000 MG Cap capsule Take 1,000 mg by mouth every day.     • ondansetron (ZOFRAN ODT) 4 MG TABLET DISPERSIBLE Take 1 Tab by mouth every 24 hours as needed for Nausea/Vomiting. 20 Tab 2   • vitamin D (CHOLECALCIFEROL) 1000 UNIT Tab Take 1,000 Units by mouth every day.     • cyanocobalamin (VITAMIN B12) 1000 MCG Tab Take 1 Tab by mouth every day. 30 Tab 3   • albuterol (VENTOLIN HFA) 108 (90 BASE) MCG/ACT Aero Soln inhalation aerosol Inhale 2 Puffs by mouth every 6 hours as needed for Shortness of Breath. 8.5 g 3     No current facility-administered medications for this visit.        Social History   Substance Use Topics  "  • Smoking status: Former Smoker     Packs/day: 1.00     Years: 20.00     Types: Cigarettes     Quit date: 1994   • Smokeless tobacco: Never Used   • Alcohol use Yes      Comment: 2 per month       Family Status   Relation Status   • Mother    • Father    • Sister    • Brother Alive   • Sister      Family History   Problem Relation Age of Onset   • Cancer Mother      breast cancer, bilaterally   • GI Father      ulcer   • Heart Disease Father 40     MIs   • Psychiatry Father      anxiety   • Arthritis Sister      rheumatoid   • Stroke Sister 79     mid brain       ROS:  Review of Systems   Constitutional: Negative for fever, chills, weight loss and  Positive for malaise/fatigue.   HENT: Negative for ear pain, nosebleeds,  Respiratory: Negative for cough, sputum production,   Cardiovascular: Negative for chest pain, palpitations,    Musculoskeletal: positive for muscle aches .   Skin: itchy lesions on her trunk and left arm   Neurological: positive for recurrent migraines.     Psychiatric/Behavioral: increased sadness recently , denies suicidal ideation   Exam:  Blood pressure 140/70, pulse 83, temperature 37 °C (98.6 °F), height 1.778 m (5' 10\"), weight 109.8 kg (242 lb), SpO2 93 %.  General:  Well nourished, well developed female  With sad affect   Head is grossly normal.  Neck: Supple without cervical lymphadenopathy   Trunk: under left breast lesion consistent with seborrheic keratosis, similar smaller lesion left upper extremity   Psychological: sad tearful affect        Please note that this dictation was created using voice recognition software. I have made every reasonable attempt to correct obvious errors, but I expect that there are errors of grammar and possibly content that I did not discover before finalizing the note.    Assessment/Plan:       1. Bipolar disorder, in full remission, most recent episode depressed (HCC)  REFERRAL TO PSYCHOLOGY    increase dose of Effexor to 75 " mg in AM and 37.5 at mid day. she can take capsules she just refilled. referral to counseling. she agrees to call or come in or go to ER if she develops suicidal ideation    2. Fibromyalgia      she will continue use of gabapentin and robaxin.    3. Migraine with aura and without status migrainosus, not intractable  REFERRAL TO NEUROLOGY    repeat referral placed to nuerology    4. Peripheral edema      lasix is refilled    5. Seborrheic keratoses      we discussed these are persistent benign lesions requiring no treatment    Follow up in  Two to three weeks to follow up on medications

## 2018-06-15 NOTE — ASSESSMENT & PLAN NOTE
Her depression has been worse the past three to four weeks. Her counselor has moved to [place]. She denies suicidal ideation. She has coping mechanisms that she can use. She will begin doing those things.

## 2018-06-15 NOTE — ASSESSMENT & PLAN NOTE
She has more pain the past few weeks. She can't take her medication during the day , the Gabapentin and robaxin is helpful.

## 2018-06-18 ENCOUNTER — TELEPHONE (OUTPATIENT)
Dept: CARDIOLOGY | Facility: MEDICAL CENTER | Age: 68
End: 2018-06-18

## 2018-06-18 NOTE — TELEPHONE ENCOUNTER
Rec'd fax from [facility] asking for recommendations on tooth extraction on OAC. Cindy Wayne completed & signed form - OK to stop Xarelto 24-48 hrs prior, restart pm of procedure. [facility]

## 2018-07-05 ENCOUNTER — TELEPHONE (OUTPATIENT)
Dept: MEDICAL GROUP | Facility: PHYSICIAN GROUP | Age: 68
End: 2018-07-05

## 2018-07-05 NOTE — TELEPHONE ENCOUNTER
Future Appointments       Provider Department Center    7/6/2018 9:45 AM Dori Mccray P.A.-C. [facility]        ESTABLISHED PATIENT PRE-VISIT PLANNING     Note: Patient will not be contacted if there is no indication to call.     1.  Reviewed notes from the last few office visits within the medical group: Yes    2.  If any orders were placed at last visit or intended to be done for this visit (i.e. 6 mos follow-up), do we have Results/Consult Notes?        •  Labs - Labs ordered, completed on 12/26/17 and results are in chart.       •  Imaging - Imaging ordered, NOT completed. Patient advised to complete prior to next appointment.       •  Referrals - Referral ordered, patient has NOT been seen.    3. Is this appointment scheduled as a Hospital Follow-Up? No    4.  Immunizations were updated in [facility] using WebIZ?: Yes       •  Web Iz Recommendations: FLU, TD and ZOSTAVAX (Shingles)    5.  Patient is due for the following Health Maintenance Topics:   Health Maintenance Due   Topic Date Due   • PAP SMEAR  09/20/1973   • MAMMOGRAM  05/29/2014     6.  MDX printed for Provider? NO [facility]    7.  Patient was informed to arrive 15 min prior to their scheduled appointment and bring in their medication bottles. Confirmed through automated call

## 2018-07-06 ENCOUNTER — OFFICE VISIT (OUTPATIENT)
Dept: MEDICAL GROUP | Facility: PHYSICIAN GROUP | Age: 68
End: 2018-07-06
Payer: COMMERCIAL

## 2018-07-06 VITALS
DIASTOLIC BLOOD PRESSURE: 70 MMHG | WEIGHT: 238 LBS | BODY MASS INDEX: 34.07 KG/M2 | HEIGHT: 70 IN | HEART RATE: 60 BPM | SYSTOLIC BLOOD PRESSURE: 112 MMHG | OXYGEN SATURATION: 93 % | TEMPERATURE: 99.4 F

## 2018-07-06 DIAGNOSIS — I10 ESSENTIAL HYPERTENSION: ICD-10-CM

## 2018-07-06 DIAGNOSIS — Z23 NEED FOR VACCINATION: ICD-10-CM

## 2018-07-06 DIAGNOSIS — E66.9 OBESITY (BMI 30-39.9): ICD-10-CM

## 2018-07-06 DIAGNOSIS — G43.109 MIGRAINE WITH AURA AND WITHOUT STATUS MIGRAINOSUS, NOT INTRACTABLE: ICD-10-CM

## 2018-07-06 DIAGNOSIS — F31.75 BIPOLAR DISORDER, IN PARTIAL REMISSION, MOST RECENT EPISODE DEPRESSED (HCC): ICD-10-CM

## 2018-07-06 PROCEDURE — 99214 OFFICE O/P EST MOD 30 MIN: CPT | Performed by: PHYSICIAN ASSISTANT

## 2018-07-06 RX ORDER — VENLAFAXINE HYDROCHLORIDE 37.5 MG/1
CAPSULE, EXTENDED RELEASE ORAL
COMMUNITY
End: 2018-10-30 | Stop reason: SDUPTHER

## 2018-07-06 RX ORDER — PROPRANOLOL HYDROCHLORIDE 40 MG/1
40 TABLET ORAL 3 TIMES DAILY
Qty: 270 TAB | Refills: 1 | Status: SHIPPED | OUTPATIENT
Start: 2018-07-06 | End: 2019-01-01 | Stop reason: SDUPTHER

## 2018-07-06 ASSESSMENT — PATIENT HEALTH QUESTIONNAIRE - PHQ9
CLINICAL INTERPRETATION OF PHQ2 SCORE: 3
SUM OF ALL RESPONSES TO PHQ QUESTIONS 1-9: 10
5. POOR APPETITE OR OVEREATING: 0 - NOT AT ALL
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
## 2018-08-02 ENCOUNTER — HOSPITAL ENCOUNTER (OUTPATIENT)
Dept: LAB | Facility: MEDICAL CENTER | Age: 68
End: 2018-08-02
Attending: OBSTETRICS & GYNECOLOGY
Payer: COMMERCIAL

## 2018-08-02 LAB
25(OH)D3 SERPL-MCNC: 38 NG/ML (ref 30–100)
DHEA-S SERPL-MCNC: 34 UG/DL (ref 9.4–246)
ESTRADIOL SERPL-MCNC: <20 PG/ML
FSH SERPL-ACNC: 44.2 MIU/ML
LH SERPL-ACNC: 21 IU/L
T4 FREE SERPL-MCNC: 1.06 NG/DL (ref 0.53–1.43)
TSH SERPL DL<=0.005 MIU/L-ACNC: 0.6 UIU/ML (ref 0.38–5.33)

## 2018-08-02 PROCEDURE — 83001 ASSAY OF GONADOTROPIN (FSH): CPT

## 2018-08-02 PROCEDURE — 84443 ASSAY THYROID STIM HORMONE: CPT

## 2018-08-02 PROCEDURE — 82627 DEHYDROEPIANDROSTERONE: CPT

## 2018-08-02 PROCEDURE — 82306 VITAMIN D 25 HYDROXY: CPT

## 2018-08-02 PROCEDURE — 82670 ASSAY OF TOTAL ESTRADIOL: CPT

## 2018-08-02 PROCEDURE — 83002 ASSAY OF GONADOTROPIN (LH): CPT

## 2018-08-02 PROCEDURE — 84270 ASSAY OF SEX HORMONE GLOBUL: CPT

## 2018-08-02 PROCEDURE — 36415 COLL VENOUS BLD VENIPUNCTURE: CPT

## 2018-08-02 PROCEDURE — 84403 ASSAY OF TOTAL TESTOSTERONE: CPT

## 2018-08-02 PROCEDURE — 84439 ASSAY OF FREE THYROXINE: CPT

## 2018-08-06 LAB
SHBG SERPL-SCNC: 33 NMOL/L (ref 30–135)
TESTOST FREE SERPL-MCNC: 7.3 PG/ML (ref 0.6–3.8)
TESTOST SERPL-MCNC: 44 NG/DL (ref 5–32)

## 2018-08-08 ENCOUNTER — HOSPITAL ENCOUNTER (OUTPATIENT)
Dept: LAB | Facility: MEDICAL CENTER | Age: 68
End: 2018-08-08
Attending: OBSTETRICS & GYNECOLOGY
Payer: COMMERCIAL

## 2018-08-08 LAB — CYTOLOGY REG CYTOL: NORMAL

## 2018-08-08 PROCEDURE — 88175 CYTOPATH C/V AUTO FLUID REDO: CPT

## 2018-09-16 DIAGNOSIS — M79.7 FIBROMYALGIA: ICD-10-CM

## 2018-09-17 ENCOUNTER — HOSPITAL ENCOUNTER (OUTPATIENT)
Dept: RADIOLOGY | Facility: MEDICAL CENTER | Age: 68
End: 2018-09-17
Attending: PHYSICIAN ASSISTANT
Payer: COMMERCIAL

## 2018-09-17 DIAGNOSIS — N64.4 MASTODYNIA: ICD-10-CM

## 2018-09-17 DIAGNOSIS — N63.0 LUMP OR MASS IN BREAST: ICD-10-CM

## 2018-09-17 PROCEDURE — 76642 ULTRASOUND BREAST LIMITED: CPT | Mod: RT

## 2018-09-17 PROCEDURE — G0279 TOMOSYNTHESIS, MAMMO: HCPCS

## 2018-09-18 RX ORDER — GABAPENTIN 300 MG/1
CAPSULE ORAL
Qty: 270 CAP | Refills: 1 | Status: SHIPPED | OUTPATIENT
Start: 2018-09-18 | End: 2018-11-21

## 2018-09-19 ENCOUNTER — HOSPITAL ENCOUNTER (OUTPATIENT)
Dept: RADIOLOGY | Facility: MEDICAL CENTER | Age: 68
End: 2018-09-19
Attending: ORTHOPAEDIC SURGERY
Payer: COMMERCIAL

## 2018-09-19 DIAGNOSIS — M25.532 LEFT WRIST PAIN: ICD-10-CM

## 2018-09-19 DIAGNOSIS — S52.502A CLOSED FRACTURE OF DISTAL END OF LEFT RADIUS, UNSPECIFIED FRACTURE MORPHOLOGY, INITIAL ENCOUNTER: ICD-10-CM

## 2018-09-19 PROCEDURE — 73200 CT UPPER EXTREMITY W/O DYE: CPT | Mod: LT

## 2018-09-22 NOTE — H&P
IDENTIFICATION:  The patient is a 67-year-old right-handed female with chief   complaint of left wrist pain who was emergently referred to our office by her   internist, Dr. Mccray.    HISTORY OF PRESENT ILLNESS:  The patient reports she was in good health until   3 days ago.  At that time, she tripped over a door and fell on her   outstretched left upper extremity.  She subsequently was seen in the emergency   room.  Noted to have a distal radius fracture.  She was placed in a splint.    Our office was called.  She was brought in emergently.  She denies any other   injuries.    Review of records noted she had left wrist pain and swelling, fell on the   wrist 45 minutes before going to the emergency room.  She does have a   displaced fracture of the left end of the distal radius.    She was given narcotics, was told to follow with ER in 1 or 2 days, follow   with Dr. Selvin Yang within the next available appointment.    PAST MEDICAL HISTORY:  Atrial fibrillation, hypertension, bipolar disorder,   fibromyalgia, kidney disease.    PAST SURGICAL HISTORY:  She has had a , appendectomy, knee scope,   right hand infection, previous right carpal tunnel release, and   cholecystectomy.    MEDICATIONS:  She takes Xarelto, she was instructed to stop that in few days;   an unknown antihypertensive; venlafaxine; and Robaxin.    ALLERGIES:  PENICILLIN CAUSES RASH AND NAUSEA.  IMITREX CAUSES ANAPHYLACTIC   SHOCK.  SHE IS ALLERGIC TO IMIPRAINE.  CODEINE CAUSES A RASH.  SULFA CAUSES   NAUSEA AND DIARRHEA.    FAMILY HISTORY:  Her mother  at age 69 from brain tumor, father at age 75   from heart disease.  She has 1 brothers and 2 sisters who are .  She   has 3 sons, 2 are healthy, 1 .  There is no known history of high   blood pressure, liver, lung, kidney disease, diabetes, problems with   anesthetic, or psychiatric problems.    SOCIAL HISTORY:  Currently lives in [place].  She is , lives by  herself   and her cat.  Denies IV drug use.  Denies tobacco use.  Does use 2 cups of   coffee a day, occasionally drinks.    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    REVIEW OF SYSTEMS:  Negative for any fevers, chills, shakes, nausea, vomiting,   diarrhea, shortness of breath, chest pains, problem with bowel or bladder,   weight gain or weight loss, cold or hot intolerance, recurrent pain, joint   pain, joint swelling, vision changes, skin changes except for her left wrist.    PHYSICAL EXAMINATION:  GENERAL:  The patient is a well-nourished, well-developed female who is very   pleasant, cooperative, appears in no apparent distress.  VITAL SIGNS:  Height 5 feet 10 inches, weighs 237 pounds.  Her blood pressure   is 126/79, pulse 86, temperature 98.2, respirations 18.  HEENT:  Extraocular muscles are intact.  Gums and buccal mucosa pink and moist   without lesion.  Pharynx is benign.  HEART:  Has a regular rhythm.  No murmurs, rubs, or gallops.  LUNGS:  Clear to auscultation.  ABDOMEN:  Soft, nontender, no rebound.  EXTREMITIES:  Her wrist does have significant swelling and pain with any use.    There does seem to be an obvious radial deformity.  Her monofilaments are   normal at 2.83 throughout her hand.  Extensor pollicis longus are intact.    Finger flexion and extension are grossly intact.  Minimal finger swelling.    X-RAYS:  Four views of the left wrist demonstrate an impacted shortened distal   radius fracture.  There is loss of radial inclination, significant   shortening.  Lateral film demonstrates what seems to be the most 10-15% of the   dorsal cortex to be intact.  The volar cortex completely impacted and   shortened and displaced intraarticularly.    IMPRESSION:  Intraarticular impacted distal radius fracture of a Smith type.    I have gone over the risks, benefits, and options with her.  I would recommend   reducing this and getting a  CT to confirm position.  She understood the   risks, benefits, options, and course of action.      Procedure:  Dorsal aspect of her wrist was prepped.  Under sterile condition,   a 2% hematoma block was placed.    When adequate anesthesia was obtained, she was reduced.  She has had   significant pain at this point.  Subsequently, she underwent reduction   fluoroscopy, which showed marked improvement in terms of her position.  She   subsequently had a well-molded sugar-tong splint placed.    Post-reduction AP was markedly improved with increased radial inclination, was   brought out to length.  On lateral, there was a gape between dorsal cortex   and the volar cortex but was markedly improved.  The volar cortex looked well   aligned.    PLAN:  At this point, I recommend getting a CT scan in order to further   evaluate the position.  More than likely, with this being an articular, I   think the Smith-type fractures typically need surgery.  I would probably do a   volar approach, but determine this on the CT scan.  We will go ahead and seek   approval from the insurance company and get her on the surgery schedule for   some time this week.    She understands the risks, benefits, options of surgery including infection,   bleeding, nerve or tendon damage, death from anesthesia, late arthritis due to   the intra-articular component of the fracture, late hardware removal, tendon   injury.  We will see her back early next week to go over the CT and for preop.       ____________________________________     MD CHASE BUCKLEY / MONIQUE    DD:  09/18/2018 15:10:09  DT:  09/18/2018 17:22:18    D#:  [id number]  Job#:  [id number]    cc: Dori Mccray PA-C

## 2018-09-24 ENCOUNTER — APPOINTMENT (OUTPATIENT)
Dept: RADIOLOGY | Facility: MEDICAL CENTER | Age: 68
End: 2018-09-24
Attending: ORTHOPAEDIC SURGERY
Payer: COMMERCIAL

## 2018-09-24 ENCOUNTER — HOSPITAL ENCOUNTER (OUTPATIENT)
Facility: MEDICAL CENTER | Age: 68
End: 2018-09-24
Attending: ORTHOPAEDIC SURGERY | Admitting: ORTHOPAEDIC SURGERY
Payer: COMMERCIAL

## 2018-09-24 VITALS
HEART RATE: 73 BPM | BODY MASS INDEX: 33.33 KG/M2 | WEIGHT: 238.1 LBS | DIASTOLIC BLOOD PRESSURE: 70 MMHG | HEIGHT: 71 IN | TEMPERATURE: 98.1 F | RESPIRATION RATE: 16 BRPM | OXYGEN SATURATION: 94 % | SYSTOLIC BLOOD PRESSURE: 138 MMHG

## 2018-09-24 LAB
ANION GAP SERPL CALC-SCNC: 7 MMOL/L (ref 0–11.9)
BASOPHILS # BLD AUTO: 1.1 % (ref 0–1.8)
BASOPHILS # BLD: 0.09 K/UL (ref 0–0.12)
BUN SERPL-MCNC: 18 MG/DL (ref 8–22)
CALCIUM SERPL-MCNC: 10.8 MG/DL (ref 8.4–10.2)
CHLORIDE SERPL-SCNC: 108 MMOL/L (ref 96–112)
CO2 SERPL-SCNC: 21 MMOL/L (ref 20–33)
CREAT SERPL-MCNC: 0.99 MG/DL (ref 0.5–1.4)
EOSINOPHIL # BLD AUTO: 0.16 K/UL (ref 0–0.51)
EOSINOPHIL NFR BLD: 1.9 % (ref 0–6.9)
ERYTHROCYTE [DISTWIDTH] IN BLOOD BY AUTOMATED COUNT: 43.8 FL (ref 35.9–50)
GLUCOSE SERPL-MCNC: 103 MG/DL (ref 65–99)
HCT VFR BLD AUTO: 44.3 % (ref 37–47)
HGB BLD-MCNC: 15.3 G/DL (ref 12–16)
IMM GRANULOCYTES # BLD AUTO: 0.02 K/UL (ref 0–0.11)
IMM GRANULOCYTES NFR BLD AUTO: 0.2 % (ref 0–0.9)
LYMPHOCYTES # BLD AUTO: 2.57 K/UL (ref 1–4.8)
LYMPHOCYTES NFR BLD: 30.9 % (ref 22–41)
MCH RBC QN AUTO: 32.4 PG (ref 27–33)
MCHC RBC AUTO-ENTMCNC: 34.5 G/DL (ref 33.6–35)
MCV RBC AUTO: 93.9 FL (ref 81.4–97.8)
MONOCYTES # BLD AUTO: 0.67 K/UL (ref 0–0.85)
MONOCYTES NFR BLD AUTO: 8.1 % (ref 0–13.4)
NEUTROPHILS # BLD AUTO: 4.81 K/UL (ref 2–7.15)
NEUTROPHILS NFR BLD: 57.8 % (ref 44–72)
NRBC # BLD AUTO: 0 K/UL
NRBC BLD-RTO: 0 /100 WBC
PLATELET # BLD AUTO: 239 K/UL (ref 164–446)
PMV BLD AUTO: 11 FL (ref 9–12.9)
POTASSIUM SERPL-SCNC: 4.4 MMOL/L (ref 3.6–5.5)
RBC # BLD AUTO: 4.72 M/UL (ref 4.2–5.4)
SODIUM SERPL-SCNC: 136 MMOL/L (ref 135–145)
WBC # BLD AUTO: 8.3 K/UL (ref 4.8–10.8)

## 2018-09-24 PROCEDURE — A9270 NON-COVERED ITEM OR SERVICE: HCPCS

## 2018-09-24 PROCEDURE — 160046 HCHG PACU - 1ST 60 MINS PHASE II: Performed by: ORTHOPAEDIC SURGERY

## 2018-09-24 PROCEDURE — 501838 HCHG SUTURE GENERAL: Performed by: ORTHOPAEDIC SURGERY

## 2018-09-24 PROCEDURE — 700111 HCHG RX REV CODE 636 W/ 250 OVERRIDE (IP)

## 2018-09-24 PROCEDURE — 700102 HCHG RX REV CODE 250 W/ 637 OVERRIDE(OP)

## 2018-09-24 PROCEDURE — 160047 HCHG PACU  - EA ADDL 30 MINS PHASE II: Performed by: ORTHOPAEDIC SURGERY

## 2018-09-24 PROCEDURE — 160036 HCHG PACU - EA ADDL 30 MINS PHASE I: Performed by: ORTHOPAEDIC SURGERY

## 2018-09-24 PROCEDURE — 700102 HCHG RX REV CODE 250 W/ 637 OVERRIDE(OP): Performed by: ANESTHESIOLOGY

## 2018-09-24 PROCEDURE — 80048 BASIC METABOLIC PNL TOTAL CA: CPT

## 2018-09-24 PROCEDURE — 160048 HCHG OR STATISTICAL LEVEL 1-5: Performed by: ORTHOPAEDIC SURGERY

## 2018-09-24 PROCEDURE — 700101 HCHG RX REV CODE 250

## 2018-09-24 PROCEDURE — 160002 HCHG RECOVERY MINUTES (STAT): Performed by: ORTHOPAEDIC SURGERY

## 2018-09-24 PROCEDURE — 160025 RECOVERY II MINUTES (STATS): Performed by: ORTHOPAEDIC SURGERY

## 2018-09-24 PROCEDURE — 160039 HCHG SURGERY MINUTES - EA ADDL 1 MIN LEVEL 3: Performed by: ORTHOPAEDIC SURGERY

## 2018-09-24 PROCEDURE — 160028 HCHG SURGERY MINUTES - 1ST 30 MINS LEVEL 3: Performed by: ORTHOPAEDIC SURGERY

## 2018-09-24 PROCEDURE — 160009 HCHG ANES TIME/MIN: Performed by: ORTHOPAEDIC SURGERY

## 2018-09-24 PROCEDURE — 160035 HCHG PACU - 1ST 60 MINS PHASE I: Performed by: ORTHOPAEDIC SURGERY

## 2018-09-24 PROCEDURE — 502576 HCHG PACK, HAND: Performed by: ORTHOPAEDIC SURGERY

## 2018-09-24 PROCEDURE — 85025 COMPLETE CBC W/AUTO DIFF WBC: CPT

## 2018-09-24 PROCEDURE — A9270 NON-COVERED ITEM OR SERVICE: HCPCS | Performed by: ANESTHESIOLOGY

## 2018-09-24 DEVICE — IMPLANTABLE DEVICE: Type: IMPLANTABLE DEVICE | Site: WRIST | Status: FUNCTIONAL

## 2018-09-24 RX ORDER — OXYCODONE HCL 5 MG/5 ML
5 SOLUTION, ORAL ORAL EVERY 4 HOURS PRN
Status: DISCONTINUED | OUTPATIENT
Start: 2018-09-24 | End: 2018-09-24 | Stop reason: HOSPADM

## 2018-09-24 RX ORDER — SODIUM CHLORIDE, SODIUM LACTATE, POTASSIUM CHLORIDE, CALCIUM CHLORIDE 600; 310; 30; 20 MG/100ML; MG/100ML; MG/100ML; MG/100ML
INJECTION, SOLUTION INTRAVENOUS
Status: DISCONTINUED | OUTPATIENT
Start: 2018-09-24 | End: 2018-09-24 | Stop reason: HOSPADM

## 2018-09-24 RX ORDER — BUPIVACAINE HYDROCHLORIDE 2.5 MG/ML
INJECTION, SOLUTION EPIDURAL; INFILTRATION; INTRACAUDAL
Status: DISCONTINUED
Start: 2018-09-24 | End: 2018-09-24 | Stop reason: HOSPADM

## 2018-09-24 RX ORDER — MIDAZOLAM HYDROCHLORIDE 1 MG/ML
INJECTION INTRAMUSCULAR; INTRAVENOUS
Status: DISCONTINUED
Start: 2018-09-24 | End: 2018-09-24 | Stop reason: HOSPADM

## 2018-09-24 RX ORDER — OXYCODONE HCL 5 MG/5 ML
SOLUTION, ORAL ORAL
Status: COMPLETED
Start: 2018-09-24 | End: 2018-09-24

## 2018-09-24 RX ORDER — BUPIVACAINE HYDROCHLORIDE AND EPINEPHRINE 2.5; 5 MG/ML; UG/ML
INJECTION, SOLUTION EPIDURAL; INFILTRATION; INTRACAUDAL; PERINEURAL
Status: DISCONTINUED | OUTPATIENT
Start: 2018-09-24 | End: 2018-09-24 | Stop reason: HOSPADM

## 2018-09-24 RX ORDER — TRIAMCINOLONE ACETONIDE 40 MG/ML
INJECTION, SUSPENSION INTRA-ARTICULAR; INTRAMUSCULAR
Status: DISCONTINUED | OUTPATIENT
Start: 2018-09-24 | End: 2018-09-24 | Stop reason: HOSPADM

## 2018-09-24 RX ORDER — GABAPENTIN 300 MG/1
300 CAPSULE ORAL ONCE
Status: COMPLETED | OUTPATIENT
Start: 2018-09-24 | End: 2018-09-24

## 2018-09-24 RX ORDER — ACETAMINOPHEN 500 MG
1000 TABLET ORAL ONCE
Status: COMPLETED | OUTPATIENT
Start: 2018-09-24 | End: 2018-09-24

## 2018-09-24 RX ORDER — ACETAMINOPHEN 500 MG
TABLET ORAL
Status: COMPLETED
Start: 2018-09-24 | End: 2018-09-24

## 2018-09-24 RX ADMIN — OXYCODONE HYDROCHLORIDE 5 MG: 5 SOLUTION ORAL at 15:58

## 2018-09-24 RX ADMIN — GABAPENTIN 300 MG: 300 CAPSULE ORAL at 11:59

## 2018-09-24 RX ADMIN — ACETAMINOPHEN 500 MG: 500 TABLET, COATED ORAL at 11:58

## 2018-09-24 RX ADMIN — SODIUM CHLORIDE, SODIUM LACTATE, POTASSIUM CHLORIDE, CALCIUM CHLORIDE: 600; 310; 30; 20 INJECTION, SOLUTION INTRAVENOUS at 12:13

## 2018-09-24 RX ADMIN — ACETAMINOPHEN 500 MG: 500 TABLET, COATED ORAL at 11:59

## 2018-09-24 RX ADMIN — FENTANYL CITRATE 25 MCG: 50 INJECTION, SOLUTION INTRAMUSCULAR; INTRAVENOUS at 16:00

## 2018-09-24 ASSESSMENT — PAIN SCALES - GENERAL
PAINLEVEL_OUTOF10: 4
PAINLEVEL_OUTOF10: 5
PAINLEVEL_OUTOF10: 2
PAINLEVEL_OUTOF10: 2
PAINLEVEL_OUTOF10: 0
PAINLEVEL_OUTOF10: 5
PAINLEVEL_OUTOF10: 8
PAINLEVEL_OUTOF10: 0

## 2018-09-24 NOTE — DISCHARGE INSTRUCTIONS
ACTIVITY: Rest and take it easy for the first 24 hours.  A responsible adult is recommended to remain with you during that time.  It is normal to feel sleepy.  We encourage you to not do anything that requires balance, judgment or coordination.    MILD FLU-LIKE SYMPTOMS ARE NORMAL. YOU MAY EXPERIENCE GENERALIZED MUSCLE ACHES, THROAT IRRITATION, HEADACHE AND/OR SOME NAUSEA.    FOR 24 HOURS DO NOT:  Drive, operate machinery or run household appliances.  Drink beer or alcoholic beverages.   Make important decisions or sign legal documents.    SPECIAL INSTRUCTIONS: Move fingers in dressing   Keep operative upper extremity elevated   Keep clean and dry   May remove splint on post op day 5 and start gentle wrist range of motion   When not gently moving wrist, splint on!  DIET: To avoid nausea, slowly advance diet as tolerated, avoiding spicy or greasy foods for the first day.  Add more substantial food to your diet according to your physician's instructions.    INCREASE FLUIDS AND FIBER TO AVOID CONSTIPATION.    SURGICAL DRESSING/BATHING: keep dressing clean dry and intact   FOLLOW-UP APPOINTMENT:  A follow-up appointment should has already been made   You should CALL YOUR PHYSICIAN if you develop:  Fever greater than 101 degrees F.  Pain not relieved by medication, or persistent nausea or vomiting.  Excessive bleeding (blood soaking through dressing) or unexpected drainage from the wound.  Extreme redness or swelling around the incision site, drainage of pus or foul smelling drainage.  Inability to urinate or empty your bladder within 8 hours.  Problems with breathing or chest pain.    You should call 911 if you develop problems with breathing or chest pain.  If you are unable to contact your doctor or surgical center, you should go to the nearest emergency room or urgent care center.  Physician's telephone #: Dr. Solano [phone]  If any questions arise, call your doctor.  If your doctor is not available, please feel  free to call the Surgical Center at [phone].  The Center is open Monday through Friday from 7AM to 7PM.  You can also call the HEALTH HOTLINE open 24 hours/day, 7 days/week and speak to a nurse at [phone], or toll free at [phone].    A registered nurse may call you a few days after your surgery to see how you are doing after your procedure.    MEDICATIONS: Resume taking daily medication.  Take prescribed pain medication with food.  If no medication is prescribed, you may take non-aspirin pain medication if needed.  PAIN MEDICATION CAN BE VERY CONSTIPATING.  Take a stool softener or laxative such as senokot, pericolace, or milk of magnesia if needed.    Prescription given prior to surgery   If your physician has prescribed pain medication that includes Acetaminophen (Tylenol), do not take additional Acetaminophen (Tylenol) while taking the prescribed medication.    Depression / Suicide Risk    As you are discharged from this [facility] facility, it is important to learn how to keep safe from harming yourself.    Recognize the warning signs:  · Abrupt changes in personality, positive or negative- including increase in energy   · Giving away possessions  · Change in eating patterns- significant weight changes-  positive or negative  · Change in sleeping patterns- unable to sleep or sleeping all the time   · Unwillingness or inability to communicate  · Depression  · Unusual sadness, discouragement and loneliness  · Talk of wanting to die  · Neglect of personal appearance   · Rebelliousness- reckless behavior  · Withdrawal from people/activities they love  · Confusion- inability to concentrate     If you or a loved one observes any of these behaviors or has concerns about self-harm, here's what you can do:  · Talk about it- your feelings and reasons for harming yourself  · Remove any means that you might use to hurt yourself (examples: pills, rope, extension cords, firearm)  · Get professional help  "from the community (Mental Health, Substance Abuse, psychological counseling)  · Do not be alone:Call your Safe Contact- someone whom you trust who will be there for you.  · Call your local CRISIS HOTLINE 094-0515 or 061-329-4147  · Call your local Children's Mobile Crisis Response Team [place] (002) 382-9862 or www.[employer]  · Call the toll free National Suicide Prevention Hotlines   · National Suicide Prevention Lifeline 839-880-XSZT (3040)  [place] [employer] Line Network 800-SUICIDE (959-4135)    Peripheral Nerve Block Discharge Instructions from Same Day Surgery and Inpatient :    What to Expect - Upper Extremity  · You may experience numbness and weakness in {ARM LOCATION PNB:086278}  on the same side as your surgery  · This is normal. For some people, this may be an unpleasant sensation. Be very careful with your numb limb  · Ask for help when you need it  Shoulder Surgery Side Effects  · In addition to numbness and weakness you may experience other symptoms  · Other nerves that are close to those nerves injected can also be affected by local anesthesia  · You may experience a hoarseness in your voice  · Your breathing may feel different  · You may also notice drooping of your eyelid, pupil constriction, and decreased sweating, on the side of your surgery  · All of these side effects are normal and will resolve when the local anesthetic wears off   Prevent Injury  · Protect the limb like a baby  · Beware of exposing your limb to extreme heat or cold or trauma  · The limb may be injured without you noticing because it is numb  · Keep the limb elevated whenever possible  · Do not sleep on the limb  · Change the position of the limb regularly  · Avoid putting pressure on your surgical limb  Pain Control  · The initial block on the day of surgery will make your extremity feel \"numb\"  · Any consecutive injection including prior to discharge from the hospital will make your extremity feel \"numb\"  · You may " feel an aching or burning when the local anesthesia starts to wear off  · Take pain pills as prescribed by your surgeon  · Call your surgeon or anesthesiologist if you do not have adequate pain control  ·

## 2018-09-24 NOTE — OR NURSING
1511 Arrived from the OR via gurney , sedated with lma airway in place, resp unlabored and spontaneous, left wrist dressing c/d/i, cap refill q 4 in fingers left hand, ice to wrist after anesthesia completed nerve block supplement.   1518 arouse spontaneously airway removed per anesthesia,   1525 resting, oriented pt to PPT, ice to wrist which has brace on it, elevated onto pillow, head of gurney elevated 30 degrees.   1540 pt states she can feel pain on the top of her wrist which she wants pain med for. Called  for orders as none in the computer   1555 medicated pain after verbal order anesthesia   1610 states pain med helped a little, educated pt on nerve blocks and why sometimes not all nerves are innervated with the medication which is why she has pain rx. She verbalized understanding. Hand remains on pillow with ice pack.   1625 pt resting comfortably playing on her phone, meets criteria for dc pacu.   1636 dc pacu to stage 2.

## 2018-09-25 NOTE — OP REPORT
DATE OF SERVICE:  09/24/2018    PREOPERATIVE DIAGNOSES:  1.  Left intraarticular 3-part distal radius fracture with volar translation   and 5-9 mm gaping of the distal radius.  2.  Trigger thumb.    POSTOPERATIVE DIAGNOSES:  1.  Left intraarticular 3-part distal radius fracture with volar translation   and 5-9 mm gaping of the distal radius.  2.  Trigger thumb.    PROCEDURES PERFORMED:  1.  Injection of Kenalog into the trigger thumb.  2.  Open reduction and internal fixation with volar approach and Carine plate   with 4 distal screws and 3 proximal screws.  3.  Wrist block.  4.  Fluoroscopy.    SURGEON:  Mitchel Solano MD    ASSISTANT:  PERRY Maria    ANESTHESIOLOGIST:  Riley Ramirez MD    ANESTHESIA:  General, with surgeon requesting postop pain supraclavicular   block.    COMPLICATIONS:  None.    DRAINS:  None.    SPECIMENS:  None.    TOURNIQUET TIME:  1 hour 5 minutes.    ESTIMATED BLOOD LOSS:  None.    FLUIDS:  Crystalloid and Kefzol.    INDICATIONS:  The patient previously had a distal radius fracture.  Smith-type   fracture.  She underwent a closed reduction.  A CT scan demonstrated   continued gapping of the dorsal radial aspect.  The risks, benefits, and   options were discussed with her.  It was felt that this could be improved   surgically as well as early range of motion.  She understood the risks,   benefits, options, and course of action.    FINDINGS:  Noted to be significant dorsal softness of the bone and osteopenia.    She was noted to have an ongoing trigger thumb with a clicking of the FPL   tendon.  Care was taken not to inject the tendon itself.  Anatomic reduction   was obtained.  Post-reduction, there was no ____ and instability, negative   ulnar impaction test, luno-triquetral instability, negative Richter test and   scaphoid shift test.  Good gliding and full range of motion of the joint and   forearm.    PROCEDURE IN DETAIL:  The patient was taken to the operating room  after   interscalene block was placed in the preoperative hold under ultrasound by Dr. Ramirez for surgery and request for postop pain control.  General anesthesia   was induced, all pressure points well padded.  Left lower extremity had the   previously placed splint removed.  It was prepped and draped in usual sterile   fashion.  Timeout was called.    Attention was initially brought to the thumb.  Kenalog was injected in the A1   pulley region, taking care not to inject the tendon itself with motion of the   thumb passively and pressure on the needle with the needle being beveled down   to prevent intratendinous injection.  Post-injection, she had improved   clicking of her thumb.    The arm was then exsanguinated and tourniquet inflated.    A sharp incision was carried out volarly over the FCR and no more radially   than typical due to her tattoo and her request that the tattoo be left intact.    Sharp dissection was carried through the skin and blunt excision of   subcutaneous tissues.  Adequate hemostasis obtained with cautery.  The FCR   tendon was identified and removed from its sheath, both dorsally and volarly.    Deep dissection was then carried out bluntly down onto the radial aspect of   the radius.    Sharp dissection was carried over the pronator quadratus, which was mobilized   and elevated.  A portion of the brachioradialis was also released in order to   mobilize the radial styloid fragment.    [place] elevators and Ragnells were then used, as well as Hohmann in order to   elevate the 2 intraarticular fragments.  The most ulnar fragment had a dorsal   displacement and had to be torqued up.    Due to the complex of the fracture, subsequently had indirect fracture   technique with the plate placed distally with 1 nonlocking screw and then 3   locking screws.    Once the distal fragment was attached radially and ulnarly, then the plate was   anatomically reduced, bringing it distally and correcting the  volar   angulation.    In the oblique hole, a nonlocking screw was placed.    X-rays demonstrated and confirmed excellent position under fluoroscopy.  A   locking screw was then placed distally and proximally.  The plate was noted to   be somewhat radially positioned proximally, but this was felt to be   acceptable in order to correct the radial inclination and with the indirect   reduction techniques.    Fluoroscopy confirmed the most proximal screw was long and this was switched   to a shorter screw.  The distal screws were left short in order to prevent any   extensor tendon injury.    The wound was then copiously irrigated.  The wrist and forearm underwent exam   under anesthesia.    The wound was again copiously irrigated.  A 2-0 Vicryl suture repaired the   pronator quadratus.  A running 4-0 horizontal stitches were then used to   repair the skin, reapproximating the tattoo as best as possible.  Proximally,   wrist blocks placed with 0.25% Marcaine with epinephrine.    Xeroform was then placed and a bulky dressing was applied.  She was placed in   a postoperative splint.  Tourniquet was released.  There was excellent   capillary refill, brought to recovery room in stable condition.    PLAN:  Plan is for her to start gentle range of motion in a few days, but   keeping her wrist protected.  Progress range of motion in 6 weeks and   strengthening in 3 months.       ____________________________________     MD CHASE BUCKLEY / MONIQUE    DD:  09/24/2018 15:18:13  DT:  09/24/2018 16:49:25    D#:  [id number]  Job#:  [id number]

## 2018-09-25 NOTE — OR NURSING
1715- Awaiting arrival of son.   1747- Discharge instructions provided to patient and son. Both verbalized understanding. All questions and concerns addressed. Patient D/Barrie to care of family following an uneventful stay in Stage 2.

## 2018-10-30 RX ORDER — VENLAFAXINE HYDROCHLORIDE 37.5 MG/1
CAPSULE, EXTENDED RELEASE ORAL
Qty: 270 CAP | Refills: 0 | Status: SHIPPED | OUTPATIENT
Start: 2018-10-30 | End: 2019-08-13

## 2018-11-21 ENCOUNTER — OFFICE VISIT (OUTPATIENT)
Dept: CARDIOLOGY | Facility: MEDICAL CENTER | Age: 68
End: 2018-11-21
Payer: COMMERCIAL

## 2018-11-21 VITALS
HEART RATE: 56 BPM | BODY MASS INDEX: 34.16 KG/M2 | DIASTOLIC BLOOD PRESSURE: 70 MMHG | OXYGEN SATURATION: 94 % | SYSTOLIC BLOOD PRESSURE: 110 MMHG | HEIGHT: 70 IN

## 2018-11-21 DIAGNOSIS — I10 ESSENTIAL HYPERTENSION: ICD-10-CM

## 2018-11-21 DIAGNOSIS — I48.0 PAROXYSMAL ATRIAL FIBRILLATION (HCC): ICD-10-CM

## 2018-11-21 DIAGNOSIS — Z79.899 ENCOUNTER FOR LONG-TERM (CURRENT) USE OF HIGH-RISK MEDICATION: ICD-10-CM

## 2018-11-21 DIAGNOSIS — R00.2 PALPITATIONS: ICD-10-CM

## 2018-11-21 DIAGNOSIS — R06.09 DYSPNEA ON EXERTION: ICD-10-CM

## 2018-11-21 DIAGNOSIS — E78.5 DYSLIPIDEMIA: ICD-10-CM

## 2018-11-21 DIAGNOSIS — I71.21 ASCENDING AORTIC ANEURYSM (HCC): ICD-10-CM

## 2018-11-21 PROCEDURE — 99214 OFFICE O/P EST MOD 30 MIN: CPT | Performed by: INTERNAL MEDICINE

## 2018-11-21 ASSESSMENT — ENCOUNTER SYMPTOMS
ORTHOPNEA: 0
DIZZINESS: 0
PALPITATIONS: 1
DEPRESSION: 0
LOSS OF CONSCIOUSNESS: 0
PND: 0
FALLS: 0
SHORTNESS OF BREATH: 1
ABDOMINAL PAIN: 0

## 2018-11-21 NOTE — LETTER
[facility]   [address]   [address]   [place] [address]  Phone: [phone]  Fax: [phone]              Miryam Veloz  1950    Encounter Date: 11/21/2018    Nidhi Cool M.D.          PROGRESS NOTE:  Chief Complaint   Patient presents with   • Atrial Fibrillation       Subjective:   Miryam Veloz is a 68 y.o. female who presents today to follow-up on atrial fibrillation.    Pertinent history:  Paroxysmal atrial fibrillation  Hypertension  Hyperlipidemia  Ascending aortic aneurysm    Patient reports first been diagnosed with atrial fibrillation about 2 years ago.  She reports having palpitations and dyspnea when she tries to walk 3 blocks or longer.  Her symptoms resolve when she stops and rests.  No associated chest discomfort.  No symptoms while at rest.  She has tried using her inhalers without any significant benefit.    Her blood pressures are usually well controlled, like today.  She continues to be on the Xarelto.  No bleeding issues.    Past Medical History:   Diagnosis Date   • Arrhythmia 01/2017    A-fib   • Arthritis 12/26/2017     Osteo to knees and wrists   • Asthma 12/26/2017    Inhalers PRN   • Bipolar affective disorder (HCC) 1995    anxiety and depression   • Chronic knee pain 2000   • Claustrophobia    • Dental disorder 12/26/2017    Dentures upper    • Depression 5/22/2009   • Family history of breast cancer in mother    • Fibromyalgia    • Headache, frequent episodic tension-type    • Hemorrhagic disorder (HCC) 01/2017    On Xarelto   • Hepatitis B 1975    NO treatment   • Hypertension    • IBD (inflammatory bowel disease)    • IBS (irritable bowel syndrome)    • Lumbar facet arthropathy 2004    lumbar disc disease   • Migraine    • Pain 12/26/2017    Right knee   • Pain 12/26/2017    Chronic due to fibromyaligia   • PTSD (post-traumatic stress disorder) 12/26/2017    due to 20 year hx of  abuse(0540-6795's)   • Renal disorder 12/26/2017    S/P lithium use. Unknown stage-but nephrologist states 45-50% function.   • Rotator cuff syndrome of right shoulder 2008     Past Surgical History:   Procedure Laterality Date   • WRIST ORIF Left 9/24/2018    Procedure: WRIST ORIF;  Surgeon: Mitchel Solano M.D.;  Location: [facility];  Service: Orthopedics   • KNEE ARTHROSCOPY Right 1/5/2018    Procedure: KNEE ARTHROSCOPY;  Surgeon: Henry Medel M.D.;  Location: [facility];  Service: Orthopedics   • MENISCECTOMY Right 1/5/2018    Procedure: MENISCECTOMY-partial medial and lateral;  Surgeon: Henry Medel M.D.;  Location: [facility];  Service: Orthopedics   • DEBRIDEMENT Right 1/5/2018    Procedure: DEBRIDEMENT-medial, lateral, and patellar chondyle;  Surgeon: Henry Medel M.D.;  Location: [facility];  Service: Orthopedics   • FINGER OR HAND INCISION AND DRAINAGE Right 10/20/2015    Procedure: FINGER OR HAND INCISION AND DRAINAGE- 4th finger;  Surgeon: Eric Pritchett M.D.;  Location: [facility];  Service:    • DENTAL EXTRACTION(S)  2015    Upper dentures   • CHOLECYSTECTOMY  1999   • PRIMARY C SECTION  1985   • APPENDECTOMY  1950's   • OTHER      Gets sedation for MRI's     Family History   Problem Relation Age of Onset   • Cancer Mother         breast cancer, bilaterally   • GI Father         ulcer   • Heart Disease Father 40        MIs   • Psychiatry Father         anxiety   • Arthritis Sister         rheumatoid   • Stroke Sister 79        mid brain     Social History     Social History   • Marital status: Single     Spouse name: N/A   • Number of children: N/A   • Years of education: N/A     Occupational History   • Not on file.     Social History Main Topics   • Smoking status: Former Smoker     Packs/day: 1.00     Years: 20.00     Types: Cigarettes     Quit date: 1/1/1994   • Smokeless tobacco: Never Used   • Alcohol use Yes         Comment: 2 per month   • Drug use: No      Comment: H/o Drug  abuse - Meth, Clean x 26 years.   • Sexual activity: Yes     Partners: Male      Comment: 1 current partner     Other Topics Concern   • Not on file     Social History Narrative   • No narrative on file     Allergies   Allergen Reactions   • Codeine Itching and Nausea   • Imitrex [Sumatriptan Succinate] Shortness of Breath and Swelling   • Penicillins Rash and Vomiting   • Sulfa Drugs Shortness of Breath and Itching   • Azithromycin Itching   • Dilaudid [Hydromorphone] Itching     sweating   • Other Drug Itching     Z Pack   • Sensipar [Cinacalcet] Unspecified     Nightmares and leg cramping   • Imipramine Shortness of Breath and Itching     anxious   • Seroquel [Quetiapine Fumarate]      Triggered urge to hurt self.      Outpatient Encounter Prescriptions as of 11/21/2018   Medication Sig Dispense Refill   • venlafaxine XR (EFFEXOR XR) 37.5 MG CAPSULE SR 24 HR Take 2 capsules in the morning and 1 capsule in the afternoon 270 Cap 0   • propranolol (INDERAL) 40 MG Tab Take 1 Tab by mouth 3 times a day. 270 Tab 1   • XARELTO 20 MG Tab tablet TAKE ONE TABLET BY MOUTH DAILY WITH DINNER 30 Tab 5   • diazepam (VALIUM) 2 MG Tab Take 2 mg by mouth every 6 hours as needed for Anxiety.     • eletriptan (RELPAX) 20 MG Tab Take 1 Tab by mouth Once PRN for Migraine for up to 1 dose. 10 Tab 2   • Diclofenac Sodium 1 % Gel Apply  2 grams twice a day to joints as needed 1 Tube 5   • Omega-3 Fatty Acids (FISH OIL) 1000 MG Cap capsule Take 1,000 mg by mouth every day.     • ondansetron (ZOFRAN ODT) 4 MG TABLET DISPERSIBLE Take 1 Tab by mouth every 24 hours as needed for Nausea/Vomiting. 20 Tab 2   • vitamin D (CHOLECALCIFEROL) 1000 UNIT Tab Take 1,000 Units by mouth every day.     • albuterol (VENTOLIN HFA) 108 (90 BASE) MCG/ACT Aero Soln inhalation aerosol Inhale 2 Puffs by mouth every 6 hours as needed for Shortness of Breath. 8.5 g 3   • cyanocobalamin (VITAMIN  "B12) 1000 MCG Tab Take 1 Tab by mouth every day. 30 Tab 3   • [DISCONTINUED] gabapentin (NEURONTIN) 300 MG Cap TAKE ONE CAPSULE BY MOUTH THREE TIMES A DAY (Patient not taking: Reported on 11/21/2018) 270 Cap 1   • [DISCONTINUED] methocarbamol (ROBAXIN) 750 MG Tab TAKE TWO TABLETS BY MOUTH TWICE A DAY (GENERIC ROBAXIN) (Patient not taking: Reported on 11/21/2018) 120 Tab 2   • [DISCONTINUED] furosemide (LASIX) 20 MG Tab TAKE ONE TABLET BY MOUTH DAILY PRN (Patient not taking: Reported on 11/21/2018) 90 Tab 1   • triamcinolone acetonide (KENALOG) 0.025 % Cream Apply to affected areas on face and chest twice daily as needed, 2 weeks on, 2 weeks off 1 Tube 3     No facility-administered encounter medications on file as of 11/21/2018.      Review of Systems   Constitutional: Negative for malaise/fatigue.   Respiratory: Positive for shortness of breath.    Cardiovascular: Positive for palpitations. Negative for chest pain, orthopnea, leg swelling and PND.   Gastrointestinal: Negative for abdominal pain.   Musculoskeletal: Negative for falls.   Neurological: Negative for dizziness and loss of consciousness.   Psychiatric/Behavioral: Negative for depression.   All other systems reviewed and are negative.       Objective:   /70 (BP Location: Left arm, Patient Position: Sitting, BP Cuff Size: Large adult)   Pulse (!) 56   Ht 1.778 m (5' 10\")   SpO2 94%   BMI 34.16 kg/m²      Pt refused to be weighed.     Physical Exam   Constitutional: She is oriented to person, place, and time. She appears well-developed and well-nourished. No distress.   HENT:   Head: Normocephalic and atraumatic.   Eyes: Conjunctivae are normal.   Neck: Normal range of motion. Neck supple.   Cardiovascular: Normal rate, regular rhythm and normal heart sounds.  Exam reveals no gallop and no friction rub.    No murmur heard.  Pulmonary/Chest: Effort normal and breath sounds normal. No respiratory distress. She has no wheezes. She has no rales. "   Abdominal: Soft. She exhibits no distension. There is no tenderness.   Musculoskeletal: She exhibits no edema.   Neurological: She is alert and oriented to person, place, and time.   Skin: Skin is warm and dry. She is not diaphoretic.   Psychiatric: She has a normal mood and affect. Her behavior is normal.   Nursing note and vitals reviewed.    CT scan of the aorta performed in July 2017 showed a maximal diameter of the ascending aorta at 3.7 cm.    Myocardial perfusion study performed in August 2018 did not show any fixed or reversible defects.    Assessment:     1. Essential hypertension     2. Paroxysmal atrial fibrillation (HCC)     3. Dyslipidemia  Lipid Profile   4. Ascending aortic aneurysm (HCC)     5. Dyspnea on exertion  Holter Monitor Study   6. Palpitations  Holter Monitor Study   7. Encounter for long-term (current) use of high-risk medication         Medical Decision Making:  Today's Assessment / Status / Plan:     Dyspnea:  Palpitations:  Paroxysmal atrial fibrillation:  Her exertional symptoms are concerning for possible atrial fibrillation.  She will be referred for 48-hour Holter monitor for further evaluation.  No changes in medications for now.  She is already had a myocardial perfusion study which was low risk.  Currently not on any AV zach blocking agents.  Continue Xarelto.    Hyperlipidemia: Patient is currently on fish oil.  No other therapy.  Fasting lipid panel was ordered today.    Hypertension: Blood pressure is at goal today.  Currently diet controlled.    Ascending aortic aneurysm: CT scan performed about 1 year ago did not show any significant aortic enlargement.    Return to clinic in 6 months or earlier if needed.    Thank you for allowing me to participate in the care of this patient. Please do not hesitate to contact me with any questions.    Nidhi Cool MD  Cardiologist  [facility]      PLEASE NOTE: This dictation was created using voice  recognition software.             Dori Mccray P.A.-C.  [address] [address]  VIA In Basket
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
## 2018-12-05 DIAGNOSIS — I48.0 PAROXYSMAL ATRIAL FIBRILLATION (HCC): ICD-10-CM

## 2018-12-05 NOTE — TELEPHONE ENCOUNTER
Was the patient seen in the last year in this department? Yes    Does patient have an active prescription for medications requested? Yes    Received Request Via: Pharmacy      Pt met protocol?: Yes  Venlafaxine will be due at the end of december   pt last ov 7/2018   Lab Results   Component Value Date/Time    SODIUM 136 09/24/2018 12:12 PM    POTASSIUM 4.4 09/24/2018 12:12 PM    CHLORIDE 108 09/24/2018 12:12 PM    CO2 21 09/24/2018 12:12 PM    GLUCOSE 103 (H) 09/24/2018 12:12 PM    BUN 18 09/24/2018 12:12 PM    CREATININE 0.99 09/24/2018 12:12 PM    CREATININE 0.8 01/29/2008 10:10 AM

## 2018-12-06 RX ORDER — VENLAFAXINE HYDROCHLORIDE 37.5 MG/1
CAPSULE, EXTENDED RELEASE ORAL
Qty: 270 CAP | Refills: 1 | Status: SHIPPED | OUTPATIENT
Start: 2018-12-06 | End: 2019-08-12 | Stop reason: SDUPTHER

## 2018-12-06 NOTE — TELEPHONE ENCOUNTER
Dori- You have never prescribed Xarelto for pt. Just FYI CrCl is well within normal limits for current dose. Please refill as you see fit.

## 2018-12-21 ENCOUNTER — HOSPITAL ENCOUNTER (OUTPATIENT)
Dept: LAB | Facility: MEDICAL CENTER | Age: 68
End: 2018-12-21
Attending: OBSTETRICS & GYNECOLOGY
Payer: COMMERCIAL

## 2018-12-21 ENCOUNTER — HOSPITAL ENCOUNTER (OUTPATIENT)
Dept: LAB | Facility: MEDICAL CENTER | Age: 68
End: 2018-12-21
Attending: INTERNAL MEDICINE
Payer: COMMERCIAL

## 2018-12-21 DIAGNOSIS — E78.5 DYSLIPIDEMIA: ICD-10-CM

## 2018-12-21 LAB
CHOLEST SERPL-MCNC: 201 MG/DL (ref 100–199)
HDLC SERPL-MCNC: 39 MG/DL
LDLC SERPL CALC-MCNC: 90 MG/DL
TRIGL SERPL-MCNC: 361 MG/DL (ref 0–149)

## 2018-12-21 PROCEDURE — 36415 COLL VENOUS BLD VENIPUNCTURE: CPT

## 2018-12-21 PROCEDURE — 80061 LIPID PANEL: CPT

## 2018-12-21 PROCEDURE — 84270 ASSAY OF SEX HORMONE GLOBUL: CPT

## 2018-12-21 PROCEDURE — 84403 ASSAY OF TOTAL TESTOSTERONE: CPT

## 2018-12-24 LAB
SHBG SERPL-SCNC: 35 NMOL/L (ref 30–135)
TESTOST FREE SERPL-MCNC: 6.9 PG/ML (ref 0.6–3.8)
TESTOST SERPL-MCNC: 43 NG/DL (ref 5–32)

## 2019-01-01 DIAGNOSIS — G43.109 MIGRAINE WITH AURA AND WITHOUT STATUS MIGRAINOSUS, NOT INTRACTABLE: ICD-10-CM

## 2019-01-01 DIAGNOSIS — I10 ESSENTIAL HYPERTENSION: ICD-10-CM

## 2019-01-02 NOTE — TELEPHONE ENCOUNTER
Was the patient seen in the last year in this department? Yes    Does patient have an active prescription for medications requested? No     Received Request Via: Pharmacy      Pt met protocol?: Yes, OV 7/18   BP Readings from Last 1 Encounters:   11/21/18 110/70

## 2019-01-03 RX ORDER — PROPRANOLOL HYDROCHLORIDE 40 MG/1
TABLET ORAL
Qty: 270 TAB | Refills: 0 | Status: SHIPPED | OUTPATIENT
Start: 2019-01-03 | End: 2019-07-13 | Stop reason: SDUPTHER

## 2019-01-08 ENCOUNTER — OFFICE VISIT (OUTPATIENT)
Dept: MEDICAL GROUP | Facility: PHYSICIAN GROUP | Age: 69
End: 2019-01-08
Payer: COMMERCIAL

## 2019-01-08 VITALS
OXYGEN SATURATION: 91 % | DIASTOLIC BLOOD PRESSURE: 60 MMHG | SYSTOLIC BLOOD PRESSURE: 100 MMHG | HEART RATE: 62 BPM | WEIGHT: 225.6 LBS | BODY MASS INDEX: 32.37 KG/M2 | TEMPERATURE: 99.1 F

## 2019-01-08 DIAGNOSIS — E78.5 DYSLIPIDEMIA: ICD-10-CM

## 2019-01-08 DIAGNOSIS — Z79.01 CHRONIC ANTICOAGULATION: ICD-10-CM

## 2019-01-08 DIAGNOSIS — M85.80 OSTEOPENIA, UNSPECIFIED LOCATION: ICD-10-CM

## 2019-01-08 DIAGNOSIS — G43.109 MIGRAINE WITH AURA AND WITHOUT STATUS MIGRAINOSUS, NOT INTRACTABLE: ICD-10-CM

## 2019-01-08 DIAGNOSIS — I48.0 PAROXYSMAL ATRIAL FIBRILLATION (HCC): ICD-10-CM

## 2019-01-08 DIAGNOSIS — I10 ESSENTIAL HYPERTENSION: ICD-10-CM

## 2019-01-08 DIAGNOSIS — R79.89 ELEVATED PTHRP LEVEL: ICD-10-CM

## 2019-01-08 DIAGNOSIS — E83.52 HYPERCALCEMIA: ICD-10-CM

## 2019-01-08 DIAGNOSIS — N18.30 CHRONIC RENAL FAILURE, STAGE 3 (MODERATE) (HCC): ICD-10-CM

## 2019-01-08 PROCEDURE — 99214 OFFICE O/P EST MOD 30 MIN: CPT | Performed by: PHYSICIAN ASSISTANT

## 2019-01-08 RX ORDER — ONDANSETRON 4 MG/1
4 TABLET, ORALLY DISINTEGRATING ORAL
Qty: 20 TAB | Refills: 2 | Status: SHIPPED | OUTPATIENT
Start: 2019-01-08 | End: 2021-01-08 | Stop reason: SDUPTHER

## 2019-01-08 NOTE — PROGRESS NOTES
Subjective:   Miryam Veloz is a 68 y.o. female here today for skin tag. Is an established patient of mine.    HPI:    Patient presents to the office today for routine follow-up. I last saw her in July.    Patient has hypertension which is treated with propranolol 40 mg which also helps with prevention of migraine headaches. She used to take TID but cut back to BID and has done fine on this. Not currently checking home BP readings. For her migraines, she has been taking PRN Relpax and Zofran which work well for her.    She continues to follow with [facility] cardiology for A fib, HTN, hyperlipidemia. She is on chronic Xarelto therapy and fish oil for her cholesterol. Was last seen in November. At that time, was complaining of exertional palpitations and dyspnea, and was referred for 48-hour Holter monitoring. She has not yet had this done but plans to schedule soon.    She also follows with [facility]rology for stage 3 CKD, hyperparathyroidism, and hypercalcemia. Per patient, nephrologist has recommended removal of 3 parathyroid glands. Is planning to schedule follow-up in the near future.    Broke left wrist in September with surgery later that month. Was off of work for a period of time but is back now. Still having some pain but nothing that she considers significant. Has been released by orthopedics. Does have known osteopenia, likely related to hyperparathyroidism.        Current medicines (including changes today)  Current Outpatient Prescriptions   Medication Sig Dispense Refill   • ondansetron (ZOFRAN ODT) 4 MG TABLET DISPERSIBLE Take 1 Tab by mouth every 24 hours as needed. 20 Tab 2   • propranolol (INDERAL) 40 MG Tab TAKE ONE TABLET BY MOUTH THREE TIMES A  Tab 0   • rivaroxaban (XARELTO) 20 MG Tab tablet Take 1 Tab by mouth with dinner. 90 Tab 1   • venlafaxine XR (EFFEXOR XR) 37.5 MG CAPSULE SR 24 HR Take 2 capsules in the morning and 1 capsule in the afternoon 270 Cap 0   •  triamcinolone acetonide (KENALOG) 0.025 % Cream Apply to affected areas on face and chest twice daily as needed, 2 weeks on, 2 weeks off 1 Tube 3   • Omega-3 Fatty Acids (FISH OIL) 1000 MG Cap capsule Take 1,000 mg by mouth every day.     • vitamin D (CHOLECALCIFEROL) 1000 UNIT Tab Take 1,000 Units by mouth every day.     • cyanocobalamin (VITAMIN B12) 1000 MCG Tab Take 1 Tab by mouth every day. 30 Tab 3   • venlafaxine XR (EFFEXOR XR) 37.5 MG CAPSULE SR 24 HR Take 2 capsules in the morning and 1 capsule in the afternoon 270 Cap 1   • diazepam (VALIUM) 2 MG Tab Take 2 mg by mouth every 6 hours as needed for Anxiety.     • eletriptan (RELPAX) 20 MG Tab Take 1 Tab by mouth Once PRN for Migraine for up to 1 dose. 10 Tab 2   • Diclofenac Sodium 1 % Gel Apply  2 grams twice a day to joints as needed 1 Tube 5   • albuterol (VENTOLIN HFA) 108 (90 BASE) MCG/ACT Aero Soln inhalation aerosol Inhale 2 Puffs by mouth every 6 hours as needed for Shortness of Breath. 8.5 g 3     No current facility-administered medications for this visit.      She  has a past medical history of Arrhythmia (01/2017); Arthritis (12/26/2017); Asthma (12/26/2017); Bipolar affective disorder (HCC) (1995); Chronic knee pain (2000); Claustrophobia; Dental disorder (12/26/2017); Depression (5/22/2009); Family history of breast cancer in mother; Fibromyalgia; Headache, frequent episodic tension-type; Hemorrhagic disorder (HCC) (01/2017); Hepatitis B (1975); Hypertension; IBD (inflammatory bowel disease); IBS (irritable bowel syndrome); Lumbar facet arthropathy (2004); Migraine; Pain (12/26/2017); Pain (12/26/2017); PTSD (post-traumatic stress disorder) (12/26/2017); Renal disorder (12/26/2017); and Rotator cuff syndrome of right shoulder (2008). She also has no past medical history of Breast cancer ([facility]).    ROS  +exertional dyspnea  +exertional palpitations  No chest pain  No dizziness or lightheadedness  No lower extremity edema       Objective:      Blood pressure 100/60, pulse 62, temperature 37.3 °C (99.1 °F), weight 102.3 kg (225 lb 9.6 oz), SpO2 91 %, not currently breastfeeding. Body mass index is 32.37 kg/m².     Physical Exam:  Constitutional: Alert, well-appearing, no distress.  Skin: Warm, dry, good turgor, no rashes in visible areas.  Eye: Pupils are equal and round, conjunctiva clear, lids normal.  ENMT: Lips without lesions, moist mucus membranes.  Neck: No masses. No submandibular or cervical lymphadenopathy.  Respiratory: Unlabored respiratory effort, lungs clear to auscultation, no wheezes, no rhonchi.  Cardiovascular: Normal S1, S2, no murmur, no lower extremity edema.      Assessment and Plan:   The following treatment plan was discussed    1. Essential hypertension  Chronic issue, well-controlled with propranolol. Has cut to BID, but BP does not appear to be affected. Reading is 100/60 today in the office. Continue current management.     2. Migraine with aura and without status migrainosus, not intractable  Chronic intermittent migraines which are well-controlled with preventative propranolol and PRN Relpax and Zofran for acute relief of migraine. Continue current management.  - ondansetron (ZOFRAN ODT) 4 MG TABLET DISPERSIBLE; Take 1 Tab by mouth every 24 hours as needed.  Dispense: 20 Tab; Refill: 2    3. Dyslipidemia  Chronic issue, stable. On most recent lipid panel from last month, there is mild elevation of triglycerides. Should continue fish oil and follow up with cardiology    4. Paroxysmal atrial fibrillation (HCC)  5. Chronic anticoagulation  Chronic issue, likely uncontrolled given exertional symptoms. Needs to have 48-hour Holter done which I reminded her of. Plans to schedule this soon and will need cardiology follow-up afterwards. Should continue daily Xarelto for stroke prevention.    6. Chronic renal failure, stage 3 (moderate) (HCC)  Chronic issue, relatively stable per review. Labs UTD. Should continue to follow with  nephrology.    7. Elevated PTHrP level  8. Hypercalcemia  9. Osteopenia, unspecified location  Chronic issues, stable and needing surgical intervention per her nephrologist given ongoing hypercalcemia and osteopenia. She is overdue for follow-up visit with her nephrologist, so I reminded her to schedule. Did have recent fracture, so treatment of her osteopenia is recommended. Will defer to nephrologist's recommendations regarding that given that she will likely be having parathyroid excision.      Followup: Return in about 6 months (around 7/8/2019).    Dori Mccray P.A.-C.

## 2019-04-24 ENCOUNTER — OFFICE VISIT (OUTPATIENT)
Dept: MEDICAL GROUP | Facility: PHYSICIAN GROUP | Age: 69
End: 2019-04-24
Payer: COMMERCIAL

## 2019-04-24 VITALS
BODY MASS INDEX: 33.64 KG/M2 | HEIGHT: 70 IN | DIASTOLIC BLOOD PRESSURE: 60 MMHG | OXYGEN SATURATION: 95 % | HEART RATE: 75 BPM | SYSTOLIC BLOOD PRESSURE: 102 MMHG | WEIGHT: 235 LBS | TEMPERATURE: 98.9 F

## 2019-04-24 DIAGNOSIS — R10.13 ACUTE EPIGASTRIC PAIN: ICD-10-CM

## 2019-04-24 PROCEDURE — 99214 OFFICE O/P EST MOD 30 MIN: CPT | Performed by: PHYSICIAN ASSISTANT

## 2019-04-24 RX ORDER — OMEPRAZOLE 20 MG/1
20 CAPSULE, DELAYED RELEASE ORAL DAILY
Qty: 30 CAP | Refills: 2 | Status: SHIPPED | OUTPATIENT
Start: 2019-04-24 | End: 2019-05-01 | Stop reason: SDUPTHER

## 2019-04-24 ASSESSMENT — PAIN SCALES - GENERAL: PAINLEVEL: 6=MODERATE PAIN

## 2019-04-24 NOTE — LETTER
April 24, 2019         Patient: Miryam Veloz   YOB: 1950   Date of Visit: 4/24/2019           To Whom it May Concern:    Miryam Veloz was seen in my clinic on 4/24/2019. Please excuse her absence from work on the afternoon of Wednesday 4/24/19 and all day Thursday, 4/25/19 and Friday, 4/26/19. Thank you.    If you have any questions or concerns, please don't hesitate to call.        Sincerely,           Dori Mccray P.A.-C.  Electronically Signed

## 2019-04-24 NOTE — PROGRESS NOTES
"Subjective:   Miryam Veloz is a 68 y.o. female here today for GI problems. Is an established patient of mine.    HPI:    Miryam presents to the office today with GI concern. She has been noticing ongoing \"burning\" pain in her stomach over the last week, maybe longer. Has been progressively worsening. Pain is constant. When she eats, pain will feel better for about 10 minutes, but then will get bad again. Pain is worst first thing in the morning. Occasionally feels nauseous but denies vomiting. Has noticed a lot more gas than she usually has. Is a bit more constipated than usual. No longer able to drink coffee, as she's noticed that it makes it worse. The other night she had some pizza which made it worse as well. Endorses previous H. pylori stomach ulcer when she was in her 40s and current pain feels very similar. Taking lots of Tums which don't help much. She denies any chest pain, fever, diarrhea, black/bloody stools.       Current medicines (including changes today)  Current Outpatient Prescriptions   Medication Sig Dispense Refill   • omeprazole (PRILOSEC) 20 MG delayed-release capsule Take 1 Cap by mouth every day. 30 Cap 2   • ondansetron (ZOFRAN ODT) 4 MG TABLET DISPERSIBLE Take 1 Tab by mouth every 24 hours as needed. 20 Tab 2   • propranolol (INDERAL) 40 MG Tab TAKE ONE TABLET BY MOUTH THREE TIMES A  Tab 0   • venlafaxine XR (EFFEXOR XR) 37.5 MG CAPSULE SR 24 HR Take 2 capsules in the morning and 1 capsule in the afternoon 270 Cap 1   • rivaroxaban (XARELTO) 20 MG Tab tablet Take 1 Tab by mouth with dinner. 90 Tab 1   • venlafaxine XR (EFFEXOR XR) 37.5 MG CAPSULE SR 24 HR Take 2 capsules in the morning and 1 capsule in the afternoon 270 Cap 0   • diazepam (VALIUM) 2 MG Tab Take 2 mg by mouth every 6 hours as needed for Anxiety.     • eletriptan (RELPAX) 20 MG Tab Take 1 Tab by mouth Once PRN for Migraine for up to 1 dose. 10 Tab 2   • triamcinolone acetonide (KENALOG) 0.025 % Cream Apply " "to affected areas on face and chest twice daily as needed, 2 weeks on, 2 weeks off 1 Tube 3   • Diclofenac Sodium 1 % Gel Apply  2 grams twice a day to joints as needed 1 Tube 5   • Omega-3 Fatty Acids (FISH OIL) 1000 MG Cap capsule Take 1,000 mg by mouth every day.     • vitamin D (CHOLECALCIFEROL) 1000 UNIT Tab Take 1,000 Units by mouth every day.     • albuterol (VENTOLIN HFA) 108 (90 BASE) MCG/ACT Aero Soln inhalation aerosol Inhale 2 Puffs by mouth every 6 hours as needed for Shortness of Breath. 8.5 g 3   • cyanocobalamin (VITAMIN B12) 1000 MCG Tab Take 1 Tab by mouth every day. 30 Tab 3     No current facility-administered medications for this visit.      She  has a past medical history of Arrhythmia (01/2017); Arthritis (12/26/2017); Asthma (12/26/2017); Bipolar affective disorder (HCC) (1995); Chronic knee pain (2000); Claustrophobia; Dental disorder (12/26/2017); Depression (5/22/2009); Family history of breast cancer in mother; Fibromyalgia; Headache, frequent episodic tension-type; Hemorrhagic disorder (HCC) (01/2017); Hepatitis B (1975); Hypertension; IBD (inflammatory bowel disease); IBS (irritable bowel syndrome); Lumbar facet arthropathy (2004); Migraine; Pain (12/26/2017); Pain (12/26/2017); PTSD (post-traumatic stress disorder) (12/26/2017); Renal disorder (12/26/2017); and Rotator cuff syndrome of right shoulder (2008). She also has no past medical history of Breast cancer ([facility]).    ROS  As per HPI.       Objective:     /60 (BP Location: Left arm, Patient Position: Sitting)   Pulse 75   Temp 37.2 °C (98.9 °F) (Temporal)   Ht 1.778 m (5' 10\")   Wt 106.6 kg (235 lb)   SpO2 95%  Body mass index is 33.72 kg/m².     Physical Exam:  Constitutional: Alert, well-appearing, no distress.  Skin: Warm, dry, good turgor, no rashes in visible areas.  Eye: Pupils are equal and round, conjunctiva clear, lids normal.  ENMT: Lips without lesions, moist mucus membranes.  Respiratory: Unlabored respiratory " effort, lungs clear to auscultation, no wheezes, no rhonchi.  Cardiovascular: Normal S1, S2, no murmur, no lower extremity edema.  Abdomen: Normoactive bowel sounds.  Abdomen is protuberant, but soft and nondistended.  Generalized tenderness throughout the abdomen, but most marked in the epigastrium.  There is no rebound or guarding present.      Assessment and Plan:   The following treatment plan was discussed    1. Acute epigastric pain  New problem, uncontrolled.  Considerations include peptic ulcer disease, gastritis, GERD, H. pylori.  We will have her submit stool sample to check for H. pylori.  I am going to start her on omeprazole 20 mg daily with close follow-up in 1 month.  We discussed that if she fails to show improvement or symptoms worsen, she will need to see GI for EGD.  She was given ER precautions including development of sudden worsening of pain, hematemesis, or melena.  - omeprazole (PRILOSEC) 20 MG delayed-release capsule; Take 1 Cap by mouth every day.  Dispense: 30 Cap; Refill: 2  - H.PYLORI STOOL ANTIGEN; Future      Followup: Return in about 1 month (around 5/24/2019), or if symptoms worsen or fail to improve, for f/u epigastric pain; Short.    Dori Mccray P.A.-C.

## 2019-04-24 NOTE — PATIENT INSTRUCTIONS
Omeprazole capsule (Dr. Lilly's OTC)  What is this medicine?  OMEPRAZOLE (oh ME pray zol) prevents the production of acid in the stomach. It is used to treat the symptoms of heartburn. You can buy this medicine without a prescription. This product is not for long-term use, unless otherwise directed by your doctor or health care professional.  This medicine may be used for other purposes; ask your health care provider or pharmacist if you have questions.  What should I tell my health care provider before I take this medicine?  They need to know if you have any of these conditions:  -black or bloody stools  -chest pain  -difficulty swallowing  -have had heartburn for over 3 months  -have heartburn with dizziness, lightheadedness or sweating  -liver disease  -stomach pain  -unexplained weight loss  -vomiting with blood  -wheezing  -an unusual or allergic reaction to omeprazole, other medicines, foods, dyes, or preservatives  -pregnant or trying to get pregnant  -breast-feeding  How should I use this medicine?  Take this medicine by mouth. Follow the directions on the product label. If you are taking this medicine without a prescription, take one tablet every day. Do not use for longer than 14 days or repeat a course of treatment more often than every 4 months unless directed by a doctor or healthcare professional. Take your dose at regular intervals every 24 hours. Swallow the tablet whole with a drink of water. Do not crush, break or chew. This medicine works best if taken on an empty stomach 30 minutes before breakfast. If you are using this medicine with the prescription of your doctor or healthcare professional, follow the directions you were given. Do not take your medicine more often than directed.  Talk to your pediatrician regarding the use of this medicine in children. Special care may be needed.  Overdosage: If you think you've taken too much of this medicine contact a poison control center or emergency room  at once.  Overdosage: If you think you have taken too much of this medicine contact a poison control center or emergency room at once.  NOTE: This medicine is only for you. Do not share this medicine with others.  What if I miss a dose?  If you miss a dose, take it as soon as you can. If it is almost time for your next dose, take only that dose. Do not take double or extra doses.  What may interact with this medicine?  Do not take this medicine with any of the following medications:  -atazanavir  -certain medicines that treat or prevent blood clots like clopidogrel, warfarin  -nelfinavir   This medicine may also interact with the following medications:  -ampicillin  -certain medicines for anxiety or sleep  -cyclosporine  -diazepam  -digoxin  -disulfiram  -iron salts  -phenytoin  -prescription medicine for fungal or yeast infection like itraconazole, ketoconazole, voriconazole  -saquinavir  -tacrolimus  This list may not describe all possible interactions. Give your health care provider a list of all the medicines, herbs, non-prescription drugs, or dietary supplements you use. Also tell them if you smoke, drink alcohol, or use illegal drugs. Some items may interact with your medicine.  What should I watch for while using this medicine?  It can take several days before your heartburn gets better. Check with your doctor or health care professional if your condition does not start to get better, or if it gets worse.  Do not treat yourself for heartburn with this medicine for more than 14 days in a row. You should only use this medicine for a 2-week treatment period once every 4 months. If your symptoms return shortly after your therapy is complete, or within the 4 month time frame, call your doctor or health care professional.  What side effects may I notice from receiving this medicine?  Side effects that you should report to your doctor or health care professional as soon as possible:  -blood in urine  -bone, muscle  or joint pain  -chest pain or tightness  -dark yellow or brown urine  -fever or sore throat  -redness, blistering, peeling or loosening of the skin, including inside the mouth  -shortness of breath  -skin rash  -yellowing of the eyes or skin   Side effects that usually do not require medical attention (Report these to your doctor or health care professional if they continue or are bothersome.):  -diarrhea or constipation  -headache  This list may not describe all possible side effects. Call your doctor for medical advice about side effects. You may report side effects to FDA at 7-071-FDA-7715.  Where should I keep my medicine?  Keep out of the reach of children.  Store at room temperature between 20 and 25 degrees C (68 and 77 degrees F). Protect from light and moisture. Throw away any unused medicine after the expiration date.  NOTE: This sheet is a summary. It may not cover all possible information. If you have questions about this medicine, talk to your doctor, pharmacist, or health care provider.  © 2014, [employer]. (10/19/2011 10:19:01 PM)

## 2019-04-25 ENCOUNTER — HOSPITAL ENCOUNTER (OUTPATIENT)
Facility: MEDICAL CENTER | Age: 69
End: 2019-04-25
Attending: PHYSICIAN ASSISTANT
Payer: COMMERCIAL

## 2019-04-25 DIAGNOSIS — R10.13 ACUTE EPIGASTRIC PAIN: ICD-10-CM

## 2019-04-25 LAB — H PYLORI AG STL QL IA: NOT DETECTED

## 2019-04-25 PROCEDURE — 87338 HPYLORI STOOL AG IA: CPT

## 2019-05-01 ENCOUNTER — TELEPHONE (OUTPATIENT)
Dept: MEDICAL GROUP | Facility: PHYSICIAN GROUP | Age: 69
End: 2019-05-01

## 2019-05-01 DIAGNOSIS — R10.13 ACUTE EPIGASTRIC PAIN: ICD-10-CM

## 2019-05-01 RX ORDER — OMEPRAZOLE 40 MG/1
20 CAPSULE, DELAYED RELEASE ORAL DAILY
Qty: 30 CAP | Refills: 2 | Status: SHIPPED | OUTPATIENT
Start: 2019-05-01 | End: 2019-07-31 | Stop reason: SDUPTHER

## 2019-05-01 NOTE — TELEPHONE ENCOUNTER
1. Caller Name: Miryam Veloz                                           Call Back Number: [phone] (home) MYCHART MESSAGE OK      Patient approves a detailed voicemail message: N\A    Pt lvm stating omeprazole is not working.She would like to know what else you recommend. Please advise.

## 2019-05-01 NOTE — TELEPHONE ENCOUNTER
Will increase omeprazole to 40 mg daily.  New prescription sent to [facility].  She can use up what she has and just take 2 instead of 1.  Will also order referral to GI for further evaluation as previously discussed.  Dori Mccray P.A.-C.

## 2019-05-29 ENCOUNTER — OFFICE VISIT (OUTPATIENT)
Dept: MEDICAL GROUP | Facility: PHYSICIAN GROUP | Age: 69
End: 2019-05-29
Payer: COMMERCIAL

## 2019-05-29 VITALS
BODY MASS INDEX: 33.64 KG/M2 | DIASTOLIC BLOOD PRESSURE: 62 MMHG | SYSTOLIC BLOOD PRESSURE: 110 MMHG | OXYGEN SATURATION: 93 % | WEIGHT: 235 LBS | HEIGHT: 70 IN | HEART RATE: 60 BPM | TEMPERATURE: 99.4 F

## 2019-05-29 DIAGNOSIS — Z79.01 CHRONIC ANTICOAGULATION: ICD-10-CM

## 2019-05-29 DIAGNOSIS — R10.13 EPIGASTRIC PAIN: ICD-10-CM

## 2019-05-29 DIAGNOSIS — I48.0 PAROXYSMAL ATRIAL FIBRILLATION (HCC): ICD-10-CM

## 2019-05-29 DIAGNOSIS — E53.8 VITAMIN B12 DEFICIENCY: ICD-10-CM

## 2019-05-29 PROCEDURE — 99213 OFFICE O/P EST LOW 20 MIN: CPT | Performed by: PHYSICIAN ASSISTANT

## 2019-05-29 NOTE — PROGRESS NOTES
Subjective:   Miryam Veloz is a 68 y.o. female here today for follow-up on epigastric pain. Is an established patient of mine.    HPI:    Patient presents to the office today for routine follow-up regarding burning epigastric pain, nausea, and excessive gas that she was experiencing last month.  I started her on omeprazole 20 mg daily. H. Pylori stool testing was recommended given that she has prior history of this. Results came back negative. Earlier this month, she called that the medication was not helping so I increased the dose to 40 mg daily and referred her to GI for consideration of EGD.  She states that since starting the higher dose of the omeprazole, her symptoms resolved. No longer having any stomach pain, nausea, or excessive gas/belching. She continues to avoid spicy and greasy foods given that these things tended to upset her stomach even prior to the new symptoms starting, but other than that, is able to eat pretty much what she wants.  She has not yet followed up with GI due to her work schedule but plans to in the near future.  She otherwise denies concerns today.      Current medicines (including changes today)  Current Outpatient Prescriptions   Medication Sig Dispense Refill   • rivaroxaban (XARELTO) 20 MG Tab tablet Take 1 Tab by mouth with dinner. 90 Tab 1   • omeprazole (PRILOSEC) 40 MG delayed-release capsule Take 1 Cap by mouth every day. 30 Cap 2   • ondansetron (ZOFRAN ODT) 4 MG TABLET DISPERSIBLE Take 1 Tab by mouth every 24 hours as needed. 20 Tab 2   • propranolol (INDERAL) 40 MG Tab TAKE ONE TABLET BY MOUTH THREE TIMES A  Tab 0   • venlafaxine XR (EFFEXOR XR) 37.5 MG CAPSULE SR 24 HR Take 2 capsules in the morning and 1 capsule in the afternoon 270 Cap 1   • venlafaxine XR (EFFEXOR XR) 37.5 MG CAPSULE SR 24 HR Take 2 capsules in the morning and 1 capsule in the afternoon 270 Cap 0   • triamcinolone acetonide (KENALOG) 0.025 % Cream Apply to affected areas on face and  "chest twice daily as needed, 2 weeks on, 2 weeks off 1 Tube 3   • Omega-3 Fatty Acids (FISH OIL) 1000 MG Cap capsule Take 1,000 mg by mouth every day.     • vitamin D (CHOLECALCIFEROL) 1000 UNIT Tab Take 1,000 Units by mouth every day.     • cyanocobalamin (VITAMIN B12) 1000 MCG Tab Take 1 Tab by mouth every day. 30 Tab 3   • diazepam (VALIUM) 2 MG Tab Take 2 mg by mouth every 6 hours as needed for Anxiety.     • eletriptan (RELPAX) 20 MG Tab Take 1 Tab by mouth Once PRN for Migraine for up to 1 dose. 10 Tab 2   • Diclofenac Sodium 1 % Gel Apply  2 grams twice a day to joints as needed 1 Tube 5   • albuterol (VENTOLIN HFA) 108 (90 BASE) MCG/ACT Aero Soln inhalation aerosol Inhale 2 Puffs by mouth every 6 hours as needed for Shortness of Breath. 8.5 g 3     No current facility-administered medications for this visit.      She  has a past medical history of Arrhythmia (01/2017); Arthritis (12/26/2017); Asthma (12/26/2017); Bipolar affective disorder (HCC) (1995); Chronic knee pain (2000); Claustrophobia; Dental disorder (12/26/2017); Depression (5/22/2009); Family history of breast cancer in mother; Fibromyalgia; Headache, frequent episodic tension-type; Hemorrhagic disorder (HCC) (01/2017); Hepatitis B (1975); Hypertension; IBD (inflammatory bowel disease); IBS (irritable bowel syndrome); Lumbar facet arthropathy (2004); Migraine; Pain (12/26/2017); Pain (12/26/2017); PTSD (post-traumatic stress disorder) (12/26/2017); Renal disorder (12/26/2017); and Rotator cuff syndrome of right shoulder (2008). She also has no past medical history of Breast cancer ([facility]).    ROS  As per HPI.       Objective:     /62 (BP Location: Left arm, Patient Position: Sitting, BP Cuff Size: Large adult)   Pulse 60   Temp 37.4 °C (99.4 °F)   Ht 1.778 m (5' 10\")   Wt 106.6 kg (235 lb)   SpO2 93%  Body mass index is 33.72 kg/m².     Physical Exam:  Constitutional: Alert, well-appearing, no distress.  Skin: Warm, dry, good turgor, no " rashes in visible areas.  Eye: Pupils are equal and round, conjunctiva clear, lids normal.  ENMT: Lips without lesions, moist mucus membranes.  Abdomen: Normoactive bowel sounds.  Abdomen is soft, nondistended.  Very mild tenderness to palpation of the mid epigastrium without rebound or guarding.  Nontender elsewhere in the abdomen.      Assessment and Plan:   The following treatment plan was discussed    1. Epigastric pain  Established problem, well-controlled since starting omeprazole 40 mg daily.  I have advised her to continue this and follow-up with GI given her history of H. pylori, although her stool test was negative.    2. Paroxysmal atrial fibrillation (HCC)  3. Chronic anticoagulation  Patient is requesting refills of Xarelto.  She follows with cardiology for paroxysmal A. fib but does not have a follow-up until August and will be out before then.  She is due for annual screening lab work in September which I have ordered today.  - rivaroxaban (XARELTO) 20 MG Tab tablet; Take 1 Tab by mouth with dinner.  Dispense: 90 Tab; Refill: 1  - Comp Metabolic Panel; Future  - CBC WITH DIFFERENTIAL; Future    4. Vitamin B12 deficiency  Given history of B12 deficiency, I have advised her to have level checked before next appointment.  - VITAMIN B12; Future      Followup: Return in about 4 months (around 9/29/2019) for f/u chronic conditions; Short.    Dori Mccray P.A.-C.

## 2019-07-13 DIAGNOSIS — G43.109 MIGRAINE WITH AURA AND WITHOUT STATUS MIGRAINOSUS, NOT INTRACTABLE: ICD-10-CM

## 2019-07-13 DIAGNOSIS — I10 ESSENTIAL HYPERTENSION: ICD-10-CM

## 2019-07-15 RX ORDER — PROPRANOLOL HYDROCHLORIDE 40 MG/1
TABLET ORAL
Qty: 270 TAB | Refills: 1 | Status: SHIPPED | OUTPATIENT
Start: 2019-07-15 | End: 2019-10-19 | Stop reason: SDUPTHER

## 2019-07-15 NOTE — TELEPHONE ENCOUNTER
Was the patient seen in the last year in this department? Yes    Does patient have an active prescription for medications requested? No     Received Request Via: Pharmacy      Pt met protocol?: Yes, OV 5/19

## 2019-07-15 NOTE — TELEPHONE ENCOUNTER
Refill X 6 months, sent to pharmacy.Pt. Seen in the last 6 months per protocol.   Lab Results   Component Value Date/Time    SODIUM 136 09/24/2018 12:12 PM    POTASSIUM 4.4 09/24/2018 12:12 PM    CHLORIDE 108 09/24/2018 12:12 PM    CO2 21 09/24/2018 12:12 PM    GLUCOSE 103 (H) 09/24/2018 12:12 PM    BUN 18 09/24/2018 12:12 PM    CREATININE 0.99 09/24/2018 12:12 PM    CREATININE 0.8 01/29/2008 10:10 AM

## 2019-07-31 DIAGNOSIS — R10.13 ACUTE EPIGASTRIC PAIN: ICD-10-CM

## 2019-07-31 NOTE — TELEPHONE ENCOUNTER
Was the patient seen in the last year in this department? Yes    Does patient have an active prescription for medications requested? No     Received Request Via: Pharmacy    Pt met protocol?: Yes     Last OV 05/29/2019

## 2019-08-02 RX ORDER — OMEPRAZOLE 40 MG/1
CAPSULE, DELAYED RELEASE ORAL
Qty: 30 CAP | Refills: 2 | Status: SHIPPED | OUTPATIENT
Start: 2019-08-02 | End: 2019-11-06 | Stop reason: SDUPTHER

## 2019-08-13 RX ORDER — VENLAFAXINE HYDROCHLORIDE 37.5 MG/1
CAPSULE, EXTENDED RELEASE ORAL
Qty: 270 CAP | Refills: 1 | Status: SHIPPED
Start: 2019-08-13 | End: 2019-12-24

## 2019-08-13 NOTE — TELEPHONE ENCOUNTER
Was the patient seen in the last year in this department? Yes    Does patient have an active prescription for medications requested? No     Received Request Via: Pharmacy    Pt met protocol?: Yes     Last OV 05/29/19

## 2019-09-12 ENCOUNTER — APPOINTMENT (OUTPATIENT)
Dept: URGENT CARE | Facility: PHYSICIAN GROUP | Age: 69
End: 2019-09-12
Payer: COMMERCIAL

## 2019-09-12 ENCOUNTER — APPOINTMENT (OUTPATIENT)
Dept: RADIOLOGY | Facility: IMAGING CENTER | Age: 69
End: 2019-09-12
Attending: INTERNAL MEDICINE
Payer: COMMERCIAL

## 2019-09-12 ENCOUNTER — OFFICE VISIT (OUTPATIENT)
Dept: MEDICAL GROUP | Facility: PHYSICIAN GROUP | Age: 69
End: 2019-09-12
Payer: COMMERCIAL

## 2019-09-12 ENCOUNTER — TELEPHONE (OUTPATIENT)
Dept: MEDICAL GROUP | Facility: PHYSICIAN GROUP | Age: 69
End: 2019-09-12

## 2019-09-12 VITALS
WEIGHT: 242 LBS | HEIGHT: 70 IN | HEART RATE: 64 BPM | TEMPERATURE: 98.4 F | OXYGEN SATURATION: 94 % | BODY MASS INDEX: 34.65 KG/M2 | DIASTOLIC BLOOD PRESSURE: 66 MMHG | SYSTOLIC BLOOD PRESSURE: 120 MMHG

## 2019-09-12 DIAGNOSIS — M41.9 SCOLIOSIS OF LUMBAR SPINE, UNSPECIFIED SCOLIOSIS TYPE: ICD-10-CM

## 2019-09-12 DIAGNOSIS — M43.16 SPONDYLOLISTHESIS OF LUMBAR REGION: ICD-10-CM

## 2019-09-12 DIAGNOSIS — M54.50 ACUTE BILATERAL LOW BACK PAIN WITHOUT SCIATICA: ICD-10-CM

## 2019-09-12 PROCEDURE — 72100 X-RAY EXAM L-S SPINE 2/3 VWS: CPT | Mod: 26 | Performed by: INTERNAL MEDICINE

## 2019-09-12 PROCEDURE — 99204 OFFICE O/P NEW MOD 45 MIN: CPT | Performed by: INTERNAL MEDICINE

## 2019-09-12 RX ORDER — ACETAMINOPHEN 325 MG/1
650 TABLET ORAL 3 TIMES DAILY
Qty: 60 TAB | Refills: 1 | Status: SHIPPED | OUTPATIENT
Start: 2019-09-12 | End: 2021-06-03

## 2019-09-12 RX ORDER — CYCLOBENZAPRINE HCL 10 MG
10 TABLET ORAL 3 TIMES DAILY PRN
Qty: 30 TAB | Refills: 0 | Status: SHIPPED | OUTPATIENT
Start: 2019-09-12 | End: 2021-01-04

## 2019-09-12 NOTE — LETTER
September 12, 2019        Miryam Juan Magdiel  [address] [facility] [address]        To Whomsoever Concerned,  Miryam was under my care in my office today for her medical condition.  Please excuse her from the work for tomorrow that is 9/13/2019.  If you have any questions or concerns, please don't hesitate to call.        Sincerely,        Sejal Russell M.D.    Electronically Signed

## 2019-09-13 NOTE — ASSESSMENT & PLAN NOTE
This is a new problem started 5 days back as patient is moving the boxes and its around 7/10 in severity. Pt is currently on Tylenol 2 tab which didn't help much. Requesting for better medications as patient is on Xarelto there is contraindication to NSAIDS. There was no imaging on the back.

## 2019-09-13 NOTE — PROGRESS NOTES
CC: Short visit, acute back pain.    HISTORY OF PRESENT ILLNESS: Patient is a 68 y.o. female established patient who presents today to discuss on medical conditions as mentioned in HPI below.    Health Maintenance: Patient already had her flu vaccine at her workplace.    Acute bilateral low back pain without sciatica  This is a new problem started 5 days back as patient is moving the boxes and its around 7/10 in severity. Pt is currently on Tylenol 2 tab which didn't help much. Requesting for better medications as patient is on Xarelto there is contraindication to NSAIDS. There was no imaging on the back.       PHQ score 0, BMI > 34 , no tobacco, no fall injuries.    Patient Active Problem List    Diagnosis Date Noted   • Paroxysmal atrial fibrillation ([facility]) 01/09/2017     Priority: Medium   • Hypercalcemia 10/20/2015     Priority: Medium   • Chronic renal failure, stage 3 (moderate) ([facility]) 01/09/2017     Priority: Low   • Fibromyalgia 01/09/2017     Priority: Low   • Acute bilateral low back pain without sciatica 09/12/2019   • Osteopenia 01/08/2019   • Mild intermittent asthma without complication 12/22/2017   • Carpal tunnel syndrome on both sides 12/22/2017   • Chronic anticoagulation 08/01/2017   • Dyslipidemia 08/01/2017   • Peripheral edema 07/25/2017   • Abnormal CT scan 07/25/2017   • Acute pain of right knee 05/30/2017   • Obesity (BMI 30-39.9) 04/12/2017   • Elevated PTHrP level 07/28/2016   • Vitamin B12 deficiency 11/12/2015   • Essential hypertension 08/12/2014   • Lumbar disc disease 09/14/2012   • Migraine headache    • Lumbar facet arthropathy    • Allergic rhinitis due to pollen 08/26/2011   • Anxiety 07/15/2011   • Bipolar affective disorder ([facility]) 05/22/2009      Allergies:Codeine; Imitrex [sumatriptan succinate]; Penicillins; Sulfa drugs; Azithromycin; Dilaudid [hydromorphone]; Other drug; Sensipar [cinacalcet]; Imipramine; and Seroquel [quetiapine fumarate]    Current Outpatient Medications    Medication Sig Dispense Refill   • Diclofenac Sodium 1 % Gel Apply  2 grams twice a day to joints as needed 1 Tube 5   • cyclobenzaprine (FLEXERIL) 10 MG Tab Take 1 Tab by mouth 3 times a day as needed. 30 Tab 0   • acetaminophen (TYLENOL) 325 MG Tab Take 2 Tabs by mouth 3 times a day. 60 Tab 1   • venlafaxine XR (EFFEXOR XR) 37.5 MG CAPSULE SR 24 HR TAKE TWO CAPSULES BY MOUTH EVERY MORNING AND TAKE 1 CAPSULE IN THE AFTERNOON 270 Cap 1   • omeprazole (PRILOSEC) 40 MG delayed-release capsule TAKE ONE CAPSULE BY MOUTH DAILY 30 Cap 2   • propranolol (INDERAL) 40 MG Tab TAKE ONE TABLET BY MOUTH THREE TIMES A  Tab 1   • rivaroxaban (XARELTO) 20 MG Tab tablet Take 1 Tab by mouth with dinner. 90 Tab 1   • ondansetron (ZOFRAN ODT) 4 MG TABLET DISPERSIBLE Take 1 Tab by mouth every 24 hours as needed. 20 Tab 2   • diazepam (VALIUM) 2 MG Tab Take 2 mg by mouth every 6 hours as needed for Anxiety.     • eletriptan (RELPAX) 20 MG Tab Take 1 Tab by mouth Once PRN for Migraine for up to 1 dose. 10 Tab 2   • triamcinolone acetonide (KENALOG) 0.025 % Cream Apply to affected areas on face and chest twice daily as needed, 2 weeks on, 2 weeks off 1 Tube 3   • Omega-3 Fatty Acids (FISH OIL) 1000 MG Cap capsule Take 1,000 mg by mouth every day.     • vitamin D (CHOLECALCIFEROL) 1000 UNIT Tab Take 1,000 Units by mouth every day.     • albuterol (VENTOLIN HFA) 108 (90 BASE) MCG/ACT Aero Soln inhalation aerosol Inhale 2 Puffs by mouth every 6 hours as needed for Shortness of Breath. 8.5 g 3   • cyanocobalamin (VITAMIN B12) 1000 MCG Tab Take 1 Tab by mouth every day. 30 Tab 3     No current facility-administered medications for this visit.        Social History     Tobacco Use   • Smoking status: Former Smoker     Packs/day: 1.00     Years: 20.00     Pack years: 20.00     Types: Cigarettes     Last attempt to quit: 1994     Years since quittin.7   • Smokeless tobacco: Never Used   Substance Use Topics   • Alcohol use: Yes  "    Comment: 2 per month   • Drug use: No     Comment: H/o Drug  abuse - Meth, Clean x 26 years.     Social History     Social History Narrative   • Not on file       Family History   Problem Relation Age of Onset   • Cancer Mother         uterine, breast cancer   • GI Disease Father         ulcer   • Heart Disease Father 40        MIs   • Psychiatric Illness Father         anxiety   • Arthritis Sister         rheumatoid   • Cancer Brother         glioblastoma   • Stroke Sister 79        mid brain        ROS:     - Constitutional:  Negative for fever, chills, unexpected weight change, and fatigue/generalized weakness.    - HEENT:  Negative for headaches, vision changes, hearing changes, ear pain, ear discharge, rhinorrhea, sinus congestion, sore throat, and neck pain.      - Respiratory: Negative for cough, sputum production, chest congestion, dyspnea, wheezing, and crackles.      - Cardiovascular: Negative for chest pain, palpitations, orthopnea, and bilateral lower extremity edema.     - Gastrointestinal: Negative for heartburn, nausea, vomiting, abdominal pain, hematochezia, melena, diarrhea, constipation, and greasy/foul-smelling stools.     - Genitourinary: Negative for dysuria, polyuria, hematuria, pyuria, urinary urgency, and urinary incontinence.     - Musculoskeletal: As mentioned in HPI above negative for myalgias and joint pain.     - Skin: Negative for rash, itching, cyanotic skin color change.     - Neurological: Negative for dizziness, tingling, tremors, focal sensory deficit, focal weakness and headaches.     - Endo/Heme/Allergies: Does not bruise/bleed easily.     - Psychiatric/Behavioral: Negative for depression, suicidal/homicidal ideation and memory loss.        Last labs in September 2018 reviewed.    Exam:    /66 (BP Location: Right arm, Patient Position: Sitting, BP Cuff Size: Adult)   Pulse 64   Temp 36.9 °C (98.4 °F) (Temporal)   Ht 1.778 m (5' 10\")   Wt 109.8 kg (242 lb)   SpO2 " 94%  Body mass index is 34.72 kg/m².    General:  Well nourished, well developed female in NAD  Head is grossly normal.  Neck: Supple without JVD or bruit. Thyroid is not enlarged.  Pulmonary: Clear to ausculation and percussion.  Normal effort. No rales, ronchi, or wheezing.  Cardiovascular: Regular rate and rhythm without murmur. Carotid and radial pulses are intact and equal bilaterally.  Extremities: no clubbing, cyanosis, or edema.  Back exam : Positive for tenderness on palpation of lumbosacral spine and para spinal tenderness.  Paraspinal muscles appear very stiff, straight leg raising test not done due to pain.    Please note that this dictation was created using voice recognition software. I have made every reasonable attempt to correct obvious errors, but I expect that there are errors of grammar and possibly content that I did not discover before finalizing the note.    Assessment/Plan:  1. Acute bilateral low back pain without sciatica  New problem, most likely disc prolapse due to lifting of heavy weights.  As mentioned in HPI above and with physical exam findings patient does have chronic back pain problem in the past as per my chart review but no imaging seen.  Will do a baseline x-ray at this time before I order physical therapy explained to the patient.  As patient is on Xarelto and NSAIDs are contraindicated, will give her muscle relaxer and will need to continue high-dose Tylenol.  Last liver function test within normal limits in 2017.  Counseled on conservative management including heat pads, early mobilization explained to the patient.  Given instructions to inform if no improvement so that we can plan for an MRI if needed.  Will await for x-ray report before ordering physical therapy.  - Diclofenac Sodium 1 % Gel; Apply  2 grams twice a day to joints as needed  Dispense: 1 Tube; Refill: 5  - cyclobenzaprine (FLEXERIL) 10 MG Tab; Take 1 Tab by mouth 3 times a day as needed.  Dispense: 30 Tab;  Refill: 0  - acetaminophen (TYLENOL) 325 MG Tab; Take 2 Tabs by mouth 3 times a day.  Dispense: 60 Tab; Refill: 1  - DX-LUMBAR SPINE-2 OR 3 VIEWS; Future

## 2019-10-11 ENCOUNTER — HOSPITAL ENCOUNTER (OUTPATIENT)
Dept: LAB | Facility: MEDICAL CENTER | Age: 69
End: 2019-10-11
Attending: PHYSICIAN ASSISTANT
Payer: COMMERCIAL

## 2019-10-11 DIAGNOSIS — E53.8 VITAMIN B12 DEFICIENCY: ICD-10-CM

## 2019-10-11 DIAGNOSIS — Z79.01 CHRONIC ANTICOAGULATION: ICD-10-CM

## 2019-10-11 LAB
ALBUMIN SERPL BCP-MCNC: 3.7 G/DL (ref 3.2–4.9)
ALBUMIN/GLOB SERPL: 1.3 G/DL
ALP SERPL-CCNC: 86 U/L (ref 30–99)
ALT SERPL-CCNC: 12 U/L (ref 2–50)
ANION GAP SERPL CALC-SCNC: 9 MMOL/L (ref 0–11.9)
AST SERPL-CCNC: 15 U/L (ref 12–45)
BASOPHILS # BLD AUTO: 1.1 % (ref 0–1.8)
BASOPHILS # BLD: 0.08 K/UL (ref 0–0.12)
BILIRUB SERPL-MCNC: 0.4 MG/DL (ref 0.1–1.5)
BUN SERPL-MCNC: 16 MG/DL (ref 8–22)
CALCIUM SERPL-MCNC: 10.4 MG/DL (ref 8.5–10.5)
CHLORIDE SERPL-SCNC: 110 MMOL/L (ref 96–112)
CO2 SERPL-SCNC: 25 MMOL/L (ref 20–33)
CREAT SERPL-MCNC: 0.93 MG/DL (ref 0.5–1.4)
EOSINOPHIL # BLD AUTO: 0.18 K/UL (ref 0–0.51)
EOSINOPHIL NFR BLD: 2.4 % (ref 0–6.9)
ERYTHROCYTE [DISTWIDTH] IN BLOOD BY AUTOMATED COUNT: 46.5 FL (ref 35.9–50)
GLOBULIN SER CALC-MCNC: 2.9 G/DL (ref 1.9–3.5)
GLUCOSE SERPL-MCNC: 105 MG/DL (ref 65–99)
HCT VFR BLD AUTO: 44.4 % (ref 37–47)
HGB BLD-MCNC: 14.2 G/DL (ref 12–16)
IMM GRANULOCYTES # BLD AUTO: 0.03 K/UL (ref 0–0.11)
IMM GRANULOCYTES NFR BLD AUTO: 0.4 % (ref 0–0.9)
LYMPHOCYTES # BLD AUTO: 2.29 K/UL (ref 1–4.8)
LYMPHOCYTES NFR BLD: 30.4 % (ref 22–41)
MCH RBC QN AUTO: 32.9 PG (ref 27–33)
MCHC RBC AUTO-ENTMCNC: 32 G/DL (ref 33.6–35)
MCV RBC AUTO: 102.8 FL (ref 81.4–97.8)
MONOCYTES # BLD AUTO: 0.56 K/UL (ref 0–0.85)
MONOCYTES NFR BLD AUTO: 7.4 % (ref 0–13.4)
NEUTROPHILS # BLD AUTO: 4.39 K/UL (ref 2–7.15)
NEUTROPHILS NFR BLD: 58.3 % (ref 44–72)
NRBC # BLD AUTO: 0 K/UL
NRBC BLD-RTO: 0 /100 WBC
PLATELET # BLD AUTO: 192 K/UL (ref 164–446)
PMV BLD AUTO: 12.3 FL (ref 9–12.9)
POTASSIUM SERPL-SCNC: 4.1 MMOL/L (ref 3.6–5.5)
PROT SERPL-MCNC: 6.6 G/DL (ref 6–8.2)
RBC # BLD AUTO: 4.32 M/UL (ref 4.2–5.4)
SODIUM SERPL-SCNC: 144 MMOL/L (ref 135–145)
VIT B12 SERPL-MCNC: 339 PG/ML (ref 211–911)
WBC # BLD AUTO: 7.5 K/UL (ref 4.8–10.8)

## 2019-10-11 PROCEDURE — 82607 VITAMIN B-12: CPT

## 2019-10-11 PROCEDURE — 80053 COMPREHEN METABOLIC PANEL: CPT

## 2019-10-11 PROCEDURE — 85025 COMPLETE CBC W/AUTO DIFF WBC: CPT

## 2019-10-11 PROCEDURE — 36415 COLL VENOUS BLD VENIPUNCTURE: CPT

## 2019-10-19 ENCOUNTER — OFFICE VISIT (OUTPATIENT)
Dept: MEDICAL GROUP | Facility: PHYSICIAN GROUP | Age: 69
End: 2019-10-19
Payer: COMMERCIAL

## 2019-10-19 VITALS
TEMPERATURE: 98.4 F | WEIGHT: 245 LBS | BODY MASS INDEX: 35.07 KG/M2 | DIASTOLIC BLOOD PRESSURE: 82 MMHG | HEART RATE: 76 BPM | HEIGHT: 70 IN | SYSTOLIC BLOOD PRESSURE: 122 MMHG | OXYGEN SATURATION: 95 %

## 2019-10-19 DIAGNOSIS — G43.109 MIGRAINE WITH AURA AND WITHOUT STATUS MIGRAINOSUS, NOT INTRACTABLE: ICD-10-CM

## 2019-10-19 DIAGNOSIS — R10.13 EPIGASTRIC PAIN: ICD-10-CM

## 2019-10-19 DIAGNOSIS — I10 ESSENTIAL HYPERTENSION: ICD-10-CM

## 2019-10-19 DIAGNOSIS — Z12.39 ENCOUNTER FOR SCREENING FOR MALIGNANT NEOPLASM OF BREAST: ICD-10-CM

## 2019-10-19 DIAGNOSIS — N18.30 CHRONIC RENAL FAILURE, STAGE 3 (MODERATE) (HCC): ICD-10-CM

## 2019-10-19 DIAGNOSIS — Q82.8 ACCESSORY SKIN TAGS: ICD-10-CM

## 2019-10-19 DIAGNOSIS — J30.1 ALLERGIC RHINITIS DUE TO POLLEN, UNSPECIFIED SEASONALITY: ICD-10-CM

## 2019-10-19 DIAGNOSIS — I48.0 PAROXYSMAL ATRIAL FIBRILLATION (HCC): ICD-10-CM

## 2019-10-19 DIAGNOSIS — F41.9 ANXIETY: ICD-10-CM

## 2019-10-19 DIAGNOSIS — F31.75 BIPOLAR DISORDER, IN PARTIAL REMISSION, MOST RECENT EPISODE DEPRESSED (HCC): ICD-10-CM

## 2019-10-19 DIAGNOSIS — R79.89 ELEVATED PTHRP LEVEL: ICD-10-CM

## 2019-10-19 DIAGNOSIS — J45.21 MILD INTERMITTENT ASTHMA WITH ACUTE EXACERBATION: ICD-10-CM

## 2019-10-19 PROCEDURE — 99214 OFFICE O/P EST MOD 30 MIN: CPT | Mod: 25 | Performed by: PHYSICIAN ASSISTANT

## 2019-10-19 PROCEDURE — 11200 RMVL SKIN TAGS UP TO&INC 15: CPT | Performed by: PHYSICIAN ASSISTANT

## 2019-10-19 RX ORDER — ALBUTEROL SULFATE 90 UG/1
1-2 AEROSOL, METERED RESPIRATORY (INHALATION) EVERY 6 HOURS PRN
Qty: 8.5 G | Refills: 3 | Status: SHIPPED | OUTPATIENT
Start: 2019-10-19 | End: 2021-01-08 | Stop reason: SDUPTHER

## 2019-10-19 RX ORDER — AZELASTINE 1 MG/ML
1 SPRAY, METERED NASAL 2 TIMES DAILY
Qty: 30 ML | Refills: 5 | Status: SHIPPED | OUTPATIENT
Start: 2019-10-19 | End: 2021-01-04

## 2019-10-19 RX ORDER — PROPRANOLOL HYDROCHLORIDE 40 MG/1
40 TABLET ORAL 3 TIMES DAILY
Qty: 270 TAB | Refills: 1 | Status: SHIPPED | OUTPATIENT
Start: 2019-10-19 | End: 2020-04-17

## 2019-10-19 RX ORDER — METHYLPREDNISOLONE 4 MG/1
TABLET ORAL
Qty: 21 TAB | Refills: 0 | Status: SHIPPED
Start: 2019-10-19 | End: 2020-06-09

## 2019-10-19 NOTE — PROGRESS NOTES
Subjective:   Miryam Veloz is a 69 y.o. female here today for follow-up on epigastric pain and other chronic conditions. Is an established patient of mine.    HPI:    Patient presents to the office today for routine follow-up on her chronic conditions.  Her most recent appointments have been for epigastric pain.  She was treated with high-dose omeprazole with improvement of her symptoms. States that as long as she takes medication daily she has not had any problems. She has follow-up with GI scheduled for December.     Has mild intermittent asthma with associated year-round allergies.  No current maintenance inhalers but does use albuterol occasionally. Not currently on allergy medications. Has had ongoing rhinitis. She does mention that she's had increased shortness of breath over the last couple of weeks. States has difficulty catching her breath and can't take a deep breath. Complains of dry cough all day since SOB started. Denies fever, but feels lethargic with very little energy. Throat feels a little sore. Complains of slight chest tightness. Has not been using albuterol inhaler.    She continues on propranolol 40 mg 3 times daily for hypertension.  Also has A. fib and is on chronic Xarelto for anticoagulation.  She follows with [facility]--last visit in 11/2018.    She has hyperparathyroidism and follows with nephrology.  Also has stage III CKD.  It has been recommended for her to have parathyroid excision which she has not yet had done. [facility] is planning on scheduling a follow-up appointment soon.    She mentions that she was noticing a lot more tearful episodes lately, but feels she is coming out of it. Hasn't noticed in about a week. Continues with Effexor XR 75 mg in the AM and 37.5 mg at bedtime.    Migraines have been better lately. She mentions that she's had more episodes of visual aura without associated headache or with only mild pain. Estimates current migraine frequency to be  about twice per month. She has not been needing to use her Relpax at all.     She does have symptomatic cluster of skin tags under her left breast which bother her, especially given that the band of her bra is constantly rubbing on the area. She would like to have removed.      Current medicines (including changes today)  Current Outpatient Medications   Medication Sig Dispense Refill   • albuterol (VENTOLIN HFA) 108 (90 Base) MCG/ACT Aero Soln inhalation aerosol Inhale 1-2 Puffs by mouth every 6 hours as needed for Shortness of Breath. 8.5 g 3   • propranolol (INDERAL) 40 MG Tab Take 1 Tab by mouth 3 times a day. 270 Tab 1   • azelastine (ASTELIN) 137 MCG/SPRAY nasal spray [place] 1 Spray in nose 2 times a day. 30 mL 5   • methylPREDNISolone (MEDROL DOSEPAK) 4 MG Tablet Therapy Pack As directed on the packaging label. 21 Tab 0   • Diclofenac Sodium 1 % Gel Apply  2 grams twice a day to joints as needed 1 Tube 5   • cyclobenzaprine (FLEXERIL) 10 MG Tab Take 1 Tab by mouth 3 times a day as needed. 30 Tab 0   • acetaminophen (TYLENOL) 325 MG Tab Take 2 Tabs by mouth 3 times a day. 60 Tab 1   • venlafaxine XR (EFFEXOR XR) 37.5 MG CAPSULE SR 24 HR TAKE TWO CAPSULES BY MOUTH EVERY MORNING AND TAKE 1 CAPSULE IN THE AFTERNOON 270 Cap 1   • omeprazole (PRILOSEC) 40 MG delayed-release capsule TAKE ONE CAPSULE BY MOUTH DAILY 30 Cap 2   • rivaroxaban (XARELTO) 20 MG Tab tablet Take 1 Tab by mouth with dinner. 90 Tab 1   • ondansetron (ZOFRAN ODT) 4 MG TABLET DISPERSIBLE Take 1 Tab by mouth every 24 hours as needed. 20 Tab 2   • diazepam (VALIUM) 2 MG Tab Take 2 mg by mouth every 6 hours as needed for Anxiety.     • eletriptan (RELPAX) 20 MG Tab Take 1 Tab by mouth Once PRN for Migraine for up to 1 dose. 10 Tab 2   • triamcinolone acetonide (KENALOG) 0.025 % Cream Apply to affected areas on face and chest twice daily as needed, 2 weeks on, 2 weeks off 1 Tube 3   • Omega-3 Fatty Acids (FISH OIL) 1000 MG Cap capsule Take 1,000 mg  "by mouth every day.     • vitamin D (CHOLECALCIFEROL) 1000 UNIT Tab Take 1,000 Units by mouth every day.     • cyanocobalamin (VITAMIN B12) 1000 MCG Tab Take 1 Tab by mouth every day. 30 Tab 3     No current facility-administered medications for this visit.      She  has a past medical history of Arrhythmia (01/2017), Arthritis (12/26/2017), Asthma (12/26/2017), Bipolar affective disorder (HCC) (1995), Chronic knee pain (2000), Claustrophobia, Dental disorder (12/26/2017), Depression (5/22/2009), Family history of breast cancer in mother, Fibromyalgia, Headache, frequent episodic tension-type, Hemorrhagic disorder (HCC) (01/2017), Hepatitis B (1975), Hypertension, IBD (inflammatory bowel disease), IBS (irritable bowel syndrome), Lumbar facet arthropathy (2004), Migraine, Pain (12/26/2017), Pain (12/26/2017), PTSD (post-traumatic stress disorder) (12/26/2017), Renal disorder (12/26/2017), and Rotator cuff syndrome of right shoulder (2008). She also has no past medical history of Breast cancer (Formerly [facility]).    ROS  As per HPI.       Objective:     /82 (BP Location: Left arm, Patient Position: Sitting, BP Cuff Size: Large adult)   Pulse 76   Temp 36.9 °C (98.4 °F)   Ht 1.778 m (5' 10\")   Wt 111.1 kg (245 lb)   SpO2 95%  Body mass index is 35.15 kg/m².     Physical Exam:  Constitutional: Alert, well-appearing, very pleasant, no distress.  Psychiatric: Fully oriented with fluent speech. Affect is appropriate with euthymic mood.  Skin: Warm, dry, good turgor. There is a cluster of 4 skin tags noted under the left breast.  Eye: Pupils are equal and round, conjunctiva clear, lids normal.  ENMT: Nasal turbinates appear mildly inflamed with mild clear rhinorrhea visible. Lips without lesions, moist mucus membranes. No pharyngeal erythema.  Neck: No masses. No submandibular or cervical lymphadenopathy.  Respiratory: Unlabored respiratory effort, SpO2 95% on room air. Lungs clear to auscultation, no wheezes, no " rhonchi.  Cardiovascular: Normal S1, S2, no murmur.      Assessment and Plan:   The following treatment plan was discussed    1. Epigastric pain  Established problem, well-controlled since initiation of high-dose omeprazole which she will continue. Will follow up for GI evaluation in December as scheduled.    2. Mild intermittent asthma with acute exacerbation  Chronic asthma with acute exacerbation. Endorsing increased SOB/chest tightness over last 2 weeks. Suspect allergies may be contributing as she's had rhinitis, sore throat/cough as well (which are likely due to PND). I am recommending that she start using albuterol inhaler, given that she isn't currently. I have prescribed short-course of steroids in the form of Medrol DosePak. No current worrisome symptoms/exam findings. Oxygenating well today, lungs sound normal, no tachycardia. Patient advised to follow up with me if her shortness of breath does not improve.  - albuterol (VENTOLIN HFA) 108 (90 Base) MCG/ACT Aero Soln inhalation aerosol; Inhale 1-2 Puffs by mouth every 6 hours as needed for Shortness of Breath.  Dispense: 8.5 g; Refill: 3  - methylPREDNISolone (MEDROL DOSEPAK) 4 MG Tablet Therapy Pack; As directed on the packaging label.  Dispense: 21 Tab; Refill: 0    3. Allergic rhinitis due to pollen, unspecified seasonality  Established problem, uncontrolled. I am recommending that she start Astelin nasal spray.  - azelastine (ASTELIN) 137 MCG/SPRAY nasal spray; Spray 1 Spray in nose 2 times a day.  Dispense: 30 mL; Refill: 5    4. Essential hypertension  Established problem, well-controlled on propranolol. BP today in the office is at-goal. Continue current management.  - propranolol (INDERAL) 40 MG Tab; Take 1 Tab by mouth 3 times a day.  Dispense: 270 Tab; Refill: 1    5. Paroxysmal atrial fibrillation (HCC)  Established problem, well-controlled on propranolol. Stable on Xarelto for anticoagulation. She is overdue for follow-up with cardiology, so  she was advised to schedule this.     6. Elevated PTHrP level  Established problem, unclear level of control. She is planning on following up with her nephrologist in the near future as she has been recommended to have parathyroid gland excision.    7. Chronic renal failure, stage 3 (moderate) (HCC)  Established problem, stable per review. Most recent eGFR done earlier this month was >60. Will continue to follow with nephrology.    8. Bipolar disorder, in partial remission, most recent episode depressed (HCC)  9. Anxiety  Established problems, reasonably well-controlled with Effexor XR which she will continue.     10. Migraine with aura and without status migrainosus, not intractable  Established problem, well-controlled recently with decrease in headaches and increase in aura-only symptoms. She will continue propranolol for preventative purposes. Has Relpax if she needs it but hasn't had to use. Will continue to monitor.  - propranolol (INDERAL) 40 MG Tab; Take 1 Tab by mouth 3 times a day.  Dispense: 270 Tab; Refill: 1    11. Accessory skin tags  New problem, uncontrolled. Risks of cryotherapy discussed and verbal consent obtain. The cluster of skin tags below the left breasts were treated with liquid nitrogen using freeze-thaw-freeze technique. Patient tolerated well.    12. Encounter for screening for malignant neoplasm of breast  - MA-SCREEN MAMMO W/CAD-BILAT; Future      Followup: Return if symptoms worsen or fail to improve.    Dori Mccray P.A.-C.

## 2019-10-22 PROBLEM — R10.13 EPIGASTRIC PAIN: Status: ACTIVE | Noted: 2019-10-22

## 2019-11-06 DIAGNOSIS — R10.13 ACUTE EPIGASTRIC PAIN: ICD-10-CM

## 2019-11-06 NOTE — TELEPHONE ENCOUNTER
Was the patient seen in the last year in this department? Yes LOLV 10/19/19    Does patient have an active prescription for medications requested? No     Received Request Via: Pharmacy

## 2019-11-07 RX ORDER — OMEPRAZOLE 40 MG/1
CAPSULE, DELAYED RELEASE ORAL
Qty: 90 CAP | Refills: 1 | Status: SHIPPED | OUTPATIENT
Start: 2019-11-07 | End: 2020-05-05

## 2019-11-07 NOTE — TELEPHONE ENCOUNTER
Was the patient seen in the last year in this department? Yes    Does patient have an active prescription for medications requested? No     Received Request Via: Pharmacy      Pt met protocol?: Yes    LAST OV 10/19/2019

## 2019-12-20 DIAGNOSIS — I48.0 PAROXYSMAL ATRIAL FIBRILLATION (HCC): ICD-10-CM

## 2019-12-23 RX ORDER — RIVAROXABAN 20 MG/1
TABLET, FILM COATED ORAL
Qty: 90 TAB | Refills: 0 | Status: SHIPPED | OUTPATIENT
Start: 2019-12-23 | End: 2020-03-19

## 2019-12-24 ENCOUNTER — OFFICE VISIT (OUTPATIENT)
Dept: MEDICAL GROUP | Facility: PHYSICIAN GROUP | Age: 69
End: 2019-12-24
Payer: COMMERCIAL

## 2019-12-24 VITALS
SYSTOLIC BLOOD PRESSURE: 134 MMHG | WEIGHT: 244 LBS | DIASTOLIC BLOOD PRESSURE: 90 MMHG | OXYGEN SATURATION: 95 % | HEIGHT: 70 IN | BODY MASS INDEX: 34.93 KG/M2 | TEMPERATURE: 98.2 F | HEART RATE: 64 BPM

## 2019-12-24 DIAGNOSIS — L82.1 SEBORRHEIC KERATOSIS: ICD-10-CM

## 2019-12-24 DIAGNOSIS — R61 UNEXPLAINED NIGHT SWEATS: ICD-10-CM

## 2019-12-24 DIAGNOSIS — L91.8 SKIN TAG: ICD-10-CM

## 2019-12-24 DIAGNOSIS — R20.9 DISTURBANCE OF SKIN SENSATION: ICD-10-CM

## 2019-12-24 DIAGNOSIS — F31.75 BIPOLAR DISORDER, IN PARTIAL REMISSION, MOST RECENT EPISODE DEPRESSED (HCC): ICD-10-CM

## 2019-12-24 PROCEDURE — 17110 DESTRUCTION B9 LES UP TO 14: CPT | Performed by: PHYSICIAN ASSISTANT

## 2019-12-24 PROCEDURE — 99214 OFFICE O/P EST MOD 30 MIN: CPT | Mod: 25 | Performed by: PHYSICIAN ASSISTANT

## 2019-12-24 PROCEDURE — 11200 RMVL SKIN TAGS UP TO&INC 15: CPT | Mod: 59 | Performed by: PHYSICIAN ASSISTANT

## 2019-12-24 RX ORDER — VENLAFAXINE HYDROCHLORIDE 75 MG/1
75 CAPSULE, EXTENDED RELEASE ORAL 2 TIMES DAILY
Qty: 60 CAP | Refills: 5 | Status: SHIPPED | OUTPATIENT
Start: 2019-12-24 | End: 2020-08-03
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
## 2019-12-30 ENCOUNTER — HOSPITAL ENCOUNTER (OUTPATIENT)
Dept: LAB | Facility: MEDICAL CENTER | Age: 69
End: 2019-12-30
Attending: PHYSICIAN ASSISTANT
Payer: COMMERCIAL

## 2019-12-30 DIAGNOSIS — R61 UNEXPLAINED NIGHT SWEATS: ICD-10-CM

## 2019-12-30 LAB
APPEARANCE UR: CLEAR
BACTERIA #/AREA URNS HPF: NEGATIVE /HPF
BILIRUB UR QL STRIP.AUTO: NEGATIVE
COLOR UR: YELLOW
CRP SERPL HS-MCNC: 0.27 MG/DL (ref 0–0.75)
EPI CELLS #/AREA URNS HPF: ABNORMAL /HPF
GLUCOSE UR STRIP.AUTO-MCNC: NEGATIVE MG/DL
HYALINE CASTS #/AREA URNS LPF: ABNORMAL /LPF
KETONES UR STRIP.AUTO-MCNC: NEGATIVE MG/DL
LEUKOCYTE ESTERASE UR QL STRIP.AUTO: ABNORMAL
MICRO URNS: ABNORMAL
NITRITE UR QL STRIP.AUTO: NEGATIVE
PH UR STRIP.AUTO: 6 [PH] (ref 5–8)
PROT UR QL STRIP: NEGATIVE MG/DL
RBC # URNS HPF: ABNORMAL /HPF
RBC UR QL AUTO: NEGATIVE
SP GR UR STRIP.AUTO: 1.02
TSH SERPL DL<=0.005 MIU/L-ACNC: 0.44 UIU/ML (ref 0.38–5.33)
UROBILINOGEN UR STRIP.AUTO-MCNC: 0.2 MG/DL
WBC #/AREA URNS HPF: ABNORMAL /HPF

## 2019-12-30 PROCEDURE — 86140 C-REACTIVE PROTEIN: CPT

## 2019-12-30 PROCEDURE — 87389 HIV-1 AG W/HIV-1&-2 AB AG IA: CPT

## 2019-12-30 PROCEDURE — 84443 ASSAY THYROID STIM HORMONE: CPT

## 2019-12-30 PROCEDURE — 36415 COLL VENOUS BLD VENIPUNCTURE: CPT

## 2019-12-30 PROCEDURE — 81001 URINALYSIS AUTO W/SCOPE: CPT

## 2019-12-30 PROCEDURE — 86480 TB TEST CELL IMMUN MEASURE: CPT

## 2019-12-31 LAB
GAMMA INTERFERON BACKGROUND BLD IA-ACNC: 0.03 IU/ML
HIV 1+2 AB+HIV1 P24 AG SERPL QL IA: NON REACTIVE
M TB IFN-G BLD-IMP: NEGATIVE
M TB IFN-G CD4+ BCKGRND COR BLD-ACNC: 0.01 IU/ML
MITOGEN IGNF BCKGRD COR BLD-ACNC: >10 IU/ML
QFT TB2 - NIL TBQ2: 0.01 IU/ML

## 2020-01-03 DIAGNOSIS — R31.29 MICROSCOPIC HEMATURIA: ICD-10-CM

## 2020-01-08 ENCOUNTER — APPOINTMENT (OUTPATIENT)
Dept: RADIOLOGY | Facility: MEDICAL CENTER | Age: 70
End: 2020-01-08
Attending: PHYSICIAN ASSISTANT

## 2020-02-11 ENCOUNTER — OFFICE VISIT (OUTPATIENT)
Dept: URGENT CARE | Facility: PHYSICIAN GROUP | Age: 70
End: 2020-02-11
Payer: COMMERCIAL

## 2020-02-11 VITALS
HEART RATE: 81 BPM | OXYGEN SATURATION: 93 % | TEMPERATURE: 99.8 F | RESPIRATION RATE: 16 BRPM | BODY MASS INDEX: 34.07 KG/M2 | HEIGHT: 70 IN | WEIGHT: 238 LBS | DIASTOLIC BLOOD PRESSURE: 80 MMHG | SYSTOLIC BLOOD PRESSURE: 128 MMHG

## 2020-02-11 DIAGNOSIS — J11.1 INFLUENZA-LIKE ILLNESS: ICD-10-CM

## 2020-02-11 DIAGNOSIS — R05.9 COUGH: ICD-10-CM

## 2020-02-11 PROCEDURE — 99203 OFFICE O/P NEW LOW 30 MIN: CPT | Performed by: PHYSICIAN ASSISTANT

## 2020-02-11 RX ORDER — CODEINE PHOSPHATE/GUAIFENESIN 10-100MG/5
10 LIQUID (ML) ORAL 4 TIMES DAILY PRN
Qty: 200 ML | Refills: 0 | Status: SHIPPED | OUTPATIENT
Start: 2020-02-11 | End: 2020-02-16

## 2020-02-11 ASSESSMENT — ENCOUNTER SYMPTOMS
SPUTUM PRODUCTION: 1
SORE THROAT: 1
NAUSEA: 0
HEADACHES: 1
EYE DISCHARGE: 0
VOMITING: 0
FEVER: 1
COUGH: 1
DIARRHEA: 0
SHORTNESS OF BREATH: 0
CHILLS: 1
MYALGIAS: 1

## 2020-02-11 NOTE — PROGRESS NOTES
Subjective:      Delilah Veloz is a 69 y.o. female who presents with Ear Pain (ear pain for both ears , cough, headache, sore throat, x4 days, diarrhea started today )    PMH:  has a past medical history of Arrhythmia (01/2017), Arthritis (12/26/2017), Asthma (12/26/2017), Bipolar affective disorder (HCC) (1995), Chronic knee pain (2000), Claustrophobia, Dental disorder (12/26/2017), Depression (5/22/2009), Family history of breast cancer in mother, Fibromyalgia, Headache, frequent episodic tension-type, Hemorrhagic disorder (HCC) (01/2017), Hepatitis B (1975), Hypertension, IBD (inflammatory bowel disease), IBS (irritable bowel syndrome), Lumbar facet arthropathy (2004), Migraine, Pain (12/26/2017), Pain (12/26/2017), PTSD (post-traumatic stress disorder) (12/26/2017), Renal disorder (12/26/2017), and Rotator cuff syndrome of right shoulder (2008). She also has no past medical history of Breast cancer ([facility]).  MEDS:   Current Outpatient Medications:   •  venlafaxine XR (EFFEXOR XR) 75 MG CAPSULE SR 24 HR, Take 1 Cap by mouth 2 Times a Day., Disp: 60 Cap, Rfl: 5  •  XARELTO 20 MG Tab tablet, TAKE 1 TABLET BY MOUTH WITH DINNER, Disp: 90 Tab, Rfl: 0  •  omeprazole (PRILOSEC) 40 MG delayed-release capsule, TAKE ONE CAPSULE BY MOUTH DAILY, Disp: 90 Cap, Rfl: 1  •  albuterol (VENTOLIN HFA) 108 (90 Base) MCG/ACT Aero Soln inhalation aerosol, Inhale 1-2 Puffs by mouth every 6 hours as needed for Shortness of Breath., Disp: 8.5 g, Rfl: 3  •  propranolol (INDERAL) 40 MG Tab, Take 1 Tab by mouth 3 times a day., Disp: 270 Tab, Rfl: 1  •  azelastine (ASTELIN) 137 MCG/SPRAY nasal spray, Spray 1 Spray in nose 2 times a day., Disp: 30 mL, Rfl: 5  •  methylPREDNISolone (MEDROL DOSEPAK) 4 MG Tablet Therapy Pack, As directed on the packaging label., Disp: 21 Tab, Rfl: 0  •  Diclofenac Sodium 1 % Gel, Apply  2 grams twice a day to joints as needed, Disp: 1 Tube, Rfl: 5  •  cyclobenzaprine (FLEXERIL) 10 MG Tab, Take 1 Tab by mouth  3 times a day as needed., Disp: 30 Tab, Rfl: 0  •  acetaminophen (TYLENOL) 325 MG Tab, Take 2 Tabs by mouth 3 times a day., Disp: 60 Tab, Rfl: 1  •  ondansetron (ZOFRAN ODT) 4 MG TABLET DISPERSIBLE, Take 1 Tab by mouth every 24 hours as needed., Disp: 20 Tab, Rfl: 2  •  diazepam (VALIUM) 2 MG Tab, Take 2 mg by mouth every 6 hours as needed for Anxiety., Disp: , Rfl:   •  eletriptan (RELPAX) 20 MG Tab, Take 1 Tab by mouth Once PRN for Migraine for up to 1 dose., Disp: 10 Tab, Rfl: 2  •  triamcinolone acetonide (KENALOG) 0.025 % Cream, Apply to affected areas on face and chest twice daily as needed, 2 weeks on, 2 weeks off, Disp: 1 Tube, Rfl: 3  •  Omega-3 Fatty Acids (FISH OIL) 1000 MG Cap capsule, Take 1,000 mg by mouth every day., Disp: , Rfl:   •  vitamin D (CHOLECALCIFEROL) 1000 UNIT Tab, Take 1,000 Units by mouth every day., Disp: , Rfl:   •  cyanocobalamin (VITAMIN B12) 1000 MCG Tab, Take 1 Tab by mouth every day., Disp: 30 Tab, Rfl: 3  ALLERGIES:   Allergies   Allergen Reactions   • Codeine Itching and Nausea   • Imitrex [Sumatriptan Succinate] Shortness of Breath and Swelling   • Penicillins Rash and Vomiting   • Sulfa Drugs Shortness of Breath and Itching   • Azithromycin Itching   • Dilaudid [Hydromorphone] Itching     sweating   • Other Drug Itching     Z Pack   • Sensipar [Cinacalcet] Unspecified     Nightmares and leg cramping   • Imipramine Shortness of Breath and Itching     anxious   • Seroquel [Quetiapine Fumarate]      Triggered urge to hurt self.      SURGHX:   Past Surgical History:   Procedure Laterality Date   • WRIST ORIF Left 9/24/2018    Procedure: WRIST ORIF;  Surgeon: Mitchel Solano M.D.;  Location: [facility];  Service: Orthopedics   • KNEE ARTHROSCOPY Right 1/5/2018    Procedure: KNEE ARTHROSCOPY;  Surgeon: Henry Medel M.D.;  Location: SURGERY [facility];  Service: Orthopedics   • MENISCECTOMY Right 1/5/2018    Procedure: MENISCECTOMY-partial medial and lateral;   Surgeon: Henry Medel M.D.;  Location: SURGERY [facility];  Service: Orthopedics   • DEBRIDEMENT Right 1/5/2018    Procedure: DEBRIDEMENT-medial, lateral, and patellar chondyle;  Surgeon: Henry Medel M.D.;  Location: SURGERY [facility];  Service: Orthopedics   • FINGER OR HAND INCISION AND DRAINAGE Right 10/20/2015    Procedure: FINGER OR HAND INCISION AND DRAINAGE- 4th finger;  Surgeon: Eric Pritchett M.D.;  Location: SURGERY [facility];  Service:    • DENTAL EXTRACTION(S)  2015    Upper dentures   • CHOLECYSTECTOMY  1999   • PRIMARY C SECTION  1985   • APPENDECTOMY  1950's   • OTHER      Gets sedation for MRI's     SOCHX:  reports that she quit smoking about 26 years ago. Her smoking use included cigarettes. She has a 20.00 pack-year smoking history. She has never used smokeless tobacco. She reports current alcohol use. She reports that she does not use drugs.  FH: Reviewed with patient, not pertinent to this visit.           Patient presents with:  Ear Pain: ear pain for both ears , fever, chills, cough, headache, sore throat, x4 days, and one episode of diarrhea started today         Otalgia    There is pain in both ears. This is a new problem. The current episode started in the past 7 days. The problem occurs constantly. The problem has been gradually worsening. The maximum temperature recorded prior to her arrival was 100.4 - 100.9 F. The fever has been present for 1 to 2 days. Associated symptoms include coughing, headaches and a sore throat. Pertinent negatives include no diarrhea or vomiting. She has tried acetaminophen for the symptoms. The treatment provided mild relief. There is no history of a chronic ear infection, hearing loss or a tympanostomy tube.       Review of Systems   Constitutional: Positive for chills, fever and malaise/fatigue.   HENT: Positive for congestion, ear pain and sore throat.    Eyes: Negative for discharge.   Respiratory: Positive for cough and  "sputum production. Negative for shortness of breath.    Cardiovascular: Negative for chest pain.   Gastrointestinal: Negative for diarrhea, nausea and vomiting.   Musculoskeletal: Positive for myalgias.   Neurological: Positive for headaches.   All other systems reviewed and are negative.         Objective:     /80 (BP Location: Left arm, Patient Position: Sitting, BP Cuff Size: Adult)   Pulse 81   Temp 37.7 °C (99.8 °F) (Temporal)   Resp 16   Ht 1.778 m (5' 10\")   Wt 108 kg (238 lb)   SpO2 93%   BMI 34.15 kg/m²      Physical Exam  Vitals signs and nursing note reviewed.   Constitutional:       General: She is not in acute distress.     Appearance: Normal appearance. She is well-developed. She is not toxic-appearing or diaphoretic.   HENT:      Head: Normocephalic and atraumatic.      Right Ear: Tympanic membrane normal.      Left Ear: Tympanic membrane normal.      Nose: Mucosal edema, congestion and rhinorrhea present.      Mouth/Throat:      Mouth: Mucous membranes are moist.      Pharynx: Uvula midline. Posterior oropharyngeal erythema present.      Tonsils: No tonsillar exudate.   Eyes:      General: Lids are normal.      Extraocular Movements: Extraocular movements intact.      Conjunctiva/sclera:      Right eye: Right conjunctiva is injected.      Left eye: Left conjunctiva is injected.      Pupils: Pupils are equal, round, and reactive to light.   Neck:      Musculoskeletal: Normal range of motion and neck supple.   Cardiovascular:      Rate and Rhythm: Normal rate and regular rhythm.      Pulses: Normal pulses.      Heart sounds: Normal heart sounds.   Pulmonary:      Effort: Pulmonary effort is normal. No respiratory distress.      Breath sounds: No wheezing or rales.   Abdominal:      Palpations: Abdomen is soft.   Musculoskeletal: Normal range of motion.   Skin:     General: Skin is warm and dry.      Capillary Refill: Capillary refill takes less than 2 seconds.   Neurological:      General: " No focal deficit present.      Mental Status: She is alert and oriented to person, place, and time.      Gait: Gait normal.   Psychiatric:         Mood and Affect: Mood normal.         Behavior: Behavior is cooperative.          Assessment/Plan:     1. Influenza-like illness       PT likely has the flu, however, pt presentation this clinic is out of the window for antiviral treatment or patient declined treatment.  This was discussed with patient/parent who verbalized understanding of discussion.    PT can continue OTC medications, increase fluids and rest until symptoms improve.     PT should follow up with PCP in 1-2 days for re-evaluation if symptoms have not improved.  Discussed red flags and reasons to return to UC or ED.  Pt and/or family verbalized understanding of diagnosis and follow up instructions and was offered informational handout on diagnosis.  PT discharged.

## 2020-02-11 NOTE — LETTER
February 11, 2020         Patient: Miryam Veloz   YOB: 1950   Date of Visit: 2/11/2020           To Whom it May Concern:    Miryam Veloz was seen in my clinic on 2/11/2020. She has been diagnosed with Influenza.   She may return to work on 2/18/2020.     If you have any questions or concerns, please don't hesitate to call.        Sincerely,           Mable Hutchins P.A.-C.  Electronically Signed

## 2020-03-18 DIAGNOSIS — I48.0 PAROXYSMAL ATRIAL FIBRILLATION (HCC): ICD-10-CM

## 2020-03-19 RX ORDER — RIVAROXABAN 20 MG/1
TABLET, FILM COATED ORAL
Qty: 90 TAB | Refills: 1 | Status: SHIPPED | OUTPATIENT
Start: 2020-03-19 | End: 2020-09-16

## 2020-03-19 NOTE — TELEPHONE ENCOUNTER
Was the patient seen in the last year in this department? Yes    Does patient have an active prescription for medications requested? No     Received Request Via: Pharmacy    Pt met protocol?: Yes     Last OV 10/24/2019

## 2020-04-16 DIAGNOSIS — G43.109 MIGRAINE WITH AURA AND WITHOUT STATUS MIGRAINOSUS, NOT INTRACTABLE: ICD-10-CM

## 2020-04-16 DIAGNOSIS — I10 ESSENTIAL HYPERTENSION: ICD-10-CM

## 2020-04-16 NOTE — TELEPHONE ENCOUNTER
Was the patient seen in the last year in this department? Yes    Does patient have an active prescription for medications requested? No     Received Request Via: Pharmacy      Pt met protocol?: Yes, last ov 12/19  Last labs 12/19  BP Readings from Last 1 Encounters:   02/11/20 128/80

## 2020-04-17 RX ORDER — PROPRANOLOL HYDROCHLORIDE 40 MG/1
TABLET ORAL
Qty: 270 TAB | Refills: 1 | Status: SHIPPED | OUTPATIENT
Start: 2020-04-17 | End: 2020-12-23 | Stop reason: SDUPTHER

## 2020-05-02 DIAGNOSIS — R10.13 ACUTE EPIGASTRIC PAIN: ICD-10-CM

## 2020-05-04 NOTE — TELEPHONE ENCOUNTER
Was the patient seen in the last year in this department? Yes    Does patient have an active prescription for medications requested? No     Received Request Via: Pharmacy      Pt met protocol?: Yes, OV 12/19

## 2020-05-05 RX ORDER — OMEPRAZOLE 40 MG/1
CAPSULE, DELAYED RELEASE ORAL
Qty: 90 CAP | Refills: 1 | Status: SHIPPED | OUTPATIENT
Start: 2020-05-05 | End: 2020-11-02

## 2020-06-09 ENCOUNTER — OFFICE VISIT (OUTPATIENT)
Dept: MEDICAL GROUP | Facility: PHYSICIAN GROUP | Age: 70
End: 2020-06-09
Payer: COMMERCIAL

## 2020-06-09 VITALS
WEIGHT: 249 LBS | OXYGEN SATURATION: 95 % | TEMPERATURE: 98.6 F | HEIGHT: 70 IN | DIASTOLIC BLOOD PRESSURE: 70 MMHG | BODY MASS INDEX: 35.65 KG/M2 | SYSTOLIC BLOOD PRESSURE: 132 MMHG | HEART RATE: 69 BPM

## 2020-06-09 DIAGNOSIS — R61 UNEXPLAINED NIGHT SWEATS: ICD-10-CM

## 2020-06-09 DIAGNOSIS — G43.009 MIGRAINE WITHOUT AURA AND WITHOUT STATUS MIGRAINOSUS, NOT INTRACTABLE: ICD-10-CM

## 2020-06-09 PROCEDURE — 99214 OFFICE O/P EST MOD 30 MIN: CPT | Performed by: PHYSICIAN ASSISTANT

## 2020-06-09 RX ORDER — ELETRIPTAN HYDROBROMIDE 20 MG/1
20 TABLET, FILM COATED ORAL
Qty: 10 TAB | Refills: 2 | Status: SHIPPED | OUTPATIENT
Start: 2020-06-09 | End: 2021-01-08 | Stop reason: SDUPTHER

## 2020-06-09 RX ORDER — CYANOCOBALAMIN (VITAMIN B-12) 5000 MCG
1 TABLET,DISINTEGRATING ORAL DAILY
COMMUNITY
End: 2021-04-19

## 2020-06-09 RX ORDER — LIDOCAINE 50 MG/G
PATCH TOPICAL
COMMUNITY
Start: 2020-04-30 | End: 2022-02-16

## 2020-06-09 ASSESSMENT — FIBROSIS 4 INDEX: FIB4 SCORE: 1.51

## 2020-06-09 NOTE — PROGRESS NOTES
"Subjective:   Miryam Veloz is a 69 y.o. female here today for follow up on headaches. Is an established patient of mine.    HPI:    Patient presents to the office today with complaints of increased headaches.  She has known history of migraine headaches but she states that her brother recently passed away of glioblastoma and around the time of his death, was getting as often as 1 headache every couple of days which is atypical for her.  Typically would get onset of headache in the morning.  Headaches would usually last for couple of hours and then resolve.  She has not experienced aura with these headaches whereas she has had aura with prior headaches.  Describes pain as right-sided and \"piercing\" in nature, which is different than the \"dull\" pain that she typically noticed with her migraines.  She is prescribed Relpax for migraines but has not tried it at all.  She denies any associated numbness, tingling, muscle weakness, or any other symptoms. Last headache was about 2 weeks ago. She recalls having a brain MRI completed a long time ago which she believes was normal.  Glioblastoma does run in her family.  Not only did her brother have it, but her mother did as well.  She does have some fear that she may develop this condition as well.    She also mentions that she continues to experience night sweats.  She occasionally will drench her bed close but this is not common.  She continues to deny fevers or daytime sweating episodes.  She had lab work-up for this in December of last year which was unremarkable.      Current medicines (including changes today)  Current Outpatient Medications   Medication Sig Dispense Refill   • Cyanocobalamin (VITAMIN B-12) 5000 MCG TABLET DISPERSIBLE Take 1 Tab by mouth every day.     • Multiple Vitamins-Minerals (CENTRUM SILVER 50+WOMEN PO) Take 1 Tab by mouth every day.     • eletriptan (RELPAX) 20 MG Tab Take 1 Tab by mouth Once PRN for Migraine for up to 1 dose. 10 Tab 2 "   • omeprazole (PRILOSEC) 40 MG delayed-release capsule TAKE ONE CAPSULE BY MOUTH DAILY 90 Cap 1   • propranolol (INDERAL) 40 MG Tab TAKE ONE TABLET BY MOUTH THREE TIMES A  Tab 1   • XARELTO 20 MG Tab tablet TAKE 1 TABLET BY MOUTH WITH DINNER 90 Tab 1   • venlafaxine XR (EFFEXOR XR) 75 MG CAPSULE SR 24 HR Take 1 Cap by mouth 2 Times a Day. 60 Cap 5   • albuterol (VENTOLIN HFA) 108 (90 Base) MCG/ACT Aero Soln inhalation aerosol Inhale 1-2 Puffs by mouth every 6 hours as needed for Shortness of Breath. 8.5 g 3   • azelastine (ASTELIN) 137 MCG/SPRAY nasal spray [place] 1 Spray in nose 2 times a day. 30 mL 5   • Diclofenac Sodium 1 % Gel Apply  2 grams twice a day to joints as needed 1 Tube 5   • cyclobenzaprine (FLEXERIL) 10 MG Tab Take 1 Tab by mouth 3 times a day as needed. 30 Tab 0   • acetaminophen (TYLENOL) 325 MG Tab Take 2 Tabs by mouth 3 times a day. 60 Tab 1   • ondansetron (ZOFRAN ODT) 4 MG TABLET DISPERSIBLE Take 1 Tab by mouth every 24 hours as needed. 20 Tab 2   • diazepam (VALIUM) 2 MG Tab Take 2 mg by mouth every 6 hours as needed for Anxiety.     • lidocaine (LIDODERM) 5 % Patch        No current facility-administered medications for this visit.      She  has a past medical history of Arrhythmia (01/2017), Arthritis (12/26/2017), Asthma (12/26/2017), Bipolar affective disorder (HCC) (1995), Chronic knee pain (2000), Claustrophobia, Dental disorder (12/26/2017), Depression (5/22/2009), Family history of breast cancer in mother, Fibromyalgia, Headache, frequent episodic tension-type, Hemorrhagic disorder (HCC) (01/2017), Hepatitis B (1975), Hypertension, IBD (inflammatory bowel disease), IBS (irritable bowel syndrome), Lumbar facet arthropathy (2004), Migraine, Pain (12/26/2017), Pain (12/26/2017), PTSD (post-traumatic stress disorder) (12/26/2017), Renal disorder (12/26/2017), and Rotator cuff syndrome of right shoulder (2008). She also has no past medical history of Breast cancer (HCC).    LORNA  As  "per HPI.       Objective:     /70 (BP Location: Left arm, Patient Position: Sitting, BP Cuff Size: Adult)   Pulse 69   Temp 37 °C (98.6 °F) (Tympanic)   Ht 1.778 m (5' 10\")   Wt 112.9 kg (249 lb)   SpO2 95%  Body mass index is 35.73 kg/m².     Physical Exam:  Constitutional: Alert, well-appearing, pleasant, no distress.  Skin: Warm, dry, good turgor, no rashes in visible areas.  Eye: Pupils are equal and round, conjunctiva clear, lids normal.  ENMT: Lips without lesions, moist mucus membranes.  Neck: Normal-appearing.  Respiratory: Unlabored respiratory effort, lungs clear to auscultation, no wheezes, no rhonchi.  Cardiovascular: Normal S1, S2, no murmur.  Neurologic: Cranial nerves II-XII are grossly intact. No focal deficits are noted.      Assessment and Plan:   The following treatment plan was discussed    1. Migraine without aura and without status migrainosus, not intractable  Established problem, uncontrolled. Having worsening migraine headaches, possibly stress-induced. No obvious neurologic deficits on exam, but given change in pattern, recommend brain MRI. I have advised her to try Relpax if she has recurrence. This was refilled for her today.  - MR-BRAIN-W/O; Future  - eletriptan (RELPAX) 20 MG Tab; Take 1 Tab by mouth Once PRN for Migraine for up to 1 dose.  Dispense: 10 Tab; Refill: 2    2. Unexplained night sweats  Established problem, uncontrolled but stable. Prior lab work-up unremarkable. I have advised patient to continue to monitor symptoms. If she starts noticing fevers or is drenching night clothes/bedding frequently, should return for re-evaluation.      Followup: Return for f/u pending MRI results.    Dori Mccray P.A.-C.             "

## 2020-07-09 ENCOUNTER — TELEPHONE (OUTPATIENT)
Dept: MEDICAL GROUP | Facility: PHYSICIAN GROUP | Age: 70
End: 2020-07-09

## 2020-07-09 DIAGNOSIS — Z01.812 PRE-PROCEDURE LAB EXAM: ICD-10-CM

## 2020-07-10 NOTE — TELEPHONE ENCOUNTER
Please call patient and let her know that she needs to have an EKG done before being cleared for anesthesia for her MRI at the end of the month. Please set her up with an MA visit to get this done. Also, she needs to have a complete blood count done beforehand as well. I have ordered this. She should do at her earliest convenience. Does not need to be fasting.  Dori Mccray P.A.-C.

## 2020-07-17 ENCOUNTER — TELEPHONE (OUTPATIENT)
Dept: CARDIOLOGY | Facility: MEDICAL CENTER | Age: 70
End: 2020-07-17

## 2020-07-17 NOTE — TELEPHONE ENCOUNTER
Received faxed clearance request from [facility]. Pt is planned to have Left knee scope, partial meniscectomy.    Last OV with AA 11/21/2018. Pt will need FV prior to receiving clearance.

## 2020-07-24 ENCOUNTER — TELEPHONE (OUTPATIENT)
Dept: RADIOLOGY | Facility: MEDICAL CENTER | Age: 70
End: 2020-07-24

## 2020-07-27 DIAGNOSIS — Z01.810 PRE-PROCEDURAL CARDIOVASCULAR EXAMINATION: ICD-10-CM

## 2020-07-27 DIAGNOSIS — Z01.812 PRE-PROCEDURE LAB EXAM: ICD-10-CM

## 2020-07-27 NOTE — TELEPHONE ENCOUNTER
Maribel Gomez from [facility] calling to follow up on clearance request.  Please call Patricia at

## 2020-07-27 NOTE — PROGRESS NOTES
Please attempt to call patient again to inform that she needs to do labs and come in for MA visit for EKG. These things need to be done prior to her MRI on Wednesday or she will not be cleared to have done.  Dori Mccray P.A.-C.

## 2020-07-27 NOTE — TELEPHONE ENCOUNTER
Patricia called back. Advised pt need a FV prior to clearance for surgery, verbalized understanding, appreciative of update.

## 2020-07-27 NOTE — TELEPHONE ENCOUNTER
Attempted to call Patricia back, no answer, LM with .    [facility]  [phone]

## 2020-07-29 ENCOUNTER — APPOINTMENT (OUTPATIENT)
Dept: RADIOLOGY | Facility: MEDICAL CENTER | Age: 70
End: 2020-07-29
Attending: PHYSICIAN ASSISTANT
Payer: COMMERCIAL

## 2020-08-03 DIAGNOSIS — F31.75 BIPOLAR DISORDER, IN PARTIAL REMISSION, MOST RECENT EPISODE DEPRESSED (HCC): ICD-10-CM

## 2020-08-03 RX ORDER — VENLAFAXINE HYDROCHLORIDE 75 MG/1
CAPSULE, EXTENDED RELEASE ORAL
Qty: 60 CAP | Refills: 0 | Status: SHIPPED | OUTPATIENT
Start: 2020-08-03 | End: 2020-09-02

## 2020-08-21 ENCOUNTER — HOSPITAL ENCOUNTER (OUTPATIENT)
Dept: LAB | Facility: MEDICAL CENTER | Age: 70
End: 2020-08-21
Attending: PHYSICIAN ASSISTANT
Payer: COMMERCIAL

## 2020-08-21 DIAGNOSIS — R31.29 MICROSCOPIC HEMATURIA: ICD-10-CM

## 2020-08-21 DIAGNOSIS — Z01.812 PRE-PROCEDURE LAB EXAM: ICD-10-CM

## 2020-08-21 LAB
ANION GAP SERPL CALC-SCNC: 12 MMOL/L (ref 7–16)
APPEARANCE UR: CLEAR
BACTERIA #/AREA URNS HPF: ABNORMAL /HPF
BASOPHILS # BLD AUTO: 1.4 % (ref 0–1.8)
BASOPHILS # BLD: 0.09 K/UL (ref 0–0.12)
BILIRUB UR QL STRIP.AUTO: NEGATIVE
BUN SERPL-MCNC: 12 MG/DL (ref 8–22)
CALCIUM SERPL-MCNC: 10.5 MG/DL (ref 8.5–10.5)
CHLORIDE SERPL-SCNC: 106 MMOL/L (ref 96–112)
CO2 SERPL-SCNC: 22 MMOL/L (ref 20–33)
COLOR UR: YELLOW
CREAT SERPL-MCNC: 0.97 MG/DL (ref 0.5–1.4)
EOSINOPHIL # BLD AUTO: 0.15 K/UL (ref 0–0.51)
EOSINOPHIL NFR BLD: 2.3 % (ref 0–6.9)
EPI CELLS #/AREA URNS HPF: ABNORMAL /HPF
ERYTHROCYTE [DISTWIDTH] IN BLOOD BY AUTOMATED COUNT: 52.2 FL (ref 35.9–50)
FASTING STATUS PATIENT QL REPORTED: NORMAL
GLUCOSE SERPL-MCNC: 111 MG/DL (ref 65–99)
GLUCOSE UR STRIP.AUTO-MCNC: NEGATIVE MG/DL
HCT VFR BLD AUTO: 49.3 % (ref 37–47)
HGB BLD-MCNC: 15.4 G/DL (ref 12–16)
HYALINE CASTS #/AREA URNS LPF: ABNORMAL /LPF
IMM GRANULOCYTES # BLD AUTO: 0.02 K/UL (ref 0–0.11)
IMM GRANULOCYTES NFR BLD AUTO: 0.3 % (ref 0–0.9)
KETONES UR STRIP.AUTO-MCNC: NEGATIVE MG/DL
LEUKOCYTE ESTERASE UR QL STRIP.AUTO: ABNORMAL
LYMPHOCYTES # BLD AUTO: 2.05 K/UL (ref 1–4.8)
LYMPHOCYTES NFR BLD: 30.8 % (ref 22–41)
MCH RBC QN AUTO: 33 PG (ref 27–33)
MCHC RBC AUTO-ENTMCNC: 31.2 G/DL (ref 33.6–35)
MCV RBC AUTO: 105.6 FL (ref 81.4–97.8)
MICRO URNS: ABNORMAL
MONOCYTES # BLD AUTO: 0.54 K/UL (ref 0–0.85)
MONOCYTES NFR BLD AUTO: 8.1 % (ref 0–13.4)
NEUTROPHILS # BLD AUTO: 3.81 K/UL (ref 2–7.15)
NEUTROPHILS NFR BLD: 57.1 % (ref 44–72)
NITRITE UR QL STRIP.AUTO: NEGATIVE
NRBC # BLD AUTO: 0 K/UL
NRBC BLD-RTO: 0 /100 WBC
PH UR STRIP.AUTO: 6.5 [PH] (ref 5–8)
PLATELET # BLD AUTO: 198 K/UL (ref 164–446)
PMV BLD AUTO: 11.9 FL (ref 9–12.9)
POTASSIUM SERPL-SCNC: 5.2 MMOL/L (ref 3.6–5.5)
PROT UR QL STRIP: NEGATIVE MG/DL
RBC # BLD AUTO: 4.67 M/UL (ref 4.2–5.4)
RBC # URNS HPF: ABNORMAL /HPF
RBC UR QL AUTO: NEGATIVE
SODIUM SERPL-SCNC: 140 MMOL/L (ref 135–145)
SP GR UR STRIP.AUTO: 1.02
UROBILINOGEN UR STRIP.AUTO-MCNC: 0.2 MG/DL
WBC # BLD AUTO: 6.7 K/UL (ref 4.8–10.8)
WBC #/AREA URNS HPF: ABNORMAL /HPF

## 2020-08-21 PROCEDURE — 85025 COMPLETE CBC W/AUTO DIFF WBC: CPT

## 2020-08-21 PROCEDURE — 81001 URINALYSIS AUTO W/SCOPE: CPT

## 2020-08-21 PROCEDURE — 80048 BASIC METABOLIC PNL TOTAL CA: CPT

## 2020-08-21 PROCEDURE — 36415 COLL VENOUS BLD VENIPUNCTURE: CPT

## 2020-08-26 ENCOUNTER — TELEPHONE (OUTPATIENT)
Dept: MEDICAL GROUP | Facility: PHYSICIAN GROUP | Age: 70
End: 2020-08-26

## 2020-08-26 NOTE — TELEPHONE ENCOUNTER
----- Message from Dori Mccray P.A.-C. sent at 8/26/2020  4:15 PM PDT -----  Please inform patient that I have reviewed her lab work which is stable but she still needs to complete EKG prior to being cleared for MRI with general anesthesia. I previously ordered this so she just needs to set up MA appointment.  Dori Mccray P.A.-C.

## 2020-09-02 DIAGNOSIS — F31.75 BIPOLAR DISORDER, IN PARTIAL REMISSION, MOST RECENT EPISODE DEPRESSED (HCC): ICD-10-CM

## 2020-09-02 RX ORDER — VENLAFAXINE HYDROCHLORIDE 75 MG/1
CAPSULE, EXTENDED RELEASE ORAL
Qty: 60 CAP | Refills: 0 | Status: SHIPPED | OUTPATIENT
Start: 2020-09-02 | End: 2020-10-06

## 2020-09-15 DIAGNOSIS — I48.0 PAROXYSMAL ATRIAL FIBRILLATION (HCC): ICD-10-CM

## 2020-09-16 RX ORDER — RIVAROXABAN 20 MG/1
TABLET, FILM COATED ORAL
Qty: 90 TAB | Refills: 1 | Status: SHIPPED | OUTPATIENT
Start: 2020-09-16 | End: 2021-01-08 | Stop reason: SDUPTHER

## 2020-10-17 ENCOUNTER — HOSPITAL ENCOUNTER (OUTPATIENT)
Dept: RADIOLOGY | Facility: MEDICAL CENTER | Age: 70
End: 2020-10-17
Attending: PHYSICIAN ASSISTANT
Payer: COMMERCIAL

## 2020-10-17 DIAGNOSIS — Z12.31 VISIT FOR SCREENING MAMMOGRAM: ICD-10-CM

## 2020-11-01 DIAGNOSIS — R10.13 ACUTE EPIGASTRIC PAIN: ICD-10-CM

## 2020-11-02 RX ORDER — OMEPRAZOLE 40 MG/1
CAPSULE, DELAYED RELEASE ORAL
Qty: 90 CAP | Refills: 0 | Status: SHIPPED | OUTPATIENT
Start: 2020-11-02 | End: 2021-01-08 | Stop reason: SDUPTHER

## 2020-12-23 DIAGNOSIS — I10 ESSENTIAL HYPERTENSION: ICD-10-CM

## 2020-12-23 DIAGNOSIS — G43.109 MIGRAINE WITH AURA AND WITHOUT STATUS MIGRAINOSUS, NOT INTRACTABLE: ICD-10-CM

## 2020-12-23 DIAGNOSIS — F31.75 BIPOLAR DISORDER, IN PARTIAL REMISSION, MOST RECENT EPISODE DEPRESSED (HCC): ICD-10-CM

## 2020-12-24 ENCOUNTER — TELEPHONE (OUTPATIENT)
Dept: CARDIOLOGY | Facility: MEDICAL CENTER | Age: 70
End: 2020-12-24

## 2020-12-24 RX ORDER — VENLAFAXINE HYDROCHLORIDE 75 MG/1
CAPSULE, EXTENDED RELEASE ORAL
Qty: 180 CAP | Refills: 0 | Status: SHIPPED | OUTPATIENT
Start: 2020-12-24 | End: 2021-01-04

## 2020-12-24 RX ORDER — PROPRANOLOL HYDROCHLORIDE 40 MG/1
40 TABLET ORAL 3 TIMES DAILY
Qty: 270 TAB | Refills: 0 | Status: SHIPPED | OUTPATIENT
Start: 2020-12-24 | End: 2021-01-08 | Stop reason: SDUPTHER

## 2020-12-24 NOTE — TELEPHONE ENCOUNTER
Left VM on pt's number [phone] regarding th lipid profile ordered at her last visit. I asked where she was going to get them done and to give us a call back at [phone] should any questions or concerns arise.
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
## 2020-12-28 NOTE — TELEPHONE ENCOUNTER
PH: [phone]   PT NM: Geronimo Velozia   : 1950   RE: Missed call from office in  regards to lipid panel.      --------------------------------------  Message History  Account: [id number]  Taken:  Thu 24-Dec-2020  1:37p AW  Serial#: [id number]

## 2020-12-31 ENCOUNTER — TELEPHONE (OUTPATIENT)
Dept: CARDIOLOGY | Facility: MEDICAL CENTER | Age: 70
End: 2020-12-31

## 2021-01-01 DIAGNOSIS — F31.75 BIPOLAR DISORDER, IN PARTIAL REMISSION, MOST RECENT EPISODE DEPRESSED (HCC): ICD-10-CM

## 2021-01-04 ENCOUNTER — OFFICE VISIT (OUTPATIENT)
Dept: CARDIOLOGY | Facility: MEDICAL CENTER | Age: 71
End: 2021-01-04
Payer: COMMERCIAL

## 2021-01-04 VITALS
HEART RATE: 77 BPM | RESPIRATION RATE: 16 BRPM | SYSTOLIC BLOOD PRESSURE: 120 MMHG | OXYGEN SATURATION: 90 % | DIASTOLIC BLOOD PRESSURE: 62 MMHG | WEIGHT: 257.6 LBS | HEIGHT: 70 IN | BODY MASS INDEX: 36.88 KG/M2

## 2021-01-04 DIAGNOSIS — I10 ESSENTIAL HYPERTENSION: ICD-10-CM

## 2021-01-04 DIAGNOSIS — Z01.810 PREOPERATIVE CARDIOVASCULAR EXAMINATION: ICD-10-CM

## 2021-01-04 DIAGNOSIS — E78.5 DYSLIPIDEMIA: ICD-10-CM

## 2021-01-04 DIAGNOSIS — I48.0 PAROXYSMAL ATRIAL FIBRILLATION (HCC): ICD-10-CM

## 2021-01-04 DIAGNOSIS — Z79.01 CHRONIC ANTICOAGULATION: ICD-10-CM

## 2021-01-04 DIAGNOSIS — I77.810 ASCENDING AORTA DILATATION (HCC): ICD-10-CM

## 2021-01-04 PROCEDURE — 99214 OFFICE O/P EST MOD 30 MIN: CPT | Performed by: NURSE PRACTITIONER

## 2021-01-04 PROCEDURE — 93000 ELECTROCARDIOGRAM COMPLETE: CPT | Performed by: INTERNAL MEDICINE

## 2021-01-04 RX ORDER — VENLAFAXINE HYDROCHLORIDE 75 MG/1
CAPSULE, EXTENDED RELEASE ORAL
Qty: 180 CAP | Refills: 0 | Status: SHIPPED
Start: 2021-01-04 | End: 2021-01-08

## 2021-01-04 SDOH — HEALTH STABILITY: MENTAL HEALTH: HOW OFTEN DO YOU HAVE A DRINK CONTAINING ALCOHOL?: MONTHLY OR LESS

## 2021-01-04 SDOH — HEALTH STABILITY: MENTAL HEALTH: HOW MANY STANDARD DRINKS CONTAINING ALCOHOL DO YOU HAVE ON A TYPICAL DAY?: 1 OR 2

## 2021-01-04 SDOH — HEALTH STABILITY: MENTAL HEALTH: HOW OFTEN DO YOU HAVE 6 OR MORE DRINKS ON ONE OCCASION?: NEVER

## 2021-01-04 ASSESSMENT — FIBROSIS 4 INDEX: FIB4 SCORE: 1.49

## 2021-01-05 ENCOUNTER — HOSPITAL ENCOUNTER (OUTPATIENT)
Dept: CARDIOLOGY | Facility: MEDICAL CENTER | Age: 71
End: 2021-01-05
Attending: NURSE PRACTITIONER
Payer: COMMERCIAL

## 2021-01-05 LAB — EKG IMPRESSION: NORMAL

## 2021-01-05 PROCEDURE — 93306 TTE W/DOPPLER COMPLETE: CPT

## 2021-01-05 ASSESSMENT — ENCOUNTER SYMPTOMS
DIZZINESS: 0
PND: 0
WEIGHT LOSS: 0
CLAUDICATION: 0
ORTHOPNEA: 0
WEAKNESS: 0
PALPITATIONS: 0
SHORTNESS OF BREATH: 1

## 2021-01-06 LAB
LV EJECT FRACT  99904: 65
LV EJECT FRACT MOD 2C 99903: 69.47
LV EJECT FRACT MOD 4C 99902: 62.67
LV EJECT FRACT MOD BP 99901: 66.34

## 2021-01-06 PROCEDURE — 93306 TTE W/DOPPLER COMPLETE: CPT | Mod: 26 | Performed by: INTERNAL MEDICINE

## 2021-01-08 ENCOUNTER — HOSPITAL ENCOUNTER (OUTPATIENT)
Dept: LAB | Facility: MEDICAL CENTER | Age: 71
End: 2021-01-08
Attending: NURSE PRACTITIONER
Payer: COMMERCIAL

## 2021-01-08 ENCOUNTER — HOSPITAL ENCOUNTER (OUTPATIENT)
Dept: LAB | Facility: MEDICAL CENTER | Age: 71
End: 2021-01-08
Attending: FAMILY MEDICINE
Payer: COMMERCIAL

## 2021-01-08 ENCOUNTER — OFFICE VISIT (OUTPATIENT)
Dept: MEDICAL GROUP | Facility: PHYSICIAN GROUP | Age: 71
End: 2021-01-08
Payer: COMMERCIAL

## 2021-01-08 VITALS
WEIGHT: 248 LBS | BODY MASS INDEX: 35.5 KG/M2 | SYSTOLIC BLOOD PRESSURE: 126 MMHG | TEMPERATURE: 98.1 F | OXYGEN SATURATION: 93 % | HEIGHT: 70 IN | DIASTOLIC BLOOD PRESSURE: 74 MMHG | HEART RATE: 85 BPM

## 2021-01-08 DIAGNOSIS — G43.009 MIGRAINE WITHOUT AURA AND WITHOUT STATUS MIGRAINOSUS, NOT INTRACTABLE: ICD-10-CM

## 2021-01-08 DIAGNOSIS — N64.4 BREAST PAIN, LEFT: ICD-10-CM

## 2021-01-08 DIAGNOSIS — G43.109 MIGRAINE WITH AURA AND WITHOUT STATUS MIGRAINOSUS, NOT INTRACTABLE: ICD-10-CM

## 2021-01-08 DIAGNOSIS — E78.5 DYSLIPIDEMIA: ICD-10-CM

## 2021-01-08 DIAGNOSIS — I48.0 PAROXYSMAL ATRIAL FIBRILLATION (HCC): ICD-10-CM

## 2021-01-08 DIAGNOSIS — R53.83 FATIGUE, UNSPECIFIED TYPE: ICD-10-CM

## 2021-01-08 DIAGNOSIS — E53.8 VITAMIN B12 DEFICIENCY: ICD-10-CM

## 2021-01-08 DIAGNOSIS — R10.13 ACUTE EPIGASTRIC PAIN: ICD-10-CM

## 2021-01-08 DIAGNOSIS — F31.75 BIPOLAR DISORDER, IN PARTIAL REMISSION, MOST RECENT EPISODE DEPRESSED (HCC): ICD-10-CM

## 2021-01-08 DIAGNOSIS — I10 ESSENTIAL HYPERTENSION: ICD-10-CM

## 2021-01-08 DIAGNOSIS — J45.21 MILD INTERMITTENT ASTHMA WITH ACUTE EXACERBATION: ICD-10-CM

## 2021-01-08 PROBLEM — R61 UNEXPLAINED NIGHT SWEATS: Status: RESOLVED | Noted: 2019-12-24 | Resolved: 2021-01-08

## 2021-01-08 PROBLEM — M54.50 ACUTE BILATERAL LOW BACK PAIN WITHOUT SCIATICA: Status: RESOLVED | Noted: 2019-09-12 | Resolved: 2021-01-08

## 2021-01-08 LAB
25(OH)D3 SERPL-MCNC: 45 NG/ML (ref 30–100)
ALBUMIN SERPL BCP-MCNC: 4.2 G/DL (ref 3.2–4.9)
ALBUMIN/GLOB SERPL: 1.3 G/DL
ALP SERPL-CCNC: 113 U/L (ref 30–99)
ALT SERPL-CCNC: 26 U/L (ref 2–50)
ANION GAP SERPL CALC-SCNC: 7 MMOL/L (ref 7–16)
AST SERPL-CCNC: 19 U/L (ref 12–45)
BASOPHILS # BLD AUTO: 1.1 % (ref 0–1.8)
BASOPHILS # BLD: 0.08 K/UL (ref 0–0.12)
BILIRUB SERPL-MCNC: 0.6 MG/DL (ref 0.1–1.5)
BUN SERPL-MCNC: 19 MG/DL (ref 8–22)
CALCIUM SERPL-MCNC: 11.1 MG/DL (ref 8.5–10.5)
CHLORIDE SERPL-SCNC: 106 MMOL/L (ref 96–112)
CHOLEST SERPL-MCNC: 207 MG/DL (ref 100–199)
CHOLEST SERPL-MCNC: 208 MG/DL (ref 100–199)
CO2 SERPL-SCNC: 26 MMOL/L (ref 20–33)
CREAT SERPL-MCNC: 0.91 MG/DL (ref 0.5–1.4)
EOSINOPHIL # BLD AUTO: 0.13 K/UL (ref 0–0.51)
EOSINOPHIL NFR BLD: 1.8 % (ref 0–6.9)
ERYTHROCYTE [DISTWIDTH] IN BLOOD BY AUTOMATED COUNT: 48.3 FL (ref 35.9–50)
GLOBULIN SER CALC-MCNC: 3.2 G/DL (ref 1.9–3.5)
GLUCOSE SERPL-MCNC: 118 MG/DL (ref 65–99)
HCT VFR BLD AUTO: 49.4 % (ref 37–47)
HDLC SERPL-MCNC: 36 MG/DL
HDLC SERPL-MCNC: 36 MG/DL
HGB BLD-MCNC: 15.6 G/DL (ref 12–16)
IMM GRANULOCYTES # BLD AUTO: 0.05 K/UL (ref 0–0.11)
IMM GRANULOCYTES NFR BLD AUTO: 0.7 % (ref 0–0.9)
LDLC SERPL CALC-MCNC: 123 MG/DL
LDLC SERPL CALC-MCNC: 123 MG/DL
LYMPHOCYTES # BLD AUTO: 1.86 K/UL (ref 1–4.8)
LYMPHOCYTES NFR BLD: 26.4 % (ref 22–41)
MCH RBC QN AUTO: 31.6 PG (ref 27–33)
MCHC RBC AUTO-ENTMCNC: 31.6 G/DL (ref 33.6–35)
MCV RBC AUTO: 100.2 FL (ref 81.4–97.8)
MONOCYTES # BLD AUTO: 0.57 K/UL (ref 0–0.85)
MONOCYTES NFR BLD AUTO: 8.1 % (ref 0–13.4)
NEUTROPHILS # BLD AUTO: 4.36 K/UL (ref 2–7.15)
NEUTROPHILS NFR BLD: 61.9 % (ref 44–72)
NRBC # BLD AUTO: 0 K/UL
NRBC BLD-RTO: 0 /100 WBC
PLATELET # BLD AUTO: 215 K/UL (ref 164–446)
PMV BLD AUTO: 12.4 FL (ref 9–12.9)
POTASSIUM SERPL-SCNC: 4.4 MMOL/L (ref 3.6–5.5)
PROT SERPL-MCNC: 7.4 G/DL (ref 6–8.2)
RBC # BLD AUTO: 4.93 M/UL (ref 4.2–5.4)
SODIUM SERPL-SCNC: 139 MMOL/L (ref 135–145)
T4 FREE SERPL-MCNC: 1.38 NG/DL (ref 0.93–1.7)
TRIGL SERPL-MCNC: 240 MG/DL (ref 0–149)
TRIGL SERPL-MCNC: 243 MG/DL (ref 0–149)
TSH SERPL DL<=0.005 MIU/L-ACNC: 0.53 UIU/ML (ref 0.38–5.33)
VIT B12 SERPL-MCNC: 3104 PG/ML (ref 211–911)
WBC # BLD AUTO: 7.1 K/UL (ref 4.8–10.8)

## 2021-01-08 PROCEDURE — 84439 ASSAY OF FREE THYROXINE: CPT

## 2021-01-08 PROCEDURE — 82306 VITAMIN D 25 HYDROXY: CPT

## 2021-01-08 PROCEDURE — 80061 LIPID PANEL: CPT | Mod: 91

## 2021-01-08 PROCEDURE — 80061 LIPID PANEL: CPT

## 2021-01-08 PROCEDURE — 36415 COLL VENOUS BLD VENIPUNCTURE: CPT

## 2021-01-08 PROCEDURE — 85025 COMPLETE CBC W/AUTO DIFF WBC: CPT

## 2021-01-08 PROCEDURE — 84443 ASSAY THYROID STIM HORMONE: CPT

## 2021-01-08 PROCEDURE — 80053 COMPREHEN METABOLIC PANEL: CPT

## 2021-01-08 PROCEDURE — 99214 OFFICE O/P EST MOD 30 MIN: CPT | Performed by: FAMILY MEDICINE

## 2021-01-08 PROCEDURE — 82607 VITAMIN B-12: CPT

## 2021-01-08 RX ORDER — OMEPRAZOLE 40 MG/1
40 CAPSULE, DELAYED RELEASE ORAL DAILY
Qty: 90 CAP | Refills: 3 | Status: SHIPPED | OUTPATIENT
Start: 2021-01-08 | End: 2022-03-04 | Stop reason: SDUPTHER

## 2021-01-08 RX ORDER — ALBUTEROL SULFATE 90 UG/1
1-2 AEROSOL, METERED RESPIRATORY (INHALATION) EVERY 6 HOURS PRN
Qty: 8.5 G | Refills: 3 | Status: SHIPPED | OUTPATIENT
Start: 2021-01-08 | End: 2022-02-01 | Stop reason: SDUPTHER

## 2021-01-08 RX ORDER — ONDANSETRON 4 MG/1
4 TABLET, ORALLY DISINTEGRATING ORAL
Qty: 20 TAB | Refills: 2 | Status: SHIPPED | OUTPATIENT
Start: 2021-01-08 | End: 2022-07-22 | Stop reason: SDUPTHER

## 2021-01-08 RX ORDER — ELETRIPTAN HYDROBROMIDE 20 MG/1
20 TABLET, FILM COATED ORAL
Qty: 10 TAB | Refills: 2 | Status: SHIPPED | OUTPATIENT
Start: 2021-01-08 | End: 2022-07-22 | Stop reason: SDUPTHER

## 2021-01-08 RX ORDER — PROPRANOLOL HYDROCHLORIDE 40 MG/1
40 TABLET ORAL 3 TIMES DAILY
Qty: 270 TAB | Refills: 3 | Status: SHIPPED | OUTPATIENT
Start: 2021-01-08 | End: 2021-09-20 | Stop reason: SDUPTHER

## 2021-01-08 RX ORDER — VENLAFAXINE HYDROCHLORIDE 150 MG/1
150 CAPSULE, EXTENDED RELEASE ORAL DAILY
Qty: 90 CAP | Refills: 3 | Status: SHIPPED | OUTPATIENT
Start: 2021-01-08 | End: 2022-01-10

## 2021-01-08 ASSESSMENT — FIBROSIS 4 INDEX: FIB4 SCORE: 1.49
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
## 2021-01-10 ENCOUNTER — PATIENT MESSAGE (OUTPATIENT)
Dept: CARDIOLOGY | Facility: MEDICAL CENTER | Age: 71
End: 2021-01-10

## 2021-01-10 DIAGNOSIS — I48.0 PAROXYSMAL ATRIAL FIBRILLATION (HCC): ICD-10-CM

## 2021-01-10 DIAGNOSIS — J45.20 MILD INTERMITTENT ASTHMA WITHOUT COMPLICATION: ICD-10-CM

## 2021-01-11 PROBLEM — R73.01 ELEVATED FASTING BLOOD SUGAR: Status: ACTIVE | Noted: 2021-01-11

## 2021-01-11 NOTE — PATIENT COMMUNICATION
ISMAEL Smith.  You 4 minutes ago (9:22 AM)     Yes, thank you!       You  RONI Smith 6 minutes ago (9:20 AM)     Ok to place referral? Thanks!        Pulmonary eferral order placed.

## 2021-01-19 ENCOUNTER — HOSPITAL ENCOUNTER (OUTPATIENT)
Dept: LAB | Facility: MEDICAL CENTER | Age: 71
End: 2021-01-20
Attending: ORTHOPAEDIC SURGERY
Payer: COMMERCIAL

## 2021-01-20 LAB
COVID ORDER STATUS COVID19: NORMAL
SARS-COV-2 RNA RESP QL NAA+PROBE: NOTDETECTED
SPECIMEN SOURCE: NORMAL

## 2021-01-20 PROCEDURE — U0003 INFECTIOUS AGENT DETECTION BY NUCLEIC ACID (DNA OR RNA); SEVERE ACUTE RESPIRATORY SYNDROME CORONAVIRUS 2 (SARS-COV-2) (CORONAVIRUS DISEASE [COVID-19]), AMPLIFIED PROBE TECHNIQUE, MAKING USE OF HIGH THROUGHPUT TECHNOLOGIES AS DESCRIBED BY CMS-2020-01-R: HCPCS

## 2021-01-20 PROCEDURE — C9803 HOPD COVID-19 SPEC COLLECT: HCPCS

## 2021-01-20 PROCEDURE — U0005 INFEC AGEN DETEC AMPLI PROBE: HCPCS

## 2021-03-03 DIAGNOSIS — Z23 NEED FOR VACCINATION: ICD-10-CM

## 2021-03-06 ENCOUNTER — HOSPITAL ENCOUNTER (OUTPATIENT)
Dept: RADIOLOGY | Facility: MEDICAL CENTER | Age: 71
End: 2021-03-06
Attending: FAMILY MEDICINE
Payer: COMMERCIAL

## 2021-03-06 DIAGNOSIS — Z12.31 VISIT FOR SCREENING MAMMOGRAM: ICD-10-CM

## 2021-03-06 PROCEDURE — 77063 BREAST TOMOSYNTHESIS BI: CPT

## 2021-04-19 ENCOUNTER — OFFICE VISIT (OUTPATIENT)
Dept: SLEEP MEDICINE | Facility: MEDICAL CENTER | Age: 71
End: 2021-04-19
Payer: COMMERCIAL

## 2021-04-19 VITALS
HEIGHT: 70 IN | RESPIRATION RATE: 16 BRPM | HEART RATE: 71 BPM | OXYGEN SATURATION: 93 % | BODY MASS INDEX: 35.05 KG/M2 | DIASTOLIC BLOOD PRESSURE: 84 MMHG | WEIGHT: 244.8 LBS | SYSTOLIC BLOOD PRESSURE: 118 MMHG

## 2021-04-19 DIAGNOSIS — R06.09 DYSPNEA ON EXERTION: ICD-10-CM

## 2021-04-19 DIAGNOSIS — Z92.29 HISTORY OF PNEUMOCOCCAL VACCINATION: ICD-10-CM

## 2021-04-19 DIAGNOSIS — J96.11 CHRONIC HYPOXEMIC RESPIRATORY FAILURE (HCC): ICD-10-CM

## 2021-04-19 DIAGNOSIS — R29.818 SUSPECTED SLEEP APNEA: ICD-10-CM

## 2021-04-19 PROCEDURE — 94761 N-INVAS EAR/PLS OXIMETRY MLT: CPT | Performed by: INTERNAL MEDICINE

## 2021-04-19 PROCEDURE — 99204 OFFICE O/P NEW MOD 45 MIN: CPT | Mod: 25 | Performed by: INTERNAL MEDICINE

## 2021-04-19 ASSESSMENT — ENCOUNTER SYMPTOMS
HEADACHES: 0
SPUTUM PRODUCTION: 0
DIZZINESS: 0
SINUS PAIN: 0
STRIDOR: 0
PSYCHIATRIC NEGATIVE: 1
WEIGHT LOSS: 0
NAUSEA: 0
EYE DISCHARGE: 0
FOCAL WEAKNESS: 0
ABDOMINAL PAIN: 0
ORTHOPNEA: 0
SHORTNESS OF BREATH: 1
SORE THROAT: 0
WHEEZING: 0
CHILLS: 0
EYE PAIN: 0
LOSS OF CONSCIOUSNESS: 0
FEVER: 0
DIARRHEA: 0
VOMITING: 0
COUGH: 0
SENSORY CHANGE: 0
MYALGIAS: 0

## 2021-04-19 ASSESSMENT — FIBROSIS 4 INDEX: FIB4 SCORE: 1.18

## 2021-04-19 NOTE — ASSESSMENT & PLAN NOTE
Suspected multifactorial etiology with emphysema, deconditioning, and possible uncontrolled HR with pAF  -- MMRC 1, stable  -- Desats on multi oximetry today in clinic, check overnight oximetry  --PFTs
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
## 2021-04-19 NOTE — PROCEDURES
Multi-Ox Readings  Multi Ox #1 Room air   O2 sat % at rest 97   O2 sat % on exertion 89   O2 sat average on exertion     Multi Ox #2     O2 sat % at rest     O2 sat % on exertion     O2 sat average on exertion       Oxygen Use     Oxygen Frequency     Duration of need     Is the patient mobile within the home?     CPAP Use?     BIPAP Use?     Servo Titration

## 2021-05-03 ENCOUNTER — HOME STUDY (OUTPATIENT)
Dept: SLEEP MEDICINE | Facility: MEDICAL CENTER | Age: 71
End: 2021-05-03
Attending: INTERNAL MEDICINE
Payer: COMMERCIAL

## 2021-05-05 PROCEDURE — 94762 N-INVAS EAR/PLS OXIMTRY CONT: CPT | Performed by: INTERNAL MEDICINE

## 2021-05-05 NOTE — PROCEDURES
Overnight oximetry performed on room air.      Basal SPO2 is 86% with desaturations below 90% for 91% of the night, or 323 minutes, to a fatuma of 74%.    Interpretation:  Baseline hypoxia with significant nocturnal oxygen desaturations to a fatuma of 74%.    Recommendations:  Cardiopulmonary work-up advised; polysomnography should also be considered to exclude sleep disordered breathing.

## 2021-05-19 ENCOUNTER — PATIENT MESSAGE (OUTPATIENT)
Dept: SLEEP MEDICINE | Facility: MEDICAL CENTER | Age: 71
End: 2021-05-19

## 2021-05-19 ENCOUNTER — TELEPHONE (OUTPATIENT)
Dept: SLEEP MEDICINE | Facility: MEDICAL CENTER | Age: 71
End: 2021-05-19

## 2021-05-19 NOTE — TELEPHONE ENCOUNTER
Called pt and lm, asking pt to return my call to schedule an appt with Dr Delong.    Last saw Dr Hilliard on 04/19/21.  6 week follow-up.      Sent [employer] message as well.

## 2021-05-27 ENCOUNTER — TELEPHONE (OUTPATIENT)
Dept: SLEEP MEDICINE | Facility: MEDICAL CENTER | Age: 71
End: 2021-05-27

## 2021-05-27 NOTE — TELEPHONE ENCOUNTER
Upcoming appt: 06/03/21 with Dr Delong    Test: PFT    Called pt and lm asking pt to return my call re: a test that was order but not yet completed or scheduled.

## 2021-06-01 ENCOUNTER — PATIENT MESSAGE (OUTPATIENT)
Dept: SLEEP MEDICINE | Facility: MEDICAL CENTER | Age: 71
End: 2021-06-01

## 2021-06-01 NOTE — TELEPHONE ENCOUNTER
Called pt and lm, asking for a return call re: a test that I don't see yet completed.    Sent [employer] message.

## 2021-06-03 ENCOUNTER — OFFICE VISIT (OUTPATIENT)
Dept: SLEEP MEDICINE | Facility: MEDICAL CENTER | Age: 71
End: 2021-06-03
Payer: COMMERCIAL

## 2021-06-03 ENCOUNTER — PATIENT MESSAGE (OUTPATIENT)
Dept: SLEEP MEDICINE | Facility: MEDICAL CENTER | Age: 71
End: 2021-06-03

## 2021-06-03 VITALS
OXYGEN SATURATION: 96 % | DIASTOLIC BLOOD PRESSURE: 84 MMHG | HEART RATE: 78 BPM | WEIGHT: 251.44 LBS | HEIGHT: 70 IN | SYSTOLIC BLOOD PRESSURE: 114 MMHG | BODY MASS INDEX: 36 KG/M2

## 2021-06-03 DIAGNOSIS — J96.11 CHRONIC HYPOXEMIC RESPIRATORY FAILURE (HCC): ICD-10-CM

## 2021-06-03 DIAGNOSIS — J43.9 PULMONARY EMPHYSEMA, UNSPECIFIED EMPHYSEMA TYPE (HCC): ICD-10-CM

## 2021-06-03 DIAGNOSIS — J45.20 MILD INTERMITTENT ASTHMA WITHOUT COMPLICATION: ICD-10-CM

## 2021-06-03 PROCEDURE — 99213 OFFICE O/P EST LOW 20 MIN: CPT | Performed by: INTERNAL MEDICINE

## 2021-06-03 ASSESSMENT — ENCOUNTER SYMPTOMS
PSYCHIATRIC NEGATIVE: 1
CONSTITUTIONAL NEGATIVE: 1
NEUROLOGICAL NEGATIVE: 1
SHORTNESS OF BREATH: 1
PALPITATIONS: 1
COUGH: 1
EYES NEGATIVE: 1
MUSCULOSKELETAL NEGATIVE: 1
GASTROINTESTINAL NEGATIVE: 1

## 2021-06-03 ASSESSMENT — FIBROSIS 4 INDEX: FIB4 SCORE: 1.18
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
## 2021-06-03 NOTE — ASSESSMENT & PLAN NOTE
Has history of it but she also has underlying emphysema in the upper lobes.  We will treat with Breo for both her COPD and her underlying history of asthma.  Explained to patient she does need her PFTs to assess severity of her COPD and also evaluate bronchodilator responsive to lesions and overlap of asthma persistent

## 2021-06-03 NOTE — ASSESSMENT & PLAN NOTE
Upper lobe predominant  mMRC 1 which is stable  Patient has not had her PFTs unknown severity patient will schedule this.  It will also allow us to evaluate overlap of asthma.  Uses albuterol once a day with exercise.  We will trial Breo to see if her exercise capacity improves.

## 2021-06-03 NOTE — ASSESSMENT & PLAN NOTE
Nocturnal  Pt has stated she cannot tolerate CPAP due to her claustrophobia  Reviewed oxygen at night and patient understands that she will benefit from oxygen at night especially in light of the fact she has paroxysmal A. fib and does get palpitations mainly at night.    Plan for 2 L of oxygen at night and then will repeat overnight oximetry on 2 L 2 weeks later to ensure that it is enough liters flow

## 2021-06-11 ENCOUNTER — NON-PROVIDER VISIT (OUTPATIENT)
Dept: SLEEP MEDICINE | Facility: MEDICAL CENTER | Age: 71
End: 2021-06-11
Attending: INTERNAL MEDICINE
Payer: COMMERCIAL

## 2021-06-11 VITALS — WEIGHT: 244 LBS | HEIGHT: 70 IN | BODY MASS INDEX: 34.93 KG/M2

## 2021-06-11 PROCEDURE — 94010 BREATHING CAPACITY TEST: CPT | Performed by: INTERNAL MEDICINE

## 2021-06-11 PROCEDURE — 94726 PLETHYSMOGRAPHY LUNG VOLUMES: CPT | Performed by: INTERNAL MEDICINE

## 2021-06-11 PROCEDURE — 94729 DIFFUSING CAPACITY: CPT | Performed by: INTERNAL MEDICINE

## 2021-06-11 ASSESSMENT — PULMONARY FUNCTION TESTS
FEV1_PREDICTED: 2.74
FEV1/FVC_PERCENT_LLN: 65
FEV1_PERCENT_PREDICTED: 72
FEV1/FVC: 73
FEV1/FVC_PREDICTED: 78
FEV1: 1.98
FEV1/FVC_PERCENT_PREDICTED: 95
FVC_PREDICTED: 3.57
FEV1/FVC_PERCENT_PREDICTED: 93
FVC_PERCENT_PREDICTED: 76
FEV1_LLN: 2.29
FEV1/FVC: 73
FVC_LLN: 2.98
FEV1/FVC_PERCENT_PREDICTED: 77
FVC: 2.73

## 2021-06-11 ASSESSMENT — FIBROSIS 4 INDEX: FIB4 SCORE: 1.18

## 2021-06-11 NOTE — PROCEDURES
Technician: CANDELARIO Sterling    Technician Comment:  Good patient effort & cooperation.  The results of this test meet the ATS/ERS standards for acceptability & reproducibility.  Test was performed on the [employer] Body Plethysmograph-Elite DX system.  Predicted values were GLI-2012 for spirometry, GLI-2017 for DLCO, ITS for Lung Volumes.  The DLCO was uncorrected for Hgb.      Interpretation:  Baseline spirometry shows proportionate reduction in FVC at 2.73 L or 76% predicted and FEV1 at 1.98 L or 72% predicted.  FEV1/FVC ratio is normal at 73.  Bronchodilator testing was not performed.  Total lung capacity is within normal limits at 6.11 L.  There is mild air trapping.  Diffusion capacity is mildly reduced at 72% predicted.  Pulmonary function testing shows nonspecific reduction in FEV1 and FVC however there is mild air trapping with concavity to the expiratory flow volume loop and reduced DLCO all of which are consistent with a diagnosis of COPD.

## 2021-06-25 ENCOUNTER — HOME STUDY (OUTPATIENT)
Dept: SLEEP MEDICINE | Facility: MEDICAL CENTER | Age: 71
End: 2021-06-25
Payer: COMMERCIAL

## 2021-06-25 DIAGNOSIS — J43.9 PULMONARY EMPHYSEMA, UNSPECIFIED EMPHYSEMA TYPE (HCC): ICD-10-CM

## 2021-06-29 PROCEDURE — 94762 N-INVAS EAR/PLS OXIMTRY CONT: CPT | Performed by: INTERNAL MEDICINE

## 2021-06-29 NOTE — PROCEDURES
Overnight oximetry performed on 2 L/min oxygen.    Basal SPO2 was 90.7%. Overall oximetry was preserved over 90% with few desaturations noted, some which appear artifactual.    Interpretation:  Overall normal oximetry on 2 L/min oxygen.

## 2021-09-20 ENCOUNTER — OFFICE VISIT (OUTPATIENT)
Dept: MEDICAL GROUP | Facility: PHYSICIAN GROUP | Age: 71
End: 2021-09-20
Payer: COMMERCIAL

## 2021-09-20 VITALS
HEIGHT: 70 IN | SYSTOLIC BLOOD PRESSURE: 132 MMHG | HEART RATE: 74 BPM | TEMPERATURE: 98.9 F | DIASTOLIC BLOOD PRESSURE: 70 MMHG | WEIGHT: 243 LBS | BODY MASS INDEX: 34.79 KG/M2 | OXYGEN SATURATION: 94 %

## 2021-09-20 DIAGNOSIS — L68.0 HIRSUTISM: ICD-10-CM

## 2021-09-20 DIAGNOSIS — R61 NIGHT SWEATS: ICD-10-CM

## 2021-09-20 DIAGNOSIS — I10 ESSENTIAL HYPERTENSION: ICD-10-CM

## 2021-09-20 DIAGNOSIS — G43.109 MIGRAINE WITH AURA AND WITHOUT STATUS MIGRAINOSUS, NOT INTRACTABLE: ICD-10-CM

## 2021-09-20 DIAGNOSIS — E83.52 HYPERCALCEMIA: ICD-10-CM

## 2021-09-20 DIAGNOSIS — R79.89 ELEVATED PTHRP LEVEL: ICD-10-CM
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
## 2021-09-20 DIAGNOSIS — I83.813 VARICOSE VEINS OF BOTH LOWER EXTREMITIES WITH PAIN: ICD-10-CM

## 2021-09-20 DIAGNOSIS — R63.5 WEIGHT GAIN: ICD-10-CM

## 2021-09-20 DIAGNOSIS — S61.459A CAT BITE OF HAND, UNSPECIFIED LATERALITY, INITIAL ENCOUNTER: ICD-10-CM

## 2021-09-20 PROBLEM — Z92.29 HISTORY OF PNEUMOCOCCAL VACCINATION: Status: RESOLVED | Noted: 2021-04-19 | Resolved: 2021-09-20

## 2021-09-20 PROCEDURE — 99214 OFFICE O/P EST MOD 30 MIN: CPT | Performed by: FAMILY MEDICINE

## 2021-09-20 RX ORDER — CLINDAMYCIN HYDROCHLORIDE 300 MG/1
300 CAPSULE ORAL 3 TIMES DAILY
Qty: 15 CAPSULE | Refills: 0 | Status: SHIPPED | OUTPATIENT
Start: 2021-09-20 | End: 2021-09-25

## 2021-09-20 RX ORDER — CIPROFLOXACIN 500 MG/1
500 TABLET, FILM COATED ORAL 2 TIMES DAILY
Qty: 10 TABLET | Refills: 0 | Status: SHIPPED | OUTPATIENT
Start: 2021-09-20 | End: 2021-09-25

## 2021-09-20 RX ORDER — PROPRANOLOL HYDROCHLORIDE 40 MG/1
40 TABLET ORAL 2 TIMES DAILY
Qty: 180 TABLET | Refills: 3 | Status: SHIPPED | OUTPATIENT
Start: 2021-09-20 | End: 2022-09-13 | Stop reason: SDUPTHER

## 2021-09-20 ASSESSMENT — FIBROSIS 4 INDEX: FIB4 SCORE: 1.2
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
## 2021-09-20 NOTE — LETTER
[facility]  [address]  [place] [address]     September 20, 2021    Patient: Miryam Veloz   YOB: 1950   Date of Visit: 9/20/2021       To Whom It May Concern:    Miryam Veloz was seen and treated in our department on 9/20/2021. Due to medical condition she should be out of work on 9/21/2021.    Sincerely,     Pearl Whitmore D.O.

## 2021-09-21 ENCOUNTER — HOSPITAL ENCOUNTER (OUTPATIENT)
Dept: LAB | Facility: MEDICAL CENTER | Age: 71
End: 2021-09-21
Attending: FAMILY MEDICINE
Payer: COMMERCIAL

## 2021-09-21 DIAGNOSIS — R63.5 WEIGHT GAIN: ICD-10-CM

## 2021-09-21 DIAGNOSIS — E83.52 HYPERCALCEMIA: ICD-10-CM

## 2021-09-21 DIAGNOSIS — L68.0 HIRSUTISM: ICD-10-CM

## 2021-09-21 DIAGNOSIS — R61 NIGHT SWEATS: ICD-10-CM

## 2021-09-21 LAB
ALBUMIN SERPL BCP-MCNC: 3.8 G/DL (ref 3.2–4.9)
ALBUMIN/GLOB SERPL: 1.3 G/DL
ALP SERPL-CCNC: 103 U/L (ref 30–99)
ALT SERPL-CCNC: 24 U/L (ref 2–50)
ANION GAP SERPL CALC-SCNC: 8 MMOL/L (ref 7–16)
AST SERPL-CCNC: 20 U/L (ref 12–45)
BASOPHILS # BLD AUTO: 1 % (ref 0–1.8)
BASOPHILS # BLD: 0.07 K/UL (ref 0–0.12)
BILIRUB SERPL-MCNC: 0.5 MG/DL (ref 0.1–1.5)
BUN SERPL-MCNC: 12 MG/DL (ref 8–22)
CALCIUM SERPL-MCNC: 10.6 MG/DL (ref 8.5–10.5)
CHLORIDE SERPL-SCNC: 109 MMOL/L (ref 96–112)
CO2 SERPL-SCNC: 26 MMOL/L (ref 20–33)
CREAT SERPL-MCNC: 0.88 MG/DL (ref 0.5–1.4)
EOSINOPHIL # BLD AUTO: 0.13 K/UL (ref 0–0.51)
EOSINOPHIL NFR BLD: 1.9 % (ref 0–6.9)
ERYTHROCYTE [DISTWIDTH] IN BLOOD BY AUTOMATED COUNT: 50.3 FL (ref 35.9–50)
ESTRADIOL SERPL-MCNC: <5 PG/ML
GLOBULIN SER CALC-MCNC: 3 G/DL (ref 1.9–3.5)
GLUCOSE SERPL-MCNC: 101 MG/DL (ref 65–99)
HCT VFR BLD AUTO: 46.8 % (ref 37–47)
HGB BLD-MCNC: 15 G/DL (ref 12–16)
IMM GRANULOCYTES # BLD AUTO: 0.02 K/UL (ref 0–0.11)
IMM GRANULOCYTES NFR BLD AUTO: 0.3 % (ref 0–0.9)
LYMPHOCYTES # BLD AUTO: 2.23 K/UL (ref 1–4.8)
LYMPHOCYTES NFR BLD: 32.5 % (ref 22–41)
MCH RBC QN AUTO: 32.5 PG (ref 27–33)
MCHC RBC AUTO-ENTMCNC: 32.1 G/DL (ref 33.6–35)
MCV RBC AUTO: 101.3 FL (ref 81.4–97.8)
MONOCYTES # BLD AUTO: 0.5 K/UL (ref 0–0.85)
MONOCYTES NFR BLD AUTO: 7.3 % (ref 0–13.4)
NEUTROPHILS # BLD AUTO: 3.92 K/UL (ref 2–7.15)
NEUTROPHILS NFR BLD: 57 % (ref 44–72)
NRBC # BLD AUTO: 0 K/UL
NRBC BLD-RTO: 0 /100 WBC
PLATELET # BLD AUTO: 203 K/UL (ref 164–446)
PMV BLD AUTO: 12.3 FL (ref 9–12.9)
POTASSIUM SERPL-SCNC: 4.8 MMOL/L (ref 3.6–5.5)
PROGEST SERPL-MCNC: 0.08 NG/ML
PROT SERPL-MCNC: 6.8 G/DL (ref 6–8.2)
PTH-INTACT SERPL-MCNC: 132 PG/ML (ref 14–72)
RBC # BLD AUTO: 4.62 M/UL (ref 4.2–5.4)
SODIUM SERPL-SCNC: 143 MMOL/L (ref 135–145)
T4 FREE SERPL-MCNC: 1.51 NG/DL (ref 0.93–1.7)
TESTOST SERPL-MCNC: 34 NG/DL (ref 9–75)
TSH SERPL DL<=0.005 MIU/L-ACNC: 0.52 UIU/ML (ref 0.38–5.33)
WBC # BLD AUTO: 6.9 K/UL (ref 4.8–10.8)

## 2021-09-21 PROCEDURE — 36415 COLL VENOUS BLD VENIPUNCTURE: CPT

## 2021-09-21 PROCEDURE — 85025 COMPLETE CBC W/AUTO DIFF WBC: CPT

## 2021-09-21 PROCEDURE — 84443 ASSAY THYROID STIM HORMONE: CPT

## 2021-09-21 PROCEDURE — 82542 COL CHROMOTOGRAPHY QUAL/QUAN: CPT

## 2021-09-21 PROCEDURE — 80053 COMPREHEN METABOLIC PANEL: CPT

## 2021-09-21 PROCEDURE — 84403 ASSAY OF TOTAL TESTOSTERONE: CPT

## 2021-09-21 PROCEDURE — 82670 ASSAY OF TOTAL ESTRADIOL: CPT

## 2021-09-21 PROCEDURE — 83970 ASSAY OF PARATHORMONE: CPT

## 2021-09-21 PROCEDURE — 84439 ASSAY OF FREE THYROXINE: CPT

## 2021-09-21 PROCEDURE — 84144 ASSAY OF PROGESTERONE: CPT

## 2021-10-01 LAB — PTH RELATED PROT SERPL-SCNC: <2 PMOL/L (ref 0–3.4)

## 2021-10-05 ENCOUNTER — OFFICE VISIT (OUTPATIENT)
Dept: MEDICAL GROUP | Facility: PHYSICIAN GROUP | Age: 71
End: 2021-10-05
Payer: COMMERCIAL

## 2021-10-05 VITALS
BODY MASS INDEX: 34.22 KG/M2 | OXYGEN SATURATION: 93 % | SYSTOLIC BLOOD PRESSURE: 132 MMHG | TEMPERATURE: 98.7 F | HEIGHT: 70 IN | WEIGHT: 239 LBS | HEART RATE: 83 BPM | DIASTOLIC BLOOD PRESSURE: 76 MMHG

## 2021-10-05 DIAGNOSIS — E21.0 PRIMARY HYPERPARATHYROIDISM (HCC): ICD-10-CM

## 2021-10-05 DIAGNOSIS — Z78.0 POSTMENOPAUSAL: ICD-10-CM

## 2021-10-05 DIAGNOSIS — L68.0 HIRSUTISM: ICD-10-CM

## 2021-10-05 DIAGNOSIS — Z23 NEED FOR VACCINATION: ICD-10-CM

## 2021-10-05 PROCEDURE — 99214 OFFICE O/P EST MOD 30 MIN: CPT | Mod: 25 | Performed by: FAMILY MEDICINE

## 2021-10-05 PROCEDURE — 90662 IIV NO PRSV INCREASED AG IM: CPT | Performed by: FAMILY MEDICINE

## 2021-10-05 PROCEDURE — 90471 IMMUNIZATION ADMIN: CPT | Performed by: FAMILY MEDICINE

## 2021-10-05 RX ORDER — SPIRONOLACTONE 50 MG/1
50 TABLET, FILM COATED ORAL 2 TIMES DAILY
Qty: 180 TABLET | Refills: 3 | Status: SHIPPED | OUTPATIENT
Start: 2021-10-05 | End: 2022-07-22

## 2021-10-05 RX ORDER — SPIRONOLACTONE 50 MG/1
50 TABLET, FILM COATED ORAL DAILY
Qty: 30 TABLET | Refills: 3 | Status: SHIPPED
Start: 2021-10-05 | End: 2021-10-05

## 2021-10-05 ASSESSMENT — FIBROSIS 4 INDEX: FIB4 SCORE: 1.39
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
## 2021-10-11 NOTE — TELEPHONE ENCOUNTER
Have we ever prescribed this med? Yes.  If yes, what date? 06/16/21    Last OV: 06/03/21 with Dr Delong    Next OV: No Pending appt.     DX:     Medications:   Requested Prescriptions     Pending Prescriptions Disp Refills   • BREO ELLIPTA 100-25 MCG/INH AEROSOL POWDER, BREATH ACTIVATED [Pharmacy Med Name: BREO ELLIPTA 100-25 MCG INH]       Sig: INHALE ONE DOSE BY MOUTH DAILY. RINSE MOUTH AFTER EACH USE.

## 2021-11-01 ENCOUNTER — HOSPITAL ENCOUNTER (OUTPATIENT)
Dept: LAB | Facility: MEDICAL CENTER | Age: 71
End: 2021-11-01
Attending: FAMILY MEDICINE
Payer: COMMERCIAL

## 2021-11-01 DIAGNOSIS — L68.0 HIRSUTISM: ICD-10-CM

## 2021-11-01 LAB
ALBUMIN SERPL BCP-MCNC: 4.2 G/DL (ref 3.2–4.9)
BUN SERPL-MCNC: 18 MG/DL (ref 8–22)
CALCIUM SERPL-MCNC: 11.2 MG/DL (ref 8.5–10.5)
CHLORIDE SERPL-SCNC: 107 MMOL/L (ref 96–112)
CO2 SERPL-SCNC: 24 MMOL/L (ref 20–33)
CREAT SERPL-MCNC: 0.97 MG/DL (ref 0.5–1.4)
GLUCOSE SERPL-MCNC: 100 MG/DL (ref 65–99)
PHOSPHATE SERPL-MCNC: 2.3 MG/DL (ref 2.5–4.5)
POTASSIUM SERPL-SCNC: 4.6 MMOL/L (ref 3.6–5.5)
SODIUM SERPL-SCNC: 140 MMOL/L (ref 135–145)

## 2021-11-01 PROCEDURE — 36415 COLL VENOUS BLD VENIPUNCTURE: CPT

## 2021-11-01 PROCEDURE — 80069 RENAL FUNCTION PANEL: CPT

## 2021-12-21 ENCOUNTER — HOSPITAL ENCOUNTER (OUTPATIENT)
Dept: RADIOLOGY | Facility: MEDICAL CENTER | Age: 71
End: 2021-12-21
Attending: FAMILY MEDICINE
Payer: COMMERCIAL

## 2021-12-21 DIAGNOSIS — Z78.0 POSTMENOPAUSAL: ICD-10-CM

## 2021-12-21 PROCEDURE — 77080 DXA BONE DENSITY AXIAL: CPT

## 2022-01-08 DIAGNOSIS — F31.75 BIPOLAR DISORDER, IN PARTIAL REMISSION, MOST RECENT EPISODE DEPRESSED (HCC): ICD-10-CM

## 2022-01-10 RX ORDER — VENLAFAXINE HYDROCHLORIDE 150 MG/1
CAPSULE, EXTENDED RELEASE ORAL
Qty: 90 CAPSULE | Refills: 2 | Status: SHIPPED | OUTPATIENT
Start: 2022-01-10 | End: 2022-10-26 | Stop reason: SDUPTHER

## 2022-02-01 DIAGNOSIS — J45.21 MILD INTERMITTENT ASTHMA WITH ACUTE EXACERBATION: ICD-10-CM

## 2022-02-02 RX ORDER — ALBUTEROL SULFATE 90 UG/1
1-2 AEROSOL, METERED RESPIRATORY (INHALATION) EVERY 6 HOURS PRN
Qty: 8.5 G | Refills: 3 | Status: SHIPPED | OUTPATIENT
Start: 2022-02-02 | End: 2023-06-26 | Stop reason: SDUPTHER

## 2022-02-16 ENCOUNTER — OFFICE VISIT (OUTPATIENT)
Dept: URGENT CARE | Facility: PHYSICIAN GROUP | Age: 72
End: 2022-02-16
Payer: COMMERCIAL

## 2022-02-16 VITALS
DIASTOLIC BLOOD PRESSURE: 80 MMHG | TEMPERATURE: 97.7 F | SYSTOLIC BLOOD PRESSURE: 134 MMHG | WEIGHT: 244.8 LBS | OXYGEN SATURATION: 92 % | HEIGHT: 70 IN | HEART RATE: 77 BPM | RESPIRATION RATE: 16 BRPM | BODY MASS INDEX: 35.05 KG/M2

## 2022-02-16 DIAGNOSIS — S86.912A MUSCLE STRAIN OF LEFT LOWER LEG, INITIAL ENCOUNTER: ICD-10-CM

## 2022-02-16 DIAGNOSIS — M79.662 PAIN IN LEFT LOWER LEG: ICD-10-CM
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
## 2022-02-16 PROCEDURE — 99213 OFFICE O/P EST LOW 20 MIN: CPT | Performed by: NURSE PRACTITIONER

## 2022-02-16 ASSESSMENT — ENCOUNTER SYMPTOMS
SENSORY CHANGE: 0
TINGLING: 0
PND: 0
FALLS: 0
WEAKNESS: 0
RESPIRATORY NEGATIVE: 1
CLAUDICATION: 0
BRUISES/BLEEDS EASILY: 0
FEVER: 0
CHILLS: 0
MYALGIAS: 1

## 2022-02-16 ASSESSMENT — FIBROSIS 4 INDEX: FIB4 SCORE: 1.39

## 2022-02-17 NOTE — PROGRESS NOTES
Subjective     Delilah Veloz is a 71 y.o. female who presents with Leg Injury (Left, 2/6/2022 tripped on the stairs )            HPI  States had mis-stepped off a stair when she stepped down approx 10 days ago. States still having left lower leg pain with long periods of walking. States has been weight bearing/standing more often in the last week. Takes Zarelto for a-fib. Denies swelling, bruising, redness, heat to skin of left lower extremity. Denies knee or ankle pain. States left lower extremity pain along the lateral aspect of her calf muscle after walking all day at work. States no muscle bulging but has feels lumps in her calf muscle. States leg elevation and rest helps pain.     PMH:  has a past medical history of Arrhythmia (01/2017), Arthritis (12/26/2017), Asthma (12/26/2017), Bipolar affective disorder (HCC) (1995), Chronic knee pain (2000), Claustrophobia, Dental disorder (12/26/2017), Depression (5/22/2009), Family history of breast cancer in mother, Fibromyalgia, Headache, frequent episodic tension-type, Hemorrhagic disorder (HCC) (01/2017), Hepatitis B (1975), Hypertension, IBD (inflammatory bowel disease), IBS (irritable bowel syndrome), Lumbar facet arthropathy (2004), Migraine, Pain (12/26/2017), Pain (12/26/2017), PTSD (post-traumatic stress disorder) (12/26/2017), Renal disorder (12/26/2017), and Rotator cuff syndrome of right shoulder (2008).    She has no past medical history of Breast cancer ([facility]).  MEDS:   Current Outpatient Medications:   •  albuterol (VENTOLIN HFA) 108 (90 Base) MCG/ACT Aero Soln inhalation aerosol, Inhale 1-2 Puffs every 6 hours as needed for Shortness of Breath., Disp: 8.5 g, Rfl: 3  •  venlafaxine (EFFEXOR-XR) 150 MG extended-release capsule, TAKE ONE CAPSULE BY MOUTH DAILY, Disp: 90 Capsule, Rfl: 2  •  BREO ELLIPTA 100-25 MCG/INH AEROSOL POWDER, BREATH ACTIVATED, INHALE ONE DOSE BY MOUTH DAILY. RINSE MOUTH AFTER EACH USE., Disp: 3 Each, Rfl: 1  •  propranolol  (INDERAL) 40 MG Tab, Take 1 Tablet by mouth 2 times a day., Disp: 180 Tablet, Rfl: 3  •  rivaroxaban (XARELTO) 20 MG Tab tablet, Take 1 Tab by mouth with dinner., Disp: 90 Tab, Rfl: 3  •  ondansetron (ZOFRAN ODT) 4 MG TABLET DISPERSIBLE, Take 1 Tab by mouth every 24 hours as needed., Disp: 20 Tab, Rfl: 2  •  omeprazole (PRILOSEC) 40 MG delayed-release capsule, Take 1 Cap by mouth every day., Disp: 90 Cap, Rfl: 3  •  eletriptan (RELPAX) 20 MG Tab, Take 1 Tab by mouth one time as needed for Migraine for up to 1 dose., Disp: 10 Tab, Rfl: 2  •  Diclofenac Sodium 1 % Gel, Apply  2 grams twice a day to joints as needed, Disp: 1 Tube, Rfl: 5  •  diazepam (VALIUM) 2 MG Tab, Take 2 mg by mouth every 6 hours as needed for Anxiety., Disp: , Rfl:   •  spironolactone (ALDACTONE) 50 MG Tab, Take 1 Tablet by mouth 2 times a day. (Patient not taking: Reported on 2/16/2022), Disp: 180 Tablet, Rfl: 3  ALLERGIES:   Allergies   Allergen Reactions   • Codeine Itching and Nausea   • Imitrex [Sumatriptan Succinate] Shortness of Breath and Swelling   • Penicillins Rash and Vomiting   • Sulfa Drugs Shortness of Breath and Itching   • Azithromycin Itching   • Dilaudid [Hydromorphone] Itching     sweating   • Other Drug Itching     Z Pack   • Sensipar [Cinacalcet] Unspecified     Nightmares and leg cramping   • Imipramine Shortness of Breath and Itching     anxious   • Seroquel [Quetiapine Fumarate]      Triggered urge to hurt self.      SURGHX:   Past Surgical History:   Procedure Laterality Date   • ORIF, WRIST Left 9/24/2018    Procedure: WRIST ORIF;  Surgeon: Mitchel Solano M.D.;  Location: [facility];  Service: Orthopedics   • KNEE ARTHROSCOPY Right 1/5/2018    Procedure: KNEE ARTHROSCOPY;  Surgeon: Henry Medel M.D.;  Location: SURGERY [facility];  Service: Orthopedics   • MENISCECTOMY Right 1/5/2018    Procedure: MENISCECTOMY-partial medial and lateral;  Surgeon: Henry Medel M.D.;  [facility];  Service: Orthopedics   • DEBRIDEMENT Right 1/5/2018    Procedure: DEBRIDEMENT-medial, lateral, and patellar chondyle;  Surgeon: Henry Medel M.D.;  Location: SURGERY [facility];  Service: Orthopedics   • FINGER OR HAND INCISION AND DRAINAGE Right 10/20/2015    Procedure: FINGER OR HAND INCISION AND DRAINAGE- 4th finger;  Surgeon: Eric Pritchett M.D.;  Location: SURGERY [facility];  Service:    • DENTAL EXTRACTION(S)  2015    Upper dentures   • CHOLECYSTECTOMY  1999   • PRIMARY C SECTION  1985   • APPENDECTOMY  1950's   • OTHER      Gets sedation for MRI's     SOCHX:  reports that she quit smoking about 28 years ago. Her smoking use included cigarettes. She has a 20.00 pack-year smoking history. She has never used smokeless tobacco. She reports current alcohol use of about 0.6 oz of alcohol per week. She reports that she does not use drugs.  FH: Family history was reviewed, no pertinent findings to report    Review of Systems   Constitutional: Negative for chills, fever and malaise/fatigue.   Respiratory: Negative.    Cardiovascular: Negative for claudication, leg swelling and PND.   Musculoskeletal: Positive for myalgias. Negative for falls and joint pain.   Skin: Negative for itching and rash.   Neurological: Negative for tingling, sensory change and weakness.   Endo/Heme/Allergies: Does not bruise/bleed easily.   All other systems reviewed and are negative.             Objective     /80 (BP Location: Right arm, Patient Position: Sitting, BP Cuff Size: Adult)   Pulse 77   Temp 36.5 °C (97.7 °F) (Temporal)   Resp 16   Ht 1.778 m (5' 10\")   Wt 111 kg (244 lb 12.8 oz)   SpO2 92%   BMI 35.13 kg/m²      Physical Exam  Vitals reviewed.   Constitutional:       General: She is awake. She is not in acute distress.     Appearance: Normal appearance. She is well-developed. She is not ill-appearing, toxic-appearing or diaphoretic.   HENT:      Head: Normocephalic.   Eyes:      " Pupils: Pupils are equal, round, and reactive to light.   Cardiovascular:      Rate and Rhythm: Normal rate.   Pulmonary:      Effort: Pulmonary effort is normal. No tachypnea, accessory muscle usage or respiratory distress.   Musculoskeletal:      Left lower leg: Normal. No swelling, deformity, lacerations, tenderness or bony tenderness. No edema.        Legs:       Comments: Med assist applied ace wrap to left calf.   Skin:     General: Skin is warm and dry.      Findings: No abrasion, bruising, ecchymosis, erythema, signs of injury, laceration, rash or wound.   Neurological:      Mental Status: She is alert and oriented to person, place, and time.      GCS: GCS eye subscore is 4. GCS verbal subscore is 5. GCS motor subscore is 6.      Sensory: Sensation is intact.      Motor: Motor function is intact.      Coordination: Coordination is intact.      Gait: Gait is intact.   Psychiatric:         Attention and Perception: Attention normal.         Mood and Affect: Mood normal.         Speech: Speech normal.         Behavior: Behavior normal. Behavior is cooperative.                             Assessment & Plan              1. Slipping, tripping and stumbling without falling, unspecified, initial encounter      2. Pain in left lower leg    3. Muscle strain of left lower leg, initial encounter      -May use over the counter NSAID as needed for pain/swelling  -May use cool compresses for any swelling as needed  -Recommend warm compress to muscle soreness  -May utilize RICE method as needed, use ace wrap with standing/walking  -Avoid excessive weight bearing to avoid further injury  -May apply topical analgesics as needed: recommend Magnesium muscle foam/cream   -Perform proper body mechanics with lifting, twisting, bending and walking  -Monitor for deformity, numbness/tingling in toes, decreased range of motion with weight bearing- need re-evaluation

## 2022-03-16 DIAGNOSIS — I48.0 PAROXYSMAL ATRIAL FIBRILLATION (HCC): ICD-10-CM

## 2022-03-31 ENCOUNTER — OFFICE VISIT (OUTPATIENT)
Dept: MEDICAL GROUP | Facility: PHYSICIAN GROUP | Age: 72
End: 2022-03-31
Payer: COMMERCIAL

## 2022-03-31 DIAGNOSIS — G43.009 MIGRAINE WITHOUT AURA AND WITHOUT STATUS MIGRAINOSUS, NOT INTRACTABLE: ICD-10-CM

## 2022-03-31 DIAGNOSIS — Z12.31 ENCOUNTER FOR SCREENING MAMMOGRAM FOR BREAST CANCER: ICD-10-CM

## 2022-03-31 DIAGNOSIS — Z12.11 ENCOUNTER FOR SCREENING FOR MALIGNANT NEOPLASM OF COLON: ICD-10-CM

## 2022-03-31 DIAGNOSIS — Z00.00 ENCOUNTER FOR MEDICAL EXAMINATION TO ESTABLISH CARE: ICD-10-CM

## 2022-03-31 DIAGNOSIS — R79.89 ELEVATED PTHRP LEVEL: ICD-10-CM

## 2022-03-31 DIAGNOSIS — E66.9 OBESITY (BMI 30-39.9): ICD-10-CM

## 2022-03-31 DIAGNOSIS — R73.01 ELEVATED FASTING BLOOD SUGAR: ICD-10-CM

## 2022-03-31 DIAGNOSIS — S46.812A SUBSCAPULARIS (MUSCLE) SPRAIN, LEFT, INITIAL ENCOUNTER: ICD-10-CM

## 2022-03-31 DIAGNOSIS — F31.75 BIPOLAR DISORDER, IN PARTIAL REMISSION, MOST RECENT EPISODE DEPRESSED (HCC): ICD-10-CM

## 2022-03-31 DIAGNOSIS — J45.20 MILD INTERMITTENT ASTHMA WITHOUT COMPLICATION: ICD-10-CM

## 2022-03-31 DIAGNOSIS — I48.0 PAROXYSMAL ATRIAL FIBRILLATION (HCC): ICD-10-CM

## 2022-03-31 DIAGNOSIS — I10 ESSENTIAL HYPERTENSION: ICD-10-CM

## 2022-03-31 DIAGNOSIS — J96.11 CHRONIC HYPOXEMIC RESPIRATORY FAILURE (HCC): ICD-10-CM

## 2022-03-31 DIAGNOSIS — N18.30 CHRONIC RENAL FAILURE, STAGE 3 (MODERATE), UNSPECIFIED WHETHER STAGE 3A OR 3B CKD: ICD-10-CM

## 2022-03-31 DIAGNOSIS — F41.9 ANXIETY: ICD-10-CM

## 2022-03-31 DIAGNOSIS — I73.00 RAYNAUD'S PHENOMENON WITHOUT GANGRENE: ICD-10-CM

## 2022-03-31 DIAGNOSIS — E78.2 MIXED HYPERLIPIDEMIA: ICD-10-CM

## 2022-03-31 PROCEDURE — 99215 OFFICE O/P EST HI 40 MIN: CPT

## 2022-03-31 ASSESSMENT — FIBROSIS 4 INDEX: FIB4 SCORE: 1.39

## 2022-03-31 NOTE — ASSESSMENT & PLAN NOTE
This is a chronic condition, stable.  Patient was being followed by nephrology who placed a referral to endocrinology to assess for parathyroid excision.  Patient says she never followed up on this referral and is requesting a new one today.  Referral placed.

## 2022-03-31 NOTE — ASSESSMENT & PLAN NOTE
"This is a chronic condition, stable with ongoing fluid restriction.  Patient stated she used to be followed by nephrology but has not been seen by them for a while.  Hasn't gone for \"a while.\"  Patient states she was first referred to them when her GFR dropped to the 40s.  She said it was believed it was caused by her lithium that she was taking for bipolar disorder.  Most recent labs in January 2022 show GFR 57.  Patient states she tries to restrict her fluid intake on her own.  She used to drink approximately 128 ounces of water a day and now she drinks 77 ounces.  She also started to do intermittent fasting about 2 weeks ago with good results.    >Continue fluid restriction and will trend GFR, Bun and Cr every 6 months and monitor closely.  "

## 2022-03-31 NOTE — ASSESSMENT & PLAN NOTE
This is a chronic problem.  Patient is followed by [facility].  Patient says she does get intermittent palpitations.  The last time she felt this was yesterday.  Today her pulse feels normal and regular.  Patient is on Xarelto for anticoagulation.  Continue close management with cardiology and continue current management.

## 2022-03-31 NOTE — ASSESSMENT & PLAN NOTE
This is a chronic condition, stable on propanolol 40 mg twice a day.  Patient blood pressure today in office is 126/64.  Patient does not check her blood pressure at home, however she denies any symptoms including chest pain, chest pressure, shortness of breath, dizziness or lightheadedness.  Continue current management.

## 2022-03-31 NOTE — ASSESSMENT & PLAN NOTE
This is a chronic condition, somewhat controlled.  Patient's LDL and triglycerides started to increase approximately 1 year ago.  It is trending down but still considered elevated.  Her last lab draw November 2021 showed triglycerides 240 and .. Her 10-year ASCVD risk score is 12.3%.  She recently started exercising more which includes walking 2 miles a day.  She also started intermittent fasting approximately 2 weeks ago.  She currently takes a lot of medication and would prefer not to add another 1.  She is motivated to make a change with diet and exercise.  We will try this for 6 months and recheck labs to assess whether to add a statin.     Ref. Range 1/8/2021 08:07   Cholesterol,Tot Latest Ref Range: 100 - 199 mg/dL 207 (H)   Triglycerides Latest Ref Range: 0 - 149 mg/dL 240 (H)   HDL Latest Ref Range: >=40 mg/dL 36 (A)   LDL Latest Ref Range: <100 mg/dL 123 (H)

## 2022-03-31 NOTE — ASSESSMENT & PLAN NOTE
This is a chronic problem secondary to bipolar disorder, controlled with venlafaxine 150 mg daily.  Patient states that she has been additionally anxious because she lost her partner approximately 1 month ago to heart attack and both her mother and brother  shortly before that of brain cancer.  Patient states this medication is working well for her, however, she has been having night sweats since she started taking it.  I explained that if this medication is working well for her, I would hate to change it.  She said that the night sweats are unbearable and she cannot handle it.  After reviewing her chart and considering her Bipolar and cardiac history, I placed a referral placed to behavioral health for management.

## 2022-03-31 NOTE — ASSESSMENT & PLAN NOTE
This is a chronic condition, stable on 2 L home O2 at night.  Patient is followed by pulmonology.  The patient was changed over to oxygen from CPAP due to feeling claustrophobic.  Patient states she is not consistent with her oxygen because it dries out her nose and mouth. SaO2 in office today is 93%.  Continue close management with pulmonology.

## 2022-03-31 NOTE — ASSESSMENT & PLAN NOTE
This is a chronic problem, controlled with venlafaxine 150 mg.  Patient states she has ups and downs, but since starting the venlafaxine she has felt pretty stable.  When she has ups, she gets very anxious and wants to spend money.  But when she is down, she states all she wants to do is sleep and cry.    Patient is requesting to switch the venlafaxine to something else due to night sweats since starting it.  I explained I would hate to change her medication if it is working for her.  However, she said the night sweats are unbearable.  Considering her history of arrhythmias and bipolar disorder, I placed a referral to behavioral health for management.

## 2022-03-31 NOTE — ASSESSMENT & PLAN NOTE
This is a chronic condition, stable.  Patients BMI in office is 21.15 (147 lbs).  Patient recently started intermittent fasting and lost 7 pounds in the last 2 weeks.  Patient also started walking 2 miles a day.  She is motivated and eager to make healthy changes.

## 2022-03-31 NOTE — ASSESSMENT & PLAN NOTE
This is a new condition.  Patient states her fingers and toes will occasionally turn white when cold.  She loses feeling and feels tingling.  She has not been diagnosed with hypothyroid and her last TSH is normal.  Educated patient on importance of keeping warm, exercising, and decreasing stress to avoid symptoms.

## 2022-03-31 NOTE — ASSESSMENT & PLAN NOTE
This is a chronic condition, stable.  Patient's most recent fasting blood glucose November 2021 was 100.  Patient recently started exercising, which includes walking 2 miles a day and intermittent fasting.  She is motivated and understands the importance of lifestyle modification.  I will check labs with an A1c and determine best plan of care moving forward.
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
## 2022-03-31 NOTE — ASSESSMENT & PLAN NOTE
This is a new condition.  Patient states she has muscle pain under her left scapula.  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   patient states she has used ice and heat.  She does not want to use NSAIDs due to her kidney disease.  I encouraged her to continue using ice and heat and give it time.  Diclofenac gel also recommended and ordered.

## 2022-03-31 NOTE — ASSESSMENT & PLAN NOTE
This is a chronic condition, stable on eletriptan 20 mg as needed.  Patient states she gets a couple migraines a month.  She does get aura, which includes visual disturbances.  She says the medication helps her but it does make her nauseous.  She has Zofran for this.  Her triggers are stress and food.  Continue avoiding triggers. Continue current management.

## 2022-03-31 NOTE — LETTER
March 31, 2022         Patient: Miryam Veloz   YOB: 1950   Date of Visit: 3/31/2022           To Whom it May Concern:    Miryam Veloz was seen in my clinic on 3/31/2022. She may return to work on 03/31/2022.    If you have any questions or concerns, please don't hesitate to call.        Sincerely,           ISMAEL Phan.  Electronically Signed

## 2022-03-31 NOTE — ASSESSMENT & PLAN NOTE
This is a chronic condition, stable on Breo Ellipta daily.  Patient also has an albuterol inhaler that she uses approximately once a week.  Patient is followed by pulmonology.  She states her asthma is impacted by weather changes which will occasionally increase the need of help her albuterol.  Continue close management with pulmonology and current management.

## 2022-04-15 NOTE — TELEPHONE ENCOUNTER
Have we ever prescribed this med? Yes.  If yes, what date? 10/11/21    Last OV: 06/03/21 with Dr Delong    Next OV: No Pending appt.    DX:     Medications:   Requested Prescriptions     Pending Prescriptions Disp Refills   • Fluticasone Furoate-Vilanterol (BREO ELLIPTA) 100-25 MCG/INH AEROSOL POWDER, BREATH ACTIVATED 3 Each 1     Sig: INHALE ONE DOSE BY MOUTH DAILY. RINSE MOUTH AFTER EACH USE.

## 2022-06-01 DIAGNOSIS — R10.13 ACUTE EPIGASTRIC PAIN: ICD-10-CM

## 2022-06-01 RX ORDER — OMEPRAZOLE 40 MG/1
CAPSULE, DELAYED RELEASE ORAL
Qty: 30 CAPSULE | Refills: 0 | Status: SHIPPED
Start: 2022-06-01 | End: 2022-06-22

## 2022-06-12 DIAGNOSIS — I48.0 PAROXYSMAL ATRIAL FIBRILLATION (HCC): ICD-10-CM

## 2022-06-13 RX ORDER — RIVAROXABAN 20 MG/1
TABLET, FILM COATED ORAL
Qty: 90 TABLET | Refills: 0 | Status: SHIPPED | OUTPATIENT
Start: 2022-06-13 | End: 2022-09-12

## 2022-06-20 VITALS
TEMPERATURE: 98.6 F | HEIGHT: 70 IN | HEART RATE: 76 BPM | WEIGHT: 247 LBS | OXYGEN SATURATION: 93 % | SYSTOLIC BLOOD PRESSURE: 126 MMHG | DIASTOLIC BLOOD PRESSURE: 64 MMHG | BODY MASS INDEX: 35.36 KG/M2

## 2022-06-22 DIAGNOSIS — R10.13 ACUTE EPIGASTRIC PAIN: ICD-10-CM

## 2022-06-22 RX ORDER — OMEPRAZOLE 20 MG/1
20-40 CAPSULE, DELAYED RELEASE ORAL DAILY
Qty: 90 CAPSULE | Refills: 0 | Status: SHIPPED | OUTPATIENT
Start: 2022-06-22 | End: 2022-07-05 | Stop reason: SDUPTHER

## 2022-06-22 NOTE — TELEPHONE ENCOUNTER
Patient has been on a high dose of omeprazole 40 mg since 2019.  Patient reports relief of symptoms with this dose.  I will trial lowering the dose to 20 mg and assess her tolerance.  I encouraged patient to go back up to 40 mg if her symptoms return.

## 2022-07-05 DIAGNOSIS — R10.13 ACUTE EPIGASTRIC PAIN: ICD-10-CM

## 2022-07-05 RX ORDER — OMEPRAZOLE 20 MG/1
20-40 CAPSULE, DELAYED RELEASE ORAL DAILY
Qty: 90 CAPSULE | Refills: 0 | Status: SHIPPED | OUTPATIENT
Start: 2022-07-05 | End: 2022-08-17 | Stop reason: SDUPTHER

## 2022-07-11 ENCOUNTER — HOSPITAL ENCOUNTER (OUTPATIENT)
Dept: LAB | Facility: MEDICAL CENTER | Age: 72
End: 2022-07-11
Payer: COMMERCIAL

## 2022-07-11 DIAGNOSIS — F31.75 BIPOLAR DISORDER, IN PARTIAL REMISSION, MOST RECENT EPISODE DEPRESSED (HCC): ICD-10-CM

## 2022-07-11 DIAGNOSIS — N18.30 CHRONIC RENAL FAILURE, STAGE 3 (MODERATE), UNSPECIFIED WHETHER STAGE 3A OR 3B CKD: ICD-10-CM

## 2022-07-11 DIAGNOSIS — E78.2 MIXED HYPERLIPIDEMIA: ICD-10-CM

## 2022-07-11 DIAGNOSIS — R73.01 ELEVATED FASTING BLOOD SUGAR: ICD-10-CM

## 2022-07-11 LAB
25(OH)D3 SERPL-MCNC: 67 NG/ML (ref 30–100)
ALBUMIN SERPL BCP-MCNC: 3.8 G/DL (ref 3.2–4.9)
ALBUMIN/GLOB SERPL: 1.3 G/DL
ALP SERPL-CCNC: 90 U/L (ref 30–99)
ALT SERPL-CCNC: 19 U/L (ref 2–50)
ANION GAP SERPL CALC-SCNC: 8 MMOL/L (ref 7–16)
AST SERPL-CCNC: 22 U/L (ref 12–45)
BASOPHILS # BLD AUTO: 1.1 % (ref 0–1.8)
BASOPHILS # BLD: 0.08 K/UL (ref 0–0.12)
BILIRUB SERPL-MCNC: 0.5 MG/DL (ref 0.1–1.5)
BUN SERPL-MCNC: 10 MG/DL (ref 8–22)
CALCIUM SERPL-MCNC: 10.5 MG/DL (ref 8.5–10.5)
CHLORIDE SERPL-SCNC: 109 MMOL/L (ref 96–112)
CHOLEST SERPL-MCNC: 170 MG/DL (ref 100–199)
CO2 SERPL-SCNC: 24 MMOL/L (ref 20–33)
CREAT SERPL-MCNC: 0.95 MG/DL (ref 0.5–1.4)
EOSINOPHIL # BLD AUTO: 0.16 K/UL (ref 0–0.51)
EOSINOPHIL NFR BLD: 2.3 % (ref 0–6.9)
ERYTHROCYTE [DISTWIDTH] IN BLOOD BY AUTOMATED COUNT: 46.7 FL (ref 35.9–50)
EST. AVERAGE GLUCOSE BLD GHB EST-MCNC: 117 MG/DL
FASTING STATUS PATIENT QL REPORTED: NORMAL
GFR SERPLBLD CREATININE-BSD FMLA CKD-EPI: 65 ML/MIN/1.73 M 2
GLOBULIN SER CALC-MCNC: 3 G/DL (ref 1.9–3.5)
GLUCOSE SERPL-MCNC: 99 MG/DL (ref 65–99)
HBA1C MFR BLD: 5.7 % (ref 4–5.6)
HCT VFR BLD AUTO: 45.7 % (ref 37–47)
HDLC SERPL-MCNC: 32 MG/DL
HGB BLD-MCNC: 15.1 G/DL (ref 12–16)
IMM GRANULOCYTES # BLD AUTO: 0.02 K/UL (ref 0–0.11)
IMM GRANULOCYTES NFR BLD AUTO: 0.3 % (ref 0–0.9)
LDLC SERPL CALC-MCNC: 84 MG/DL
LYMPHOCYTES # BLD AUTO: 2.61 K/UL (ref 1–4.8)
LYMPHOCYTES NFR BLD: 36.9 % (ref 22–41)
MCH RBC QN AUTO: 32.4 PG (ref 27–33)
MCHC RBC AUTO-ENTMCNC: 33 G/DL (ref 33.6–35)
MCV RBC AUTO: 98.1 FL (ref 81.4–97.8)
MONOCYTES # BLD AUTO: 0.6 K/UL (ref 0–0.85)
MONOCYTES NFR BLD AUTO: 8.5 % (ref 0–13.4)
NEUTROPHILS # BLD AUTO: 3.6 K/UL (ref 2–7.15)
NEUTROPHILS NFR BLD: 50.9 % (ref 44–72)
NRBC # BLD AUTO: 0 K/UL
NRBC BLD-RTO: 0 /100 WBC
PLATELET # BLD AUTO: 192 K/UL (ref 164–446)
PMV BLD AUTO: 12.3 FL (ref 9–12.9)
POTASSIUM SERPL-SCNC: 4.3 MMOL/L (ref 3.6–5.5)
PROT SERPL-MCNC: 6.8 G/DL (ref 6–8.2)
RBC # BLD AUTO: 4.66 M/UL (ref 4.2–5.4)
SODIUM SERPL-SCNC: 141 MMOL/L (ref 135–145)
TRIGL SERPL-MCNC: 271 MG/DL (ref 0–149)
TSH SERPL DL<=0.005 MIU/L-ACNC: 0.89 UIU/ML (ref 0.38–5.33)
WBC # BLD AUTO: 7.1 K/UL (ref 4.8–10.8)

## 2022-07-11 PROCEDURE — 80061 LIPID PANEL: CPT

## 2022-07-11 PROCEDURE — 82306 VITAMIN D 25 HYDROXY: CPT

## 2022-07-11 PROCEDURE — 83036 HEMOGLOBIN GLYCOSYLATED A1C: CPT

## 2022-07-11 PROCEDURE — 80053 COMPREHEN METABOLIC PANEL: CPT

## 2022-07-11 PROCEDURE — 85025 COMPLETE CBC W/AUTO DIFF WBC: CPT

## 2022-07-11 PROCEDURE — 84443 ASSAY THYROID STIM HORMONE: CPT

## 2022-07-11 PROCEDURE — 36415 COLL VENOUS BLD VENIPUNCTURE: CPT

## 2022-07-15 NOTE — TELEPHONE ENCOUNTER
07/15/22 0315   Respiratory Assessment   Assessment Type Assess only   General Appearance Sleeping   Respiratory Pattern Assisted;Spontaneous   Chest Assessment Chest expansion symmetrical   Bilateral Breath Sounds Diminished;Clear   Cough None   Resp Comments no distress no changes made keiht bipap well will cont to monitor   O2 Device bipap   Non-Invasive Information   O2 Interface Device Full face mask  (mask)   Non-Invasive Ventilation Mode BiPAP  (v60)   $ Continous NIV Subsequent   SpO2 98 %   $ Pulse Oximetry Spot Check Charge Completed   Non-Invasive Settings   FiO2 (%) 40   Rise Time 3   IPAP (cm) 16 cm   EPAP (cm) 6 cm   Rate (Set) 12   Flow (lpm) 37   Pressure Support (cm H2O) 10   Inspiratory Time (Set) 1 25   Non-Invasive Readings   Skin Intervention Skin intact;Mask rotated;Skin barrier applied  (appied earlier)   Total Rate 26   Spontaneous Vt (mL) 675   Spontaneous MV (mL) 17 4   I/E Ratio (Obs) 1:3   Leak (lpm) 2   Peak Pressure (Obs) 17   Non-Invasive Alarms   Insp Pressure High (cm H20) 25   Insp Pressure Low (cm H20) 12   Low Insp Pressure Time (sec) 20 sec   MV Low (L/min) 3   Vt High (mL) 1200   Vt Low (mL) 200   High Resp Rate (BPM) 40 BPM   Low Resp Rate (BPM) 8 BPM   Apnea Interval (sec) 20   Apnea Pressure 16 Refilled x 3 months. Please advise patient to schedule follow-up for future fills.

## 2022-07-22 ENCOUNTER — OFFICE VISIT (OUTPATIENT)
Dept: MEDICAL GROUP | Facility: PHYSICIAN GROUP | Age: 72
End: 2022-07-22
Payer: COMMERCIAL

## 2022-07-22 VITALS
TEMPERATURE: 98.4 F | HEIGHT: 70 IN | SYSTOLIC BLOOD PRESSURE: 116 MMHG | WEIGHT: 241 LBS | DIASTOLIC BLOOD PRESSURE: 70 MMHG | OXYGEN SATURATION: 95 % | BODY MASS INDEX: 34.5 KG/M2 | HEART RATE: 72 BPM

## 2022-07-22 DIAGNOSIS — Z01.83 ENCOUNTER FOR BLOOD TYPING: ICD-10-CM

## 2022-07-22 DIAGNOSIS — R71.8 ELEVATED MCV: ICD-10-CM

## 2022-07-22 DIAGNOSIS — E78.2 MIXED HYPERLIPIDEMIA: ICD-10-CM

## 2022-07-22 DIAGNOSIS — G43.109 MIGRAINE WITH AURA AND WITHOUT STATUS MIGRAINOSUS, NOT INTRACTABLE: ICD-10-CM

## 2022-07-22 DIAGNOSIS — J96.11 CHRONIC HYPOXEMIC RESPIRATORY FAILURE (HCC): ICD-10-CM

## 2022-07-22 DIAGNOSIS — K21.9 GASTROESOPHAGEAL REFLUX DISEASE WITHOUT ESOPHAGITIS: ICD-10-CM

## 2022-07-22 DIAGNOSIS — L30.9 DERMATITIS: ICD-10-CM

## 2022-07-22 PROCEDURE — 99215 OFFICE O/P EST HI 40 MIN: CPT

## 2022-07-22 RX ORDER — ONDANSETRON 4 MG/1
4 TABLET, ORALLY DISINTEGRATING ORAL
Qty: 20 TABLET | Refills: 2 | Status: SHIPPED | OUTPATIENT
Start: 2022-07-22 | End: 2023-11-30 | Stop reason: SDUPTHER

## 2022-07-22 RX ORDER — ELETRIPTAN HYDROBROMIDE 20 MG/1
20 TABLET, FILM COATED ORAL
Qty: 10 TABLET | Refills: 2 | Status: SHIPPED | OUTPATIENT
Start: 2022-07-22 | End: 2023-11-30 | Stop reason: SDUPTHER

## 2022-07-22 RX ORDER — FOLIC ACID/MULTIVIT,IRON,MINER 0.4MG-18MG
1200 TABLET ORAL DAILY
Qty: 90 CAPSULE | Refills: 3 | Status: SHIPPED | OUTPATIENT
Start: 2022-07-22 | End: 2023-06-26

## 2022-07-22 ASSESSMENT — ENCOUNTER SYMPTOMS: HEADACHES: 1

## 2022-07-22 ASSESSMENT — FIBROSIS 4 INDEX: FIB4 SCORE: 1.81

## 2022-07-22 NOTE — ASSESSMENT & PLAN NOTE
Chronic, controlled with eletriptan 20 mg and Zofran as needed for nausea.  Refill provided.  Continue trigger avoidance.

## 2022-07-22 NOTE — ASSESSMENT & PLAN NOTE
Chronic, stable.  Will continue controlling with diet and exercise as patient is motivated and making progress in lowering her cholesterol without medication.  I recommended omega-3 fatty acids to help with triglycerides and placed an order for this.  Will continue to trend lipid panel with annual labs.

## 2022-07-22 NOTE — PROGRESS NOTES
Subjective:     CC:   Chief Complaint   Patient presents with   • Follow-Up       HPI:   Miryam presents today for a lab review and medication refill.    Problem   Gastroesophageal Reflux Disease Without Esophagitis    Patient tried cutting her dose of omeprazole to 20 mg but only lasted one week. She experience burping, increased gas, as well as a gnawing feeling in her stomach. She didn't have heart burn or reflux, but the other symptoms were so uncomfortable, she went back up on her omeprazole to 40 mg.      Dermatitis    Patient has developed a red, dry, itchy patch on her elbow.  Patient states has been present for approximately 2 weeks.      Mixed Hyperlipidemia    Patient's cholesterol is trending down and moving in the right direction.  She is not currently on medication. The 10-year ASCVD risk score (Amanda Park DC Jr., et al., 2013) is: 10.1% improved from 12.3 one year ago.  Patient is motivated to control her cholesterol with diet and exercise and is hopeful to stay off medication.  She has made significant progress on her own.  She is intermittent fasting as well as walking 2 miles a day.       Latest Reference Range & Units 07/11/22 07:34   Cholesterol,Tot 100 - 199 mg/dL 170   Triglycerides 0 - 149 mg/dL 271 (H)   HDL >=40 mg/dL 32 !   LDL <100 mg/dL 84        Migraine Headache    Patient states she gets a couple migraines a month.  She does get aura, which includes visual disturbances.  She is managed on eletriptan 20 mg as needed.  She says the medication helps her but it does give her nausea.  She takes Zofran for this.  Her migraine triggers are stress and food.  She is requesting a refill today.         Health Maintenance: Completed    ROS:  Review of Systems   Skin: Positive for itching.        +Dry, red, patch on elbow   Neurological: Positive for headaches.     Objective:     Exam:  /70 (BP Location: Left arm, Patient Position: Sitting, BP Cuff Size: Large adult)   Pulse 72   Temp 36.9 °C  "(98.4 °F) (Temporal)   Ht 1.778 m (5' 10\")   Wt 109 kg (241 lb)   SpO2 95%   BMI 34.58 kg/m²  Body mass index is 34.58 kg/m².    Physical Exam  Skin:     Comments: +Dry, red patch on elbow       Labs: Results reviewed from 7/11/2022.    Assessment & Plan:     71 y.o. female with the following -    1. Mixed hyperlipidemia  Chronic, stable.  Will continue controlling with diet and exercise as patient is motivated and making progress in lowering her cholesterol without medication.  I recommended omega-3 fatty acids to help with triglycerides and placed an order for this.  Will continue to trend lipid panel with annual labs.    - Omega-3 Fatty Acids (OMEGA-3 FISH OIL) 1200 MG Cap; Take 1,200 mg by mouth every day.  Dispense: 90 Capsule; Refill: 3    2. Elevated MCV  New, stable.  Patient is not anemic.  Will review B12 levels with next labs.    3. Gastroesophageal reflux disease without esophagitis  Chronic, controlled with medication.  Continue omeprazole 40 mg.    4. Migraine with aura and without status migrainosus, not intractable  Chronic, controlled with eletriptan 20 mg and Zofran as needed for nausea.  Refill provided.  Continue trigger avoidance.  - eletriptan (RELPAX) 20 MG Tab; Take 1 Tablet by mouth one time as needed for Migraine for up to 1 dose.  Dispense: 10 Tablet; Refill: 2  - ondansetron (ZOFRAN ODT) 4 MG TABLET DISPERSIBLE; Take 1 Tablet by mouth every 24 hours as needed for Nausea.  Dispense: 20 Tablet; Refill: 2    5. Dermatitis  New problem.  Looks like dermatitis.  Will order hydrocortisone cream for itch relief.  - hydrocortisone 2.5 % Cream topical cream; Apply 1 Application topically 2 times a day.  Dispense: 20 g; Refill: 1    6. Chronic hypoxemic respiratory failure (HCC)  Chronic, stable.  Due for annual PFTs.  - PULMONARY FUNCTION TESTS -Test requested: Complete Pulmonary Function Test; Future    7. Encounter for blood typing  Patient states she does not know her blood type and is " curious if we can check this with her next set of labs.  - COD - Adult (Type and Screen); Future    Patient was educated in proper administration of medication(s) ordered today including safety, possible SE, risks, benefits, rationale and alternatives to therapy.   Supportive care, differential diagnoses, and indications for immediate follow-up discussed with patient.    Pathogenesis of diagnosis discussed including typical length and natural progression.    Instructed to return to clinic or nearest emergency department for any change in condition, further concerns, or worsening of symptoms.  Patient states understanding of the plan of care and discharge instructions.    Return in about 6 months (around 1/22/2023) for Follow-up .    I spent a total of 45 minutes with record review, exam, and communication with the patient, communication with other providers, and documentation of this encounter. This does not include time spent on separately billable procedures/tests.    I have placed the above orders and discussed them with an approved delegating provider.  The MA is performing the below orders under the direction of Dr. Ferguson.    Please note that this dictation was created using voice recognition software. I have worked with consultants from the vendor as well as technical experts from [facility] [employer] to optimize the interface. I have made every reasonable attempt to correct obvious errors, but I expect that there are errors of grammar and possibly content that I did not discover before finalizing the note.

## 2022-08-17 DIAGNOSIS — R10.13 ACUTE EPIGASTRIC PAIN: ICD-10-CM

## 2022-08-17 RX ORDER — OMEPRAZOLE 40 MG/1
40 CAPSULE, DELAYED RELEASE ORAL DAILY
Qty: 90 CAPSULE | Refills: 0 | Status: SHIPPED | OUTPATIENT
Start: 2022-08-17 | End: 2022-11-14

## 2022-08-21 NOTE — TELEPHONE ENCOUNTER
Last seen by PCP 06/9/2020. Will send 6 month(s) to the pharmacy.    
CONSTITUTIONAL: Well-appearing;  in no apparent distress.   HEAD: Normocephalic; atraumatic.   EYES: PERRL; EOM intact; conjunctiva and sclera clear  ENT: normal nose; no rhinorrhea; normal pharynx with no erythema or lesions.   NECK: Supple; non-tender; no LAD  CARDIOVASCULAR: Normal S1, S2; No audible murmurs. Regular rate and rhythm.   RESPIRATORY: Breathing easily; breath sounds clear and equal bilaterally; no wheezes, rhonchi, or rales.  GI: Soft; non-distended; non-tender; no palpable organomegaly.   MSK: FROM at all extremities, normal tone   EXT: No cyanosis or edema; N/V intact  SKIN: Normal for age and race; warm; dry; good turgor; no apparent lesions or rash.   NEURO: A & O x 3; face symmetric; grossly unremarkable.   PSYCHOLOGICAL: The patient’s mood and manner are appropriate.

## 2022-09-12 DIAGNOSIS — I48.0 PAROXYSMAL ATRIAL FIBRILLATION (HCC): ICD-10-CM

## 2022-09-13 DIAGNOSIS — G43.109 MIGRAINE WITH AURA AND WITHOUT STATUS MIGRAINOSUS, NOT INTRACTABLE: ICD-10-CM

## 2022-09-13 DIAGNOSIS — I10 ESSENTIAL HYPERTENSION: ICD-10-CM

## 2022-09-14 RX ORDER — PROPRANOLOL HYDROCHLORIDE 40 MG/1
40 TABLET ORAL 2 TIMES DAILY
Qty: 180 TABLET | Refills: 3 | Status: SHIPPED | OUTPATIENT
Start: 2022-09-14 | End: 2023-09-14

## 2022-10-25 ENCOUNTER — OFFICE VISIT (OUTPATIENT)
Dept: ENDOCRINOLOGY | Facility: MEDICAL CENTER | Age: 72
End: 2022-10-25
Payer: COMMERCIAL

## 2022-10-25 VITALS
HEIGHT: 70 IN | SYSTOLIC BLOOD PRESSURE: 126 MMHG | BODY MASS INDEX: 36.36 KG/M2 | WEIGHT: 254 LBS | HEART RATE: 82 BPM | OXYGEN SATURATION: 93 % | DIASTOLIC BLOOD PRESSURE: 80 MMHG

## 2022-10-25 DIAGNOSIS — Z78.0 OSTEOPENIA AFTER MENOPAUSE: ICD-10-CM

## 2022-10-25 DIAGNOSIS — E55.9 VITAMIN D DEFICIENCY: ICD-10-CM

## 2022-10-25 DIAGNOSIS — E21.3 HYPERPARATHYROIDISM (HCC): ICD-10-CM

## 2022-10-25 DIAGNOSIS — M85.80 OSTEOPENIA AFTER MENOPAUSE: ICD-10-CM

## 2022-10-25 PROCEDURE — 99214 OFFICE O/P EST MOD 30 MIN: CPT

## 2022-10-25 ASSESSMENT — FIBROSIS 4 INDEX: FIB4 SCORE: 1.84

## 2022-10-25 NOTE — PROGRESS NOTES
Chief Complaint: Consult requested by RONI Phan for evaluation of Hypercalcemia    HPI:   Miryam Veloz is a 72 y.o. female     Hyperparathyroidism:     She was first diagnosed with hypercalcemia thorough Dr. Lloyd about 5 years ago who recommended surgery. She made appt with surgeon at that time, but the surgeon was maternity leave and needed patient for discharge and follow-up  Patient is here reporting the following symptoms and for an evaluation to determine if she needs surgery for her parathyroid gland  She reports elevation of the serum calcium and parathormone, bone pain, and depression, fatigue,     She denies history of nephrolithiasis, polydypsia , polyuria, abdominal pain, constipation, lethargy, weakness, memory difficulties, and osteoporosis.    She is not currently on hydrochlorothiazide.    She is not on lithium therapy.    She  is taking calcium supplements.-just with her multivamins    She denies history of malignancy.    She denies family history of hypercalcemia.    She denies family history of endocrine malignancies.       Latest Reference Range & Units 9/21/21 11:11   Pth, Intact 14.0 - 72.0 pg/mL 132.0 (H)      Latest Reference Range & Units 11/1/21 11:03 7/11/22 07:34   Calcium 8.5 - 10.5 mg/dL 11.2 (H) 10.5     2.  Vitamin D deficiency:  Currently taking vitamin D with multivitamins   Latest Reference Range & Units 7/11/22 07:34   25-Hydroxy   Vitamin D 25 30 - 100 ng/mL 67     3.  Osteopenia after menopause:  Currently taking vitamin D and calcium daily with her multivitamins    DEXA scan done on 12/21/2021 showed a lumbar T score of 0.5, and a left femur T score of -2.0    Patient's medications, allergies, and social histories were reviewed and updated as appropriate.      ROS:      CONS:     No fever, no chills, no weight loss, no fatigue   EYES:      No diplopia, no blurry vision, no redness of eyes, no swelling of eyelids   ENT:    No hearing loss, No ear  pain, No sore throat, no dysphagia, no neck swelling   CV:     No chest pain, no palpitations, no claudication, no orthopnea, no PND   PULM:    No SOB, no cough, no hemoptysis, no wheezing    GI:   No nausea, no vomiting, no diarrhea, no constipation, no bloody stools   :  Passing urine well, no dysuria, no hematuria   ENDO:   No polyuria, no polydipsia, no heat intolerance, no cold intolerance   NEURO: No headaches, no dizziness, no convulsions, no tremors   MUSC:  No joint swellings, no arthralgias, no myalgias, no weakness   SKIN:   No rash, no ulcers, no dry skin   PSYCH:   No depression, no anxiety, no difficulty sleeping       Past Medical History:  Patient Active Problem List    Diagnosis Date Noted    Gastroesophageal reflux disease without esophagitis 07/22/2022    Dermatitis 07/22/2022    Raynaud's phenomenon 03/31/2022    Subscapularis (muscle) sprain, left, initial encounter 03/31/2022    Pulmonary emphysema (formerly [facility]) 06/03/2021    Chronic hypoxemic respiratory failure (formerly [facility]) 04/19/2021    Suspected sleep apnea 04/19/2021    Elevated fasting blood sugar 01/11/2021    Osteopenia 01/08/2019    Mild intermittent asthma without complication 12/22/2017    Carpal tunnel syndrome on both sides 12/22/2017    Chronic anticoagulation 08/01/2017    Mixed hyperlipidemia 08/01/2017    Peripheral edema 07/25/2017    Abnormal CT scan 07/25/2017    Acute pain of right knee 05/30/2017    Dyspnea on exertion 04/12/2017    Obesity (BMI 30-39.9) 04/12/2017    Paroxysmal atrial fibrillation (formerly [facility]) 01/09/2017    Chronic renal failure, stage 3 (moderate) (formerly [facility]) 01/09/2017    Fibromyalgia 01/09/2017    Elevated PTHrP level 07/28/2016    Vitamin B12 deficiency 11/12/2015    Hypercalcemia 10/20/2015    Essential hypertension 08/12/2014    Lumbar disc disease 09/14/2012    Migraine headache     Lumbar facet arthropathy     Allergic rhinitis due to pollen 08/26/2011    Anxiety 07/15/2011    Bipolar affective disorder (formerly [facility]) 05/22/2009        Past Surgical History:  Past Surgical History:   Procedure Laterality Date    ORIF, WRIST Left 9/24/2018    Procedure: WRIST ORIF;  Surgeon: Mitchel Solano M.D.;  Location: [facility];  Service: Orthopedics    KNEE ARTHROSCOPY Right 1/5/2018    Procedure: KNEE ARTHROSCOPY;  Surgeon: Henry Medel M.D.;  Location: [facility];  Service: Orthopedics    MENISCECTOMY Right 1/5/2018    Procedure: MENISCECTOMY-partial medial and lateral;  Surgeon: Henry Medel M.D.;  Location: [facility];  Service: Orthopedics    DEBRIDEMENT Right 1/5/2018    Procedure: DEBRIDEMENT-medial, lateral, and patellar chondyle;  Surgeon: Henry Medel M.D.;  Location: [facility];  Service: Orthopedics    FINGER OR HAND INCISION AND DRAINAGE Right 10/20/2015    Procedure: FINGER OR HAND INCISION AND DRAINAGE- 4th finger;  Surgeon: Eric Pritchett M.D.;  Location: [facility];  Service:     DENTAL EXTRACTION(S)  2015    Upper dentures    CHOLECYSTECTOMY  1999    PRIMARY C SECTION  1985    APPENDECTOMY  1950's    OTHER      Gets sedation for MRI's        Allergies:  Codeine, Imitrex [sumatriptan succinate], Penicillins, Sulfa drugs, Azithromycin, Dilaudid [hydromorphone], Other drug, Sensipar [cinacalcet], Imipramine, and Seroquel [quetiapine fumarate]     Current Medications:    Current Outpatient Medications:     propranolol (INDERAL) 40 MG Tab, Take 1 Tablet by mouth 2 times a day., Disp: 180 Tablet, Rfl: 3    rivaroxaban (XARELTO) 20 MG Tab tablet, Take 1 Tablet by mouth with dinner., Disp: 90 Tablet, Rfl: 3    omeprazole (PRILOSEC) 40 MG delayed-release capsule, Take 1 Capsule by mouth every day., Disp: 90 Capsule, Rfl: 0    hydrocortisone 2.5 % Cream topical cream, Apply 1 Application topically 2 times a day., Disp: 20 g, Rfl: 1    eletriptan (RELPAX) 20 MG Tab, Take 1 Tablet by mouth one time as needed for Migraine for up to 1 dose., Disp: 10 Tablet, Rfl:  2    ondansetron (ZOFRAN ODT) 4 MG TABLET DISPERSIBLE, Take 1 Tablet by mouth every 24 hours as needed for Nausea., Disp: 20 Tablet, Rfl: 2    Omega-3 Fatty Acids (OMEGA-3 FISH OIL) 1200 MG Cap, Take 1,200 mg by mouth every day., Disp: 90 Capsule, Rfl: 3    Fluticasone Furoate-Vilanterol (BREO ELLIPTA) 100-25 MCG/INH AEROSOL POWDER, BREATH ACTIVATED, INHALE ONE DOSE BY MOUTH DAILY. RINSE MOUTH AFTER EACH USE., Disp: 3 Each, Rfl: 1    diclofenac sodium (VOLTAREN) 1 % Gel, Apply 2 g topically 4 times a day as needed., Disp: 100 g, Rfl: 2    albuterol (VENTOLIN HFA) 108 (90 Base) MCG/ACT Aero Soln inhalation aerosol, Inhale 1-2 Puffs every 6 hours as needed for Shortness of Breath., Disp: 8.5 g, Rfl: 3    venlafaxine (EFFEXOR-XR) 150 MG extended-release capsule, TAKE ONE CAPSULE BY MOUTH DAILY, Disp: 90 Capsule, Rfl: 2    diazepam (VALIUM) 2 MG Tab, Take 2 mg by mouth every 6 hours as needed for Anxiety., Disp: , Rfl:     Social History:  Social History     Socioeconomic History    Marital status:      Spouse name: Not on file    Number of children: Not on file    Years of education: Not on file    Highest education level: Not on file   Occupational History    Not on file   Tobacco Use    Smoking status: Former     Packs/day: 1.00     Years: 20.00     Pack years: 20.00     Types: Cigarettes     Quit date: 1994     Years since quittin.8    Smokeless tobacco: Never    Tobacco comments:     quit 27 years ago   Vaping Use    Vaping Use: Never used   Substance and Sexual Activity    Alcohol use: Yes     Alcohol/week: 0.6 oz     Types: 1 Glasses of wine per week     Comment: 2 per month    Drug use: No     Comment: H/o Drug  abuse - Meth, Clean x 26 years.    Sexual activity: Not Currently     Partners: Male     Comment: Partner passed away   Other Topics Concern    Not on file   Social History Narrative    Not on file     Social Determinants of Health     Financial Resource Strain: Not on file   Food  "Insecurity: Not on file   Transportation Needs: Not on file   Physical Activity: Not on file   Stress: Not on file   Social Connections: Not on file   Intimate Partner Violence: Not on file   Housing Stability: Not on file        Family History:   Family History   Problem Relation Age of Onset    Cancer Mother         uterine, breast cancer, glioblastoma    GI Disease Father         ulcer    Heart Disease Father 40        MIs    Psychiatric Illness Father         anxiety    Arthritis Sister         rheumatoid    Cancer Brother         glioblastoma    Stroke Sister 79        mid brain         PHYSICAL EXAM:   Vital signs: /80   Pulse 82   Ht 1.778 m (5' 10\")   Wt 115 kg (254 lb)   SpO2 93%   BMI 36.45 kg/m²   GENERAL: Well-developed, well-nourished  in no apparent distress.   EYE: No ocular and eyelid asymmetry, Anicteric sclerae,  PERRL, No exophthalmos or lidlag  HENT: Hearing grossly intact, Normocephalic, atraumatic.   NECK: Supple. Trachea midline. thyroid is normal in size without nodules or tenderness  CARDIOVASCULAR: Regular rate and rhythm. No murmurs, rubs, or gallops.   LUNGS: Clear to auscultation bilaterally   EXTREMITIES: No clubbing, cyanosis, or edema.   NEUROLOGICAL: Cranial nerves II-XII are grossly intact   Symmetric reflexes at the patella no proximal muscle weakness, No visible tremor with both outstretched hands  LYMPH: No cervical, supraclavicular,  adenopathy palpated.   SKIN: No rashes, lesions. Turgor is normal.    Labs:    Imaging:      ASSESSMENT/PLAN:   1. Hyperparathyroidism (HCC)  Clinically unstable  Biochemically I will conduct an evaluation to update current blood panel to determine if patient is a candidate for a parathyroidectomy or no  Please do the following blood work fasting and early in the morning-send me a message in my chart 3 days after you do the following blood work for advice on whether referral to surgery will be placed or not    - PTH INTACT (PTH ONLY); " Future  - NM-PARATHYROID (SESTAMIBI) SCAN; Future  - MAGNESIUM; Future  - IONIZED CALCIUM; Future  - PHOSPHORUS; Future  - Comp Metabolic Panel; Future  - Urine Calcium 24 HR or Random; Future  - URINE CREATININE 24 HR; Future  - SPEP W/REFLEX TO CRISTIANA, A, G, M; Future  - TSH; Future  - FREE THYROXINE; Future  - THYROID PEROXIDASE  (TPO) AB; Future  - ANTITHYROGLOBULIN AB; Future    2. Vitamin D deficiency  I will evaluate levels  - VITAMIN D,25 HYDROXY (DEFICIENCY); Future    3. Osteopenia after menopause  Stable  Follow up PCP    Disposition: Make an appointment to follow-up in 4 months  After I review blood panel from diagnoses #1 I will order blood work for patient to do prior to her next appointment in 4 months    Thank you kindly for allowing me to participate in the endocrine care plan for this patient.    PIPE GallardoPEsauRFREDRICK  10/25/22    CC:   RONI Phan

## 2022-10-25 NOTE — LETTER
[facility] ENDOCRINOLOGY  [address] [place] [id number]  [phone]     October 25, 2022    Patient: Miryam Veloz   YOB: 1950   Date of Visit: 10/25/2022       To Whom It May Concern:    Miryam Veloz was seen and treated in our department on 10/25/2022.     Sincerely,     Michelle [facility]as [facility]

## 2022-10-26 DIAGNOSIS — F31.75 BIPOLAR DISORDER, IN PARTIAL REMISSION, MOST RECENT EPISODE DEPRESSED (HCC): ICD-10-CM

## 2022-10-26 RX ORDER — VENLAFAXINE HYDROCHLORIDE 150 MG/1
150 CAPSULE, EXTENDED RELEASE ORAL DAILY
Qty: 90 CAPSULE | Refills: 3 | Status: SHIPPED | OUTPATIENT
Start: 2022-10-26 | End: 2023-10-25 | Stop reason: SDUPTHER

## 2022-11-01 DIAGNOSIS — F41.9 ANXIETY: ICD-10-CM

## 2022-11-01 RX ORDER — ALPRAZOLAM 0.25 MG/1
0.25 TABLET ORAL PRN
Qty: 1 TABLET | Refills: 0 | Status: SHIPPED | OUTPATIENT
Start: 2022-11-01 | End: 2022-11-30

## 2022-11-03 ENCOUNTER — HOSPITAL ENCOUNTER (OUTPATIENT)
Dept: RADIOLOGY | Facility: MEDICAL CENTER | Age: 72
End: 2022-11-03
Payer: COMMERCIAL

## 2022-11-03 DIAGNOSIS — E21.3 HYPERPARATHYROIDISM (HCC): ICD-10-CM

## 2022-11-03 PROCEDURE — A9500 TC99M SESTAMIBI: HCPCS

## 2022-11-11 ENCOUNTER — HOSPITAL ENCOUNTER (OUTPATIENT)
Dept: LAB | Facility: MEDICAL CENTER | Age: 72
End: 2022-11-11
Payer: COMMERCIAL

## 2022-11-11 DIAGNOSIS — E55.9 VITAMIN D DEFICIENCY: ICD-10-CM

## 2022-11-11 DIAGNOSIS — E21.3 HYPERPARATHYROIDISM (HCC): ICD-10-CM

## 2022-11-11 LAB
25(OH)D3 SERPL-MCNC: 75 NG/ML (ref 30–100)
ALBUMIN SERPL BCP-MCNC: 4 G/DL (ref 3.2–4.9)
ALBUMIN/GLOB SERPL: 1.2 G/DL
ALP SERPL-CCNC: 101 U/L (ref 30–99)
ALT SERPL-CCNC: 19 U/L (ref 2–50)
ANION GAP SERPL CALC-SCNC: 6 MMOL/L (ref 7–16)
AST SERPL-CCNC: 15 U/L (ref 12–45)
BILIRUB SERPL-MCNC: 0.6 MG/DL (ref 0.1–1.5)
BUN SERPL-MCNC: 20 MG/DL (ref 8–22)
CA-I SERPL-SCNC: 1.4 MMOL/L (ref 1.1–1.3)
CALCIUM SERPL-MCNC: 11.1 MG/DL (ref 8.5–10.5)
CHLORIDE SERPL-SCNC: 104 MMOL/L (ref 96–112)
CO2 SERPL-SCNC: 28 MMOL/L (ref 20–33)
CREAT SERPL-MCNC: 0.98 MG/DL (ref 0.5–1.4)
GFR SERPLBLD CREATININE-BSD FMLA CKD-EPI: 62 ML/MIN/1.73 M 2
GLOBULIN SER CALC-MCNC: 3.3 G/DL (ref 1.9–3.5)
GLUCOSE SERPL-MCNC: 100 MG/DL (ref 65–99)
MAGNESIUM SERPL-MCNC: 1.9 MG/DL (ref 1.5–2.5)
PHOSPHATE SERPL-MCNC: 2.8 MG/DL (ref 2.5–4.5)
POTASSIUM SERPL-SCNC: 4.2 MMOL/L (ref 3.6–5.5)
PROT SERPL-MCNC: 7.3 G/DL (ref 6–8.2)
PTH-INTACT SERPL-MCNC: 138 PG/ML (ref 14–72)
SODIUM SERPL-SCNC: 138 MMOL/L (ref 135–145)
T4 FREE SERPL-MCNC: 1.57 NG/DL (ref 0.93–1.7)
THYROPEROXIDASE AB SERPL-ACNC: 10 IU/ML (ref 0–9)
TSH SERPL DL<=0.005 MIU/L-ACNC: 0.48 UIU/ML (ref 0.38–5.33)

## 2022-11-11 PROCEDURE — 80053 COMPREHEN METABOLIC PANEL: CPT

## 2022-11-11 PROCEDURE — 86800 THYROGLOBULIN ANTIBODY: CPT

## 2022-11-11 PROCEDURE — 84100 ASSAY OF PHOSPHORUS: CPT

## 2022-11-11 PROCEDURE — 86376 MICROSOMAL ANTIBODY EACH: CPT

## 2022-11-11 PROCEDURE — 36415 COLL VENOUS BLD VENIPUNCTURE: CPT

## 2022-11-11 PROCEDURE — 82330 ASSAY OF CALCIUM: CPT

## 2022-11-11 PROCEDURE — 82306 VITAMIN D 25 HYDROXY: CPT

## 2022-11-11 PROCEDURE — 84439 ASSAY OF FREE THYROXINE: CPT

## 2022-11-11 PROCEDURE — 84443 ASSAY THYROID STIM HORMONE: CPT

## 2022-11-11 PROCEDURE — 84165 PROTEIN E-PHORESIS SERUM: CPT

## 2022-11-11 PROCEDURE — 84155 ASSAY OF PROTEIN SERUM: CPT

## 2022-11-11 PROCEDURE — 83735 ASSAY OF MAGNESIUM: CPT

## 2022-11-11 PROCEDURE — 83970 ASSAY OF PARATHORMONE: CPT

## 2022-11-13 ENCOUNTER — OFFICE VISIT (OUTPATIENT)
Dept: URGENT CARE | Facility: PHYSICIAN GROUP | Age: 72
End: 2022-11-13
Payer: COMMERCIAL

## 2022-11-13 VITALS
OXYGEN SATURATION: 94 % | RESPIRATION RATE: 16 BRPM | HEART RATE: 89 BPM | WEIGHT: 254 LBS | SYSTOLIC BLOOD PRESSURE: 146 MMHG | HEIGHT: 70 IN | DIASTOLIC BLOOD PRESSURE: 80 MMHG | TEMPERATURE: 98.3 F | BODY MASS INDEX: 36.36 KG/M2

## 2022-11-13 DIAGNOSIS — R10.13 ACUTE EPIGASTRIC PAIN: ICD-10-CM
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
## 2022-11-13 PROCEDURE — 99213 OFFICE O/P EST LOW 20 MIN: CPT | Performed by: FAMILY MEDICINE

## 2022-11-13 RX ORDER — CLINDAMYCIN HYDROCHLORIDE 300 MG/1
300 CAPSULE ORAL 3 TIMES DAILY
Qty: 15 CAPSULE | Refills: 0 | Status: SHIPPED | OUTPATIENT
Start: 2022-11-13 | End: 2022-11-14 | Stop reason: SDUPTHER

## 2022-11-13 RX ORDER — DOXYCYCLINE HYCLATE 100 MG
100 TABLET ORAL EVERY 12 HOURS
Qty: 10 TABLET | Refills: 0 | Status: SHIPPED | OUTPATIENT
Start: 2022-11-13 | End: 2022-11-14 | Stop reason: SDUPTHER

## 2022-11-13 ASSESSMENT — FIBROSIS 4 INDEX: FIB4 SCORE: 1.25

## 2022-11-13 ASSESSMENT — ENCOUNTER SYMPTOMS: ROS SKIN COMMENTS: CAT BITE
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
## 2022-11-13 NOTE — LETTER
November 13, 2022         Patient: Miryam Veloz   YOB: 1950   Date of Visit: 11/13/2022           To Whom it May Concern:    Miryam Veloz was seen in my clinic on 11/13/2022. She may return to work in 1-2 days.    If you have any questions or concerns, please don't hesitate to call.        Sincerely,           Randal Morrow M.D.  Electronically Signed

## 2022-11-14 ENCOUNTER — OFFICE VISIT (OUTPATIENT)
Dept: URGENT CARE | Facility: PHYSICIAN GROUP | Age: 72
End: 2022-11-14
Payer: COMMERCIAL

## 2022-11-14 VITALS
WEIGHT: 254 LBS | BODY MASS INDEX: 36.36 KG/M2 | OXYGEN SATURATION: 95 % | TEMPERATURE: 100.6 F | RESPIRATION RATE: 24 BRPM | HEIGHT: 70 IN | HEART RATE: 89 BPM | SYSTOLIC BLOOD PRESSURE: 130 MMHG | DIASTOLIC BLOOD PRESSURE: 70 MMHG
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
## 2022-11-14 DIAGNOSIS — E21.3 HYPERPARATHYROIDISM (HCC): ICD-10-CM

## 2022-11-14 LAB — THYROGLOB AB SERPL-ACNC: <0.9 IU/ML (ref 0–4)

## 2022-11-14 PROCEDURE — 99213 OFFICE O/P EST LOW 20 MIN: CPT

## 2022-11-14 RX ORDER — CLINDAMYCIN HYDROCHLORIDE 300 MG/1
300 CAPSULE ORAL 3 TIMES DAILY
Qty: 6 CAPSULE | Refills: 0 | Status: SHIPPED | OUTPATIENT
Start: 2022-11-14 | End: 2022-11-16

## 2022-11-14 RX ORDER — MUPIROCIN CALCIUM 20 MG/G
1 CREAM TOPICAL 2 TIMES DAILY
Qty: 15 G | Refills: 0 | Status: SHIPPED
Start: 2022-11-14 | End: 2022-11-14

## 2022-11-14 RX ORDER — OMEPRAZOLE 40 MG/1
CAPSULE, DELAYED RELEASE ORAL
Qty: 90 CAPSULE | Refills: 2 | Status: SHIPPED | OUTPATIENT
Start: 2022-11-14 | End: 2023-08-08

## 2022-11-14 RX ORDER — DOXYCYCLINE HYCLATE 100 MG
100 TABLET ORAL EVERY 12 HOURS
Qty: 4 TABLET | Refills: 0 | Status: SHIPPED | OUTPATIENT
Start: 2022-11-14 | End: 2022-11-16

## 2022-11-14 RX ORDER — MUPIROCIN CALCIUM 20 MG/G
1 CREAM TOPICAL 2 TIMES DAILY
Qty: 15 G | Refills: 0 | Status: SHIPPED | OUTPATIENT
Start: 2022-11-14 | End: 2022-11-21

## 2022-11-14 ASSESSMENT — ENCOUNTER SYMPTOMS
FEVER: 1
CHILLS: 1

## 2022-11-14 ASSESSMENT — FIBROSIS 4 INDEX: FIB4 SCORE: 1.25

## 2022-11-14 NOTE — TELEPHONE ENCOUNTER
Have we ever prescribed this med? Yes.  If yes, what date? 04/16/22    Last OV: 06/23/21 with Dr Delong    Next OV: No Pending appt.     DX: Pulmonary emphysema, unspecified emphysema     Medications:   Requested Prescriptions     Pending Prescriptions Disp Refills    fluticasone furoate-vilanterol (BREO ELLIPTA) 100-25 MCG/ACT AEROSOL POWDER, BREATH ACTIVATED [Pharmacy Med Name: BREO ELLIPTA 100-25 MCG INH]       Sig: INHALE ONE DOSE BY MOUTH DAILY - RINSE MOUTH AFTER EACH USE

## 2022-11-14 NOTE — PROGRESS NOTES
Subjective     Delilah Veloz is a 72 y.o. female who presents with Wound Check (Cat bite on L hand, swelling, minor redness, pt seen yesterday and not feeling better, headache, chills, x2 days )            HPI    Patient is here today for worsening symptoms from a cat bite 2 days ago.  She was seen at the urgent care yesterday, and was prescribed doxycycline and clindamycin for 5 days.  She is up-to-date on her Tdap, which she received last in 2016.  She reports that her cat just received the rabies vaccine the day before she got bit.  She reports pain and increasing swelling on her left hand.  She reports pain to be 6 out of 10, achy and burning in character.  Pain is worse with pressure or any palpation, states pain can go up to 10 out of 10.  She further reports decreased range of motion on the left hand, states it is painful to make a fist.  Patient is febrile here in the clinic today at 100.6 °F.    Patient's current problem list, medications, and past medical/surgical history were reviewed in Epic.    PMH:  has a past medical history of Arrhythmia (01/2017), Arthritis (12/26/2017), Asthma (12/26/2017), Bipolar affective disorder (HCC) (1995), Chronic knee pain (2000), Claustrophobia, Dental disorder (12/26/2017), Depression (5/22/2009), Family history of breast cancer in mother, Fibromyalgia, Headache, frequent episodic tension-type, Hemorrhagic disorder (HCC) (01/2017), Hepatitis B (1975), Hypertension, IBD (inflammatory bowel disease), IBS (irritable bowel syndrome), Lumbar facet arthropathy (2004), Migraine, Pain (12/26/2017), Pain (12/26/2017), PTSD (post-traumatic stress disorder) (12/26/2017), Renal disorder (12/26/2017), and Rotator cuff syndrome of right shoulder (2008).    She has no past medical history of Breast cancer ([facility]).  MEDS:   Current Outpatient Medications:     doxycycline (VIBRAMYCIN) 100 MG Tab, Take 1 Tablet by mouth every 12 hours for 5 days., Disp: 10 Tablet, Rfl: 0     clindamycin (CLEOCIN) 300 MG Cap, Take 1 Capsule by mouth 3 times a day for 5 days., Disp: 15 Capsule, Rfl: 0    ALPRAZolam (XANAX) 0.25 MG Tab, Take 1 Tablet by mouth as needed for Sleep (For anxiety during medical procedure.) for up to 1 dose., Disp: 1 Tablet, Rfl: 0    venlafaxine (EFFEXOR-XR) 150 MG extended-release capsule, Take 1 Capsule by mouth every day., Disp: 90 Capsule, Rfl: 3    propranolol (INDERAL) 40 MG Tab, Take 1 Tablet by mouth 2 times a day., Disp: 180 Tablet, Rfl: 3    rivaroxaban (XARELTO) 20 MG Tab tablet, Take 1 Tablet by mouth with dinner., Disp: 90 Tablet, Rfl: 3    omeprazole (PRILOSEC) 40 MG delayed-release capsule, Take 1 Capsule by mouth every day., Disp: 90 Capsule, Rfl: 0    hydrocortisone 2.5 % Cream topical cream, Apply 1 Application topically 2 times a day., Disp: 20 g, Rfl: 1    eletriptan (RELPAX) 20 MG Tab, Take 1 Tablet by mouth one time as needed for Migraine for up to 1 dose., Disp: 10 Tablet, Rfl: 2    ondansetron (ZOFRAN ODT) 4 MG TABLET DISPERSIBLE, Take 1 Tablet by mouth every 24 hours as needed for Nausea., Disp: 20 Tablet, Rfl: 2    Omega-3 Fatty Acids (OMEGA-3 FISH OIL) 1200 MG Cap, Take 1,200 mg by mouth every day., Disp: 90 Capsule, Rfl: 3    Fluticasone Furoate-Vilanterol (BREO ELLIPTA) 100-25 MCG/INH AEROSOL POWDER, BREATH ACTIVATED, INHALE ONE DOSE BY MOUTH DAILY. RINSE MOUTH AFTER EACH USE., Disp: 3 Each, Rfl: 1    diclofenac sodium (VOLTAREN) 1 % Gel, Apply 2 g topically 4 times a day as needed., Disp: 100 g, Rfl: 2    albuterol (VENTOLIN HFA) 108 (90 Base) MCG/ACT Aero Soln inhalation aerosol, Inhale 1-2 Puffs every 6 hours as needed for Shortness of Breath., Disp: 8.5 g, Rfl: 3    diazepam (VALIUM) 2 MG Tab, Take 1 Tablet by mouth every 6 hours as needed for Anxiety., Disp: , Rfl:   ALLERGIES:   Allergies   Allergen Reactions    Codeine Itching and Nausea    Imitrex [Sumatriptan Succinate] Shortness of Breath and Swelling    Penicillins Rash and Vomiting     Sulfa Drugs Shortness of Breath and Itching    Azithromycin Itching    Dilaudid [Hydromorphone] Itching     sweating    Other Drug Itching     Z Pack    Sensipar [Cinacalcet] Unspecified     Nightmares and leg cramping    Imipramine Shortness of Breath and Itching     anxious    Seroquel [Quetiapine Fumarate]      Triggered urge to hurt self.      SURGHX:   Past Surgical History:   Procedure Laterality Date    ORIF, WRIST Left 9/24/2018    Procedure: WRIST ORIF;  Surgeon: Mitchel Solano M.D.;  Location: [facility];  Service: Orthopedics    KNEE ARTHROSCOPY Right 1/5/2018    Procedure: KNEE ARTHROSCOPY;  Surgeon: Henry Medel M.D.;  Location: [facility];  Service: Orthopedics    MENISCECTOMY Right 1/5/2018    Procedure: MENISCECTOMY-partial medial and lateral;  Surgeon: Henry Medel M.D.;  Location: [facility];  Service: Orthopedics    DEBRIDEMENT Right 1/5/2018    Procedure: DEBRIDEMENT-medial, lateral, and patellar chondyle;  Surgeon: Henry Medel M.D.;  Location: [facility];  Service: Orthopedics    FINGER OR HAND INCISION AND DRAINAGE Right 10/20/2015    Procedure: FINGER OR HAND INCISION AND DRAINAGE- 4th finger;  Surgeon: Eric Pritchett M.D.;  Location: [facility];  Service:     DENTAL EXTRACTION(S)  2015    Upper dentures    CHOLECYSTECTOMY  1999    PRIMARY C SECTION  1985    APPENDECTOMY  1950's    OTHER      Gets sedation for MRI's     SOCHX:  reports that she quit smoking about 28 years ago. Her smoking use included cigarettes. She has a 20.00 pack-year smoking history. She has never used smokeless tobacco. She reports current alcohol use of about 0.6 oz per week. She reports that she does not use drugs.  FH: Reviewed with patient, not pertinent to this visit.       Review of Systems   Constitutional:  Positive for chills, fever and malaise/fatigue.   All other systems reviewed and are negative.           Objective     BP  "130/70 (BP Location: Left arm, Patient Position: Sitting, BP Cuff Size: Adult)   Pulse 89   Temp (!) 38.1 °C (100.6 °F) (Temporal)   Resp (!) 24   Ht 1.778 m (5' 10\")   Wt 115 kg (254 lb) Comment: Pt stated  SpO2 95%   BMI 36.45 kg/m²      Physical Exam  Constitutional:       Appearance: Normal appearance.   HENT:      Head: Normocephalic.      Nose: Nose normal.   Eyes:      Extraocular Movements: Extraocular movements intact.   Cardiovascular:      Rate and Rhythm: Normal rate and regular rhythm.      Pulses: Normal pulses.      Heart sounds: Normal heart sounds.   Pulmonary:      Effort: Pulmonary effort is normal.      Breath sounds: Normal breath sounds.   Musculoskeletal:         General: Normal range of motion.      Cervical back: Normal range of motion.   Skin:     General: Skin is warm and dry.      Findings: Wound present.      Comments: Puncture wound on the left hand from a cat bite; erythema, swelling, tenderness to touch; no discharge   Neurological:      General: No focal deficit present.      Mental Status: She is alert.   Psychiatric:         Mood and Affect: Mood normal.         Behavior: Behavior normal.         Judgment: Judgment normal.                  Assessment & Plan        1. Infected cat bite, initial encounter    - doxycycline (VIBRAMYCIN) 100 MG Tab; Take 1 Tablet by mouth every 12 hours for 2 days.  Dispense: 4 Tablet; Refill: 0  - clindamycin (CLEOCIN) 300 MG Cap; Take 1 Capsule by mouth 3 times a day for 2 days.  Dispense: 6 Capsule; Refill: 0  - mupirocin calcium (BACTROBAN) 2 % Cream; Apply 1 Application topically 2 times a day for 7 days.  Dispense: 15 g; Refill: 0    2. Cat bite, subsequent encounter    - doxycycline (VIBRAMYCIN) 100 MG Tab; Take 1 Tablet by mouth every 12 hours for 2 days.  Dispense: 4 Tablet; Refill: 0  - clindamycin (CLEOCIN) 300 MG Cap; Take 1 Capsule by mouth 3 times a day for 2 days.  Dispense: 6 Capsule; Refill: 0  - mupirocin calcium (BACTROBAN) 2 % " Cream; Apply 1 Application topically 2 times a day for 7 days.  Dispense: 15 g; Refill: 0    Patient's presentation is consistent with an infected cat bite.  To her antibiotics doxycycline and clindamycin were extended for 2 more days.  She is also prescribed mupirocin ointment to apply twice daily for 7 days.  May take ibuprofen or Tylenol for pain and fever.  Patient was advised to return to clinic if symptoms persist or worsen in the next 48 hours.  Discussed treatment plan with patient, she is agreeable and verbalized understanding.  Educated patient on signs and symptoms watch out for, when to return to the clinic or go to the ER.    Electronically Signed by CURTIS Martinez

## 2022-11-14 NOTE — LETTER
"[facility] [place]  [address]  [place] [address]     November 14, 2022    Patient: Miryam Veloz   YOB: 1950   Date of Visit: 11/14/2022       To Whom It May Concern:    Miryam Veloz was seen and treated in our department on 11/14/2022.     Sincerely,     Leanna Jordan \"John\" CURTIS Snell                 "

## 2022-11-16 LAB
ALBUMIN SERPL ELPH-MCNC: 3.72 G/DL (ref 3.75–5.01)
ALPHA1 GLOB SERPL ELPH-MCNC: 0.29 G/DL (ref 0.19–0.46)
ALPHA2 GLOB SERPL ELPH-MCNC: 0.75 G/DL (ref 0.48–1.05)
B-GLOBULIN SERPL ELPH-MCNC: 1.08 G/DL (ref 0.48–1.1)
GAMMA GLOB SERPL ELPH-MCNC: 1.07 G/DL (ref 0.62–1.51)
INTERPRETATION SERPL IFE-IMP: ABNORMAL
MONOCLON BAND OBS SERPL: ABNORMAL
MONOCLONAL PROTEIN NL11656: ABNORMAL G/DL
PATHOLOGY STUDY: ABNORMAL
PROT SERPL-MCNC: 6.9 G/DL (ref 6.3–8.2)

## 2022-11-18 RX ORDER — FLUTICASONE FUROATE AND VILANTEROL 100; 25 UG/1; UG/1
POWDER RESPIRATORY (INHALATION)
Qty: 1 EACH | Refills: 3 | Status: SHIPPED | OUTPATIENT
Start: 2022-11-18 | End: 2023-06-26 | Stop reason: SDUPTHER

## 2022-11-25 ENCOUNTER — HOSPITAL ENCOUNTER (OUTPATIENT)
Facility: MEDICAL CENTER | Age: 72
End: 2022-11-25
Payer: COMMERCIAL

## 2022-11-25 PROCEDURE — 82340 ASSAY OF CALCIUM IN URINE: CPT

## 2022-11-25 PROCEDURE — 82570 ASSAY OF URINE CREATININE: CPT

## 2022-11-25 PROCEDURE — 81050 URINALYSIS VOLUME MEASURE: CPT

## 2022-11-26 DIAGNOSIS — E21.3 HYPERPARATHYROIDISM (HCC): ICD-10-CM

## 2022-11-26 LAB
CREAT 24H UR-MSRATE: 665 MG/24 HR (ref 800–1800)
CREAT UR-MCNC: 51.18 MG/DL
SPECIMEN VOL UR: 1300 ML

## 2022-11-30 LAB
CALCIUM 24H UR-MCNC: 8.2 MG/DL
CALCIUM 24H UR-MRATE: 102 MG/D (ref 100–250)
CALCIUM/CREAT 24H UR: 171 MG/G (ref 20–300)
COLLECT DURATION TIME SPEC: 24 HRS
CREAT 24H UR-MCNC: 48 MG/DL
CREAT 24H UR-MRATE: 600 MG/D (ref 500–1400)
SPECIMEN VOL ?TM UR: 1250 ML

## 2022-12-05 DIAGNOSIS — E21.3 HYPERPARATHYROIDISM (HCC): ICD-10-CM

## 2022-12-09 ENCOUNTER — OFFICE VISIT (OUTPATIENT)
Dept: URGENT CARE | Facility: PHYSICIAN GROUP | Age: 72
End: 2022-12-09
Payer: COMMERCIAL

## 2022-12-09 VITALS
HEIGHT: 70 IN | HEART RATE: 80 BPM | OXYGEN SATURATION: 92 % | WEIGHT: 251 LBS | DIASTOLIC BLOOD PRESSURE: 70 MMHG | BODY MASS INDEX: 35.93 KG/M2 | SYSTOLIC BLOOD PRESSURE: 128 MMHG | RESPIRATION RATE: 16 BRPM | TEMPERATURE: 99.5 F

## 2022-12-09 DIAGNOSIS — J02.0 STREP PHARYNGITIS: ICD-10-CM

## 2022-12-09 LAB
INT CON NEG: ABNORMAL
INT CON POS: ABNORMAL
S PYO AG THROAT QL: POSITIVE

## 2022-12-09 PROCEDURE — 99213 OFFICE O/P EST LOW 20 MIN: CPT | Performed by: PHYSICIAN ASSISTANT

## 2022-12-09 PROCEDURE — 87880 STREP A ASSAY W/OPTIC: CPT | Performed by: PHYSICIAN ASSISTANT

## 2022-12-09 RX ORDER — CLINDAMYCIN HYDROCHLORIDE 300 MG/1
CAPSULE ORAL
COMMUNITY
Start: 2022-11-17 | End: 2022-12-09

## 2022-12-09 RX ORDER — CLINDAMYCIN HYDROCHLORIDE 300 MG/1
300 CAPSULE ORAL 3 TIMES DAILY
Qty: 30 CAPSULE | Refills: 0 | Status: SHIPPED | OUTPATIENT
Start: 2022-12-09 | End: 2022-12-19

## 2022-12-09 ASSESSMENT — ENCOUNTER SYMPTOMS
DIARRHEA: 0
CHILLS: 0
CONSTIPATION: 0
SORE THROAT: 1
EYE DISCHARGE: 0
EYE PAIN: 0
EYE REDNESS: 0
FEVER: 0
VOMITING: 0
DIAPHORESIS: 0
WHEEZING: 0
HEADACHES: 1
SINUS PAIN: 0
SHORTNESS OF BREATH: 0
NAUSEA: 0
DIZZINESS: 0
ABDOMINAL PAIN: 0
COUGH: 0

## 2022-12-09 ASSESSMENT — FIBROSIS 4 INDEX: FIB4 SCORE: 1.25

## 2022-12-09 NOTE — LETTER
[facility] [place]  [address]  [place] [address]     December 9, 2022    Patient: Miryam Veloz   YOB: 1950   Date of Visit: 12/9/2022       To Whom It May Concern:    Miryam Veloz was seen and treated in our department on 12/9/2022.  Please excuse from work on 12/9/2022 - 12/12/2022, can return on 12/13/2022    Sincerely,     Jorge Crawford P.A.-C.
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
## 2023-01-09 DIAGNOSIS — Z00.00 WELLNESS EXAMINATION: ICD-10-CM

## 2023-01-24 DIAGNOSIS — F41.9 ANXIETY DUE TO INVASIVE PROCEDURE: ICD-10-CM

## 2023-01-24 RX ORDER — ALPRAZOLAM 0.25 MG/1
0.25 TABLET ORAL
Qty: 1 TABLET | Refills: 0 | Status: SHIPPED | OUTPATIENT
Start: 2023-01-24 | End: 2023-01-25

## 2023-01-27 ENCOUNTER — APPOINTMENT (OUTPATIENT)
Dept: RADIOLOGY | Facility: MEDICAL CENTER | Age: 73
End: 2023-01-27
Payer: COMMERCIAL

## 2023-02-26 ENCOUNTER — OFFICE VISIT (OUTPATIENT)
Dept: URGENT CARE | Facility: PHYSICIAN GROUP | Age: 73
End: 2023-02-26
Payer: COMMERCIAL

## 2023-02-26 ENCOUNTER — APPOINTMENT (OUTPATIENT)
Dept: RADIOLOGY | Facility: IMAGING CENTER | Age: 73
End: 2023-02-26
Attending: FAMILY MEDICINE
Payer: COMMERCIAL

## 2023-02-26 VITALS
WEIGHT: 251 LBS | RESPIRATION RATE: 16 BRPM | HEIGHT: 70 IN | HEART RATE: 95 BPM | OXYGEN SATURATION: 93 % | DIASTOLIC BLOOD PRESSURE: 70 MMHG | BODY MASS INDEX: 35.93 KG/M2 | TEMPERATURE: 99 F | SYSTOLIC BLOOD PRESSURE: 132 MMHG

## 2023-02-26 DIAGNOSIS — J44.1 COPD WITH ACUTE EXACERBATION (HCC): ICD-10-CM

## 2023-02-26 DIAGNOSIS — R05.1 ACUTE COUGH: ICD-10-CM

## 2023-02-26 DIAGNOSIS — J18.9 PNEUMONIA OF RIGHT LOWER LOBE DUE TO INFECTIOUS ORGANISM: ICD-10-CM

## 2023-02-26 LAB
FLUAV RNA SPEC QL NAA+PROBE: NEGATIVE
FLUBV RNA SPEC QL NAA+PROBE: NEGATIVE
RSV RNA SPEC QL NAA+PROBE: NEGATIVE
SARS-COV-2 RNA RESP QL NAA+PROBE: NEGATIVE

## 2023-02-26 PROCEDURE — 0241U POCT CEPHEID COV-2, FLU A/B, RSV - PCR: CPT | Performed by: FAMILY MEDICINE

## 2023-02-26 PROCEDURE — 71046 X-RAY EXAM CHEST 2 VIEWS: CPT | Mod: TC | Performed by: RADIOLOGY

## 2023-02-26 PROCEDURE — 99214 OFFICE O/P EST MOD 30 MIN: CPT | Performed by: FAMILY MEDICINE

## 2023-02-26 RX ORDER — PREDNISONE 20 MG/1
40 TABLET ORAL DAILY
Qty: 10 TABLET | Refills: 0 | Status: SHIPPED | OUTPATIENT
Start: 2023-02-26 | End: 2023-03-03

## 2023-02-26 RX ORDER — CEFUROXIME AXETIL 500 MG/1
500 TABLET ORAL 2 TIMES DAILY
Qty: 14 TABLET | Refills: 0 | Status: SHIPPED | OUTPATIENT
Start: 2023-02-26 | End: 2023-03-05

## 2023-02-26 RX ORDER — DEXTROMETHORPHAN HYDROBROMIDE AND PROMETHAZINE HYDROCHLORIDE 15; 6.25 MG/5ML; MG/5ML
5 SYRUP ORAL 4 TIMES DAILY PRN
Qty: 120 ML | Refills: 0 | Status: SHIPPED
Start: 2023-02-26 | End: 2023-06-12

## 2023-02-26 RX ORDER — DOXYCYCLINE HYCLATE 100 MG
100 TABLET ORAL 2 TIMES DAILY
Qty: 14 TABLET | Refills: 0 | Status: SHIPPED | OUTPATIENT
Start: 2023-02-26 | End: 2023-03-05

## 2023-02-26 ASSESSMENT — ENCOUNTER SYMPTOMS
WEIGHT LOSS: 0
EYE DISCHARGE: 0
VOMITING: 0
EYE REDNESS: 0
NAUSEA: 0

## 2023-02-26 ASSESSMENT — FIBROSIS 4 INDEX: FIB4 SCORE: 1.25

## 2023-02-26 NOTE — PROGRESS NOTES
"Subjective     Delilah Veloz is a 72 y.o. female who presents with Cough (Headache, sore throat, chills, congestion, x3 days )            2 days productive cough without blood in sputum. Sinus pressure and drainage. Subjective fever/chills. HA. Myalgia. +SOB. PMH COPD on nightly O2. Using albuterol occasionally. No PMH pneumonia. No PMH C19. No other aggravating or alleviating factors.        Review of Systems   Constitutional:  Positive for malaise/fatigue. Negative for weight loss.   Eyes:  Negative for discharge and redness.   Gastrointestinal:  Negative for nausea and vomiting.   Musculoskeletal:  Negative for joint pain.   Skin:  Negative for itching and rash.            Objective     /70 (BP Location: Right arm, Patient Position: Sitting, BP Cuff Size: Adult)   Pulse 95   Temp 37.2 °C (99 °F) (Temporal)   Resp 16   Ht 1.778 m (5' 10\")   Wt 114 kg (251 lb)   SpO2 93%   BMI 36.01 kg/m²      Physical Exam  Constitutional:       General: She is not in acute distress.     Appearance: She is well-developed.   HENT:      Head: Normocephalic and atraumatic.      Nose: Congestion present.      Mouth/Throat:      Mouth: Mucous membranes are moist.      Pharynx: No posterior oropharyngeal erythema.      Comments: PND  Eyes:      Conjunctiva/sclera: Conjunctivae normal.   Cardiovascular:      Rate and Rhythm: Normal rate and regular rhythm.      Heart sounds: Normal heart sounds. No murmur heard.  Pulmonary:      Effort: Pulmonary effort is normal.      Breath sounds: Normal breath sounds. No wheezing.   Skin:     General: Skin is warm and dry.      Findings: No rash.   Neurological:      Mental Status: She is alert.                           Assessment & Plan       Chest x-ray per radiology:  New poorly defined opacifications are noted in the right lung base which could indicate developing pneumonitis or pneumonia.    1. COPD with acute exacerbation (HCC)  doxycycline (VIBRAMYCIN) 100 MG Tab    " predniSONE (DELTASONE) 20 MG Tab    DX-CHEST-2 VIEWS      2. Acute cough  POCT CEPHEID COV-2, FLU A/B, RSV - PCR    DX-CHEST-2 VIEWS    promethazine-dextromethorphan (PROMETHAZINE-DM) 6.25-15 MG/5ML syrup      3. Pneumonia of right lower lobe due to infectious organism  cefUROXime (CEFTIN) 500 MG Tab        Differential diagnosis, natural history, supportive care, and indications for immediate follow-up were discussed.     Discussed risk versus benefit of outpatient versus inpatient treatment.  Libertad opts for trial of outpatient treatment and has good social support.  She understands to go to the emergency department if no improvement or worse.

## 2023-02-26 NOTE — LETTER
February 26, 2023         Patient: Miryam Veloz   YOB: 1950   Date of Visit: 2/26/2023           To Whom it May Concern:    Miryam Veloz was seen in my clinic on 2/26/2023. Please excuse work absences through 3/10/2023 due to pneumonia. She may return sooner if improving and without fever for 24 hours.       Sincerely,           Chay Clifton M.D.  Electronically Signed

## 2023-03-21 ENCOUNTER — HOSPITAL ENCOUNTER (OUTPATIENT)
Dept: LAB | Facility: MEDICAL CENTER | Age: 73
End: 2023-03-21
Payer: COMMERCIAL

## 2023-03-21 DIAGNOSIS — Z00.00 WELLNESS EXAMINATION: ICD-10-CM

## 2023-03-21 LAB
ALBUMIN SERPL BCP-MCNC: 3.6 G/DL (ref 3.2–4.9)
ALBUMIN/GLOB SERPL: 1 G/DL
ALP SERPL-CCNC: 110 U/L (ref 30–99)
ALT SERPL-CCNC: 23 U/L (ref 2–50)
ANION GAP SERPL CALC-SCNC: 9 MMOL/L (ref 7–16)
AST SERPL-CCNC: 21 U/L (ref 12–45)
BASOPHILS # BLD AUTO: 1 % (ref 0–1.8)
BASOPHILS # BLD: 0.07 K/UL (ref 0–0.12)
BILIRUB SERPL-MCNC: 0.4 MG/DL (ref 0.1–1.5)
BUN SERPL-MCNC: 14 MG/DL (ref 8–22)
CALCIUM ALBUM COR SERPL-MCNC: 10.7 MG/DL (ref 8.5–10.5)
CALCIUM SERPL-MCNC: 10.4 MG/DL (ref 8.5–10.5)
CHLORIDE SERPL-SCNC: 109 MMOL/L (ref 96–112)
CO2 SERPL-SCNC: 25 MMOL/L (ref 20–33)
CREAT SERPL-MCNC: 0.86 MG/DL (ref 0.5–1.4)
EOSINOPHIL # BLD AUTO: 0.16 K/UL (ref 0–0.51)
EOSINOPHIL NFR BLD: 2.4 % (ref 0–6.9)
ERYTHROCYTE [DISTWIDTH] IN BLOOD BY AUTOMATED COUNT: 47.4 FL (ref 35.9–50)
GFR SERPLBLD CREATININE-BSD FMLA CKD-EPI: 72 ML/MIN/1.73 M 2
GLOBULIN SER CALC-MCNC: 3.6 G/DL (ref 1.9–3.5)
GLUCOSE SERPL-MCNC: 101 MG/DL (ref 65–99)
HCT VFR BLD AUTO: 45.9 % (ref 37–47)
HGB BLD-MCNC: 15.1 G/DL (ref 12–16)
IMM GRANULOCYTES # BLD AUTO: 0.02 K/UL (ref 0–0.11)
IMM GRANULOCYTES NFR BLD AUTO: 0.3 % (ref 0–0.9)
LYMPHOCYTES # BLD AUTO: 1.99 K/UL (ref 1–4.8)
LYMPHOCYTES NFR BLD: 29.3 % (ref 22–41)
MCH RBC QN AUTO: 31.5 PG (ref 27–33)
MCHC RBC AUTO-ENTMCNC: 32.9 G/DL (ref 33.6–35)
MCV RBC AUTO: 95.8 FL (ref 81.4–97.8)
MONOCYTES # BLD AUTO: 0.75 K/UL (ref 0–0.85)
MONOCYTES NFR BLD AUTO: 11 % (ref 0–13.4)
NEUTROPHILS # BLD AUTO: 3.81 K/UL (ref 2–7.15)
NEUTROPHILS NFR BLD: 56 % (ref 44–72)
NRBC # BLD AUTO: 0 K/UL
NRBC BLD-RTO: 0 /100 WBC
PLATELET # BLD AUTO: 214 K/UL (ref 164–446)
PMV BLD AUTO: 11.7 FL (ref 9–12.9)
POTASSIUM SERPL-SCNC: 4.5 MMOL/L (ref 3.6–5.5)
PROT SERPL-MCNC: 7.2 G/DL (ref 6–8.2)
RBC # BLD AUTO: 4.79 M/UL (ref 4.2–5.4)
SODIUM SERPL-SCNC: 143 MMOL/L (ref 135–145)
WBC # BLD AUTO: 6.8 K/UL (ref 4.8–10.8)

## 2023-03-21 PROCEDURE — 80053 COMPREHEN METABOLIC PANEL: CPT

## 2023-03-21 PROCEDURE — 85025 COMPLETE CBC W/AUTO DIFF WBC: CPT

## 2023-03-21 PROCEDURE — 36415 COLL VENOUS BLD VENIPUNCTURE: CPT

## 2023-03-22 ENCOUNTER — PATIENT MESSAGE (OUTPATIENT)
Dept: SLEEP MEDICINE | Facility: MEDICAL CENTER | Age: 73
End: 2023-03-22
Payer: COMMERCIAL

## 2023-03-23 ENCOUNTER — HOSPITAL ENCOUNTER (OUTPATIENT)
Dept: RADIOLOGY | Facility: MEDICAL CENTER | Age: 73
End: 2023-03-23
Payer: COMMERCIAL

## 2023-03-23 ENCOUNTER — APPOINTMENT (OUTPATIENT)
Dept: RADIOLOGY | Facility: MEDICAL CENTER | Age: 73
End: 2023-03-23
Payer: COMMERCIAL

## 2023-03-23 PROCEDURE — 70492 CT SFT TSUE NCK W/O & W/DYE: CPT

## 2023-03-23 PROCEDURE — 700117 HCHG RX CONTRAST REV CODE 255

## 2023-03-23 RX ADMIN — IOHEXOL 100 ML: 350 INJECTION, SOLUTION INTRAVENOUS at 11:17

## 2023-03-28 ENCOUNTER — APPOINTMENT (OUTPATIENT)
Dept: RADIOLOGY | Facility: MEDICAL CENTER | Age: 73
End: 2023-03-28
Payer: COMMERCIAL

## 2023-04-04 DIAGNOSIS — E21.3 HYPERPARATHYROIDISM (HCC): ICD-10-CM

## 2023-04-12 DIAGNOSIS — E21.3 HYPERPARATHYROIDISM (HCC): ICD-10-CM

## 2023-06-10 ENCOUNTER — APPOINTMENT (OUTPATIENT)
Dept: RADIOLOGY | Facility: MEDICAL CENTER | Age: 73
End: 2023-06-10
Attending: EMERGENCY MEDICINE
Payer: COMMERCIAL

## 2023-06-10 ENCOUNTER — HOSPITAL ENCOUNTER (EMERGENCY)
Facility: MEDICAL CENTER | Age: 73
End: 2023-06-10
Attending: EMERGENCY MEDICINE
Payer: COMMERCIAL

## 2023-06-10 VITALS
HEIGHT: 70 IN | OXYGEN SATURATION: 94 % | DIASTOLIC BLOOD PRESSURE: 73 MMHG | TEMPERATURE: 98 F | RESPIRATION RATE: 16 BRPM | HEART RATE: 64 BPM | WEIGHT: 251 LBS | BODY MASS INDEX: 35.93 KG/M2 | SYSTOLIC BLOOD PRESSURE: 145 MMHG

## 2023-06-10 DIAGNOSIS — S90.02XA CONTUSION OF LEFT ANKLE, INITIAL ENCOUNTER: ICD-10-CM

## 2023-06-10 PROCEDURE — 99284 EMERGENCY DEPT VISIT MOD MDM: CPT

## 2023-06-10 PROCEDURE — A9270 NON-COVERED ITEM OR SERVICE: HCPCS | Performed by: EMERGENCY MEDICINE

## 2023-06-10 PROCEDURE — 700102 HCHG RX REV CODE 250 W/ 637 OVERRIDE(OP): Performed by: EMERGENCY MEDICINE

## 2023-06-10 PROCEDURE — 73610 X-RAY EXAM OF ANKLE: CPT | Mod: LT

## 2023-06-10 RX ORDER — HYDROCODONE BITARTRATE AND ACETAMINOPHEN 5; 325 MG/1; MG/1
1 TABLET ORAL EVERY 4 HOURS PRN
Qty: 6 TABLET | Refills: 0 | Status: SHIPPED | OUTPATIENT
Start: 2023-06-10 | End: 2023-06-13

## 2023-06-10 RX ORDER — HYDROCODONE BITARTRATE AND ACETAMINOPHEN 5; 325 MG/1; MG/1
1 TABLET ORAL ONCE
Status: COMPLETED | OUTPATIENT
Start: 2023-06-10 | End: 2023-06-10

## 2023-06-10 RX ADMIN — HYDROCODONE BITARTRATE AND ACETAMINOPHEN 1 TABLET: 5; 325 TABLET ORAL at 19:15

## 2023-06-10 ASSESSMENT — FIBROSIS 4 INDEX: FIB4 SCORE: 1.43

## 2023-06-10 ASSESSMENT — PAIN DESCRIPTION - PAIN TYPE: TYPE: ACUTE PAIN

## 2023-06-11 NOTE — ED NOTES
Patient provided discharge instructions and medication information. Patient verbalizes understanding and denies any further questions. Patient instructed to return if condition worsens.

## 2023-06-11 NOTE — ED NOTES
Bedside report received from YIN Armenta. Assumed care of patient and she is resting, bed locked and in lowest position. No wall oxygen requirements at this time. Call light within reach. No distress noted.

## 2023-06-11 NOTE — ED TRIAGE NOTES
"Chief Complaint   Patient presents with    Ankle Pain     BIBA post GLF  Pt slipped and fell near doorway, door then closed onto her L ankle   -LOC, -head strike, +thinner   Pt states a few hours after injury the pain began to worsen so she called EMS     BP (!) 180/87   Pulse 75   Temp 36.8 °C (98.2 °F) (Temporal)   Resp 18   Ht 1.778 m (5' 10\")   Wt 114 kg (251 lb)   SpO2 95%   BMI 36.01 kg/m²     "

## 2023-06-12 ENCOUNTER — OFFICE VISIT (OUTPATIENT)
Dept: MEDICAL GROUP | Facility: PHYSICIAN GROUP | Age: 73
End: 2023-06-12
Payer: COMMERCIAL

## 2023-06-12 VITALS
OXYGEN SATURATION: 92 % | TEMPERATURE: 98.6 F | HEIGHT: 70 IN | DIASTOLIC BLOOD PRESSURE: 70 MMHG | SYSTOLIC BLOOD PRESSURE: 124 MMHG | HEART RATE: 80 BPM | BODY MASS INDEX: 35.93 KG/M2 | RESPIRATION RATE: 16 BRPM | WEIGHT: 251 LBS

## 2023-06-12 DIAGNOSIS — S93.492D SPRAIN OF POSTERIOR TALOFIBULAR LIGAMENT OF LEFT ANKLE, SUBSEQUENT ENCOUNTER: ICD-10-CM

## 2023-06-12 DIAGNOSIS — S90.02XD CONTUSION OF LEFT ANKLE, SUBSEQUENT ENCOUNTER: ICD-10-CM

## 2023-06-12 PROBLEM — S93.492A SPRAIN OF POSTERIOR TALOFIBULAR LIGAMENT OF LEFT ANKLE: Status: ACTIVE | Noted: 2023-06-12

## 2023-06-12 PROBLEM — S90.02XA CONTUSION OF LEFT ANKLE: Status: ACTIVE | Noted: 2023-06-12

## 2023-06-12 PROCEDURE — 3074F SYST BP LT 130 MM HG: CPT | Performed by: NURSE PRACTITIONER

## 2023-06-12 PROCEDURE — 99214 OFFICE O/P EST MOD 30 MIN: CPT | Performed by: NURSE PRACTITIONER

## 2023-06-12 PROCEDURE — 3078F DIAST BP <80 MM HG: CPT | Performed by: NURSE PRACTITIONER

## 2023-06-12 RX ORDER — ALPRAZOLAM 0.25 MG/1
TABLET ORAL
COMMUNITY
End: 2023-08-03

## 2023-06-12 ASSESSMENT — FIBROSIS 4 INDEX: FIB4 SCORE: 1.43

## 2023-06-12 NOTE — PROGRESS NOTES
Chief Complaint   Patient presents with    Ankle Injury     Left ankle, following up.                                                                                                                                        HPI:   Miryam presents today with the following.    Problem   Contusion of Left Ankle   Sprain of Posterior Talofibular Ligament of Left Ankle       Current Outpatient Medications   Medication Sig Dispense Refill    ALPRAZolam (XANAX) 0.25 MG Tab alprazolam 0.25 mg tablet      HYDROcodone-acetaminophen (NORCO) 5-325 MG Tab per tablet Take 1 Tablet by mouth every four hours as needed (pain) for up to 3 days. 6 Tablet 0    fluticasone furoate-vilanterol (BREO ELLIPTA) 100-25 MCG/ACT AEROSOL POWDER, BREATH ACTIVATED INHALE ONE DOSE BY MOUTH DAILY - RINSE MOUTH AFTER EACH USE 1 Each 3    omeprazole (PRILOSEC) 40 MG delayed-release capsule TAKE ONE CAPSULE BY MOUTH DAILY 90 Capsule 2    venlafaxine (EFFEXOR-XR) 150 MG extended-release capsule Take 1 Capsule by mouth every day. 90 Capsule 3    propranolol (INDERAL) 40 MG Tab Take 1 Tablet by mouth 2 times a day. 180 Tablet 3    rivaroxaban (XARELTO) 20 MG Tab tablet Take 1 Tablet by mouth with dinner. 90 Tablet 3    hydrocortisone 2.5 % Cream topical cream Apply 1 Application topically 2 times a day. 20 g 1    eletriptan (RELPAX) 20 MG Tab Take 1 Tablet by mouth one time as needed for Migraine for up to 1 dose. 10 Tablet 2    ondansetron (ZOFRAN ODT) 4 MG TABLET DISPERSIBLE Take 1 Tablet by mouth every 24 hours as needed for Nausea. 20 Tablet 2    Omega-3 Fatty Acids (OMEGA-3 FISH OIL) 1200 MG Cap Take 1,200 mg by mouth every day. 90 Capsule 3    diclofenac sodium (VOLTAREN) 1 % Gel Apply 2 g topically 4 times a day as needed. 100 g 2    albuterol (VENTOLIN HFA) 108 (90 Base) MCG/ACT Aero Soln inhalation aerosol Inhale 1-2 Puffs every 6 hours as needed for Shortness of Breath. 8.5 g 3    diazepam (VALIUM) 2 MG Tab Take 1 Tablet by mouth every 6 hours  "as needed for Anxiety.       No current facility-administered medications for this visit.       Allergies as of 06/12/2023 - Reviewed 06/12/2023   Allergen Reaction Noted    Codeine Itching and Nausea 05/22/2009    Imitrex [sumatriptan succinate] Shortness of Breath and Swelling 03/05/2010    Penicillins Rash and Vomiting 08/21/2007    Sulfa drugs Shortness of Breath and Itching 08/21/2007    Azithromycin Itching 08/21/2007    Dilaudid [hydromorphone] Itching 10/20/2015    Other drug Itching 07/06/2011    Sensipar [cinacalcet] Unspecified 07/28/2016    Imipramine Shortness of Breath and Itching 06/22/2011    Seroquel [quetiapine fumarate]  08/19/2011        ROS:  All systems negative expect as addressed in assessment and plan.     /70 (BP Location: Left arm, Patient Position: Sitting, BP Cuff Size: Large adult)   Pulse 80   Temp 37 °C (98.6 °F) (Temporal)   Resp 16   Ht 1.778 m (5' 10\")   Wt 114 kg (251 lb) Comment: pt stated  SpO2 92%   BMI 36.01 kg/m²       Physical Exam  Vitals reviewed.   Constitutional:       Appearance: Normal appearance.   HENT:      Head: Normocephalic and atraumatic.      Mouth/Throat:      Mouth: Mucous membranes are moist.   Eyes:      Extraocular Movements: Extraocular movements intact.      Conjunctiva/sclera: Conjunctivae normal.   Cardiovascular:      Pulses:           Dorsalis pedis pulses are 2+ on the right side and 2+ on the left side.   Pulmonary:      Effort: Pulmonary effort is normal.   Musculoskeletal:         General: Normal range of motion.      Cervical back: Normal range of motion.      Right foot: Normal.      Left foot: Decreased capillary refill. Swelling, tenderness and bony tenderness present. Normal pulse.        Legs:       Comments: Patient with large hematoma and contusion of the medial left leg.    Feet:      Left foot:      Skin integrity: Skin breakdown and erythema present.   Skin:     General: Skin is warm and dry.   Neurological:      General: " No focal deficit present.      Mental Status: She is alert and oriented to person, place, and time.   Psychiatric:         Mood and Affect: Mood normal.         Behavior: Behavior normal.         Thought Content: Thought content normal.       Assessment and Plan:  72 y.o. female with the following issues.    1. Contusion of left ankle, subsequent encounter  DX-ANKLE 3+ VIEWS LEFT    DX-TIBIA AND FIBULA LEFT      2. Sprain of posterior talofibular ligament of left ankle, subsequent encounter  DX-ANKLE 3+ VIEWS LEFT    DX-TIBIA AND FIBULA LEFT           Contusion of left ankle  Acute condition.  Patient had a ground-level fall resulting in her ankle being closed on by her sliding glass door.  Patient did go to the ER for evaluation that night as her pain was increasing.  Patient developed a significant contusion/hematoma to the medial left lower extremity.  Patient also has tenderness and swelling of the left ankle.  In the ED they did do an x-ray of her ankle which did not show any acute fracture but did show significant soft tissue swelling.  Patient was provided with a walking boot and sent home and instructed to follow-up with her PCP.    Patient is here today for follow-up.  Patient still has significant hematoma and contusion of the left medial lower extremity.  Patient is on blood thinners.  Patient does also have soft tissue swelling of the medial and lateral ankle.  Patient has bony tenderness at the lateral malleolus.  Discussed with patient that due to her soft tissue swelling she would benefit from repeat x-ray in approximately 1 week to reevaluate.  The boot the patient was provided with at the ED is too small for the patient.    We will provide patient with a larger walking boot for comfortability.  Patient advised to wear boot if she will be on her feet long-term and/or walking.  When patient is at rest patient to take boot off and perform range of motion exercises of her ankle.  Discussed with patient  the possibility of a small fracture that was not visible on x-ray versus sprain/strain.    Patient to follow-up in 2 weeks if no improvement.  We will repeat ankle x-ray as well as a tib-fib x-ray in 1 week.      Return if symptoms worsen or fail to improve.      Please note that this dictation was created using voice recognition software. I have worked with consultants from the vendor as well as technical experts from [facility] to optimize the interface. I have made every reasonable attempt to correct obvious errors, but I expect that there are errors of grammar and possibly content that I did not discover before finalizing the note.

## 2023-06-12 NOTE — LETTER
June 12, 2023         Patient: Miryam Veloz   YOB: 1950   Date of Visit: 6/12/2023           To Whom it May Concern:    Miryam Veloz was seen in my clinic on 6/12/2023. Patient is to remain off work until Thursday June 15th due to acute injury.     If you have any questions or concerns, please don't hesitate to call.        Sincerely,           MINDY Lujan.P.R.N.  Electronically Signed

## 2023-06-12 NOTE — ASSESSMENT & PLAN NOTE
Acute condition.  Patient had a ground-level fall resulting in her ankle being closed on by her sliding glass door.  Patient did go to the ER for evaluation that night as her pain was increasing.  Patient developed a significant contusion/hematoma to the medial left lower extremity.  Patient also has tenderness and swelling of the left ankle.  In the ED they did do an x-ray of her ankle which did not show any acute fracture but did show significant soft tissue swelling.  Patient was provided with a walking boot and sent home and instructed to follow-up with her PCP.    Patient is here today for follow-up.  Patient still has significant hematoma and contusion of the left medial lower extremity.  Patient is on blood thinners.  Patient does also have soft tissue swelling of the medial and lateral ankle.  Patient has bony tenderness at the lateral malleolus.  Discussed with patient that due to her soft tissue swelling she would benefit from repeat x-ray in approximately 1 week to reevaluate.  The boot the patient was provided with at the ED is too small for the patient.    We will provide patient with a larger walking boot for comfortability.  Patient advised to wear boot if she will be on her feet long-term and/or walking.  When patient is at rest patient to take boot off and perform range of motion exercises of her ankle.  Discussed with patient the possibility of a small fracture that was not visible on x-ray versus sprain/strain.    Patient to follow-up in 2 weeks if no improvement.  We will repeat ankle x-ray as well as a tib-fib x-ray in 1 week.

## 2023-06-16 ENCOUNTER — PATIENT MESSAGE (OUTPATIENT)
Dept: MEDICAL GROUP | Facility: PHYSICIAN GROUP | Age: 73
End: 2023-06-16

## 2023-06-16 ENCOUNTER — HOSPITAL ENCOUNTER (OUTPATIENT)
Dept: RADIOLOGY | Facility: MEDICAL CENTER | Age: 73
End: 2023-06-16
Attending: NURSE PRACTITIONER

## 2023-06-16 ENCOUNTER — HOSPITAL ENCOUNTER (OUTPATIENT)
Dept: RADIOLOGY | Facility: MEDICAL CENTER | Age: 73
End: 2023-06-16
Attending: NURSE PRACTITIONER
Payer: COMMERCIAL

## 2023-06-16 DIAGNOSIS — S93.492D SPRAIN OF POSTERIOR TALOFIBULAR LIGAMENT OF LEFT ANKLE, SUBSEQUENT ENCOUNTER: ICD-10-CM

## 2023-06-16 DIAGNOSIS — S90.02XD CONTUSION OF LEFT ANKLE, SUBSEQUENT ENCOUNTER: ICD-10-CM

## 2023-06-16 PROCEDURE — 73610 X-RAY EXAM OF ANKLE: CPT | Mod: LT

## 2023-06-16 PROCEDURE — 73590 X-RAY EXAM OF LOWER LEG: CPT | Mod: LT

## 2023-06-21 ENCOUNTER — OFFICE VISIT (OUTPATIENT)
Dept: MEDICAL GROUP | Facility: PHYSICIAN GROUP | Age: 73
End: 2023-06-21
Payer: COMMERCIAL

## 2023-06-21 ENCOUNTER — HOSPITAL ENCOUNTER (OUTPATIENT)
Facility: MEDICAL CENTER | Age: 73
End: 2023-06-21
Payer: COMMERCIAL

## 2023-06-21 VITALS
SYSTOLIC BLOOD PRESSURE: 132 MMHG | HEART RATE: 85 BPM | DIASTOLIC BLOOD PRESSURE: 82 MMHG | TEMPERATURE: 99.1 F | OXYGEN SATURATION: 89 % | HEIGHT: 70 IN | BODY MASS INDEX: 36.01 KG/M2

## 2023-06-21 DIAGNOSIS — L03.116 CELLULITIS OF LEFT LOWER EXTREMITY: ICD-10-CM

## 2023-06-21 DIAGNOSIS — Z02.89 ENCOUNTER FOR COMPLETION OF FORM WITH PATIENT: ICD-10-CM

## 2023-06-21 LAB
CREAT 24H UR-MSRATE: 699 MG/24 HR (ref 800–1800)
CREAT UR-MCNC: 60.77 MG/DL
SPECIMEN VOL UR: 1150 ML

## 2023-06-21 PROCEDURE — 82340 ASSAY OF CALCIUM IN URINE: CPT

## 2023-06-21 PROCEDURE — 82570 ASSAY OF URINE CREATININE: CPT

## 2023-06-21 PROCEDURE — 99214 OFFICE O/P EST MOD 30 MIN: CPT

## 2023-06-21 PROCEDURE — 3079F DIAST BP 80-89 MM HG: CPT

## 2023-06-21 PROCEDURE — 81050 URINALYSIS VOLUME MEASURE: CPT

## 2023-06-21 PROCEDURE — 3075F SYST BP GE 130 - 139MM HG: CPT

## 2023-06-21 RX ORDER — LINEZOLID 600 MG/1
600 TABLET, FILM COATED ORAL 2 TIMES DAILY
Qty: 10 TABLET | Refills: 0 | Status: SHIPPED | OUTPATIENT
Start: 2023-06-21 | End: 2023-06-26

## 2023-06-21 NOTE — PROGRESS NOTES
"Subjective:     CC:   Chief Complaint   Patient presents with    Paperwork       HISTORY OF THE PRESENT ILLNESS: Miryma is a pleasant 72 y.o. female here today  for completion of [place] paperwork allowing for intermittent leave for exacerbations and pain secondary to migraines and fibromyalgia.    Patient also is here for a wound check.  Patient presented to the ER on Frannie 10 for contusion of her left ankle.  She does have a large hematoma and contusion that is worsening. She denies fevers. She has pain when walking.    ROS:  All systems negative expect as addressed in assessment and plan.     Objective:     Exam:  /82 (BP Location: Right arm, Patient Position: Sitting, BP Cuff Size: Small adult)   Pulse 85   Temp 37.3 °C (99.1 °F) (Temporal)   Ht 1.778 m (5' 10\")   SpO2 89%   BMI 36.01 kg/m²  Body mass index is 36.01 kg/m².    Physical Exam  Skin:            Comments: +Erythema, warm to touch       Assessment & Plan:     72 y.o. female with the following -    1. Encounter for completion of form with patient  Diagnoses reviewed. [place] paperwork completed to recommend intermittent leave for her job as a  for [employer] when she has migraines with aura, making it difficult to see as well as painful fibromyalgia flareups. Scanned and uploaded to chart.    2. Cellulitis of left lower extremity  Patient's wound assessed. Wound is closed with no drainage but pain is worsening. Last assessment reviewed and there are new signs/sx of infection.  Redness is spreading to shin. Warm to touch. Patient has several antibiotic allergies. Will treat with 5-day course of Zyvox.  - linezolid (ZYVOX) 600 MG Tab; Take 1 Tablet by mouth 2 times a day for 5 days.  Dispense: 10 Tablet; Refill: 0    Patient was educated in proper administration of medication(s) ordered today including safety, possible SE, risks, benefits, rationale and alternatives to therapy.   Supportive care, differential diagnoses, and indications " for immediate follow-up discussed with patient.    Pathogenesis of diagnosis discussed including typical length and natural progression.    Instructed to return to clinic or nearest emergency department for any change in condition, further concerns, or worsening of symptoms. She will follow-up if symptoms fail to improve or worsen. Patient states understanding of the plan of care and discharge instructions.    Return if symptoms worsen or fail to improve.    I spent a total of 38 minutes with record review, exam, and communication with the patient, communication with other providers, and documentation of this encounter. This does not include time spent on separately billable procedures/tests.    I have placed the above orders and discussed them with an approved delegating provider.  The MA is performing the below orders under the direction of Dr. Ferguson.    Please note that this dictation was created using voice recognition software. I have worked with consultants from the vendor as well as technical experts from [facility] to optimize the interface. I have made every reasonable attempt to correct obvious errors, but I expect that there are errors of grammar and possibly content that I did not discover before finalizing the note.

## 2023-06-23 LAB
CALCIUM 24H UR-MCNC: 13.2 MG/DL
CALCIUM 24H UR-MRATE: 152 MG/D (ref 100–250)
CALCIUM/CREAT 24H UR: 220 MG/G (ref 20–300)
COLLECT DURATION TIME SPEC: 24 HRS
CREAT 24H UR-MCNC: 60 MG/DL
CREAT 24H UR-MRATE: 690 MG/D (ref 500–1400)
SPECIMEN VOL ?TM UR: 1150 ML

## 2023-06-24 ENCOUNTER — HOSPITAL ENCOUNTER (OUTPATIENT)
Dept: RADIOLOGY | Facility: MEDICAL CENTER | Age: 73
End: 2023-06-24
Attending: SURGERY
Payer: COMMERCIAL

## 2023-06-24 DIAGNOSIS — E21.0 PRIMARY HYPERPARATHYROIDISM (HCC): ICD-10-CM

## 2023-06-24 PROCEDURE — 76536 US EXAM OF HEAD AND NECK: CPT

## 2023-06-26 ENCOUNTER — OFFICE VISIT (OUTPATIENT)
Dept: SLEEP MEDICINE | Facility: MEDICAL CENTER | Age: 73
End: 2023-06-26
Attending: INTERNAL MEDICINE
Payer: COMMERCIAL

## 2023-06-26 VITALS
DIASTOLIC BLOOD PRESSURE: 68 MMHG | HEART RATE: 80 BPM | SYSTOLIC BLOOD PRESSURE: 108 MMHG | WEIGHT: 251 LBS | OXYGEN SATURATION: 91 % | HEIGHT: 70 IN | BODY MASS INDEX: 35.93 KG/M2

## 2023-06-26 DIAGNOSIS — J45.21 MILD INTERMITTENT ASTHMA WITH ACUTE EXACERBATION: ICD-10-CM

## 2023-06-26 DIAGNOSIS — J43.9 PULMONARY EMPHYSEMA, UNSPECIFIED EMPHYSEMA TYPE (HCC): ICD-10-CM

## 2023-06-26 PROCEDURE — 99214 OFFICE O/P EST MOD 30 MIN: CPT | Performed by: INTERNAL MEDICINE

## 2023-06-26 PROCEDURE — 3074F SYST BP LT 130 MM HG: CPT | Performed by: INTERNAL MEDICINE

## 2023-06-26 PROCEDURE — 99213 OFFICE O/P EST LOW 20 MIN: CPT | Performed by: INTERNAL MEDICINE

## 2023-06-26 PROCEDURE — 3078F DIAST BP <80 MM HG: CPT | Performed by: INTERNAL MEDICINE

## 2023-06-26 RX ORDER — ALBUTEROL SULFATE 90 UG/1
1-2 AEROSOL, METERED RESPIRATORY (INHALATION) EVERY 6 HOURS PRN
Qty: 3 EACH | Refills: 4 | Status: SHIPPED | OUTPATIENT
Start: 2023-06-26 | End: 2023-09-24

## 2023-06-26 RX ORDER — FLUTICASONE FUROATE AND VILANTEROL 100; 25 UG/1; UG/1
POWDER RESPIRATORY (INHALATION)
Qty: 3 EACH | Refills: 4 | Status: SHIPPED | OUTPATIENT
Start: 2023-06-26 | End: 2023-09-24

## 2023-06-26 ASSESSMENT — FIBROSIS 4 INDEX: FIB4 SCORE: 1.43
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
## 2023-06-27 ASSESSMENT — ENCOUNTER SYMPTOMS
CONSTITUTIONAL NEGATIVE: 1
PSYCHIATRIC NEGATIVE: 1
SHORTNESS OF BREATH: 1
CARDIOVASCULAR NEGATIVE: 1
EYES NEGATIVE: 1
NEUROLOGICAL NEGATIVE: 1
COUGH: 1
GASTROINTESTINAL NEGATIVE: 1

## 2023-06-28 NOTE — ASSESSMENT & PLAN NOTE
Upper lobe predominant  mMRC 1  Doing well on Breo with one exacerbation in 2 years  Refilled and she can follow up with PCP for refills  Pt maintains activity level and at this time due to ankle injury she is limited

## 2023-07-21 ENCOUNTER — APPOINTMENT (OUTPATIENT)
Dept: ADMISSIONS | Facility: MEDICAL CENTER | Age: 73
End: 2023-07-21
Attending: SURGERY
Payer: COMMERCIAL

## 2023-08-03 ENCOUNTER — OFFICE VISIT (OUTPATIENT)
Dept: MEDICAL GROUP | Facility: PHYSICIAN GROUP | Age: 73
End: 2023-08-03
Payer: COMMERCIAL

## 2023-08-03 VITALS
HEART RATE: 82 BPM | BODY MASS INDEX: 36.94 KG/M2 | DIASTOLIC BLOOD PRESSURE: 78 MMHG | SYSTOLIC BLOOD PRESSURE: 116 MMHG | TEMPERATURE: 98.1 F | OXYGEN SATURATION: 94 % | WEIGHT: 258 LBS | RESPIRATION RATE: 16 BRPM | HEIGHT: 70 IN

## 2023-08-03 DIAGNOSIS — K21.9 GASTROESOPHAGEAL REFLUX DISEASE WITHOUT ESOPHAGITIS: ICD-10-CM

## 2023-08-03 DIAGNOSIS — E66.9 OBESITY (BMI 30-39.9): ICD-10-CM

## 2023-08-03 DIAGNOSIS — E78.1 HIGH TRIGLYCERIDES: ICD-10-CM

## 2023-08-03 DIAGNOSIS — Z12.12 SCREENING FOR COLORECTAL CANCER: ICD-10-CM

## 2023-08-03 DIAGNOSIS — Z12.31 ENCOUNTER FOR SCREENING MAMMOGRAM FOR BREAST CANCER: ICD-10-CM

## 2023-08-03 DIAGNOSIS — M79.89 SWELLING OF EXTREMITY, LEFT: ICD-10-CM

## 2023-08-03 DIAGNOSIS — S93.492D SPRAIN OF POSTERIOR TALOFIBULAR LIGAMENT OF LEFT ANKLE, SUBSEQUENT ENCOUNTER: ICD-10-CM

## 2023-08-03 DIAGNOSIS — M25.562 CHRONIC PAIN OF BOTH KNEES: ICD-10-CM

## 2023-08-03 DIAGNOSIS — M25.561 CHRONIC PAIN OF BOTH KNEES: ICD-10-CM

## 2023-08-03 DIAGNOSIS — G89.29 CHRONIC PAIN OF BOTH KNEES: ICD-10-CM

## 2023-08-03 DIAGNOSIS — J43.9 PULMONARY EMPHYSEMA, UNSPECIFIED EMPHYSEMA TYPE (HCC): ICD-10-CM

## 2023-08-03 DIAGNOSIS — I10 ESSENTIAL HYPERTENSION: ICD-10-CM

## 2023-08-03 DIAGNOSIS — Z12.11 SCREENING FOR COLORECTAL CANCER: ICD-10-CM

## 2023-08-03 DIAGNOSIS — J45.20 MILD INTERMITTENT ASTHMA WITHOUT COMPLICATION: ICD-10-CM

## 2023-08-03 PROBLEM — R93.89 ABNORMAL CT SCAN: Status: RESOLVED | Noted: 2017-07-25 | Resolved: 2023-08-03

## 2023-08-03 PROBLEM — E21.5 DISORDER OF PARATHYROID GLAND (HCC): Status: ACTIVE | Noted: 2023-08-03

## 2023-08-03 PROBLEM — G56.03 CARPAL TUNNEL SYNDROME ON BOTH SIDES: Status: RESOLVED | Noted: 2017-12-22 | Resolved: 2023-08-03

## 2023-08-03 PROBLEM — R06.09 DYSPNEA ON EXERTION: Status: RESOLVED | Noted: 2017-04-12 | Resolved: 2023-08-03

## 2023-08-03 PROCEDURE — 3078F DIAST BP <80 MM HG: CPT

## 2023-08-03 PROCEDURE — 3074F SYST BP LT 130 MM HG: CPT

## 2023-08-03 PROCEDURE — 99214 OFFICE O/P EST MOD 30 MIN: CPT

## 2023-08-03 RX ORDER — ROSUVASTATIN CALCIUM 20 MG/1
20 TABLET, COATED ORAL EVERY EVENING
Qty: 90 TABLET | Refills: 1 | Status: SHIPPED | OUTPATIENT
Start: 2023-08-03 | End: 2023-09-24

## 2023-08-03 SDOH — ECONOMIC STABILITY: TRANSPORTATION INSECURITY
IN THE PAST 12 MONTHS, HAS LACK OF TRANSPORTATION KEPT YOU FROM MEETINGS, WORK, OR FROM GETTING THINGS NEEDED FOR DAILY LIVING?: NO

## 2023-08-03 SDOH — HEALTH STABILITY: MENTAL HEALTH
STRESS IS WHEN SOMEONE FEELS TENSE, NERVOUS, ANXIOUS, OR CAN'T SLEEP AT NIGHT BECAUSE THEIR MIND IS TROUBLED. HOW STRESSED ARE YOU?: ONLY A LITTLE

## 2023-08-03 SDOH — ECONOMIC STABILITY: HOUSING INSECURITY: IN THE LAST 12 MONTHS, HOW MANY PLACES HAVE YOU LIVED?: 1

## 2023-08-03 SDOH — HEALTH STABILITY: PHYSICAL HEALTH: ON AVERAGE, HOW MANY MINUTES DO YOU ENGAGE IN EXERCISE AT THIS LEVEL?: 30 MIN

## 2023-08-03 SDOH — ECONOMIC STABILITY: HOUSING INSECURITY
IN THE LAST 12 MONTHS, WAS THERE A TIME WHEN YOU DID NOT HAVE A STEADY PLACE TO SLEEP OR SLEPT IN A SHELTER (INCLUDING NOW)?: NO

## 2023-08-03 SDOH — ECONOMIC STABILITY: TRANSPORTATION INSECURITY
IN THE PAST 12 MONTHS, HAS THE LACK OF TRANSPORTATION KEPT YOU FROM MEDICAL APPOINTMENTS OR FROM GETTING MEDICATIONS?: NO

## 2023-08-03 SDOH — ECONOMIC STABILITY: FOOD INSECURITY: WITHIN THE PAST 12 MONTHS, YOU WORRIED THAT YOUR FOOD WOULD RUN OUT BEFORE YOU GOT MONEY TO BUY MORE.: NEVER TRUE

## 2023-08-03 SDOH — ECONOMIC STABILITY: INCOME INSECURITY: HOW HARD IS IT FOR YOU TO PAY FOR THE VERY BASICS LIKE FOOD, HOUSING, MEDICAL CARE, AND HEATING?: NOT HARD AT ALL

## 2023-08-03 SDOH — ECONOMIC STABILITY: TRANSPORTATION INSECURITY
IN THE PAST 12 MONTHS, HAS LACK OF RELIABLE TRANSPORTATION KEPT YOU FROM MEDICAL APPOINTMENTS, MEETINGS, WORK OR FROM GETTING THINGS NEEDED FOR DAILY LIVING?: NO

## 2023-08-03 SDOH — HEALTH STABILITY: PHYSICAL HEALTH: ON AVERAGE, HOW MANY DAYS PER WEEK DO YOU ENGAGE IN MODERATE TO STRENUOUS EXERCISE (LIKE A BRISK WALK)?: 3 DAYS

## 2023-08-03 SDOH — ECONOMIC STABILITY: FOOD INSECURITY: WITHIN THE PAST 12 MONTHS, THE FOOD YOU BOUGHT JUST DIDN'T LAST AND YOU DIDN'T HAVE MONEY TO GET MORE.: NEVER TRUE

## 2023-08-03 SDOH — ECONOMIC STABILITY: INCOME INSECURITY: IN THE LAST 12 MONTHS, WAS THERE A TIME WHEN YOU WERE NOT ABLE TO PAY THE MORTGAGE OR RENT ON TIME?: NO

## 2023-08-03 ASSESSMENT — LIFESTYLE VARIABLES
SKIP TO QUESTIONS 9-10: 1
HOW OFTEN DO YOU HAVE A DRINK CONTAINING ALCOHOL: MONTHLY OR LESS
HOW MANY STANDARD DRINKS CONTAINING ALCOHOL DO YOU HAVE ON A TYPICAL DAY: 1 OR 2
AUDIT-C TOTAL SCORE: 1
HOW OFTEN DO YOU HAVE SIX OR MORE DRINKS ON ONE OCCASION: NEVER

## 2023-08-03 ASSESSMENT — SOCIAL DETERMINANTS OF HEALTH (SDOH)
IN A TYPICAL WEEK, HOW MANY TIMES DO YOU TALK ON THE PHONE WITH FAMILY, FRIENDS, OR NEIGHBORS?: NEVER
WITHIN THE PAST 12 MONTHS, YOU WORRIED THAT YOUR FOOD WOULD RUN OUT BEFORE YOU GOT THE MONEY TO BUY MORE: NEVER TRUE
DO YOU BELONG TO ANY CLUBS OR ORGANIZATIONS SUCH AS CHURCH GROUPS UNIONS, FRATERNAL OR ATHLETIC GROUPS, OR SCHOOL GROUPS?: NO
HOW OFTEN DO YOU HAVE SIX OR MORE DRINKS ON ONE OCCASION: NEVER
HOW MANY DRINKS CONTAINING ALCOHOL DO YOU HAVE ON A TYPICAL DAY WHEN YOU ARE DRINKING: 1 OR 2
HOW HARD IS IT FOR YOU TO PAY FOR THE VERY BASICS LIKE FOOD, HOUSING, MEDICAL CARE, AND HEATING?: NOT HARD AT ALL
HOW OFTEN DO YOU ATTEND CHURCH OR RELIGIOUS SERVICES?: NEVER
HOW OFTEN DO YOU GET TOGETHER WITH FRIENDS OR RELATIVES?: TWICE A WEEK
HOW OFTEN DO YOU ATTENT MEETINGS OF THE CLUB OR ORGANIZATION YOU BELONG TO?: NEVER
HOW OFTEN DO YOU HAVE A DRINK CONTAINING ALCOHOL: MONTHLY OR LESS
HOW OFTEN DO YOU ATTENT MEETINGS OF THE CLUB OR ORGANIZATION YOU BELONG TO?: NEVER
DO YOU BELONG TO ANY CLUBS OR ORGANIZATIONS SUCH AS CHURCH GROUPS UNIONS, FRATERNAL OR ATHLETIC GROUPS, OR SCHOOL GROUPS?: NO
IN A TYPICAL WEEK, HOW MANY TIMES DO YOU TALK ON THE PHONE WITH FAMILY, FRIENDS, OR NEIGHBORS?: NEVER
HOW OFTEN DO YOU GET TOGETHER WITH FRIENDS OR RELATIVES?: TWICE A WEEK
HOW OFTEN DO YOU ATTEND CHURCH OR RELIGIOUS SERVICES?: NEVER

## 2023-08-03 ASSESSMENT — FIBROSIS 4 INDEX: FIB4 SCORE: 1.43

## 2023-08-03 NOTE — ASSESSMENT & PLAN NOTE
Chronic, unstable. Discussed healthy lifestyle recommendations.   The 10-year ASCVD risk score (Nahum ZURITA, et al., 2019) is: 11%  Discussed statin to reduce risk.

## 2023-08-03 NOTE — PROGRESS NOTES
"Subjective:     CC: Diagnoses of Sprain of posterior talofibular ligament of left ankle, subsequent encounter, Swelling of extremity, left, Chronic pain of both knees, Essential hypertension, Gastroesophageal reflux disease without esophagitis, Pulmonary emphysema, unspecified emphysema type (HCC), Mild intermittent asthma without complication, Encounter for screening mammogram for breast cancer, Screening for colorectal cancer, High triglycerides, and Obesity (BMI 30-39.9) were pertinent to this visit.    Patient presents today to establish care with me.  Prior PCP Farhat Olmos     HPI:   Miryam presents today with    Problem   Chronic Pain of Both Knees   Disorder of Parathyroid Gland (Hcc)    252.00:Hyperparathyroidism     Gastroesophageal Reflux Disease Without Esophagitis    Patient tried cutting her dose of omeprazole to 20 mg but only lasted one week. She experience burping, increased gas, as well as a gnawing feeling in her stomach. She didn't have heart burn or reflux, but the other symptoms were so uncomfortable, she went back up on her omeprazole to 40 mg.      Pulmonary Emphysema (Hcc)   Mild Intermittent Asthma Without Complication   High Triglycerides    4/2017     Essential Hypertension   Carpal Tunnel Syndrome On Both Sides (Resolved)   Abnormal CT Scan (Resolved)   Chronic Pain of Both Knees (Resolved)   Dyspnea On Exertion (Resolved)    4/2017     Pain in Both Knees (Resolved)     ROS:  Review of Systems   Musculoskeletal:         Pain swelling medial left lower extremity   Skin:         Redness and inflammation to LLE, has improved, but    All other systems reviewed and are negative.      Objective:     Exam:  /78 (BP Location: Right arm, Patient Position: Sitting, BP Cuff Size: Large adult)   Pulse 82   Temp 36.7 °C (98.1 °F) (Temporal)   Resp 16   Ht 1.778 m (5' 10\")   Wt 117 kg (258 lb)   SpO2 94%   BMI 37.02 kg/m²  Body mass index is 37.02 kg/m².    Physical " Exam  Constitutional:       General: She is not in acute distress.     Appearance: Normal appearance. She is obese. She is not ill-appearing.   HENT:      Head: Normocephalic and atraumatic.   Cardiovascular:      Rate and Rhythm: Normal rate.      Pulses: Normal pulses.   Pulmonary:      Effort: Pulmonary effort is normal. No respiratory distress.   Skin:     General: Skin is warm and dry.             Comments: Mild localized erythema 2.5 cm swelling of tissue to medial lower extremity   Neurological:      Mental Status: She is alert and oriented to person, place, and time.   Psychiatric:         Mood and Affect: Mood normal.         Behavior: Behavior normal.         Labs:    Latest Reference Range & Units 03/21/23 09:32   WBC 4.8 - 10.8 K/uL 6.8   RBC 4.20 - 5.40 M/uL 4.79   Hemoglobin 12.0 - 16.0 g/dL 15.1   Hematocrit 37.0 - 47.0 % 45.9   MCV 81.4 - 97.8 fL 95.8   MCH 27.0 - 33.0 pg 31.5   MCHC 33.6 - 35.0 g/dL 32.9 (L)   RDW 35.9 - 50.0 fL 47.4   Platelet Count 164 - 446 K/uL 214   MPV 9.0 - 12.9 fL 11.7   Neutrophils-Polys 44.00 - 72.00 % 56.00   Neutrophils (Absolute) 2.00 - 7.15 K/uL 3.81   Lymphocytes 22.00 - 41.00 % 29.30   Lymphs (Absolute) 1.00 - 4.80 K/uL 1.99   Monocytes 0.00 - 13.40 % 11.00   Monos (Absolute) 0.00 - 0.85 K/uL 0.75   Eosinophils 0.00 - 6.90 % 2.40   Eos (Absolute) 0.00 - 0.51 K/uL 0.16   Basophils 0.00 - 1.80 % 1.00   Baso (Absolute) 0.00 - 0.12 K/uL 0.07   Immature Granulocytes 0.00 - 0.90 % 0.30   Immature Granulocytes (abs) 0.00 - 0.11 K/uL 0.02   Nucleated RBC /100 WBC 0.00   NRBC (Absolute) K/uL 0.00   Sodium 135 - 145 mmol/L 143   Potassium 3.6 - 5.5 mmol/L 4.5   Chloride 96 - 112 mmol/L 109   Co2 20 - 33 mmol/L 25   Anion Gap 7.0 - 16.0  9.0   Glucose 65 - 99 mg/dL 101 (H)   Bun 8 - 22 mg/dL 14   Creatinine 0.50 - 1.40 mg/dL 0.86   GFR (CKD-EPI) >60 mL/min/1.73 m 2 72   Calcium 8.5 - 10.5 mg/dL 10.4   Correct Calcium 8.5 - 10.5 mg/dL 10.7 (H)   AST(SGOT) 12 - 45 U/L 21    ALT(SGPT) 2 - 50 U/L 23   Alkaline Phosphatase 30 - 99 U/L 110 (H)   Total Bilirubin 0.1 - 1.5 mg/dL 0.4   Albumin 3.2 - 4.9 g/dL 3.6   Total Protein 6.0 - 8.2 g/dL 7.2   Globulin 1.9 - 3.5 g/dL 3.6 (H)   A-G Ratio g/dL 1.0   (L): Data is abnormally low  (H): Data is abnormally high    Assessment & Plan:     72 y.o. female with the following -     Problem List Items Addressed This Visit       Essential hypertension     Chronic, stable.  Reports occasional A-fib.  Continue propanolol 40 mg twice daily         Relevant Medications    rosuvastatin (CRESTOR) 20 MG Tab    High triglycerides     Chronic, unstable. Discussed healthy lifestyle recommendations.   The 10-year ASCVD risk score (Nahum ZURITA, et al., 2019) is: 11%  Discussed statin to reduce risk.           Relevant Medications    rosuvastatin (CRESTOR) 20 MG Tab    Obesity (BMI 30-39.9)    Relevant Orders    Patient identified as having weight management issue.  Appropriate orders and counseling given.    Mild intermittent asthma without complication     Chronic, stable. Continue breo ellipta daily, continue albuterol as needed         Relevant Orders    PULMONARY FUNCTION TESTS -Test requested: Spirometry with-out & with Bronchodilator    Pulmonary emphysema (HCC)     Chronic, stable. Continue breo ellipta 100-25 mcg/daily         Relevant Orders    PULMONARY FUNCTION TESTS -Test requested: Spirometry with-out & with Bronchodilator    Gastroesophageal reflux disease without esophagitis     Chronic, stable.  Continue omeprazole 40 mg daily         Sprain of posterior talofibular ligament of left ankle    Chronic pain of both knees     Other Visit Diagnoses       Swelling of extremity, left        Relevant Orders    US-EXTREMITY VENOUS LOWER UNILAT LEFT    Encounter for screening mammogram for breast cancer        Relevant Orders    MA-SCREENING MAMMO BILAT W/TOMOSYNTHESIS W/CAD    Screening for colorectal cancer        Relevant Orders    COLOGUARD (FIT DNA)           Patient was educated in proper administration of medication(s) ordered today including safety, possible SE, risks, benefits, rationale and alternatives to therapy.   Supportive care, differential diagnoses, and indications for immediate follow-up discussed with patient.    Pathogenesis of diagnosis discussed including typical length and natural progression.    Instructed to return to clinic or nearest emergency department for any change in condition, further concerns, or worsening of symptoms.  Patient states understanding of the plan of care and discharge instructions.    Return in about 6 months (around 2/3/2024) for Labs.    Please note that this dictation was created using voice recognition software. I have made every reasonable attempt to correct obvious errors, but I expect that there are errors of grammar and possibly content that I did not discover before finalizing the note.

## 2023-08-26 ENCOUNTER — HOSPITAL ENCOUNTER (OUTPATIENT)
Dept: RADIOLOGY | Facility: MEDICAL CENTER | Age: 73
End: 2023-08-26
Payer: COMMERCIAL

## 2023-08-26 DIAGNOSIS — M79.89 SWELLING OF EXTREMITY, LEFT: ICD-10-CM

## 2023-08-26 PROCEDURE — 93971 EXTREMITY STUDY: CPT | Mod: LT

## 2023-09-05 ENCOUNTER — APPOINTMENT (OUTPATIENT)
Dept: ADMISSIONS | Facility: MEDICAL CENTER | Age: 73
End: 2023-09-05
Attending: SURGERY
Payer: COMMERCIAL

## 2023-09-11 ENCOUNTER — PRE-ADMISSION TESTING (OUTPATIENT)
Dept: ADMISSIONS | Facility: MEDICAL CENTER | Age: 73
End: 2023-09-11
Attending: SURGERY
Payer: COMMERCIAL

## 2023-09-11 DIAGNOSIS — Z01.810 PRE-OPERATIVE CARDIOVASCULAR EXAMINATION: ICD-10-CM

## 2023-09-11 DIAGNOSIS — Z01.812 PRE-OPERATIVE LABORATORY EXAMINATION: ICD-10-CM

## 2023-09-13 DIAGNOSIS — G43.109 MIGRAINE WITH AURA AND WITHOUT STATUS MIGRAINOSUS, NOT INTRACTABLE: ICD-10-CM

## 2023-09-13 DIAGNOSIS — I48.0 PAROXYSMAL ATRIAL FIBRILLATION (HCC): ICD-10-CM

## 2023-09-13 DIAGNOSIS — I10 ESSENTIAL HYPERTENSION: ICD-10-CM

## 2023-09-14 RX ORDER — PROPRANOLOL HYDROCHLORIDE 40 MG/1
40 TABLET ORAL 2 TIMES DAILY
Qty: 180 TABLET | Refills: 3 | Status: SHIPPED | OUTPATIENT
Start: 2023-09-14 | End: 2023-09-24

## 2023-09-14 RX ORDER — RIVAROXABAN 20 MG/1
20 TABLET, FILM COATED ORAL
Qty: 90 TABLET | Refills: 3 | Status: ON HOLD | OUTPATIENT
Start: 2023-09-14 | End: 2023-09-20 | Stop reason: SDUPTHER

## 2023-09-18 ENCOUNTER — PRE-ADMISSION TESTING (OUTPATIENT)
Dept: ADMISSIONS | Facility: MEDICAL CENTER | Age: 73
End: 2023-09-18
Attending: SURGERY
Payer: COMMERCIAL

## 2023-09-18 DIAGNOSIS — Z01.810 PRE-OPERATIVE CARDIOVASCULAR EXAMINATION: ICD-10-CM

## 2023-09-18 DIAGNOSIS — Z01.812 PRE-OPERATIVE LABORATORY EXAMINATION: ICD-10-CM

## 2023-09-18 LAB
ANION GAP SERPL CALC-SCNC: 8 MMOL/L (ref 7–16)
BUN SERPL-MCNC: 13 MG/DL (ref 8–22)
CALCIUM SERPL-MCNC: 10.4 MG/DL (ref 8.5–10.5)
CHLORIDE SERPL-SCNC: 107 MMOL/L (ref 96–112)
CO2 SERPL-SCNC: 26 MMOL/L (ref 20–33)
CREAT SERPL-MCNC: 1.05 MG/DL (ref 0.5–1.4)
EKG IMPRESSION: NORMAL
ERYTHROCYTE [DISTWIDTH] IN BLOOD BY AUTOMATED COUNT: 47.6 FL (ref 35.9–50)
GFR SERPLBLD CREATININE-BSD FMLA CKD-EPI: 57 ML/MIN/1.73 M 2
GLUCOSE SERPL-MCNC: 111 MG/DL (ref 65–99)
HCT VFR BLD AUTO: 47.4 % (ref 37–47)
HGB BLD-MCNC: 15.3 G/DL (ref 12–16)
MCH RBC QN AUTO: 31.3 PG (ref 27–33)
MCHC RBC AUTO-ENTMCNC: 32.3 G/DL (ref 32.2–35.5)
MCV RBC AUTO: 96.9 FL (ref 81.4–97.8)
PLATELET # BLD AUTO: 200 K/UL (ref 164–446)
PMV BLD AUTO: 12 FL (ref 9–12.9)
POTASSIUM SERPL-SCNC: 4.5 MMOL/L (ref 3.6–5.5)
RBC # BLD AUTO: 4.89 M/UL (ref 4.2–5.4)
SODIUM SERPL-SCNC: 141 MMOL/L (ref 135–145)
WBC # BLD AUTO: 7.7 K/UL (ref 4.8–10.8)

## 2023-09-18 PROCEDURE — 80048 BASIC METABOLIC PNL TOTAL CA: CPT

## 2023-09-18 PROCEDURE — 85027 COMPLETE CBC AUTOMATED: CPT

## 2023-09-18 PROCEDURE — 36415 COLL VENOUS BLD VENIPUNCTURE: CPT

## 2023-09-18 PROCEDURE — 93010 ELECTROCARDIOGRAM REPORT: CPT | Performed by: INTERNAL MEDICINE

## 2023-09-18 PROCEDURE — 93005 ELECTROCARDIOGRAM TRACING: CPT

## 2023-09-20 ENCOUNTER — ANESTHESIA EVENT (OUTPATIENT)
Dept: SURGERY | Facility: MEDICAL CENTER | Age: 73
End: 2023-09-20
Payer: COMMERCIAL

## 2023-09-20 ENCOUNTER — HOSPITAL ENCOUNTER (OUTPATIENT)
Facility: MEDICAL CENTER | Age: 73
End: 2023-09-20
Attending: SURGERY | Admitting: SURGERY
Payer: COMMERCIAL

## 2023-09-20 ENCOUNTER — ANESTHESIA (OUTPATIENT)
Dept: SURGERY | Facility: MEDICAL CENTER | Age: 73
End: 2023-09-20
Payer: COMMERCIAL

## 2023-09-20 VITALS
WEIGHT: 263.01 LBS | HEIGHT: 70 IN | DIASTOLIC BLOOD PRESSURE: 69 MMHG | HEART RATE: 84 BPM | SYSTOLIC BLOOD PRESSURE: 133 MMHG | TEMPERATURE: 98.5 F | OXYGEN SATURATION: 88 % | BODY MASS INDEX: 37.65 KG/M2 | RESPIRATION RATE: 16 BRPM

## 2023-09-20 DIAGNOSIS — I48.0 PAROXYSMAL ATRIAL FIBRILLATION (HCC): ICD-10-CM

## 2023-09-20 PROBLEM — E21.5 DISORDER OF PARATHYROID GLAND (HCC): Status: RESOLVED | Noted: 2023-08-03 | Resolved: 2023-09-20

## 2023-09-20 PROBLEM — E21.0 PRIMARY HYPERPARATHYROIDISM (HCC): Status: ACTIVE | Noted: 2023-09-20

## 2023-09-20 LAB
ALBUMIN SERPL BCP-MCNC: 3.8 G/DL (ref 3.2–4.9)
CALCIUM SERPL-MCNC: 9.5 MG/DL (ref 8.5–10.5)
COLLECT TME BLD: 740 HH:MM
COLLECT TME BLD: 807 HH:MM
COLLECT TME BLD: 821 HH:MM
COLLECT TME BLD: 826 HH:MM
COLLECT TME BLD: 830 HH:MM
COLLECT TME BLD: 836 HH:MM
PATHOLOGY CONSULT NOTE: NORMAL
PTH-INTACT IO % DIF SERPL: 76 %
PTH-INTACT IO % DIF SERPL: 83 %
PTH-INTACT IO % DIF SERPL: 87 %
PTH-INTACT IO % DIF SERPL: 88 %
PTH-INTACT SP1 SERPL-MCNC: 171 PG/ML (ref 14–72)
PTH-INTACT SP2 SERPL-MCNC: 97.4 PG/ML
PTH-INTACT SP3 SERPL-MCNC: 41.8 PG/ML
PTH-INTACT SP4 SERPL-MCNC: 29 PG/ML
PTH-INTACT SP5 SERPL-MCNC: 22.3 PG/ML
PTH-INTACT SP6 SERPL-MCNC: 21.3 PG/ML

## 2023-09-20 PROCEDURE — 160041 HCHG SURGERY MINUTES - EA ADDL 1 MIN LEVEL 4: Performed by: SURGERY

## 2023-09-20 PROCEDURE — 700101 HCHG RX REV CODE 250: Performed by: ANESTHESIOLOGY

## 2023-09-20 PROCEDURE — 700102 HCHG RX REV CODE 250 W/ 637 OVERRIDE(OP): Performed by: ANESTHESIOLOGY

## 2023-09-20 PROCEDURE — 700102 HCHG RX REV CODE 250 W/ 637 OVERRIDE(OP): Performed by: SURGERY

## 2023-09-20 PROCEDURE — 700105 HCHG RX REV CODE 258: Performed by: ANESTHESIOLOGY

## 2023-09-20 PROCEDURE — 88305 TISSUE EXAM BY PATHOLOGIST: CPT | Mod: 59

## 2023-09-20 PROCEDURE — 160035 HCHG PACU - 1ST 60 MINS PHASE I: Performed by: SURGERY

## 2023-09-20 PROCEDURE — 160029 HCHG SURGERY MINUTES - 1ST 30 MINS LEVEL 4: Performed by: SURGERY

## 2023-09-20 PROCEDURE — 700105 HCHG RX REV CODE 258: Performed by: SURGERY

## 2023-09-20 PROCEDURE — 700111 HCHG RX REV CODE 636 W/ 250 OVERRIDE (IP): Performed by: ANESTHESIOLOGY

## 2023-09-20 PROCEDURE — A9270 NON-COVERED ITEM OR SERVICE: HCPCS | Performed by: SURGERY

## 2023-09-20 PROCEDURE — 160002 HCHG RECOVERY MINUTES (STAT): Performed by: SURGERY

## 2023-09-20 PROCEDURE — 160048 HCHG OR STATISTICAL LEVEL 1-5: Performed by: SURGERY

## 2023-09-20 PROCEDURE — 160036 HCHG PACU - EA ADDL 30 MINS PHASE I: Performed by: SURGERY

## 2023-09-20 PROCEDURE — 83970 ASSAY OF PARATHORMONE: CPT | Mod: 91

## 2023-09-20 PROCEDURE — A9270 NON-COVERED ITEM OR SERVICE: HCPCS | Performed by: ANESTHESIOLOGY

## 2023-09-20 PROCEDURE — 82310 ASSAY OF CALCIUM: CPT

## 2023-09-20 PROCEDURE — 82040 ASSAY OF SERUM ALBUMIN: CPT

## 2023-09-20 PROCEDURE — G0378 HOSPITAL OBSERVATION PER HR: HCPCS

## 2023-09-20 PROCEDURE — 88331 PATH CONSLTJ SURG 1 BLK 1SPC: CPT | Mod: 91

## 2023-09-20 PROCEDURE — 160009 HCHG ANES TIME/MIN: Performed by: SURGERY

## 2023-09-20 PROCEDURE — C1751 CATH, INF, PER/CENT/MIDLINE: HCPCS | Performed by: SURGERY

## 2023-09-20 RX ORDER — SCOLOPAMINE TRANSDERMAL SYSTEM 1 MG/1
1 PATCH, EXTENDED RELEASE TRANSDERMAL
Status: DISCONTINUED | OUTPATIENT
Start: 2023-09-20 | End: 2023-09-20 | Stop reason: HOSPADM

## 2023-09-20 RX ORDER — SODIUM CHLORIDE, SODIUM LACTATE, POTASSIUM CHLORIDE, CALCIUM CHLORIDE 600; 310; 30; 20 MG/100ML; MG/100ML; MG/100ML; MG/100ML
INJECTION, SOLUTION INTRAVENOUS CONTINUOUS
Status: ACTIVE | OUTPATIENT
Start: 2023-09-20 | End: 2023-09-20

## 2023-09-20 RX ORDER — DEXAMETHASONE SODIUM PHOSPHATE 4 MG/ML
INJECTION, SOLUTION INTRA-ARTICULAR; INTRALESIONAL; INTRAMUSCULAR; INTRAVENOUS; SOFT TISSUE PRN
Status: DISCONTINUED | OUTPATIENT
Start: 2023-09-20 | End: 2023-09-20 | Stop reason: SURG

## 2023-09-20 RX ORDER — HALOPERIDOL 5 MG/ML
1 INJECTION INTRAMUSCULAR
Status: DISCONTINUED | OUTPATIENT
Start: 2023-09-20 | End: 2023-09-20 | Stop reason: HOSPADM

## 2023-09-20 RX ORDER — REMIFENTANIL HYDROCHLORIDE 1 MG/ML
INJECTION, POWDER, LYOPHILIZED, FOR SOLUTION INTRAVENOUS
Status: DISCONTINUED | OUTPATIENT
Start: 2023-09-20 | End: 2023-09-20 | Stop reason: SURG

## 2023-09-20 RX ORDER — MIDAZOLAM HYDROCHLORIDE 1 MG/ML
INJECTION INTRAMUSCULAR; INTRAVENOUS PRN
Status: DISCONTINUED | OUTPATIENT
Start: 2023-09-20 | End: 2023-09-20 | Stop reason: SURG

## 2023-09-20 RX ORDER — ONDANSETRON 2 MG/ML
4 INJECTION INTRAMUSCULAR; INTRAVENOUS EVERY 4 HOURS PRN
Status: DISCONTINUED | OUTPATIENT
Start: 2023-09-20 | End: 2023-09-20 | Stop reason: HOSPADM

## 2023-09-20 RX ORDER — SUCCINYLCHOLINE CHLORIDE 20 MG/ML
INJECTION INTRAMUSCULAR; INTRAVENOUS PRN
Status: DISCONTINUED | OUTPATIENT
Start: 2023-09-20 | End: 2023-09-20 | Stop reason: SURG

## 2023-09-20 RX ORDER — LIDOCAINE HYDROCHLORIDE 20 MG/ML
INJECTION, SOLUTION EPIDURAL; INFILTRATION; INTRACAUDAL; PERINEURAL PRN
Status: DISCONTINUED | OUTPATIENT
Start: 2023-09-20 | End: 2023-09-20 | Stop reason: SURG

## 2023-09-20 RX ORDER — PROPRANOLOL HYDROCHLORIDE 40 MG/1
40 TABLET ORAL 2 TIMES DAILY
Status: DISCONTINUED | OUTPATIENT
Start: 2023-09-20 | End: 2023-09-20 | Stop reason: HOSPADM

## 2023-09-20 RX ORDER — HYDRALAZINE HYDROCHLORIDE 20 MG/ML
5 INJECTION INTRAMUSCULAR; INTRAVENOUS
Status: DISCONTINUED | OUTPATIENT
Start: 2023-09-20 | End: 2023-09-20 | Stop reason: HOSPADM

## 2023-09-20 RX ORDER — CEFAZOLIN SODIUM 1 G/3ML
INJECTION, POWDER, FOR SOLUTION INTRAMUSCULAR; INTRAVENOUS PRN
Status: DISCONTINUED | OUTPATIENT
Start: 2023-09-20 | End: 2023-09-20 | Stop reason: SURG

## 2023-09-20 RX ORDER — ONDANSETRON 2 MG/ML
4 INJECTION INTRAMUSCULAR; INTRAVENOUS
Status: COMPLETED | OUTPATIENT
Start: 2023-09-20 | End: 2023-09-20

## 2023-09-20 RX ORDER — PHENYLEPHRINE HCL IN 0.9% NACL 0.5 MG/5ML
SYRINGE (ML) INTRAVENOUS PRN
Status: DISCONTINUED | OUTPATIENT
Start: 2023-09-20 | End: 2023-09-20 | Stop reason: SURG

## 2023-09-20 RX ORDER — OXYCODONE HCL 5 MG/5 ML
10 SOLUTION, ORAL ORAL
Status: COMPLETED | OUTPATIENT
Start: 2023-09-20 | End: 2023-09-20

## 2023-09-20 RX ORDER — ONDANSETRON 2 MG/ML
INJECTION INTRAMUSCULAR; INTRAVENOUS PRN
Status: DISCONTINUED | OUTPATIENT
Start: 2023-09-20 | End: 2023-09-20 | Stop reason: SURG

## 2023-09-20 RX ORDER — DEXAMETHASONE SODIUM PHOSPHATE 4 MG/ML
4 INJECTION, SOLUTION INTRA-ARTICULAR; INTRALESIONAL; INTRAMUSCULAR; INTRAVENOUS; SOFT TISSUE
Status: DISCONTINUED | OUTPATIENT
Start: 2023-09-20 | End: 2023-09-20 | Stop reason: HOSPADM

## 2023-09-20 RX ORDER — OXYCODONE HCL 5 MG/5 ML
5 SOLUTION, ORAL ORAL
Status: COMPLETED | OUTPATIENT
Start: 2023-09-20 | End: 2023-09-20

## 2023-09-20 RX ORDER — ALBUTEROL SULFATE 90 UG/1
1-2 AEROSOL, METERED RESPIRATORY (INHALATION) EVERY 6 HOURS PRN
Status: DISCONTINUED | OUTPATIENT
Start: 2023-09-20 | End: 2023-09-20 | Stop reason: HOSPADM

## 2023-09-20 RX ORDER — DIAZEPAM 2 MG/1
2 TABLET ORAL EVERY 6 HOURS PRN
Status: DISCONTINUED | OUTPATIENT
Start: 2023-09-20 | End: 2023-09-20 | Stop reason: HOSPADM

## 2023-09-20 RX ORDER — CELECOXIB 200 MG/1
200 CAPSULE ORAL ONCE
Status: COMPLETED | OUTPATIENT
Start: 2023-09-20 | End: 2023-09-20

## 2023-09-20 RX ORDER — HYDROCODONE BITARTRATE AND ACETAMINOPHEN 5; 325 MG/1; MG/1
1 TABLET ORAL EVERY 6 HOURS PRN
Status: DISCONTINUED | OUTPATIENT
Start: 2023-09-20 | End: 2023-09-20 | Stop reason: HOSPADM

## 2023-09-20 RX ORDER — ONDANSETRON 4 MG/1
4 TABLET, ORALLY DISINTEGRATING ORAL
Status: DISCONTINUED | OUTPATIENT
Start: 2023-09-20 | End: 2023-09-20

## 2023-09-20 RX ORDER — HALOPERIDOL 5 MG/ML
1 INJECTION INTRAMUSCULAR EVERY 6 HOURS PRN
Status: DISCONTINUED | OUTPATIENT
Start: 2023-09-20 | End: 2023-09-20 | Stop reason: HOSPADM

## 2023-09-20 RX ORDER — OMEPRAZOLE 20 MG/1
40 CAPSULE, DELAYED RELEASE ORAL DAILY
Status: DISCONTINUED | OUTPATIENT
Start: 2023-09-21 | End: 2023-09-20 | Stop reason: HOSPADM

## 2023-09-20 RX ORDER — ROSUVASTATIN CALCIUM 20 MG/1
20 TABLET, COATED ORAL EVERY EVENING
Status: DISCONTINUED | OUTPATIENT
Start: 2023-09-20 | End: 2023-09-20 | Stop reason: HOSPADM

## 2023-09-20 RX ORDER — ACETAMINOPHEN 500 MG
1000 TABLET ORAL ONCE
Status: COMPLETED | OUTPATIENT
Start: 2023-09-20 | End: 2023-09-20

## 2023-09-20 RX ORDER — SODIUM CHLORIDE, SODIUM LACTATE, POTASSIUM CHLORIDE, CALCIUM CHLORIDE 600; 310; 30; 20 MG/100ML; MG/100ML; MG/100ML; MG/100ML
INJECTION, SOLUTION INTRAVENOUS CONTINUOUS
Status: DISCONTINUED | OUTPATIENT
Start: 2023-09-20 | End: 2023-09-20 | Stop reason: HOSPADM

## 2023-09-20 RX ORDER — DIPHENHYDRAMINE HYDROCHLORIDE 50 MG/ML
25 INJECTION INTRAMUSCULAR; INTRAVENOUS EVERY 6 HOURS PRN
Status: DISCONTINUED | OUTPATIENT
Start: 2023-09-20 | End: 2023-09-20 | Stop reason: HOSPADM

## 2023-09-20 RX ORDER — VENLAFAXINE HYDROCHLORIDE 75 MG/1
150 CAPSULE, EXTENDED RELEASE ORAL DAILY
Status: DISCONTINUED | OUTPATIENT
Start: 2023-09-21 | End: 2023-09-20 | Stop reason: HOSPADM

## 2023-09-20 RX ADMIN — LIDOCAINE HYDROCHLORIDE 100 MG: 20 INJECTION, SOLUTION EPIDURAL; INFILTRATION; INTRACAUDAL at 07:25

## 2023-09-20 RX ADMIN — PROPOFOL 150 MG: 10 INJECTION, EMULSION INTRAVENOUS at 07:25

## 2023-09-20 RX ADMIN — OXYCODONE HYDROCHLORIDE 10 MG: 5 SOLUTION ORAL at 09:02

## 2023-09-20 RX ADMIN — DEXAMETHASONE SODIUM PHOSPHATE 8 MG: 4 INJECTION INTRA-ARTICULAR; INTRALESIONAL; INTRAMUSCULAR; INTRAVENOUS; SOFT TISSUE at 07:31

## 2023-09-20 RX ADMIN — ROSUVASTATIN CALCIUM 20 MG: 20 TABLET, FILM COATED ORAL at 17:56

## 2023-09-20 RX ADMIN — Medication 100 MCG: at 08:23

## 2023-09-20 RX ADMIN — FENTANYL CITRATE 50 MCG: 50 INJECTION, SOLUTION INTRAMUSCULAR; INTRAVENOUS at 08:35

## 2023-09-20 RX ADMIN — CELECOXIB 200 MG: 200 CAPSULE ORAL at 06:43

## 2023-09-20 RX ADMIN — DIAZEPAM 2 MG: 2 TABLET ORAL at 15:29

## 2023-09-20 RX ADMIN — METHOCARBAMOL 500 MG: 100 INJECTION, SOLUTION INTRAMUSCULAR; INTRAVENOUS at 11:18

## 2023-09-20 RX ADMIN — ACETAMINOPHEN 1000 MG: 500 TABLET, FILM COATED ORAL at 06:43

## 2023-09-20 RX ADMIN — Medication 100 MCG: at 07:45

## 2023-09-20 RX ADMIN — FENTANYL CITRATE 25 MCG: 50 INJECTION, SOLUTION INTRAMUSCULAR; INTRAVENOUS at 09:22

## 2023-09-20 RX ADMIN — Medication 100 MCG: at 07:47

## 2023-09-20 RX ADMIN — SUCCINYLCHOLINE CHLORIDE 160 MG: 20 INJECTION, SOLUTION INTRAMUSCULAR; INTRAVENOUS at 07:27

## 2023-09-20 RX ADMIN — MIDAZOLAM 2 MG: 1 INJECTION, SOLUTION INTRAMUSCULAR; INTRAVENOUS at 07:21

## 2023-09-20 RX ADMIN — SODIUM CHLORIDE, POTASSIUM CHLORIDE, SODIUM LACTATE AND CALCIUM CHLORIDE: 600; 310; 30; 20 INJECTION, SOLUTION INTRAVENOUS at 06:44

## 2023-09-20 RX ADMIN — ONDANSETRON 4 MG: 2 INJECTION INTRAMUSCULAR; INTRAVENOUS at 09:28

## 2023-09-20 RX ADMIN — ONDANSETRON 4 MG: 2 INJECTION INTRAMUSCULAR; INTRAVENOUS at 08:28

## 2023-09-20 RX ADMIN — FENTANYL CITRATE 25 MCG: 50 INJECTION, SOLUTION INTRAMUSCULAR; INTRAVENOUS at 09:48

## 2023-09-20 RX ADMIN — CEFAZOLIN 3 G: 1 INJECTION, POWDER, FOR SOLUTION INTRAMUSCULAR; INTRAVENOUS at 07:21

## 2023-09-20 RX ADMIN — REMIFENTANIL HYDROCHLORIDE 0.2 MCG/KG/MIN: 1 INJECTION, POWDER, LYOPHILIZED, FOR SOLUTION INTRAVENOUS at 07:25

## 2023-09-20 RX ADMIN — HYDROCODONE BITARTRATE AND ACETAMINOPHEN 1 TABLET: 5; 325 TABLET ORAL at 17:56

## 2023-09-20 RX ADMIN — PROPRANOLOL HYDROCHLORIDE 40 MG: 40 TABLET ORAL at 17:56

## 2023-09-20 RX ADMIN — FENTANYL CITRATE 50 MCG: 50 INJECTION, SOLUTION INTRAMUSCULAR; INTRAVENOUS at 08:38

## 2023-09-20 RX ADMIN — HYDRALAZINE HYDROCHLORIDE 5 MG: 20 INJECTION, SOLUTION INTRAMUSCULAR; INTRAVENOUS at 09:35

## 2023-09-20 RX ADMIN — FENTANYL CITRATE 100 MCG: 50 INJECTION, SOLUTION INTRAMUSCULAR; INTRAVENOUS at 07:25

## 2023-09-20 ASSESSMENT — PAIN DESCRIPTION - PAIN TYPE
TYPE: SURGICAL PAIN

## 2023-09-20 ASSESSMENT — FIBROSIS 4 INDEX
FIB4 SCORE: 1.55

## 2023-09-20 ASSESSMENT — LIFESTYLE VARIABLES
DOES PATIENT WANT TO STOP DRINKING: NO
TOTAL SCORE: 0
TOTAL SCORE: 0
EVER FELT BAD OR GUILTY ABOUT YOUR DRINKING: NO
CONSUMPTION TOTAL: NEGATIVE
ON A TYPICAL DAY WHEN YOU DRINK ALCOHOL HOW MANY DRINKS DO YOU HAVE: 0
EVER HAD A DRINK FIRST THING IN THE MORNING TO STEADY YOUR NERVES TO GET RID OF A HANGOVER: NO
HOW MANY TIMES IN THE PAST YEAR HAVE YOU HAD 5 OR MORE DRINKS IN A DAY: 0
TOTAL SCORE: 0
AVERAGE NUMBER OF DAYS PER WEEK YOU HAVE A DRINK CONTAINING ALCOHOL: 1
ALCOHOL_USE: YES
HAVE PEOPLE ANNOYED YOU BY CRITICIZING YOUR DRINKING: NO
HAVE YOU EVER FELT YOU SHOULD CUT DOWN ON YOUR DRINKING: NO

## 2023-09-20 ASSESSMENT — PATIENT HEALTH QUESTIONNAIRE - PHQ9
1. LITTLE INTEREST OR PLEASURE IN DOING THINGS: NOT AT ALL
2. FEELING DOWN, DEPRESSED, IRRITABLE, OR HOPELESS: NOT AT ALL
SUM OF ALL RESPONSES TO PHQ9 QUESTIONS 1 AND 2: 0

## 2023-09-20 ASSESSMENT — PAIN SCALES - GENERAL: PAIN_LEVEL: 5

## 2023-09-20 NOTE — ANESTHESIA TIME REPORT
Anesthesia Start and Stop Event Times     Date Time Event    9/20/2023 0655 Ready for Procedure     0721 Anesthesia Start     0849 Anesthesia Stop        Responsible Staff  09/20/23    Name Role Begin End    Asim Watts M.D. Anesth 0721 0849        Overtime Reason:  no overtime (within assigned shift)    Comments:

## 2023-09-20 NOTE — PROGRESS NOTES
Post-Operative Discharge Instructions for Parathyroidectomy     ACTIVITY:    Most patients are able to return to a full-time work schedule in 1-2 weeks; however this may vary according to your job. It may take longer to return to heavy physical or other demanding work, or shorter if you are feeling well.     Do NOT drive a car until you are able to turn the neck side to side, which may take 1-2 weeks.      Do NOT drive while you are taking pain medicines.     DIET:    You may have temporary throat discomfort or difficulty swallowing. This is due to the surgery around your larynx (voice box) and esophagus (swallowing tube). These symptoms will gradually improve over the course of several weeks.      Drink and eat foods that can be swallowed easily, e.g. juice, soup, gelatin, applesauce, scrambled eggs or mashed potatoes.      You may be able to return to your usual diet in a couple of days.     INCISION CARE:    You may remove the outer dressing 48 hours after surgery.     You may shower 48 hours after surgery but please do not swim or soak in a tub for at least 2 weeks. After you finish showering, just pat your incision dry. If it is draining clear fluid, you can cover it with a dry dressing (such as gauze). Do NOT scrub with soap or washcloth for the first 10 days.      Mild swelling at the incision site will go away in 4-6 weeks. The pink line will slowly fade to white during the next 6-12 months.      Use a sunscreen (SPF#30 or higher) or wear a scarf for protection if in the sun for the first 6 months to a year as the sun can darken your scar.      You may begin to use a hypoallergenic moisturizing cream (no vitamin E, Mederma, or other “scar” creams) along the incision after 2 weeks.     COMMON PROBLEMS:    Numbness of the skin under the chin or above the incision is normal and should go away in a few weeks.      You may feel a lump or pressure in your throat sensation when swallowing for a few weeks.      Your  incision may feel itchy while it heals. Avoid rubbing or scratching if possible.      You may feel neck stiffness, tightness, a pulling feeling, mild aching chest discomfort, headache, ear pain or congestion. Take a mild pain medicine such as Tylenol or Advil. Put heat on the area using a hot water bottle, heating pad or warm shower.      Some people prefer to sleep with an extra pillow for the first few days after the surgery, this helps keep swelling around your incision to a minimum.      Your voice may be hoarse or weak. Pitch or tone may change. You may have difficulty singing. This usually goes back to normal over 6 weeks to 6 months.      After surgery, you may notice a change in your mood, emotional ups and downs, depression, irritability or fatigue and weakness. These changes will get better as time passes.      You do not need to be at bed rest, being active is normally well tolerated within reason.     MEDICATIONS:    Take 2000 mg of TUMS over-the-counter after your surgery (two tablets). If you begin experiencing symptoms of low calcium such as numbness or tingling in your fingertips or around your mouth, you may increase the amount of TUMS you take up to 10 tablets per day. If symptoms persist on 10 TUMS a day, call my office to inform me.     Please note that it is common for the calcium level to be low following removal of the parathyroid, and you may experience numbness or tingling, which is a sign of low calcium. If this happens, it should improve when you take your calcium supplements. If it does not, please call your doctor.      Please resume your pre-hospital medications. You should follow-up with your primary care physician regarding new prescriptions and refills.      We will supply you with a prescription for a mild narcotic pain medication. You are not required to take it. If you do take it, please do not drive or drink alcohol as these in combination may make you drowsy. Most patients do  not need strong pain medicine by the time you leave the hospital. You can take Tylenol (acetaminophen) or ibuprofen (e.g. Advil) as needed.      If you are taking supplemental calcium or narcotics, you may also want to take a stool softener, such as over-the-counter Colace or Senna, to avoid constipation. Stay hydrated by drinking some extra water.     LABORATORY DRAW:  You have been provided with a prescription to have you calcium levels drawn prior to your follow up visit. The order is at the laboratory that you designated at as your lab of choice at the office. Please have this drawn ~2 days prior to your follow-up visit.    CALL YOUR DOCTOR IF:    Call your doctor or go to the Emergency Room if you have fever (temperature greater than 100.5), chills, lightheadedness, shortness of breath, difficulty breathing, nausea, vomiting, numbness or tingling in your fingers, hands, or mouth, muscle spasms, or if you notice signs of wound infection (redness, tenderness, or drainage from the incision). Please also call or go to the Emergency Room if you have any other urgent concerns.     FOLLOW-UP:   With Dr. Zayas in one week, call to schedule, [phone].    Malika Zayas M.D.  [facility]    [address]  [place] [id number]

## 2023-09-20 NOTE — ANESTHESIA POSTPROCEDURE EVALUATION
Patient: Miryam Veloz    Procedure Summary     Date: 09/20/23 Room / Location: [facility] 23 / SURGERY SAME DAY [facility]    Anesthesia Start: 0721 Anesthesia Stop: 0849    Procedure: MINIMALLY INVASIVE PARATHYROID EXPLORATION WITH INTRAOPERATIVE PARATHYROID HORMONE MONITORING (Neck) Diagnosis: (PRIMARY HYPERPARATHYROIDISM)    Surgeons: Malika Zayas M.D. Responsible Provider: Asim Watts M.D.    Anesthesia Type: general ASA Status: 2          Final Anesthesia Type: general  Last vitals  BP   Blood Pressure : (!) 149/74    Temp   36.2 °C (97.2 °F)    Pulse   62   Resp   12    SpO2   95 %      Anesthesia Post Evaluation    Patient location during evaluation: PACU  Patient participation: complete - patient participated  Level of consciousness: awake and alert  Pain score: 5    Airway patency: patent  Anesthetic complications: no  Cardiovascular status: hemodynamically stable  Respiratory status: acceptable  Hydration status: euvolemic    PONV: none          No notable events documented.
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
## 2023-09-20 NOTE — ANESTHESIA PROCEDURE NOTES
Airway    Date/Time: 9/20/2023 7:27 AM    Performed by: Asim Watts M.D.  Authorized by: Asim Watts M.D.    Location:  OR  Urgency:  Elective  Difficult Airway: No    Indications for Airway Management:  Anesthesia      Spontaneous Ventilation: absent    Sedation Level:  Deep  Preoxygenated: Yes    Patient Position:  Sniffing  Mask Difficulty Assessment:  2 - vent by mask + OA or adjuvant +/- NMBA  Final Airway Type:  Endotracheal airway  Final Endotracheal Airway:  ETT and NIM tube  Cuffed: Yes    Technique Used for Successful ETT Placement:  Video laryngoscopy    Insertion Site:  Oral  Blade Type:  Marcin  Laryngoscope Blade/Videolaryngoscope Blade Size:  3  ETT Size (mm):  7.0  Measured from:  Lips  ETT to Lips (cm):  20  Placement Verified by: auscultation and capnometry    Cormack-Lehane Classification:  Grade I - full view of glottis  Number of Attempts at Approach:  1   Atraumatic intubation by MS4 with good positioning confirmed using glidescope

## 2023-09-20 NOTE — ANESTHESIA PREPROCEDURE EVALUATION
Case: [id number] Date/Time: 09/20/23 0715    Procedure: MINIMALLY INVASIVE PARATHYROID EXPLORATION WITH INTRAOPERATIVE PARATHYROID HORMONE MONITORING    Pre-op diagnosis: PARATHYROIDECTOMY    Location: [facility] ROOM 23 / SURGERY SAME DAY [facility]    Surgeons: Malika Zayas M.D.        Denies: MI/CHF/smoking/CVA/DM  TTE report from 2021 reviewed.      Relevant Problems   PULMONARY   (positive) Mild intermittent asthma without complication   (positive) Pulmonary emphysema (HCC)      NEURO   (positive) Migraine headache      CARDIAC   (positive) Essential hypertension   (positive) Migraine headache   (positive) Paroxysmal atrial fibrillation (HCC)      GI   (positive) Gastroesophageal reflux disease without esophagitis         (positive) Chronic renal failure, stage 3 (moderate) (HCC)       Physical Exam    Airway   Mallampati: II  TM distance: >3 FB  Neck ROM: full       Cardiovascular - normal exam  Rhythm: regular  Rate: normal  (-) murmur     Dental   Comments: Upper dentures out         Pulmonary - normal exam  Breath sounds clear to auscultation     Abdominal    Neurological - normal exam                 Anesthesia Plan    ASA 2       Plan - general       Airway plan will be ETT    ([place] for PTH monitoring)      Induction: intravenous    Postoperative Plan: Postoperative administration of opioids is intended.    Pertinent diagnostic labs and testing reviewed    Informed Consent:    Anesthetic plan and risks discussed with patient.    Use of blood products discussed with: patient whom consented to blood products.

## 2023-09-20 NOTE — OR NURSING
"0845-Pt arrive to PACU from OR. OPA & mask in place, 6L O2 via mask. Dressing to anterior neck CDI. Report received from OR RN and Anesthesia.    0852-OPA dc.    0855-Pt given water,  tolerating.    0902-PRN pain medication given. Ice placed to surgical site.    0915-Pt continues to decline oxxymask as she states she \"feels claustrophobic.\" Pt declining ice to surgical site.    0925-Pt complains of nausea, medicated. Pt continues to decline oxymask, nasal cannula placed, 5L    0931-ART line dc, tip intact.pressure held over site for 5 minutes, no active bleeding. Pressure dressing applied.    0941-Pt tolerating O2 via NC. Call to son Gregg, update on POC and pt to be admitted. All questions answered.    0943-Water given. Pt repositioned to comfort.    0946-warm pack applied to pt back of neck due to pt c/o of discomfort.    0951-Pt appears comfortable, resting in between care. Pt c/o pain at \"4 million.\" Continued to provide reassurance. Cold pack  offered, pt declined.    1004-MD to bedside, pt continues to rest, occasional snoring, appears comfortable    1035-pt resting, appears comfortable    1050-Anesthesia to bedside, verbal order for Robaxin, RBV.    1115-Spoke with son, updated on POC and room number.    1125-pt resting, appears comfortable.robaxin started. Report called to floor Faye ESQUEDA.    1135-Coffee given, pt tolerating well    1152-Pt resting, appears to be sleeping. Appears comfortable.      1158-Pt off floor with transport via [facility]. Oxygen in place, tank \"full\" All belongings with pt.      "

## 2023-09-20 NOTE — OP REPORT
Operative Report    Date: 9/20/2023    Surgeon: Malika Zayas M.D.    Assistant: UCRTIS Olvera    My assistant, CURTIS Olvera, was medically necessary for this procedure. He placed the precordial electrodes were placed by the surgical assistant, who then monitored the recurrent laryngeal nerve throughout the procedure, as well as retracted the tissues to aid in my visualization of the parathyroid tissue, and assisted with wound closure.    Anesthesiologist: Stefanie JOYA    Pre-operative Diagnosis: E21.0 primary hyperparathyroidism     Post-operative Diagnosis: same     Procedure:   1. 30251 Parathyroidectomy or exploration of parathyroid (open parathyroid exploration with intraoperative PTH monitoring)   2. bilateral bilateral recurrent laryngeal nerve monitoring    Findings: Enlarged left and right superior parathyroid glands, small right and left inferior glands. Portion of right superior parathyroid left in situ. Intraoperative PTH monitoring was performed with levels drawn at appropriate intervals. There was a 87% drop from the highest level, with the final level being 22 picograms/deciliter.     Procedure in detail: The patient was identified in the pre-operative holding area and brought to the operating room. Correct side and site were identified.  GETA was smoothly induced. The patient was prepped and draped in the usual sterile fashion.    With the patient in the supine position, the head of the bed slightly elevated, the neck slightly extended, and the patient intubated with a NIM endotracheal tube, the precordial electrodes were placed by the surgical assistant, who then monitored the recurrent laryngeal nerves bilaterally throughout the procedure.     The neck was prepared with betadiene and draped in the usual fashion. The neck was entered through a short lower transverse cervical incision and carried down through the platysma. Subplatysmal flaps were dissected superiorly and inferiorly down to  the sternal notch. The midline cervical fascia was incised down to the capsule of the thyroid.    The strap muscles were mobilized off the left thyroid lobe, and the left gland was carefully medialized. Taking down the superior pole of the left thyroid lobe, I identified an enlarged left superior parathyroid.  The vascular pedicles were divided with the Harmonic device. The gland was sent for immediate pathologic exam, which revealed a hypercellular enlarged parathyroid gland.    Dissecting more inferiorly toward the thoracic inlet, I identified a putative left inferior parathyroid. The vascular pedicles were divided with the Harmonic device. The gland was sent for immediate pathologic exam, which revealed a hypercellular enlarged parathyroid gland.     We turned our attention to the right thyroid lobe, the strap muscles were mobilized off the right thyroid lobe, and the right gland was carefully medialized. Taking down the superior pole of the right thyroid lobe, I identified an enlarged right superior parathyroid.  A portion of the gland was carefully excised, leaving half of the gland in situ. The gland was sent for immediate pathologic exam, which revealed a hypercellular parathyroid gland.    Dissecting more inferiorly, I identified a normal sized right inferior parathyroid gland at the superior aspect of the thymus. The vascular pedicles were divided with the Harmonic device. The gland was sent for immediate pathologic exam, which revealed a hypercellular enlarged parathyroid gland.    Intraoperative PTH monitoring was performed with levels drawn at appropriate intervals. There was a 87% drop from the highest level, with the final level being 22 picograms/deciliter.     The integrity of both recurrent laryngeal nerves was documented. Hemoblast was placed in both sides of the neck. The midline cervical fascia was reapproximated with running 3-0 Vicryl. Platysma was reapproximated with interrupted 3-0 Vicryl.  The skin was closed with monocryl.    The patient was awakened from general anesthetic, and was taken to the recovery room in stable condition.    Sponge and needle counts were correct at the end of the case.     Specimen:   Left superior parathyroid  Left inferior parathyroid  Portion right superior parathyroid  Right inferior parathyroid    EBL: minimal    Dispo: stable, extubated, to PACU    Malika Zayas M.D.  [facility]  [phone]

## 2023-09-20 NOTE — PROGRESS NOTES
Patient able to ambulate to bathroom without difficulty. Patient refusing to wear SCD machine at this time. Patient verbalized she is feeling anxious and would like valium, provided per MAR. Patient was able to tolerate her lunch. Denies other needs at this time.

## 2023-09-20 NOTE — ANESTHESIA PROCEDURE NOTES
Arterial Line    Performed by: Asim Watts M.D.  Authorized by: Asim Watts M.D.    Start Time:  9/20/2023 7:35 AM  End Time:  9/20/2023 7:37 AM  Localization: surface landmarks    Patient Location:  OR  Indication: continuous blood pressure monitoring        Catheter Size:  20 G  Seldinger Technique?: Yes    Laterality:  Right  Site:  Radial artery  Line Secured:  Antimicrobial disc, tape and transparent dressing  Events: patient tolerated procedure well with no complications

## 2023-09-20 NOTE — PROGRESS NOTES
4 Eyes Skin Assessment Completed by Faye, RN and Angeles RN.    Head WDL  Ears WDL  Nose WDL  Mouth WDL  Neck Incision  Breast/Chest WDL  Shoulder Blades WDL  Spine WDL  (R) Arm/Elbow/Hand - cat scratch   (L) Arm/Elbow/Hand WDL  Abdomen WDL  Groin WDL  Scrotum/Coccyx/Buttocks WDL  (R) Leg WDL  (L) Leg WDL  (R) Heel/Foot/Toe WDL  (L) Heel/Foot/Toe WDL          Devices In Places Blood Pressure Cuff and Pulse Ox      Interventions In Place Gray Ear Foams    Possible Skin Injury No    Pictures Uploaded Into Epic N/A  Wound Consult Placed N/A  RN Wound Prevention Protocol Ordered No

## 2023-09-20 NOTE — PROGRESS NOTES
"Patient arrived to CDU from PACU with 5L oxygen. Patient refusing ERAS protocol with facemask due to \"feeling claustrophobic.\" Patient states she uses 2L NC at night. Patient c/o 7/10 pain but feels like it is decreasing overtime- PACU gave Rx prior to transport. Reviewed chart with patient and the next time I can give Pain Rx is 1500 (Last dose of oxycodone was at 0902). The dressing is clean, dry, and intact. Patient taking in fluids and sounds hoarse. Will continue to monitor. Family at bedside. Answered all questions and concerns.   "

## 2023-09-21 NOTE — PROGRESS NOTES
Assessment completed. Pt A&Ox4. Respirations are even and unlabored on RA, 2L at night for COPD. Pt denies pain at this time. Neck/site is CDI. Monitors applied, VS stable, call light and belongings within reach. POC updated (pain control, labs). Pt educated on room and call light, pt verbalized understanding. Communication board updated. Needs met.

## 2023-09-21 NOTE — PROGRESS NOTES
IV Dc 'd. Discharge instructions provided to patient. Pt verbalizes understanding. Pt states all questions have been answered. Copy of DC provided to patient. Signed copy in chart.  No prescriptions provided to patient. Pt states all personal belongings are in possession.  Pt escorted off unit by unit clerk via w/c without incident. Son providing transportation.

## 2023-09-21 NOTE — CARE PLAN
The patient is Stable - Low risk of patient condition declining or worsening    Shift Goals  Clinical Goals: lab results, pain control  Patient Goals: pain control, comfort    Progress made toward(s) clinical / shift goals:    Problem: Pain - Standard  Goal: Alleviation of pain or a reduction in pain to the patient’s comfort goal  Outcome: Progressing     Problem: Knowledge Deficit - Standard  Goal: Patient and family/care givers will demonstrate understanding of plan of care, disease process/condition, diagnostic tests and medications  Outcome: Progressing       Patient is not progressing towards the following goals:      
The patient is Stable - Low risk of patient condition declining or worsening    Shift Goals  Clinical Goals: labs, pain control  Patient Goals: discharge  Family Goals: LENNOX    Progress made toward(s) clinical / shift goals:        Problem: Pain - Standard  Goal: Alleviation of pain or a reduction in pain to the patient’s comfort goal  Outcome: Progressing     Problem: Knowledge Deficit - Standard  Goal: Patient and family/care givers will demonstrate understanding of plan of care, disease process/condition, diagnostic tests and medications  Outcome: Progressing       Patient is not progressing towards the following goals:      
SCDs

## 2023-09-24 ENCOUNTER — APPOINTMENT (OUTPATIENT)
Dept: RADIOLOGY | Facility: MEDICAL CENTER | Age: 73
DRG: 641 | End: 2023-09-24
Attending: EMERGENCY MEDICINE
Payer: COMMERCIAL

## 2023-09-24 ENCOUNTER — HOSPITAL ENCOUNTER (INPATIENT)
Facility: MEDICAL CENTER | Age: 73
LOS: 3 days | DRG: 641 | End: 2023-09-27
Attending: EMERGENCY MEDICINE | Admitting: HOSPITALIST
Payer: COMMERCIAL

## 2023-09-24 DIAGNOSIS — E20.9 HYPOPARATHYROIDISM, UNSPECIFIED HYPOPARATHYROIDISM TYPE (HCC): ICD-10-CM

## 2023-09-24 DIAGNOSIS — E83.51 HYPOCALCEMIA: ICD-10-CM

## 2023-09-24 DIAGNOSIS — R07.9 ACUTE CHEST PAIN: ICD-10-CM

## 2023-09-24 LAB
ALBUMIN SERPL BCP-MCNC: 3.7 G/DL (ref 3.2–4.9)
ALBUMIN/GLOB SERPL: 1.1 G/DL
ALP SERPL-CCNC: 125 U/L (ref 30–99)
ALT SERPL-CCNC: 16 U/L (ref 2–50)
ANION GAP SERPL CALC-SCNC: 12 MMOL/L (ref 7–16)
APTT PPP: 30.5 SEC (ref 24.7–36)
AST SERPL-CCNC: 16 U/L (ref 12–45)
BASOPHILS # BLD AUTO: 0.4 % (ref 0–1.8)
BASOPHILS # BLD: 0.04 K/UL (ref 0–0.12)
BILIRUB SERPL-MCNC: 1.1 MG/DL (ref 0.1–1.5)
BUN SERPL-MCNC: 19 MG/DL (ref 8–22)
CA-I SERPL-SCNC: 0.8 MMOL/L (ref 1.1–1.3)
CA-I SERPL-SCNC: 0.8 MMOL/L (ref 1.1–1.3)
CALCIUM ALBUM COR SERPL-MCNC: 6 MG/DL (ref 8.5–10.5)
CALCIUM SERPL-MCNC: 5.8 MG/DL (ref 8.5–10.5)
CHLORIDE SERPL-SCNC: 105 MMOL/L (ref 96–112)
CO2 SERPL-SCNC: 25 MMOL/L (ref 20–33)
CREAT SERPL-MCNC: 0.87 MG/DL (ref 0.5–1.4)
EKG IMPRESSION: NORMAL
EOSINOPHIL # BLD AUTO: 0.1 K/UL (ref 0–0.51)
EOSINOPHIL NFR BLD: 1.1 % (ref 0–6.9)
ERYTHROCYTE [DISTWIDTH] IN BLOOD BY AUTOMATED COUNT: 44.8 FL (ref 35.9–50)
GFR SERPLBLD CREATININE-BSD FMLA CKD-EPI: 71 ML/MIN/1.73 M 2
GLOBULIN SER CALC-MCNC: 3.3 G/DL (ref 1.9–3.5)
GLUCOSE SERPL-MCNC: 121 MG/DL (ref 65–99)
HCT VFR BLD AUTO: 46 % (ref 37–47)
HGB BLD-MCNC: 15.5 G/DL (ref 12–16)
IMM GRANULOCYTES # BLD AUTO: 0.02 K/UL (ref 0–0.11)
IMM GRANULOCYTES NFR BLD AUTO: 0.2 % (ref 0–0.9)
INR PPP: 1.56 (ref 0.87–1.13)
LYMPHOCYTES # BLD AUTO: 2.13 K/UL (ref 1–4.8)
LYMPHOCYTES NFR BLD: 23.1 % (ref 22–41)
MAGNESIUM SERPL-MCNC: 1.5 MG/DL (ref 1.5–2.5)
MCH RBC QN AUTO: 31.7 PG (ref 27–33)
MCHC RBC AUTO-ENTMCNC: 33.7 G/DL (ref 32.2–35.5)
MCV RBC AUTO: 94.1 FL (ref 81.4–97.8)
MONOCYTES # BLD AUTO: 0.85 K/UL (ref 0–0.85)
MONOCYTES NFR BLD AUTO: 9.2 % (ref 0–13.4)
NEUTROPHILS # BLD AUTO: 6.09 K/UL (ref 1.82–7.42)
NEUTROPHILS NFR BLD: 66 % (ref 44–72)
NRBC # BLD AUTO: 0 K/UL
NRBC BLD-RTO: 0 /100 WBC (ref 0–0.2)
PHOSPHATE SERPL-MCNC: 5.1 MG/DL (ref 2.5–4.5)
PLATELET # BLD AUTO: 200 K/UL (ref 164–446)
PMV BLD AUTO: 11.2 FL (ref 9–12.9)
POTASSIUM SERPL-SCNC: 3.8 MMOL/L (ref 3.6–5.5)
PROT SERPL-MCNC: 7 G/DL (ref 6–8.2)
PROTHROMBIN TIME: 18.9 SEC (ref 12–14.6)
PTH-INTACT SERPL-MCNC: 3.8 PG/ML (ref 14–72)
PTH-INTACT SERPL-MCNC: 4 PG/ML (ref 14–72)
RBC # BLD AUTO: 4.89 M/UL (ref 4.2–5.4)
SODIUM SERPL-SCNC: 142 MMOL/L (ref 135–145)
T4 FREE SERPL-MCNC: 4.2 NG/DL (ref 0.93–1.7)
TROPONIN T SERPL-MCNC: 8 NG/L (ref 6–19)
TSH SERPL DL<=0.005 MIU/L-ACNC: 0.05 UIU/ML (ref 0.38–5.33)
WBC # BLD AUTO: 9.2 K/UL (ref 4.8–10.8)

## 2023-09-24 PROCEDURE — 83970 ASSAY OF PARATHORMONE: CPT

## 2023-09-24 PROCEDURE — 700105 HCHG RX REV CODE 258: Performed by: HOSPITALIST

## 2023-09-24 PROCEDURE — 93005 ELECTROCARDIOGRAM TRACING: CPT | Performed by: EMERGENCY MEDICINE

## 2023-09-24 PROCEDURE — 36415 COLL VENOUS BLD VENIPUNCTURE: CPT

## 2023-09-24 PROCEDURE — 85025 COMPLETE CBC W/AUTO DIFF WBC: CPT

## 2023-09-24 PROCEDURE — 99222 1ST HOSP IP/OBS MODERATE 55: CPT | Mod: AI | Performed by: HOSPITALIST

## 2023-09-24 PROCEDURE — 700102 HCHG RX REV CODE 250 W/ 637 OVERRIDE(OP): Performed by: HOSPITALIST

## 2023-09-24 PROCEDURE — 770020 HCHG ROOM/CARE - TELE (206)

## 2023-09-24 PROCEDURE — 84484 ASSAY OF TROPONIN QUANT: CPT

## 2023-09-24 PROCEDURE — 700111 HCHG RX REV CODE 636 W/ 250 OVERRIDE (IP): Performed by: HOSPITALIST

## 2023-09-24 PROCEDURE — 99285 EMERGENCY DEPT VISIT HI MDM: CPT

## 2023-09-24 PROCEDURE — 96365 THER/PROPH/DIAG IV INF INIT: CPT

## 2023-09-24 PROCEDURE — 80053 COMPREHEN METABOLIC PANEL: CPT

## 2023-09-24 PROCEDURE — 94760 N-INVAS EAR/PLS OXIMETRY 1: CPT

## 2023-09-24 PROCEDURE — 84100 ASSAY OF PHOSPHORUS: CPT

## 2023-09-24 PROCEDURE — 85610 PROTHROMBIN TIME: CPT

## 2023-09-24 PROCEDURE — 82330 ASSAY OF CALCIUM: CPT

## 2023-09-24 PROCEDURE — A9270 NON-COVERED ITEM OR SERVICE: HCPCS | Performed by: HOSPITALIST

## 2023-09-24 PROCEDURE — 93005 ELECTROCARDIOGRAM TRACING: CPT

## 2023-09-24 PROCEDURE — 83735 ASSAY OF MAGNESIUM: CPT

## 2023-09-24 PROCEDURE — 84439 ASSAY OF FREE THYROXINE: CPT

## 2023-09-24 PROCEDURE — 84443 ASSAY THYROID STIM HORMONE: CPT

## 2023-09-24 PROCEDURE — 71045 X-RAY EXAM CHEST 1 VIEW: CPT

## 2023-09-24 PROCEDURE — 85730 THROMBOPLASTIN TIME PARTIAL: CPT

## 2023-09-24 RX ORDER — ACETAMINOPHEN 325 MG/1
325 TABLET ORAL PRN
COMMUNITY
End: 2023-10-01

## 2023-09-24 RX ORDER — CALCIUM CARBONATE 500 MG/1
500 TABLET, CHEWABLE ORAL
Status: DISCONTINUED | OUTPATIENT
Start: 2023-09-25 | End: 2023-09-25

## 2023-09-24 RX ORDER — ALBUTEROL SULFATE 90 UG/1
1 AEROSOL, METERED RESPIRATORY (INHALATION) EVERY 6 HOURS PRN
Status: DISCONTINUED | OUTPATIENT
Start: 2023-09-24 | End: 2023-09-27 | Stop reason: HOSPADM

## 2023-09-24 RX ORDER — CALCIUM GLUCONATE 20 MG/ML
2 INJECTION, SOLUTION INTRAVENOUS ONCE
Status: COMPLETED | OUTPATIENT
Start: 2023-09-24 | End: 2023-09-24

## 2023-09-24 RX ORDER — LORAZEPAM 2 MG/ML
0.5 INJECTION INTRAMUSCULAR ONCE
Status: COMPLETED | OUTPATIENT
Start: 2023-09-24 | End: 2023-09-24

## 2023-09-24 RX ORDER — VENLAFAXINE HYDROCHLORIDE 75 MG/1
150 CAPSULE, EXTENDED RELEASE ORAL DAILY
Status: DISCONTINUED | OUTPATIENT
Start: 2023-09-25 | End: 2023-09-27 | Stop reason: HOSPADM

## 2023-09-24 RX ORDER — PROPRANOLOL HYDROCHLORIDE 20 MG/1
40 TABLET ORAL 2 TIMES DAILY
Status: DISCONTINUED | OUTPATIENT
Start: 2023-09-24 | End: 2023-09-27 | Stop reason: HOSPADM

## 2023-09-24 RX ORDER — POLYETHYLENE GLYCOL 3350 17 G/17G
1 POWDER, FOR SOLUTION ORAL
Status: DISCONTINUED | OUTPATIENT
Start: 2023-09-24 | End: 2023-09-27 | Stop reason: HOSPADM

## 2023-09-24 RX ORDER — LABETALOL HYDROCHLORIDE 5 MG/ML
10 INJECTION, SOLUTION INTRAVENOUS EVERY 4 HOURS PRN
Status: DISCONTINUED | OUTPATIENT
Start: 2023-09-24 | End: 2023-09-27 | Stop reason: HOSPADM

## 2023-09-24 RX ORDER — OMEPRAZOLE 20 MG/1
40 CAPSULE, DELAYED RELEASE ORAL DAILY
Status: DISCONTINUED | OUTPATIENT
Start: 2023-09-24 | End: 2023-09-27 | Stop reason: HOSPADM

## 2023-09-24 RX ORDER — OMEPRAZOLE 40 MG/1
40 CAPSULE, DELAYED RELEASE ORAL DAILY
COMMUNITY
End: 2023-12-19 | Stop reason: SDUPTHER

## 2023-09-24 RX ORDER — ALBUTEROL SULFATE 90 UG/1
1 AEROSOL, METERED RESPIRATORY (INHALATION)
COMMUNITY
End: 2023-10-24 | Stop reason: SDUPTHER

## 2023-09-24 RX ORDER — ROSUVASTATIN CALCIUM 20 MG/1
20 TABLET, COATED ORAL EVERY EVENING
Status: DISCONTINUED | OUTPATIENT
Start: 2023-09-24 | End: 2023-09-27 | Stop reason: HOSPADM

## 2023-09-24 RX ORDER — OXYCODONE HYDROCHLORIDE AND ACETAMINOPHEN 5; 325 MG/1; MG/1
1 TABLET ORAL EVERY 6 HOURS PRN
COMMUNITY
End: 2023-11-30

## 2023-09-24 RX ORDER — ONDANSETRON 4 MG/1
4 TABLET, ORALLY DISINTEGRATING ORAL EVERY 4 HOURS PRN
Status: DISCONTINUED | OUTPATIENT
Start: 2023-09-24 | End: 2023-09-27 | Stop reason: HOSPADM

## 2023-09-24 RX ORDER — POTASSIUM CHLORIDE 20 MEQ/1
40 TABLET, EXTENDED RELEASE ORAL ONCE
Status: DISCONTINUED | OUTPATIENT
Start: 2023-09-24 | End: 2023-09-24

## 2023-09-24 RX ORDER — TRAMADOL HYDROCHLORIDE 50 MG/1
50 TABLET ORAL EVERY 8 HOURS PRN
Status: DISCONTINUED | OUTPATIENT
Start: 2023-09-24 | End: 2023-09-27 | Stop reason: HOSPADM

## 2023-09-24 RX ORDER — CALCIUM GLUCONATE 20 MG/ML
1 INJECTION, SOLUTION INTRAVENOUS ONCE
Status: COMPLETED | OUTPATIENT
Start: 2023-09-24 | End: 2023-09-25

## 2023-09-24 RX ORDER — ONDANSETRON 2 MG/ML
4 INJECTION INTRAMUSCULAR; INTRAVENOUS EVERY 4 HOURS PRN
Status: DISCONTINUED | OUTPATIENT
Start: 2023-09-24 | End: 2023-09-27 | Stop reason: HOSPADM

## 2023-09-24 RX ORDER — ACETAMINOPHEN 325 MG/1
650 TABLET ORAL EVERY 6 HOURS PRN
Status: DISCONTINUED | OUTPATIENT
Start: 2023-09-24 | End: 2023-09-27 | Stop reason: HOSPADM

## 2023-09-24 RX ORDER — BISACODYL 10 MG
10 SUPPOSITORY, RECTAL RECTAL
Status: DISCONTINUED | OUTPATIENT
Start: 2023-09-24 | End: 2023-09-27 | Stop reason: HOSPADM

## 2023-09-24 RX ORDER — PROPRANOLOL HYDROCHLORIDE 40 MG/1
40 TABLET ORAL 2 TIMES DAILY
COMMUNITY
End: 2024-01-10 | Stop reason: SDUPTHER

## 2023-09-24 RX ORDER — FLUTICASONE FUROATE AND VILANTEROL 100; 25 UG/1; UG/1
1 POWDER RESPIRATORY (INHALATION) DAILY
COMMUNITY
End: 2023-10-24

## 2023-09-24 RX ORDER — AMOXICILLIN 250 MG
2 CAPSULE ORAL 2 TIMES DAILY
Status: DISCONTINUED | OUTPATIENT
Start: 2023-09-24 | End: 2023-09-27 | Stop reason: HOSPADM

## 2023-09-24 RX ADMIN — ACETAMINOPHEN 650 MG: 325 TABLET, FILM COATED ORAL at 19:58

## 2023-09-24 RX ADMIN — CALCIUM GLUCONATE 1 G: 20 INJECTION, SOLUTION INTRAVENOUS at 23:12

## 2023-09-24 RX ADMIN — TRAMADOL HYDROCHLORIDE 50 MG: 50 TABLET ORAL at 21:41

## 2023-09-24 RX ADMIN — CALCIUM CHLORIDE 1000 MG: 100 INJECTION, SOLUTION INTRAVENOUS at 14:03

## 2023-09-24 RX ADMIN — RIVAROXABAN 20 MG: 20 TABLET, FILM COATED ORAL at 17:10

## 2023-09-24 RX ADMIN — LORAZEPAM 0.5 MG: 2 INJECTION INTRAMUSCULAR; INTRAVENOUS at 17:09

## 2023-09-24 RX ADMIN — CALCIUM GLUCONATE 2 G: 20 INJECTION, SOLUTION INTRAVENOUS at 21:46

## 2023-09-24 RX ADMIN — PROPRANOLOL HYDROCHLORIDE 40 MG: 20 TABLET ORAL at 17:11

## 2023-09-24 ASSESSMENT — COGNITIVE AND FUNCTIONAL STATUS - GENERAL
MOBILITY SCORE: 24
SUGGESTED CMS G CODE MODIFIER DAILY ACTIVITY: CH
SUGGESTED CMS G CODE MODIFIER MOBILITY: CH
DAILY ACTIVITIY SCORE: 24

## 2023-09-24 ASSESSMENT — ENCOUNTER SYMPTOMS
FEVER: 0
BACK PAIN: 0
NERVOUS/ANXIOUS: 1
DEPRESSION: 0
DIZZINESS: 0
STRIDOR: 0
SENSORY CHANGE: 0
TINGLING: 1
CHILLS: 0
TREMORS: 1
DIARRHEA: 0
FOCAL WEAKNESS: 0
WEAKNESS: 1
COUGH: 0
PALPITATIONS: 0
ABDOMINAL PAIN: 0
NAUSEA: 0
SPEECH CHANGE: 0
SHORTNESS OF BREATH: 1
HEADACHES: 0

## 2023-09-24 ASSESSMENT — LIFESTYLE VARIABLES
TOTAL SCORE: 0
HOW MANY TIMES IN THE PAST YEAR HAVE YOU HAD 5 OR MORE DRINKS IN A DAY: 0
HAVE PEOPLE ANNOYED YOU BY CRITICIZING YOUR DRINKING: NO
TOTAL SCORE: 0
AVERAGE NUMBER OF DAYS PER WEEK YOU HAVE A DRINK CONTAINING ALCOHOL: 1
HAVE YOU EVER FELT YOU SHOULD CUT DOWN ON YOUR DRINKING: NO
DOES PATIENT WANT TO STOP DRINKING: NO
ALCOHOL_USE: YES
ON A TYPICAL DAY WHEN YOU DRINK ALCOHOL HOW MANY DRINKS DO YOU HAVE: 1
TOTAL SCORE: 0
EVER FELT BAD OR GUILTY ABOUT YOUR DRINKING: NO
CONSUMPTION TOTAL: NEGATIVE
EVER HAD A DRINK FIRST THING IN THE MORNING TO STEADY YOUR NERVES TO GET RID OF A HANGOVER: NO

## 2023-09-24 ASSESSMENT — CHA2DS2 SCORE
SEX: FEMALE
VASCULAR DISEASE: NO
HYPERTENSION: NO
CHA2DS2 VASC SCORE: 2
CHF OR LEFT VENTRICULAR DYSFUNCTION: NO
DIABETES: NO
AGE 75 OR GREATER: NO
PRIOR STROKE OR TIA OR THROMBOEMBOLISM: NO
AGE 65 TO 74: YES

## 2023-09-24 ASSESSMENT — HEART SCORE
RISK FACTORS: 1-2 RISK FACTORS
HISTORY: MODERATELY SUSPICIOUS
AGE: 65+
HEART SCORE: 5
TROPONIN: LESS THAN OR EQUAL TO NORMAL LIMIT
ECG: NON-SPECIFIC REPOLARIZATION DISTURBANCE

## 2023-09-24 ASSESSMENT — PATIENT HEALTH QUESTIONNAIRE - PHQ9
2. FEELING DOWN, DEPRESSED, IRRITABLE, OR HOPELESS: NOT AT ALL
SUM OF ALL RESPONSES TO PHQ9 QUESTIONS 1 AND 2: 0
1. LITTLE INTEREST OR PLEASURE IN DOING THINGS: NOT AT ALL

## 2023-09-24 ASSESSMENT — FIBROSIS 4 INDEX
FIB4 SCORE: 1.55
FIB4 SCORE: 1.42

## 2023-09-24 ASSESSMENT — PAIN DESCRIPTION - PAIN TYPE: TYPE: SURGICAL PAIN

## 2023-09-24 NOTE — ED NOTES
"Pt on the call light, crying, stating she is having anxiety attack.  Pt hyperventilating, c/o feeling hot, numbness in hands.  Pt educated on breathing, states she is upset that her son has his kids with him and cannot visit her at this time and her tray had chicken in it.  Discussed diet with patient and offered to change diet per request and states \"I'm fine.  I just am picky\".  Pt states hx of panic attacks and used to take valium but has been \"able to control them recently\".  Pt requesting something for anxiety.  Admitting MD aware.  Awaiting pharmacy to verify scheduled medications.  "

## 2023-09-24 NOTE — ED NOTES
Med rec completed per patient and patient home pharmacy.   Pt did not know strengths of any medications. Pharmacy reports strengths as well as oxy/APAP 5-325 picked up 09/22  NO Noted home antibiotics in past 30 days   Patient preferred pharmacy: [facility] in [place]

## 2023-09-24 NOTE — ED NOTES
Tarsha from Lab called with critical result of Ca 2+ = 5.8 and corrected Ca 2+ = 6.0 at 1218. Critical lab result read back to Tarsha.     Dr. Jaquez notified of critical lab result at 1218.  Critical lab result read back by Dr. Jaquez.

## 2023-09-24 NOTE — H&P
Hospital Medicine History & Physical Note    Date of Service  9/24/2023    Primary Care Physician  ISMAEL Ardon.      Code Status  Full Code    Chief Complaint  Chief Complaint   Patient presents with    Chest Pain     Onset Friday, mid sternal pain, pain w/ palpation.  Pt noted to also have bruising over L chest wall.    Post-Op Complications     Possible.  Pt had parathyroid surgery on Wednesday.  Pt has chest wall bruising since after surgery.    Numbness     Pt reports intermittent numbness and tingling to face and bilat hands, onset on Friday.  Pt reports she thought it was from the prescribed post op percocet but stopped taking the percocet and states it has still come and go.    Anxiety     Increased anxiety over the last few days.         History of Presenting Illness  Miryam Veloz is a 73 y.o. female with a recent parathyroidectomy just 4 days prior to admit who presented 9/24/2023 with tingling to her face, hands for the past two days.  There was mention of chest pain in ER note but she denied chest pain but more of a shortness of breath and denied any palpitations. She states eating well and taking her medications after surgery.  She was taking percocet but was thinking the medications was causing her these issues.  She states increased anxiety but has not been taking any medication for it.      In the ER labs were remarkable for a Ca:5.8 with a corrected calcium:6 and an ionized calcium of 0.8. Her Mg was 1.5.  Alkaline phosphatase was 125.  Troponin was normal. Her EKG was normal sinus rhythm.        I discussed the plan of care with patient.    Review of Systems  Review of Systems   Constitutional:  Positive for malaise/fatigue. Negative for chills and fever.   Respiratory:  Positive for shortness of breath. Negative for cough and stridor.    Cardiovascular:  Negative for chest pain, palpitations and leg swelling.   Gastrointestinal:  Negative for abdominal pain, diarrhea  and nausea.   Genitourinary:  Negative for dysuria and hematuria.   Musculoskeletal:  Negative for back pain and joint pain.   Skin:  Negative for rash.   Neurological:  Positive for tingling, tremors and weakness. Negative for dizziness, sensory change, speech change, focal weakness and headaches.   Psychiatric/Behavioral:  Negative for depression. The patient is nervous/anxious.        Past Medical History   has a past medical history of Abnormal CT scan (07/25/2017), Allergy, Arrhythmia (01/2017), Arthritis (12/26/2017), Asthma (12/26/2017), Bipolar affective disorder ([facility]) (1995), Carpal tunnel syndrome on both sides (12/22/2017), Chronic knee pain (2000), Chronic pain of both knees (05/30/2017), Claustrophobia, COPD (chronic obstructive pulmonary disease) (HCC), Dental disorder (12/26/2017), Depression (05/22/2009), Dyspnea on exertion (04/12/2017), Family history of breast cancer in mother, Fibromyalgia, Headache, frequent episodic tension-type, Hemorrhagic disorder ([facility]) (01/2017), Hepatitis B (1975), Hypertension, IBD (inflammatory bowel disease), IBS (irritable bowel syndrome), Lumbar facet arthropathy (2004), Migraine, Pain (12/26/2017), Pain (12/26/2017), Pain in both knees (10/06/2016), Pneumonia, PTSD (post-traumatic stress disorder) (12/26/2017), Renal disorder (12/26/2017), and Rotator cuff syndrome of right shoulder (2008).    Surgical History   has a past surgical history that includes finger or hand incision and drainage (Right, 10/20/2015); cholecystectomy (1999); appendectomy (1950's); primary c section (1985); dental extraction(s) (2015); other; knee arthroscopy (Right, 01/05/2018); meniscectomy (Right, 01/05/2018); debridement (Right, 01/05/2018); orif, wrist (Left, 09/24/2018); brca1&2 gen full seq dup/del; and pr explore parathyroid glands (N/A, 9/20/2023).     Family History  family history includes Alcohol abuse in her father; Arthritis in her sister; Breast Cancer in her mother; Cancer in  her brother and mother; GI Disease in her father; Heart Disease (age of onset: 40) in her father; Psychiatric Illness in her father; Stroke (age of onset: 79) in her sister.   Family history reviewed with patient. There is no family history that is pertinent to the chief complaint.     Social History   reports that she quit smoking about 29 years ago. Her smoking use included cigarettes. She started smoking about 49 years ago. She has a 20.0 pack-year smoking history. She has never used smokeless tobacco. She reports current alcohol use of about 1.8 oz of alcohol per week. She reports that she does not currently use drugs after having used the following drugs: Inhaled.    Allergies  Allergies   Allergen Reactions    Codeine Itching and Nausea    Imitrex [Sumatriptan Succinate] Shortness of Breath and Swelling    Penicillins Rash and Vomiting    Sulfa Drugs Shortness of Breath and Itching    Azithromycin Itching    Dilaudid [Hydromorphone] Itching     sweating    Other Drug Itching     Z Pack    Sensipar [Cinacalcet] Unspecified     Nightmares and leg cramping    Imipramine Shortness of Breath and Itching     anxious    Seroquel [Quetiapine Fumarate]      Triggered urge to hurt self.        Medications  Prior to Admission Medications   Prescriptions Last Dose Informant Patient Reported? Taking?   albuterol (VENTOLIN HFA) 108 (90 Base) MCG/ACT Aero Soln inhalation aerosol   No No   Sig: Inhale 1-2 Puffs every 6 hours as needed for Shortness of Breath.   diazepam (VALIUM) 2 MG Tab   Yes No   Sig: Take 1 Tablet by mouth every 6 hours as needed for Anxiety.   diclofenac sodium (VOLTAREN) 1 % Gel   No No   Sig: Apply 2 g topically 4 times a day as needed.   eletriptan (RELPAX) 20 MG Tab   No No   Sig: Take 1 Tablet by mouth one time as needed for Migraine for up to 1 dose.   fluticasone furoate-vilanterol (BREO ELLIPTA) 100-25 MCG/ACT AEROSOL POWDER, BREATH ACTIVATED   No No   Sig: INHALE ONE DOSE BY MOUTH DAILY - RINSE  MOUTH AFTER EACH USE   omeprazole (PRILOSEC) 40 MG delayed-release capsule   No No   Sig: TAKE ONE CAPSULE BY MOUTH DAILY   ondansetron (ZOFRAN ODT) 4 MG TABLET DISPERSIBLE   No No   Sig: Take 1 Tablet by mouth every 24 hours as needed for Nausea.   propranolol (INDERAL) 40 MG Tab   No No   Sig: TAKE ONE TABLET BY MOUTH TWICE A DAY   rivaroxaban (XARELTO) 20 MG Tab tablet   No No   Sig: Take 1 Tablet by mouth with dinner.   rosuvastatin (CRESTOR) 20 MG Tab   No No   Sig: Take 1 Tablet by mouth every evening.   venlafaxine (EFFEXOR-XR) 150 MG extended-release capsule   No No   Sig: Take 1 Capsule by mouth every day.      Facility-Administered Medications: None       Physical Exam  Temp:  [36.3 °C (97.3 °F)-37.1 °C (98.8 °F)] 36.3 °C (97.3 °F)  Pulse:  [76-92] 82  Resp:  [16-28] 18  BP: (130-191)/(65-96) 170/87  SpO2:  [90 %-97 %] 90 %  Blood Pressure : (!) 149/83   Temperature: 36.7 °C (98.1 °F)   Pulse: 89   Respiration: 18   Pulse Oximetry: 96 %       Physical Exam  Vitals reviewed.   Constitutional:       Appearance: Normal appearance. She is obese. She is not diaphoretic.   HENT:      Head: Normocephalic and atraumatic.      Nose: Nose normal.      Mouth/Throat:      Mouth: Mucous membranes are moist.      Pharynx: No oropharyngeal exudate.   Eyes:      General: No scleral icterus.        Right eye: No discharge.         Left eye: No discharge.      Extraocular Movements: Extraocular movements intact.      Conjunctiva/sclera: Conjunctivae normal.   Cardiovascular:      Rate and Rhythm: Normal rate and regular rhythm.      Pulses:           Radial pulses are 2+ on the right side and 2+ on the left side.        Dorsalis pedis pulses are 2+ on the right side and 2+ on the left side.      Heart sounds: No murmur heard.  Pulmonary:      Effort: Pulmonary effort is normal. No respiratory distress.      Breath sounds: Normal breath sounds. No wheezing or rales.   Abdominal:      General: Bowel sounds are normal. There  "is no distension.      Palpations: Abdomen is soft.   Musculoskeletal:         General: No swelling or tenderness.      Cervical back: No muscular tenderness.      Right lower leg: No edema.      Left lower leg: No edema.   Lymphadenopathy:      Cervical: No cervical adenopathy.   Skin:     Coloration: Skin is not jaundiced or pale.      Findings: Bruising (upper chest/sternum) present.   Neurological:      General: No focal deficit present.      Mental Status: She is alert and oriented to person, place, and time. Mental status is at baseline.      Cranial Nerves: No cranial nerve deficit.   Psychiatric:         Mood and Affect: Mood normal.         Behavior: Behavior normal.         Laboratory:  Recent Labs     09/24/23  1133   WBC 9.2   RBC 4.89   HEMOGLOBIN 15.5   HEMATOCRIT 46.0   MCV 94.1   MCH 31.7   MCHC 33.7   RDW 44.8   PLATELETCT 200   MPV 11.2     Recent Labs     09/24/23  1133   SODIUM 142   POTASSIUM 3.8   CHLORIDE 105   CO2 25   GLUCOSE 121*   BUN 19   CREATININE 0.87   CALCIUM 5.8*     Recent Labs     09/24/23  1133   ALTSGPT 16   ASTSGOT 16   ALKPHOSPHAT 125*   TBILIRUBIN 1.1   GLUCOSE 121*     Recent Labs     09/24/23  1133   APTT 30.5   INR 1.56*     No results for input(s): \"NTPROBNP\" in the last 72 hours.      Recent Labs     09/24/23  1133   TROPONINT 8       Imaging:  DX-CHEST-PORTABLE (1 VIEW)   Final Result      Linear atelectasis in the left lower lung. No focal consolidation. No effusions.               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    Patient will need a Telemetry bed on MEDICAL service .  The need is secondary to potential arrhythmia with severe hypocalcemia.    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  PTH significantly lower after surgery    Pulmonary emphysema (HCC)- (present on admission)  Assessment & Plan  No acute exacerbation  Continue outpatient inhalers  O2 supplementation as needed    Suspected sleep apnea- (present on admission)  Assessment & Plan  Needs outpatient polysomnogram testing  Does not use a bipap    Mixed hyperlipidemia- (present on admission)  Assessment & Plan  States she has not been taking her statin for fear of the side effects she read about.  We discussed the major risk and I alerted her that the benefit to prevent atherosclerotic disease out ways the low change of side effect but should could monitor these with her primary care.  She was willing to take her Crestor again.    Obesity (BMI 30-39.9)- (present on admission)  Assessment & Plan  Body mass index is 36.38 kg/m².    Paroxysmal atrial fibrillation (HCC)- (present on admission)  Assessment & Plan  On rivaroxaban 20mg q pm  Inderal 40mg BID with parameters  Monitor on telemetry    Essential hypertension- (present on admission)  Assessment & Plan  Propranolol.  Monitor vitals.    Bipolar affective disorder (HCC)- (present on admission)  Assessment & Plan  Continue active treatment with venlafaxine 150mg qd        VTE prophylaxis: therapeutic anticoagulation with Xarelto  "

## 2023-09-24 NOTE — ED NOTES
Rounded on patient.  Pt resting comfortably.  Updated on POC, awaiting med from pharmacy.  All needs met.

## 2023-09-24 NOTE — ED PROVIDER NOTES
ED Provider Note    CHIEF COMPLAINT  Chief Complaint   Patient presents with    Chest Pain     Onset Friday, mid sternal pain, pain w/ palpation.  Pt noted to also have bruising over L chest wall.    Post-Op Complications     Possible.  Pt had parathyroid surgery on Wednesday.  Pt has chest wall bruising since after surgery.    Numbness     Pt reports intermittent numbness and tingling to face and bilat hands, onset on Friday.  Pt reports she thought it was from the prescribed post op percocet but stopped taking the percocet and states it has still come and go.    Anxiety     Increased anxiety over the last few days.         EXTERNAL RECORDS REVIEWED  Inpatient Notes operation note from Dr. Malika Zayas on September 20, 2023 the patient underwent parathyroidectomy for hyperparathyroidism    HPI/ROS  LIMITATION TO HISTORY   Select: : None  OUTSIDE HISTORIAN(S):  None    Miryam Veloz is a 73 y.o. female who presents status post parathyroidectomy on September 20, 2023, for hyperparathyroidism, the patient since has been having tingling and muscle tetany.  She also describes chest pain that is in the left upper chest.  She reports she is anticoagulated on Xarelto for history of A-fib.  She denies any fever or cough.  The pain was dull and left-sided and was not worse with palpation or movement.  She has no history of DVT or PE.    PAST MEDICAL HISTORY   has a past medical history of Abnormal CT scan (07/25/2017), Allergy, Arrhythmia (01/2017), Arthritis (12/26/2017), Asthma (12/26/2017), Bipolar affective disorder ([facility]) (1995), Carpal tunnel syndrome on both sides (12/22/2017), Chronic knee pain (2000), Chronic pain of both knees (05/30/2017), Claustrophobia, COPD (chronic obstructive pulmonary disease) (HCC), Dental disorder (12/26/2017), Depression (05/22/2009), Dyspnea on exertion (04/12/2017), Family history of breast cancer in mother, Fibromyalgia, Headache, frequent episodic tension-type, Hemorrhagic  disorder (HCC) (2017), Hepatitis B (), Hypertension, IBD (inflammatory bowel disease), IBS (irritable bowel syndrome), Lumbar facet arthropathy (), Migraine, Pain (2017), Pain (2017), Pain in both knees (10/06/2016), Pneumonia, PTSD (post-traumatic stress disorder) (2017), Renal disorder (2017), and Rotator cuff syndrome of right shoulder ().    SURGICAL HISTORY   has a past surgical history that includes finger or hand incision and drainage (Right, 10/20/2015); cholecystectomy (); appendectomy ('s); primary c section (); dental extraction(s) (); other; knee arthroscopy (Right, 2018); meniscectomy (Right, 2018); debridement (Right, 2018); orif, wrist (Left, 2018); brca1&2 gen full seq dup/del; and explore parathyroid glands (N/A, 2023).    FAMILY HISTORY  Family History   Problem Relation Age of Onset    Cancer Mother         uterine, breast cancer, glioblastoma    Breast Cancer Mother     GI Disease Father         ulcer    Heart Disease Father 40        MIs    Psychiatric Illness Father         anxiety    Alcohol abuse Father     Arthritis Sister         rheumatoid    Cancer Brother         glioblastoma    Stroke Sister 79        mid brain       SOCIAL HISTORY  Social History     Tobacco Use    Smoking status: Former     Current packs/day: 0.00     Average packs/day: 1 pack/day for 20.0 years (20.0 ttl pk-yrs)     Types: Cigarettes     Start date: 1974     Quit date: 1994     Years since quittin.7    Smokeless tobacco: Never    Tobacco comments:     quit 27 years ago   Vaping Use    Vaping Use: Never used   Substance and Sexual Activity    Alcohol use: Yes     Alcohol/week: 1.8 oz     Types: 3 Glasses of wine per week     Comment: 2 per month    Drug use: Not Currently     Types: Inhaled     Comment: H/o Drug  abuse - Meth, Clean x 26 years.    Sexual activity: Not Currently     Partners: Male     Comment: Partner  passed away       CURRENT MEDICATIONS    rivaroxaban (XARELTO) 20 MG Tab tablet 2023    Sig: Take 1 Tablet by mouth with dinner.   Notes to Pharmacy: Start xarelto tomorrow night   Route: Oral   Renewals    Renewal requests to authorizing provider (Malika Zayas M.D.) prohibited      Number of times this order has been changed since signin     Order Audit [place]     propranolol (INDERAL) 40 MG Tab 2023    Sig: TAKE ONE TABLET BY MOUTH TWICE A DAY   Route: Oral   Renewals    Renewal provider: RONI Phan      Number of times this order has been changed since signin     Order Audit Trail     omeprazole (PRILOSEC) 40 MG delayed-release capsule 2023    Sig: TAKE ONE CAPSULE BY MOUTH DAILY   Renewals    Renewal provider: RONI Phan      Number of times this order has been changed since signin     Order Audit [place]     rosuvastatin (CRESTOR) 20 MG Tab 8/3/2023    Sig: Take 1 Tablet by mouth every evening.   Route: Oral   Number of times this order has been changed since signin     Order Audit Trail     albuterol (VENTOLIN HFA) 108 (90 Base) MCG/ACT Aero Soln inhalation aerosol 2023    Sig: Inhale 1-2 Puffs every 6 hours as needed for Shortness of Breath.   Route: Inhalation   Number of times this order has been changed since signin     Order Audit Trail     fluticasone furoate-vilanterol (BREO ELLIPTA) 100-25 MCG/ACT AEROSOL POWDER, BREATH ACTIVATED 2023    Sig: INHALE ONE DOSE BY MOUTH DAILY - RINSE MOUTH AFTER EACH USE   Notes to Pharmacy: Pt needs to make an appt for further refills.   Number of times this order has been changed since signin     Order Audit Trail     venlafaxine (EFFEXOR-XR) 150 MG extended-release capsule 10/26/2022    Sig: Take 1 Capsule by mouth every day.   Route: Oral   Number of times this order has been changed since signin     Order Audit [place]     eletriptan (RELPAX) 20 MG Tab 2022    Sig: Take 1 Tablet  "by mouth one time as needed for Migraine for up to 1 dose.   Route: Oral   Number of times this order has been changed since signin     Order Audit Trail     ondansetron (ZOFRAN ODT) 4 MG TABLET DISPERSIBLE 2022    Sig: Take 1 Tablet by mouth every 24 hours as needed for Nausea.   Route: Oral   Number of times this order has been changed since signin     Order Audit Trail     diclofenac sodium (VOLTAREN) 1 % Gel 3/31/2022    Sig: Apply 2 g topically 4 times a day as needed.   Route: Topical   Number of times this order has been changed since signin     Order Audit Trail     diazepam (VALIUM) 2 MG Tab     Sig: Take 1 Tablet by mouth every 6 hours as needed for Anxiety.   Class: Historical Med   Route: Oral   Number of times this order has been changed since signin     Order Audit [place]         ALLERGIES  Allergies   Allergen Reactions    Codeine Itching and Nausea    Imitrex [Sumatriptan Succinate] Shortness of Breath and Swelling    Penicillins Rash and Vomiting    Sulfa Drugs Shortness of Breath and Itching    Azithromycin Itching    Dilaudid [Hydromorphone] Itching     sweating    Other Drug Itching     Z Pack    Sensipar [Cinacalcet] Unspecified     Nightmares and leg cramping    Imipramine Shortness of Breath and Itching     anxious    Seroquel [Quetiapine Fumarate]      Triggered urge to hurt self.        PHYSICAL EXAM  VITAL SIGNS: BP (!) 141/82   Pulse 76   Temp 36.7 °C (98.1 °F) (Temporal)   Resp 17   Ht 1.778 m (5' 10\")   Wt 114 kg (251 lb)   SpO2 94%   BMI 36.01 kg/m²    Constitutional: Alert.  HENT: No signs of trauma, Bilateral external ears normal, Nose normal.   Eyes: Pupils are equal and reactive, Conjunctiva normal, Non-icteric.   Neck: Normal range of motion, No tenderness, Supple, No stridor.   Lymphatic: No lymphadenopathy noted.   Cardiovascular: Regular rate and rhythm, no murmurs.   Thorax & Lungs: Normal breath sounds, No respiratory distress, No wheezing, No chest " tenderness.   Abdomen: Bowel sounds normal, Soft, No tenderness, No peritoneal signs, No masses, No pulsatile masses.   Skin: Warm, Dry, No erythema, No rash.   Back: No bony tenderness, No CVA tenderness.   Extremities: Intact distal pulses, No edema, No tenderness, No cyanosis.  Musculoskeletal: Good range of motion in all major joints. No tenderness to palpation or major deformities noted.   Neurologic: Alert , Normal motor function, Normal sensory function, No focal deficits noted.   Psychiatric: Affect normal, Judgment normal, Mood normal.       DIAGNOSTIC STUDIES / PROCEDURES  EKG  I have independently interpreted this EKG  Sinus rhythm rate 79  axis normal  intervals normal  Probable left atrial enlargement  Compared to ECG 09/18/2023 10:48:26  Bradycardia, nonsinus no longer present  My impression of this EKG, it does not indicate ischemia nor arrhythmia at this time    LABS  Labs Reviewed   COMP METABOLIC PANEL - Abnormal; Notable for the following components:       Result Value    Glucose 121 (*)     Calcium 5.8 (*)     Correct Calcium 6.0 (*)     Alkaline Phosphatase 125 (*)     All other components within normal limits    Narrative:     Biotin intake of greater than 5 mg per day may interfere with  troponin levels, causing false low values.  Indicate which anticoagulants the patient is on:->UNKNOWN   PROTHROMBIN TIME - Abnormal; Notable for the following components:    PT 18.9 (*)     INR 1.56 (*)     All other components within normal limits    Narrative:     Biotin intake of greater than 5 mg per day may interfere with  troponin levels, causing false low values.  Indicate which anticoagulants the patient is on:->UNKNOWN   TSH - Abnormal; Notable for the following components:    TSH 0.050 (*)     All other components within normal limits    Narrative:     Biotin intake of greater than 5 mg per day may interfere with  troponin levels, causing false low values.  Indicate which anticoagulants the patient  is on:->UNKNOWN   FREE THYROXINE - Abnormal; Notable for the following components:    Free T-4 4.20 (*)     All other components within normal limits    Narrative:     Biotin intake of greater than 5 mg per day may interfere with  troponin levels, causing false low values.  Indicate which anticoagulants the patient is on:->UNKNOWN   PTH WITH IONIZED CALCIUM (AR) - Abnormal; Notable for the following components:    Pth, Intact 3.8 (*)     Ionized Calcium 0.8 (*)     All other components within normal limits   PHOSPHORUS - Abnormal; Notable for the following components:    Phosphorus 5.1 (*)     All other components within normal limits    Narrative:     Biotin intake of greater than 5 mg per day may interfere with  troponin levels, causing false low values.  Indicate which anticoagulants the patient is on:->UNKNOWN   PTH WITH IONIZED CALCIUM (AR) - Abnormal; Notable for the following components:    Pth, Intact 4.0 (*)     Ionized Calcium 0.8 (*)     All other components within normal limits    Narrative:     Biotin intake of greater than 5 mg per day may interfere with  troponin levels, causing false low values.  Indicate which anticoagulants the patient is on:->UNKNOWN   CBC WITH DIFFERENTIAL    Narrative:     Biotin intake of greater than 5 mg per day may interfere with  troponin levels, causing false low values.  Indicate which anticoagulants the patient is on:->UNKNOWN   TROPONIN    Narrative:     Biotin intake of greater than 5 mg per day may interfere with  troponin levels, causing false low values.  Indicate which anticoagulants the patient is on:->UNKNOWN   APTT    Narrative:     Biotin intake of greater than 5 mg per day may interfere with  troponin levels, causing false low values.  Indicate which anticoagulants the patient is on:->UNKNOWN   MAGNESIUM    Narrative:     Biotin intake of greater than 5 mg per day may interfere with  troponin levels, causing false low values.  Indicate which anticoagulants the  patient is on:->UNKNOWN   ESTIMATED GFR    Narrative:     Biotin intake of greater than 5 mg per day may interfere with  troponin levels, causing false low values.  Indicate which anticoagulants the patient is on:->UNKNOWN         RADIOLOGY  I have independently interpreted the diagnostic imaging associated with this visit and am waiting the final reading from the radiologist.   My preliminary interpretation is as follows: No pneumonia on chest x-ray  Radiologist interpretation:     DX-CHEST-PORTABLE (1 VIEW)   Final Result      Linear atelectasis in the left lower lung. No focal consolidation. No effusions.               COURSE & MEDICAL DECISION MAKING    ED Observation Status? Yes; I am placing the patient in to an observation status due to a diagnostic uncertainty as well as therapeutic intensity. Patient placed in observation status at 11:34 AM, 9/24/2023.     Observation plan is as follows: The patient presents with symptoms clinically of hypocalcemia.  She also has dull left-sided chest pain.  She is currently anticoagulated and has no history of DVT or PE.  Chest x-ray was ordered, EKG was ordered, labs were ordered including troponin and calcium.    Upon Reevaluation, the patient's condition has: not improved; and will be escalated to hospitalization.    Patient discharged from ED Observation status at 12:40 PM (Time) September 24, 2023 (Date).     INITIAL ASSESSMENT, COURSE AND PLAN  Care Narrative:     The patient's calcium is low consistent with her clinical presentation.  Her troponin is normal.  She is very low likelihood for PE because she is anticoagulated and has no history of PE or DVT.  She is anticoagulated for A-fib.        ADDITIONAL PROBLEM LIST  History of hyperparathyroidism with surgery, history of A-fib, chronic anticoagulation  DISPOSITION AND DISCUSSIONS  I have discussed management of the patient with the following physicians and KARIS's: [facility]  [facility] the patient for [facility].    Discussion of management with other \[facility] [place] [facility]\"" or appropriate source(s): None       Barriers to care at this time, including but not limited to:  None .     Decision tools and prescription drugs considered including, but not limited to: HEART Score 5 .      CRITICAL CARE  The very real possibilty of a deterioration of this patient's condition required the highest level of my preparedness for sudden, emergent intervention.  I provided critical care services, which included medication orders, frequent reevaluations of the patient's condition and response to treatment, ordering and reviewing test results, and discussing the case with various consultants.  The critical care time associated with the care of the patient was 40 minutes. Review chart for interventions. This time is exclusive of any other billable procedures.       FINAL DIAGNOSIS  1. Hypocalcemia    2. Acute chest pain      Total critical care time 40 minutes as outlined above    Electronically signed by: Nir Chiu M.D., 9/24/2023 11:34 AM

## 2023-09-24 NOTE — ED TRIAGE NOTES
"Miryam Yessica Veloz  73 y.o.  Female  [facility] from home for     Chief Complaint   Patient presents with    Chest Pain     Onset Friday, mid sternal pain, pain w/ palpation.  Pt noted to also have bruising over L chest wall.    Post-Op Complications     Possible.  Pt had parathyroid surgery on Wednesday.  Pt has chest wall bruising since after surgery.    Numbness     Pt reports intermittent numbness and tingling to face and bilat hands, onset on Friday.  Pt reports she thought it was from the prescribed post op percocet but stopped taking the percocet and states it has still come and go.    Anxiety     Increased anxiety over the last few days.       PIV placed pta and pt was given 100mcg Fentanyl pta.  Neuro intact, NAD, pt anxious/hypertensive otherwise VSS.  Pt denies recent fevers or illness.    BP (!) 184/83   Pulse 84   Temp 36.7 °C (98.1 °F) (Temporal)   Resp 18   Ht 1.778 m (5' 10\")   Wt 114 kg (251 lb)   SpO2 91%   BMI 36.01 kg/m²     "

## 2023-09-25 LAB
ALBUMIN SERPL BCP-MCNC: 3.3 G/DL (ref 3.2–4.9)
ANION GAP SERPL CALC-SCNC: 15 MMOL/L (ref 7–16)
BUN SERPL-MCNC: 19 MG/DL (ref 8–22)
CA-I SERPL-SCNC: 0.9 MMOL/L (ref 1.1–1.3)
CA-I SERPL-SCNC: 0.9 MMOL/L (ref 1.1–1.3)
CALCIUM SERPL-MCNC: 6.8 MG/DL (ref 8.5–10.5)
CHLORIDE SERPL-SCNC: 105 MMOL/L (ref 96–112)
CO2 SERPL-SCNC: 21 MMOL/L (ref 20–33)
CREAT SERPL-MCNC: 0.84 MG/DL (ref 0.5–1.4)
ERYTHROCYTE [DISTWIDTH] IN BLOOD BY AUTOMATED COUNT: 45.1 FL (ref 35.9–50)
GFR SERPLBLD CREATININE-BSD FMLA CKD-EPI: 74 ML/MIN/1.73 M 2
GLUCOSE SERPL-MCNC: 97 MG/DL (ref 65–99)
HCT VFR BLD AUTO: 42.7 % (ref 37–47)
HGB BLD-MCNC: 14.6 G/DL (ref 12–16)
MCH RBC QN AUTO: 32.6 PG (ref 27–33)
MCHC RBC AUTO-ENTMCNC: 34.2 G/DL (ref 32.2–35.5)
MCV RBC AUTO: 95.3 FL (ref 81.4–97.8)
PHOSPHATE SERPL-MCNC: 5.8 MG/DL (ref 2.5–4.5)
PLATELET # BLD AUTO: 180 K/UL (ref 164–446)
PMV BLD AUTO: 11.3 FL (ref 9–12.9)
POTASSIUM SERPL-SCNC: 3.6 MMOL/L (ref 3.6–5.5)
RBC # BLD AUTO: 4.48 M/UL (ref 4.2–5.4)
SODIUM SERPL-SCNC: 141 MMOL/L (ref 135–145)
WBC # BLD AUTO: 9.3 K/UL (ref 4.8–10.8)

## 2023-09-25 PROCEDURE — 36415 COLL VENOUS BLD VENIPUNCTURE: CPT

## 2023-09-25 PROCEDURE — 700105 HCHG RX REV CODE 258: Performed by: HOSPITALIST

## 2023-09-25 PROCEDURE — 82330 ASSAY OF CALCIUM: CPT

## 2023-09-25 PROCEDURE — 700102 HCHG RX REV CODE 250 W/ 637 OVERRIDE(OP): Performed by: INTERNAL MEDICINE

## 2023-09-25 PROCEDURE — 700111 HCHG RX REV CODE 636 W/ 250 OVERRIDE (IP): Mod: JZ | Performed by: INTERNAL MEDICINE

## 2023-09-25 PROCEDURE — 700105 HCHG RX REV CODE 258: Performed by: INTERNAL MEDICINE

## 2023-09-25 PROCEDURE — 85027 COMPLETE CBC AUTOMATED: CPT

## 2023-09-25 PROCEDURE — 700102 HCHG RX REV CODE 250 W/ 637 OVERRIDE(OP)

## 2023-09-25 PROCEDURE — 99233 SBSQ HOSP IP/OBS HIGH 50: CPT | Performed by: INTERNAL MEDICINE

## 2023-09-25 PROCEDURE — 84100 ASSAY OF PHOSPHORUS: CPT

## 2023-09-25 PROCEDURE — 770020 HCHG ROOM/CARE - TELE (206)

## 2023-09-25 PROCEDURE — 82040 ASSAY OF SERUM ALBUMIN: CPT

## 2023-09-25 PROCEDURE — A9270 NON-COVERED ITEM OR SERVICE: HCPCS

## 2023-09-25 PROCEDURE — 700102 HCHG RX REV CODE 250 W/ 637 OVERRIDE(OP): Performed by: HOSPITALIST

## 2023-09-25 PROCEDURE — 80048 BASIC METABOLIC PNL TOTAL CA: CPT

## 2023-09-25 PROCEDURE — 700111 HCHG RX REV CODE 636 W/ 250 OVERRIDE (IP): Mod: JZ | Performed by: HOSPITALIST

## 2023-09-25 PROCEDURE — A9270 NON-COVERED ITEM OR SERVICE: HCPCS | Performed by: INTERNAL MEDICINE

## 2023-09-25 PROCEDURE — A9270 NON-COVERED ITEM OR SERVICE: HCPCS | Performed by: HOSPITALIST

## 2023-09-25 RX ORDER — LORAZEPAM 1 MG/1
1 TABLET ORAL ONCE
Status: COMPLETED | OUTPATIENT
Start: 2023-09-25 | End: 2023-09-25

## 2023-09-25 RX ORDER — TRAZODONE HYDROCHLORIDE 50 MG/1
50 TABLET ORAL
Status: DISCONTINUED | OUTPATIENT
Start: 2023-09-25 | End: 2023-09-27 | Stop reason: HOSPADM

## 2023-09-25 RX ORDER — CALCIUM CARBONATE 500 MG/1
1000 TABLET, CHEWABLE ORAL
Status: DISCONTINUED | OUTPATIENT
Start: 2023-09-25 | End: 2023-09-27

## 2023-09-25 RX ORDER — LORAZEPAM 0.5 MG/1
0.5 TABLET ORAL 2 TIMES DAILY PRN
Status: DISCONTINUED | OUTPATIENT
Start: 2023-09-25 | End: 2023-09-26

## 2023-09-25 RX ORDER — OXYCODONE HYDROCHLORIDE AND ACETAMINOPHEN 5; 325 MG/1; MG/1
1 TABLET ORAL EVERY 6 HOURS PRN
Status: DISCONTINUED | OUTPATIENT
Start: 2023-09-25 | End: 2023-09-27 | Stop reason: HOSPADM

## 2023-09-25 RX ORDER — VITAMIN B COMPLEX
2000 TABLET ORAL DAILY
Status: DISCONTINUED | OUTPATIENT
Start: 2023-09-25 | End: 2023-09-27 | Stop reason: HOSPADM

## 2023-09-25 RX ADMIN — ROSUVASTATIN CALCIUM 20 MG: 20 TABLET, FILM COATED ORAL at 17:38

## 2023-09-25 RX ADMIN — CALCIUM CHLORIDE 1000 MG: 100 INJECTION, SOLUTION INTRAVENOUS at 09:38

## 2023-09-25 RX ADMIN — MOMETASONE FUROATE AND FORMOTEROL FUMARATE DIHYDRATE 2 PUFF: 200; 5 AEROSOL RESPIRATORY (INHALATION) at 04:16

## 2023-09-25 RX ADMIN — VENLAFAXINE HYDROCHLORIDE 150 MG: 75 CAPSULE, EXTENDED RELEASE ORAL at 04:15

## 2023-09-25 RX ADMIN — OMEPRAZOLE 40 MG: 20 CAPSULE, DELAYED RELEASE ORAL at 04:14

## 2023-09-25 RX ADMIN — PROPRANOLOL HYDROCHLORIDE 40 MG: 20 TABLET ORAL at 17:40

## 2023-09-25 RX ADMIN — CALCIUM CHLORIDE 1000 MG: 100 INJECTION, SOLUTION INTRAVENOUS at 18:49

## 2023-09-25 RX ADMIN — Medication 2000 UNITS: at 09:39

## 2023-09-25 RX ADMIN — LORAZEPAM 1 MG: 1 TABLET ORAL at 17:38

## 2023-09-25 RX ADMIN — RIVAROXABAN 20 MG: 20 TABLET, FILM COATED ORAL at 17:39

## 2023-09-25 RX ADMIN — OXYCODONE AND ACETAMINOPHEN 1 TABLET: 5; 325 TABLET ORAL at 04:41

## 2023-09-25 RX ADMIN — MOMETASONE FUROATE AND FORMOTEROL FUMARATE DIHYDRATE 2 PUFF: 200; 5 AEROSOL RESPIRATORY (INHALATION) at 17:43

## 2023-09-25 RX ADMIN — TRAMADOL HYDROCHLORIDE 50 MG: 50 TABLET ORAL at 03:30

## 2023-09-25 RX ADMIN — ANTACID TABLETS 500 MG: 500 TABLET, CHEWABLE ORAL at 15:27

## 2023-09-25 RX ADMIN — ANTACID TABLETS 1000 MG: 500 TABLET, CHEWABLE ORAL at 17:40

## 2023-09-25 RX ADMIN — PROPRANOLOL HYDROCHLORIDE 40 MG: 20 TABLET ORAL at 04:14

## 2023-09-25 RX ADMIN — LORAZEPAM 0.5 MG: 0.5 TABLET ORAL at 15:27

## 2023-09-25 RX ADMIN — OXYCODONE AND ACETAMINOPHEN 1 TABLET: 5; 325 TABLET ORAL at 20:51

## 2023-09-25 RX ADMIN — ANTACID TABLETS 500 MG: 500 TABLET, CHEWABLE ORAL at 09:39

## 2023-09-25 ASSESSMENT — FIBROSIS 4 INDEX: FIB4 SCORE: 1.577777777777777778

## 2023-09-25 ASSESSMENT — ENCOUNTER SYMPTOMS
FOCAL WEAKNESS: 0
WEAKNESS: 1
ABDOMINAL PAIN: 0
NERVOUS/ANXIOUS: 1
SHORTNESS OF BREATH: 1
TINGLING: 1

## 2023-09-25 ASSESSMENT — PAIN DESCRIPTION - PAIN TYPE
TYPE: ACUTE PAIN
TYPE: ACUTE PAIN

## 2023-09-25 NOTE — ASSESSMENT & PLAN NOTE
Due to recent parathyroidectomy  Patient said Dr. Zayas did leave part of her a parathyroid gland  Continue to trend calcium levels replating with IV and increasing p.o. supplement

## 2023-09-25 NOTE — PROGRESS NOTES
Hospital Medicine Daily Progress Note    Date of Service  9/25/2023    Chief Complaint  Miryam Veloz is a 73 y.o. female admitted 9/24/2023 with hypocalcemia    Hospital Course  72 yo woman with A-fib, COPD, parathyroidectomy 4 days ago with Dr. Zayas who was discharged and presents with tingling to her face and arms.  She was found severely hypocalcemic.    Interval Problem Update  She continues to have tingling to her lips and hands.  She is very anxious and having panic attacks.  Calcium levels remain low, continue with IV and increasing oral repletion.  Ativan given for her anxiety.    I have discussed this patient's plan of care and discharge plan at IDT rounds today with Case Management, Nursing, Nursing leadership, and other members of the IDT team.    Consultants/Specialty  none    Code Status  Full Code    Disposition  The patient is not medically cleared for discharge to home or a post-acute facility.  Anticipate discharge to: home with close outpatient follow-up    I have placed the appropriate orders for post-discharge needs.    Review of Systems  Review of Systems   Respiratory:  Positive for shortness of breath.    Cardiovascular:  Negative for chest pain.   Gastrointestinal:  Negative for abdominal pain.   Neurological:  Positive for tingling and weakness. Negative for focal weakness.   Psychiatric/Behavioral:  The patient is nervous/anxious.         Physical Exam  Temp:  [35.9 °C (96.6 °F)-37.1 °C (98.8 °F)] 36.8 °C (98.2 °F)  Pulse:  [81-85] 81  Resp:  [16-18] 17  BP: (151-170)/(79-94) 156/82  SpO2:  [90 %-96 %] 96 %    Physical Exam  Vitals and nursing note reviewed.   Constitutional:       General: She is not in acute distress.     Appearance: She is not toxic-appearing.   HENT:      Head: Normocephalic.      Mouth/Throat:      Mouth: Mucous membranes are moist.   Eyes:      General:         Right eye: No discharge.         Left eye: No discharge.   Cardiovascular:      Rate and Rhythm:  Normal rate and regular rhythm.   Pulmonary:      Effort: Pulmonary effort is normal. No respiratory distress.      Breath sounds: No wheezing or rales.   Abdominal:      Palpations: Abdomen is soft.      Tenderness: There is no abdominal tenderness.   Musculoskeletal:         General: No swelling.      Cervical back: Neck supple.   Skin:     General: Skin is warm and dry.   Neurological:      Mental Status: She is alert and oriented to person, place, and time.      Comments: I did not elicit [place] sign on exam   Psychiatric:         Mood and Affect: Mood is anxious.         Fluids    Intake/Output Summary (Last 24 hours) at 9/25/2023 1822  Last data filed at 9/24/2023 2200  Gross per 24 hour   Intake 120 ml   Output --   Net 120 ml       Laboratory  Recent Labs     09/24/23  1133 09/25/23  0112   WBC 9.2 9.3   RBC 4.89 4.48   HEMOGLOBIN 15.5 14.6   HEMATOCRIT 46.0 42.7   MCV 94.1 95.3   MCH 31.7 32.6   MCHC 33.7 34.2   RDW 44.8 45.1   PLATELETCT 200 180   MPV 11.2 11.3     Recent Labs     09/24/23  1133 09/25/23  0112   SODIUM 142 141   POTASSIUM 3.8 3.6   CHLORIDE 105 105   CO2 25 21   GLUCOSE 121* 97   BUN 19 19   CREATININE 0.87 0.84   CALCIUM 5.8* 6.8*     Recent Labs     09/24/23  1133   APTT 30.5   INR 1.56*               Imaging  DX-CHEST-PORTABLE (1 VIEW)   Final Result      Linear atelectasis in the left lower lung. No focal consolidation. No effusions.              Assessment/Plan  * Hypocalcemia- (present on admission)  Assessment & Plan  Due to recent parathyroidectomy  Patient said Dr. Zayas did leave part of her a parathyroid gland  Continue to trend calcium levels replating with IV and increasing p.o. supplement    Pulmonary emphysema (HCC)- (present on admission)  Assessment & Plan  No acute exacerbation  Continue outpatient inhalers  O2 supplementation as needed    Suspected sleep apnea- (present on admission)  Assessment & Plan  Needs outpatient polysomnogram testing  Does not use a  bipap    Mixed hyperlipidemia- (present on admission)  Assessment & Plan  Restarted on Crestor    Obesity (BMI 30-39.9)- (present on admission)  Assessment & Plan  Body mass index is 36.38 kg/m².    Paroxysmal atrial fibrillation (HCC)- (present on admission)  Assessment & Plan  On rivaroxaban 20mg q pm  Inderal 40mg BID with parameters  Monitor on telemetry    Essential hypertension- (present on admission)  Assessment & Plan  Propranolol.  Monitor vitals.    Bipolar affective disorder (HCC)- (present on admission)  Assessment & Plan  Continue active treatment with venlafaxine 150mg qd         VTE prophylaxis:    therapeutic anticoagulation with xarelto 20 mg daily witih meals      I have performed a physical exam and reviewed and updated ROS and Plan today (9/25/2023). In review of yesterday's note (9/24/2023), there are no changes except as documented above.

## 2023-09-25 NOTE — PROGRESS NOTES
4 Eyes Skin Assessment Completed by YIN Sargent and YIN Peacock.    Head WDL  Ears WDL  Nose WDL  Mouth WDL  Neck Scab, Bruising, and Incision  Breast/Chest Bruising  Shoulder Blades WDL  Spine WDL  (R) Arm/Elbow/Hand WDL  (L) Arm/Elbow/Hand WDL  Abdomen WDL  Groin WDL  Scrotum/Coccyx/Buttocks WDL  (R) Leg WDL  (L) Leg Scar at ankle   (R) Heel/Foot/Toe WDL  (L) Heel/Foot/Toe WDL          Devices In Places Tele Box, Blood Pressure Cuff, and Pulse Ox      Interventions In Place Pillows and Pressure Redistribution Mattress    Possible Skin Injury No    Pictures Uploaded Into Epic No, needs to be completed  Wound Consult Placed N/A  RN Wound Prevention Protocol Ordered No
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
## 2023-09-25 NOTE — ED NOTES
Bed assigned to tele, report called, pt to the floor with tele staff x 2.  VSS and all belongings with patient on transfer

## 2023-09-25 NOTE — PROGRESS NOTES
Pt received from ED. Report from YIN Sosa received.Tele monitor in place. VSS. Pt oriented to room. Educated on use of the call light. Pt demonstrated use of the call light. Discussed POC. All questions answered.

## 2023-09-25 NOTE — RESPIRATORY CARE
COPD EDUCATION by COPD CLINICAL EDUCATOR  9/25/2023 at 8:10 AM by Patrica Skinner RRT     Patient reviewed by COPD education team. Patient does not qualify for the COPD program.             COPD Assessment  COPD Clinical Specialists ONLY  COPD Education Initiated: No--Quick Screen (Followed by Pulm for mild intermintent Asthma, normal PFT in 2021, quit smoking in 1994)  Interdisciplinary Rounds: Attendance at Rounds (30 Min)

## 2023-09-25 NOTE — PROGRESS NOTES
Report received by day shift RN. PT awake alert and oriented x 4 with c/o generalized pain. Pt medicated per MAR. Discussed POC. All needs met at this time.

## 2023-09-26 PROBLEM — E20.9 HYPOPARATHYROIDISM (HCC): Status: ACTIVE | Noted: 2023-09-26

## 2023-09-26 LAB
ANION GAP SERPL CALC-SCNC: 14 MMOL/L (ref 7–16)
BUN SERPL-MCNC: 17 MG/DL (ref 8–22)
CA-I SERPL-SCNC: 0.9 MMOL/L (ref 1.1–1.3)
CALCIUM SERPL-MCNC: 7.1 MG/DL (ref 8.5–10.5)
CHLORIDE SERPL-SCNC: 105 MMOL/L (ref 96–112)
CO2 SERPL-SCNC: 23 MMOL/L (ref 20–33)
CREAT SERPL-MCNC: 0.88 MG/DL (ref 0.5–1.4)
GFR SERPLBLD CREATININE-BSD FMLA CKD-EPI: 70 ML/MIN/1.73 M 2
GLUCOSE SERPL-MCNC: 134 MG/DL (ref 65–99)
MAGNESIUM SERPL-MCNC: 1.5 MG/DL (ref 1.5–2.5)
PHOSPHATE SERPL-MCNC: 6.5 MG/DL (ref 2.5–4.5)
POTASSIUM SERPL-SCNC: 3.7 MMOL/L (ref 3.6–5.5)
PTH-INTACT SERPL-MCNC: 3.4 PG/ML (ref 14–72)
SODIUM SERPL-SCNC: 142 MMOL/L (ref 135–145)

## 2023-09-26 PROCEDURE — A9270 NON-COVERED ITEM OR SERVICE: HCPCS | Performed by: HOSPITALIST

## 2023-09-26 PROCEDURE — 700105 HCHG RX REV CODE 258

## 2023-09-26 PROCEDURE — 700102 HCHG RX REV CODE 250 W/ 637 OVERRIDE(OP): Performed by: INTERNAL MEDICINE

## 2023-09-26 PROCEDURE — 770020 HCHG ROOM/CARE - TELE (206)

## 2023-09-26 PROCEDURE — 700102 HCHG RX REV CODE 250 W/ 637 OVERRIDE(OP)

## 2023-09-26 PROCEDURE — 700111 HCHG RX REV CODE 636 W/ 250 OVERRIDE (IP): Mod: JZ

## 2023-09-26 PROCEDURE — 84100 ASSAY OF PHOSPHORUS: CPT

## 2023-09-26 PROCEDURE — 700105 HCHG RX REV CODE 258: Performed by: INTERNAL MEDICINE

## 2023-09-26 PROCEDURE — 80048 BASIC METABOLIC PNL TOTAL CA: CPT

## 2023-09-26 PROCEDURE — 99233 SBSQ HOSP IP/OBS HIGH 50: CPT | Performed by: INTERNAL MEDICINE

## 2023-09-26 PROCEDURE — A9270 NON-COVERED ITEM OR SERVICE: HCPCS | Performed by: INTERNAL MEDICINE

## 2023-09-26 PROCEDURE — 82330 ASSAY OF CALCIUM: CPT

## 2023-09-26 PROCEDURE — 700102 HCHG RX REV CODE 250 W/ 637 OVERRIDE(OP): Performed by: HOSPITALIST

## 2023-09-26 PROCEDURE — 700111 HCHG RX REV CODE 636 W/ 250 OVERRIDE (IP): Mod: JZ | Performed by: INTERNAL MEDICINE

## 2023-09-26 PROCEDURE — A9270 NON-COVERED ITEM OR SERVICE: HCPCS

## 2023-09-26 PROCEDURE — 83735 ASSAY OF MAGNESIUM: CPT

## 2023-09-26 PROCEDURE — 82652 VIT D 1 25-DIHYDROXY: CPT

## 2023-09-26 PROCEDURE — 83970 ASSAY OF PARATHORMONE: CPT

## 2023-09-26 RX ORDER — DIAZEPAM 5 MG/1
5 TABLET ORAL EVERY 6 HOURS PRN
Status: DISCONTINUED | OUTPATIENT
Start: 2023-09-26 | End: 2023-09-27 | Stop reason: HOSPADM

## 2023-09-26 RX ORDER — CALCITRIOL 0.25 UG/1
0.25 CAPSULE, LIQUID FILLED ORAL DAILY
Status: DISCONTINUED | OUTPATIENT
Start: 2023-09-26 | End: 2023-09-27 | Stop reason: HOSPADM

## 2023-09-26 RX ORDER — MAGNESIUM SULFATE HEPTAHYDRATE 40 MG/ML
2 INJECTION, SOLUTION INTRAVENOUS ONCE
Status: COMPLETED | OUTPATIENT
Start: 2023-09-26 | End: 2023-09-26

## 2023-09-26 RX ORDER — OXYCODONE HYDROCHLORIDE 5 MG/1
5 TABLET ORAL EVERY 4 HOURS PRN
Status: DISCONTINUED | OUTPATIENT
Start: 2023-09-26 | End: 2023-09-27 | Stop reason: HOSPADM

## 2023-09-26 RX ADMIN — MAGNESIUM SULFATE HEPTAHYDRATE 2 G: 2 INJECTION, SOLUTION INTRAVENOUS at 17:52

## 2023-09-26 RX ADMIN — TRAZODONE HYDROCHLORIDE 50 MG: 50 TABLET ORAL at 23:30

## 2023-09-26 RX ADMIN — RIVAROXABAN 20 MG: 20 TABLET, FILM COATED ORAL at 18:04

## 2023-09-26 RX ADMIN — ANTACID TABLETS 1000 MG: 500 TABLET, CHEWABLE ORAL at 13:28

## 2023-09-26 RX ADMIN — CALCITRIOL CAPSULES 0.25 MCG 0.25 MCG: 0.25 CAPSULE ORAL at 13:28

## 2023-09-26 RX ADMIN — CALCIUM CHLORIDE 1000 MG: 100 INJECTION, SOLUTION INTRAVENOUS at 20:02

## 2023-09-26 RX ADMIN — ANTACID TABLETS 1000 MG: 500 TABLET, CHEWABLE ORAL at 17:45

## 2023-09-26 RX ADMIN — VENLAFAXINE HYDROCHLORIDE 150 MG: 75 CAPSULE, EXTENDED RELEASE ORAL at 06:19

## 2023-09-26 RX ADMIN — PROPRANOLOL HYDROCHLORIDE 40 MG: 20 TABLET ORAL at 18:03

## 2023-09-26 RX ADMIN — ANTACID TABLETS 1000 MG: 500 TABLET, CHEWABLE ORAL at 09:55

## 2023-09-26 RX ADMIN — ROSUVASTATIN CALCIUM 20 MG: 20 TABLET, FILM COATED ORAL at 18:04

## 2023-09-26 RX ADMIN — OXYCODONE AND ACETAMINOPHEN 1 TABLET: 5; 325 TABLET ORAL at 20:47

## 2023-09-26 RX ADMIN — PROPRANOLOL HYDROCHLORIDE 40 MG: 20 TABLET ORAL at 04:11

## 2023-09-26 RX ADMIN — OXYCODONE AND ACETAMINOPHEN 1 TABLET: 5; 325 TABLET ORAL at 02:49

## 2023-09-26 RX ADMIN — OMEPRAZOLE 40 MG: 20 CAPSULE, DELAYED RELEASE ORAL at 04:11

## 2023-09-26 RX ADMIN — Medication 2000 UNITS: at 04:11

## 2023-09-26 RX ADMIN — DIAZEPAM 5 MG: 5 TABLET ORAL at 13:28

## 2023-09-26 RX ADMIN — OXYCODONE HYDROCHLORIDE 5 MG: 5 TABLET ORAL at 13:28

## 2023-09-26 RX ADMIN — CALCIUM CHLORIDE 1000 MG: 100 INJECTION, SOLUTION INTRAVENOUS at 02:46

## 2023-09-26 ASSESSMENT — ENCOUNTER SYMPTOMS
TINGLING: 1
WEAKNESS: 1
FOCAL WEAKNESS: 0
SHORTNESS OF BREATH: 1
ABDOMINAL PAIN: 0
NERVOUS/ANXIOUS: 1

## 2023-09-26 ASSESSMENT — PAIN DESCRIPTION - PAIN TYPE
TYPE: ACUTE PAIN

## 2023-09-26 ASSESSMENT — FIBROSIS 4 INDEX: FIB4 SCORE: 1.577777777777777778

## 2023-09-26 NOTE — PROGRESS NOTES
Bedside report received, assumed pt care@2119. Pt A&Ox4. Pt denies any pain at this time. Pt anxious and tearful this morning. POC discussed, pt verbalized understanding.  Call light within reach.

## 2023-09-27 VITALS
SYSTOLIC BLOOD PRESSURE: 120 MMHG | RESPIRATION RATE: 17 BRPM | HEART RATE: 83 BPM | WEIGHT: 251.1 LBS | HEIGHT: 70 IN | DIASTOLIC BLOOD PRESSURE: 53 MMHG | OXYGEN SATURATION: 92 % | BODY MASS INDEX: 35.95 KG/M2 | TEMPERATURE: 98.6 F

## 2023-09-27 LAB
ANION GAP SERPL CALC-SCNC: 13 MMOL/L (ref 7–16)
BUN SERPL-MCNC: 18 MG/DL (ref 8–22)
CA-I SERPL-SCNC: 0.9 MMOL/L (ref 1.1–1.3)
CALCIUM SERPL-MCNC: 6.8 MG/DL (ref 8.5–10.5)
CHLORIDE SERPL-SCNC: 103 MMOL/L (ref 96–112)
CO2 SERPL-SCNC: 25 MMOL/L (ref 20–33)
CREAT SERPL-MCNC: 0.99 MG/DL (ref 0.5–1.4)
GFR SERPLBLD CREATININE-BSD FMLA CKD-EPI: 61 ML/MIN/1.73 M 2
GLUCOSE SERPL-MCNC: 123 MG/DL (ref 65–99)
POTASSIUM SERPL-SCNC: 4 MMOL/L (ref 3.6–5.5)
SODIUM SERPL-SCNC: 141 MMOL/L (ref 135–145)

## 2023-09-27 PROCEDURE — 700102 HCHG RX REV CODE 250 W/ 637 OVERRIDE(OP): Performed by: INTERNAL MEDICINE

## 2023-09-27 PROCEDURE — 700111 HCHG RX REV CODE 636 W/ 250 OVERRIDE (IP)

## 2023-09-27 PROCEDURE — 82330 ASSAY OF CALCIUM: CPT

## 2023-09-27 PROCEDURE — 80048 BASIC METABOLIC PNL TOTAL CA: CPT

## 2023-09-27 PROCEDURE — A9270 NON-COVERED ITEM OR SERVICE: HCPCS | Performed by: INTERNAL MEDICINE

## 2023-09-27 PROCEDURE — 700102 HCHG RX REV CODE 250 W/ 637 OVERRIDE(OP): Performed by: HOSPITALIST

## 2023-09-27 PROCEDURE — A9270 NON-COVERED ITEM OR SERVICE: HCPCS | Performed by: HOSPITALIST

## 2023-09-27 PROCEDURE — 99239 HOSP IP/OBS DSCHRG MGMT >30: CPT | Performed by: INTERNAL MEDICINE

## 2023-09-27 RX ORDER — CALCIUM CARBONATE 500 MG/1
1000 TABLET, CHEWABLE ORAL 4 TIMES DAILY
Status: DISCONTINUED | OUTPATIENT
Start: 2023-09-27 | End: 2023-09-27 | Stop reason: HOSPADM

## 2023-09-27 RX ORDER — CALCIUM GLUCONATE 20 MG/ML
2 INJECTION, SOLUTION INTRAVENOUS ONCE
Status: COMPLETED | OUTPATIENT
Start: 2023-09-27 | End: 2023-09-27

## 2023-09-27 RX ORDER — CALCITRIOL 0.25 UG/1
0.25 CAPSULE, LIQUID FILLED ORAL DAILY
Qty: 30 CAPSULE | Refills: 0 | Status: SHIPPED | OUTPATIENT
Start: 2023-09-28 | End: 2023-11-30

## 2023-09-27 RX ADMIN — Medication 2000 UNITS: at 04:10

## 2023-09-27 RX ADMIN — DIAZEPAM 5 MG: 5 TABLET ORAL at 05:46

## 2023-09-27 RX ADMIN — CALCITRIOL CAPSULES 0.25 MCG 0.25 MCG: 0.25 CAPSULE ORAL at 04:10

## 2023-09-27 RX ADMIN — CALCIUM GLUCONATE 2 G: 20 INJECTION, SOLUTION INTRAVENOUS at 04:14

## 2023-09-27 RX ADMIN — ANTACID TABLETS 1000 MG: 500 TABLET, CHEWABLE ORAL at 08:01

## 2023-09-27 RX ADMIN — OMEPRAZOLE 40 MG: 20 CAPSULE, DELAYED RELEASE ORAL at 04:09

## 2023-09-27 RX ADMIN — VENLAFAXINE HYDROCHLORIDE 150 MG: 75 CAPSULE, EXTENDED RELEASE ORAL at 04:10

## 2023-09-27 RX ADMIN — PROPRANOLOL HYDROCHLORIDE 40 MG: 20 TABLET ORAL at 04:09

## 2023-09-27 ASSESSMENT — FIBROSIS 4 INDEX: FIB4 SCORE: 1.577777777777777778

## 2023-09-27 NOTE — PROGRESS NOTES
Bedside report received. No overnight events per night RN. Patient A&O x 4. Anxious, states she would like to be discharged today. VS'S. RA. SR on telemetry. CA this morning 6.8 corrected 7.4 no complaints at this time. POC discussed with patient. Pt verbalizes understanding. Call light and belongings with in reach. Bed locked and in lowest position, alarm and fall precautions in place.

## 2023-09-27 NOTE — DISCHARGE SUMMARY
Discharge Summary    CHIEF COMPLAINT ON ADMISSION  Chief Complaint   Patient presents with    Chest Pain     Onset Friday, mid sternal pain, pain w/ palpation.  Pt noted to also have bruising over L chest wall.    Post-Op Complications     Possible.  Pt had parathyroid surgery on Wednesday.  Pt has chest wall bruising since after surgery.    Numbness     Pt reports intermittent numbness and tingling to face and bilat hands, onset on Friday.  Pt reports she thought it was from the prescribed post op percocet but stopped taking the percocet and states it has still come and go.    Anxiety     Increased anxiety over the last few days.         Reason for Admission  EMS     Admission Date  9/24/2023    CODE STATUS  Full Code    HPI & HOSPITAL COURSE  72 yo woman with A-fib, COPD, parathyroidectomy 4 days ago with Dr. Zayas who was discharged and presents with tingling to her face and arms.  She was found severely hypocalcemic with a calcium of 5.8.  She was admitted to the telemetry unit with continuous cardiac monitoring and was given IV and oral calcium replacement.  Her PTH was noted to be low, alk phos was elevated consistent with hypoparathyroidism.  She was started on calcitriol and vitamin D.  Her symptoms improved with calcium supplementation and she will be discharged home with close outpatient follow-up with PCP    Therefore, she is discharged in good and stable condition to home with close outpatient follow-up.    The patient met 2-midnight criteria for an inpatient stay at the time of discharge.    Discharge Date  9/27/23    FOLLOW UP ITEMS POST DISCHARGE  Follow up with PCP    DISCHARGE DIAGNOSES  Principal Problem:    Hypocalcemia (POA: Yes)  Active Problems:    Bipolar affective disorder (HCC) (POA: Yes)    Essential hypertension (POA: Yes)    Paroxysmal atrial fibrillation (HCC) (POA: Yes)    Obesity (BMI 30-39.9) (POA: Yes)    Mixed hyperlipidemia (POA: Yes)      Overview: Patient's cholesterol is  trending down and moving in the right direction.        She is not currently on medication.      The 10-year ASCVD risk score (Tre JARAD Jr., et al., 2013) is: 10.1%       improved from 12.3 one year ago.      Patient is motivated to control her cholesterol with diet and exercise and       is hopeful to stay off medication.  She has made significant progress on       her own.  She is intermittent fasting as well as walking 2 miles a day.               Latest Reference Range & Units 07/11/22 07:34       Cholesterol,Tot 100 - 199 mg/dL 170       Triglycerides 0 - 149 mg/dL 271 (H)       HDL >=40 mg/dL 32 !       LDL <100 mg/dL 84                             Suspected sleep apnea (POA: Yes)    Pulmonary emphysema (HCC) (POA: Yes)    Hypoparathyroidism (HCC) (POA: Yes)  Resolved Problems:    * No resolved hospital problems. *      FOLLOW UP  Future Appointments   Date Time Provider Department Center   10/2/2023 11:20 AM [facility] [address] [facility] [address]     No follow-up provider specified.    MEDICATIONS ON DISCHARGE     Medication List        START taking these medications        Instructions   calcitRIOL 0.25 MCG Caps  Start taking on: September 28, 2023  Commonly known as: Rocaltrol   Take 1 Capsule by mouth every day.  Dose: 0.25 mcg     Calcium Carbonate Antacid 1000 MG Chew   Chew 1 Tablet 4 times a day.  Dose: 1,000 mg     Cholecalciferol 2000 UNIT Tabs  Start taking on: September 28, 2023   Take 1 Tablet by mouth every day.  Dose: 2,000 Units            CONTINUE taking these medications        Instructions   acetaminophen 325 MG Tabs  Commonly known as: Tylenol   Take 325 mg by mouth as needed for Moderate Pain or Mild Pain.  Dose: 325 mg     albuterol 108 (90 Base) MCG/ACT Aers inhalation aerosol   Inhale 1 Puff 1 time a day as needed for Shortness of Breath.  Dose: 1 Puff     Breo Ellipta 100-25 MCG/ACT Aepb  Generic drug: fluticasone furoate-vilanterol   Inhale 1 Puff every day.  Dose: 1 Puff     eletriptan 20 MG  Tabs  Commonly known as: Relpax   Take 1 Tablet by mouth one time as needed for Migraine for up to 1 dose.  Dose: 20 mg     MULTIVITAMIN WOMEN PO   Take 1 Tablet by mouth every day.  Dose: 1 Tablet     omeprazole 40 MG delayed-release capsule  Commonly known as: PriLOSEC   Take 40 mg by mouth every day.  Dose: 40 mg     ondansetron 4 MG Tbdp  Commonly known as: Zofran ODT   Take 1 Tablet by mouth every 24 hours as needed for Nausea.  Dose: 4 mg     oxyCODONE-acetaminophen 5-325 MG Tabs  Commonly known as: Percocet   Take 1 Tablet by mouth every 6 hours as needed for Severe Pain.  Dose: 1 Tablet     propranolol 40 MG Tabs  Commonly known as: Inderal   Take 40 mg by mouth 2 times a day.  Dose: 40 mg     rivaroxaban 20 MG Tabs tablet  Commonly known as: Xarelto   Take 20 mg by mouth with dinner.  Dose: 20 mg     venlafaxine 150 MG extended-release capsule  Commonly known as: Effexor-XR   Take 1 Capsule by mouth every day.  Dose: 150 mg     Voltaren 1 % Gel  Generic drug: diclofenac sodium   Apply 2 g topically 1 time a day as needed (joint pain).  Dose: 2 g              Allergies  Allergies   Allergen Reactions    Codeine Itching and Nausea    Imitrex [Sumatriptan Succinate] Shortness of Breath and Swelling    Penicillins Rash and Vomiting    Sulfa Drugs Shortness of Breath and Itching    Azithromycin Itching    Dilaudid [Hydromorphone] Itching     sweating    Other Drug Itching     Z Pack    Sensipar [Cinacalcet] Unspecified     Nightmares and leg cramping    Imipramine Shortness of Breath and Itching     anxious    Seroquel [Quetiapine Fumarate]      Triggered urge to hurt self.        DIET  Orders Placed This Encounter   Procedures    Diet Order Diet: Regular     Standing Status:   Standing     Number of Occurrences:   1     Order Specific Question:   Diet:     Answer:   Regular [1]       ACTIVITY  As tolerated.  Weight bearing as tolerated    CONSULTATIONS  none    PROCEDURES  none    LABORATORY  Lab Results    Component Value Date    SODIUM 141 09/27/2023    POTASSIUM 4.0 09/27/2023    CHLORIDE 103 09/27/2023    CO2 25 09/27/2023    GLUCOSE 123 (H) 09/27/2023    BUN 18 09/27/2023    CREATININE 0.99 09/27/2023    CREATININE 0.8 01/29/2008        Lab Results   Component Value Date    WBC 9.3 09/25/2023    HEMOGLOBIN 14.6 09/25/2023    HEMATOCRIT 42.7 09/25/2023    PLATELETCT 180 09/25/2023        Total time of the discharge process exceeds 34 minutes.

## 2023-09-27 NOTE — CARE PLAN
The patient is Stable - Low risk of patient condition declining or worsening    Shift Goals  Clinical Goals: Monitor labs, monitor pain and old IV site  Patient Goals: Discharge, pain control  Family Goals: N/A    Progress made toward(s) clinical / shift goals:      Problem: Knowledge Deficit - Standard  Goal: Patient and family/care givers will demonstrate understanding of plan of care, disease process/condition, diagnostic tests and medications  Outcome: Progressing  Note: Patient updated on plan of care. All questions answered. Patient verbalized understanding. No further questions at this time.       Problem: Pain - Standard  Goal: Alleviation of pain or a reduction in pain to the patient’s comfort goal  Outcome: Progressing  Note: Pain rating scale and pain goals dicussed with pt     Problem: Fall Risk  Goal: Patient will remain free from falls  Outcome: Progressing  Note: Patient educated to utilize call light. Patient verbalized understanding. All fall precautions in place. Bed alarm is on.         Patient is not progressing towards the following goals:

## 2023-09-27 NOTE — PROGRESS NOTES
Assumed care of patient at bedside report from day shift RN. Updated on POC. Patient currently A & O x 4; Call light within reach. Whiteboard updated. Fall precautions in place. Bed locked and in lowest position. All questions answered. No other needs indicated at this time.    PT L arm swollen and discolored where previous IV was located. APRN notified, instructed to wrap and elevate arm. Picture taken and wound consult placed

## 2023-09-27 NOTE — PROGRESS NOTES
Bedside report received. CIWA ranged from 3-9 overnight. Patient A&O x 4. VS'S. RA. Paced on telemetry. Complains of 10/10 generalized pain will medicate per MAR. POC discussed with patient. Pt verbalizes understanding. Call light and belongings with in reach. Bed locked and in lowest position, alarm and fall precautions in place.

## 2023-09-27 NOTE — PROGRESS NOTES
Hospital Medicine Daily Progress Note    Date of Service  9/26/2023    Chief Complaint  Miryam Veloz is a 73 y.o. female admitted 9/24/2023 with hypocalcemia    Hospital Course  74 yo woman with A-fib, COPD, parathyroidectomy 4 days ago with Dr. Zayas who was discharged and presents with tingling to her face and arms.  She was found severely hypocalcemic.    Interval Problem Update  She continues to have tingling to her lips and hands.  She is very anxious and having panic attacks.  Calcium levels remain low, continue with IV and increasing oral repletion.  Ativan given for her anxiety.    9/26 patient seen and evaluated bedside.  She reports tingling of her lips and fingers.  Her calcium was low at 7.1, ionized calcium is low at 0.9, phosphorus is elevated at 6.5, magnesium is borderline low at 1.5 and PTH is low at 3.4.  I have ordered a second dose of IV calcium chloride and started the patient on calcitriol.  I have also ordered IV magnesium.  Continue cardiac monitoring for symptomatic hypocalcemia    I have discussed this patient's plan of care and discharge plan at IDT rounds today with Case Management, Nursing, Nursing leadership, and other members of the IDT team.    Consultants/Specialty  none    Code Status  Full Code    Disposition  The patient is not medically cleared for discharge to home or a post-acute facility.  Anticipate discharge to: home with close outpatient follow-up    I have placed the appropriate orders for post-discharge needs.    Review of Systems  Review of Systems   Respiratory:  Positive for shortness of breath.    Cardiovascular:  Negative for chest pain.   Gastrointestinal:  Negative for abdominal pain.   Neurological:  Positive for tingling and weakness. Negative for focal weakness.   Psychiatric/Behavioral:  The patient is nervous/anxious.         Physical Exam  Temp:  [36.3 °C (97.3 °F)-37.1 °C (98.8 °F)] 37 °C (98.6 °F)  Pulse:  [81-92] 91  Resp:  [17-19] 17  BP:  (142-154)/(74-91) 147/76  SpO2:  [84 %-98 %] 98 %    Physical Exam  Vitals and nursing note reviewed.   Constitutional:       General: She is not in acute distress.     Appearance: She is not toxic-appearing.   HENT:      Head: Normocephalic.      Mouth/Throat:      Mouth: Mucous membranes are moist.   Eyes:      General:         Right eye: No discharge.         Left eye: No discharge.   Cardiovascular:      Rate and Rhythm: Normal rate and regular rhythm.   Pulmonary:      Effort: Pulmonary effort is normal. No respiratory distress.      Breath sounds: No wheezing or rales.   Abdominal:      Palpations: Abdomen is soft.      Tenderness: There is no abdominal tenderness.   Musculoskeletal:         General: No swelling.      Cervical back: Neck supple.   Skin:     General: Skin is warm and dry.   Neurological:      Mental Status: She is alert and oriented to person, place, and time.      Comments: I did not elicit [place] sign on exam   Psychiatric:         Mood and Affect: Mood is anxious.         Fluids    Intake/Output Summary (Last 24 hours) at 9/26/2023 1707  Last data filed at 9/26/2023 1250  Gross per 24 hour   Intake 240 ml   Output --   Net 240 ml         Laboratory  Recent Labs     09/24/23  1133 09/25/23  0112   WBC 9.2 9.3   RBC 4.89 4.48   HEMOGLOBIN 15.5 14.6   HEMATOCRIT 46.0 42.7   MCV 94.1 95.3   MCH 31.7 32.6   MCHC 33.7 34.2   RDW 44.8 45.1   PLATELETCT 200 180   MPV 11.2 11.3       Recent Labs     09/24/23  1133 09/25/23  0112 09/26/23  0053   SODIUM 142 141 142   POTASSIUM 3.8 3.6 3.7   CHLORIDE 105 105 105   CO2 25 21 23   GLUCOSE 121* 97 134*   BUN 19 19 17   CREATININE 0.87 0.84 0.88   CALCIUM 5.8* 6.8* 7.1*       Recent Labs     09/24/23  1133   APTT 30.5   INR 1.56*                 Imaging  DX-CHEST-PORTABLE (1 VIEW)   Final Result      Linear atelectasis in the left lower lung. No focal consolidation. No effusions.                Assessment/Plan  * Hypocalcemia- (present on  admission)  Assessment & Plan  Due to recent parathyroidectomy  Patient said Dr. Zayas did leave part of her a parathyroid gland  Continue to trend calcium levels replating with IV and increasing p.o. supplement    Hypoparathyroidism (HCC)  Assessment & Plan  Status post parathyroidectomy on 9/20  Her calcium was low at 7.1, ionized calcium is low at 0.9, phosphorus is elevated at 6.5 and PTH is low at 3.4.  Check Vitamin D levels  Started on calcitriol  Continue oral and IV calcium replacement  Monitor BMP    Pulmonary emphysema (HCC)- (present on admission)  Assessment & Plan  No acute exacerbation  Continue outpatient inhalers  O2 supplementation as needed    Suspected sleep apnea- (present on admission)  Assessment & Plan  Needs outpatient polysomnogram testing  Does not use a bipap    Mixed hyperlipidemia- (present on admission)  Assessment & Plan  Restarted on Crestor    Obesity (BMI 30-39.9)- (present on admission)  Assessment & Plan  Body mass index is 36.38 kg/m².    Paroxysmal atrial fibrillation (HCC)- (present on admission)  Assessment & Plan  On rivaroxaban 20mg q pm  Inderal 40mg BID with parameters  Monitor on telemetry    Essential hypertension- (present on admission)  Assessment & Plan  Propranolol.  Monitor vitals.    Bipolar affective disorder (HCC)- (present on admission)  Assessment & Plan  Continue active treatment with venlafaxine 150mg qd         VTE prophylaxis: Xarelto    I have performed a physical exam and reviewed and updated ROS and Plan today (9/26/2023). In review of yesterday's note (9/25/2023), there are no changes except as documented above.    I spent 53 minutes, reviewing the chart, obtaining and/or reviewing separately obtained history. Performing a medically appropriate examination and evaluation.  Counseling and educating the patient. Ordering and reviewing medications, tests, or procedures. Documenting clinical information in EPIC. Independently interpreting results and  communicating results to patient. Discussing future disposition of care with patient, RN and case management.

## 2023-09-27 NOTE — ASSESSMENT & PLAN NOTE
Status post parathyroidectomy on 9/20  Her calcium was low at 7.1, ionized calcium is low at 0.9, phosphorus is elevated at 6.5 and PTH is low at 3.4.  Check Vitamin D levels  Started on calcitriol  Continue oral and IV calcium replacement  Monitor BMP

## 2023-09-27 NOTE — PROGRESS NOTES
Discharge orders received.  Patient arrived to the discharge lounge.  PIV removed by bedside RN.  Instructions given, medications reviewed and general discharge education provided to patient.  Medications discussed, side effects discussed.  Follow up appointments discussed.  Patient verbalized understanding of dc instructions and prescriptions.  Patient signed discharge instructions.  Patient verbalized she had all belongings with her.  Patient left via car to home with son.  Wished patient a speedy recovery.

## 2023-09-28 ENCOUNTER — PATIENT OUTREACH (OUTPATIENT)
Dept: MEDICAL GROUP | Facility: PHYSICIAN GROUP | Age: 73
End: 2023-09-28
Payer: COMMERCIAL

## 2023-09-28 LAB — 1,25(OH)2D3 SERPL-MCNC: 20.7 PG/ML (ref 19.9–79.3)

## 2023-09-28 NOTE — PROGRESS NOTES
Transitional Care Management  TCM Outreach Date and Time: Filed (9/28/2023 10:06 AM)    Discharge Questions  Actual Discharge Date: 09/27/23  Now that you are home, how are you feeling?: Fair (Muscle Spasms)  Did you receive any new prescriptions?: Yes (Rocaltrol, Calcium Carbonate Antacid, Cholecalciferol)  Were you able to get them filled?: Yes  Meds to Bed or Pharmacy filled?: Pharmacy  Do you have any questions about your current medications or new medications (Review Med Rec)?: Yes (Please explain)  Do you have a follow up appointment scheduled with your PCP?: No  Was an appointment scheduled for the patient?: Yes  Appointment Date: 10/17/23  Appointment Time: 1240  Any issues or paperwork you wish to discuss with your PCP?: No  Does this patient qualify for the CCM program?: No    Transitional Care  Number of attempts made to contact patient: 1  Current or previous attempts competed within two business days of discharge? : Yes  Provided education regarding treatment plan, medications, self-management, ADLs?: Yes (Follow up visit with PCP after ER discharge)  Has patient completed an Advanced Directive?: Yes  Is the patient's advanced directive on file?: Yes  Has the Care Manager's phone number provided?: No  Is there anything else I can help you with?: No    Discharge Summary  Chief Complaint: Chest Pain, Post-Op Complications, Numbness, Anxiety  Admitting Diagnosis: EMS  Discharge Diagnosis: Hypocalcemia

## 2023-10-01 ENCOUNTER — APPOINTMENT (OUTPATIENT)
Dept: RADIOLOGY | Facility: MEDICAL CENTER | Age: 73
End: 2023-10-01
Attending: EMERGENCY MEDICINE
Payer: COMMERCIAL

## 2023-10-01 ENCOUNTER — OFFICE VISIT (OUTPATIENT)
Dept: URGENT CARE | Facility: PHYSICIAN GROUP | Age: 73
End: 2023-10-01
Payer: COMMERCIAL

## 2023-10-01 ENCOUNTER — HOSPITAL ENCOUNTER (EMERGENCY)
Facility: MEDICAL CENTER | Age: 73
End: 2023-10-01
Attending: EMERGENCY MEDICINE | Admitting: HOSPITALIST
Payer: COMMERCIAL

## 2023-10-01 VITALS
TEMPERATURE: 98.8 F | WEIGHT: 248 LBS | DIASTOLIC BLOOD PRESSURE: 70 MMHG | HEIGHT: 70 IN | SYSTOLIC BLOOD PRESSURE: 128 MMHG | BODY MASS INDEX: 35.5 KG/M2 | RESPIRATION RATE: 20 BRPM | HEART RATE: 94 BPM | OXYGEN SATURATION: 93 %

## 2023-10-01 VITALS
SYSTOLIC BLOOD PRESSURE: 133 MMHG | HEIGHT: 70 IN | BODY MASS INDEX: 36.07 KG/M2 | DIASTOLIC BLOOD PRESSURE: 81 MMHG | WEIGHT: 251.99 LBS | RESPIRATION RATE: 18 BRPM | OXYGEN SATURATION: 93 % | TEMPERATURE: 98.4 F | HEART RATE: 97 BPM

## 2023-10-01 DIAGNOSIS — T80.1XXA IV INFILTRATE, INITIAL ENCOUNTER: ICD-10-CM

## 2023-10-01 DIAGNOSIS — M79.89 PAIN AND SWELLING OF UPPER EXTREMITY, LEFT: ICD-10-CM

## 2023-10-01 DIAGNOSIS — L03.114 CELLULITIS OF LEFT UPPER EXTREMITY: ICD-10-CM

## 2023-10-01 DIAGNOSIS — E83.51 HYPOCALCEMIA: ICD-10-CM

## 2023-10-01 DIAGNOSIS — M79.602 PAIN AND SWELLING OF UPPER EXTREMITY, LEFT: ICD-10-CM

## 2023-10-01 PROBLEM — L03.90 CELLULITIS: Status: ACTIVE | Noted: 2023-10-01

## 2023-10-01 LAB
ALBUMIN SERPL BCP-MCNC: 3.6 G/DL (ref 3.2–4.9)
ALBUMIN/GLOB SERPL: 1 G/DL
ALP SERPL-CCNC: 117 U/L (ref 30–99)
ALT SERPL-CCNC: 14 U/L (ref 2–50)
ANION GAP SERPL CALC-SCNC: 15 MMOL/L (ref 7–16)
AST SERPL-CCNC: 15 U/L (ref 12–45)
BASOPHILS # BLD AUTO: 0.9 % (ref 0–1.8)
BASOPHILS # BLD: 0.09 K/UL (ref 0–0.12)
BILIRUB SERPL-MCNC: 0.5 MG/DL (ref 0.1–1.5)
BUN SERPL-MCNC: 14 MG/DL (ref 8–22)
CALCIUM ALBUM COR SERPL-MCNC: 7.1 MG/DL (ref 8.5–10.5)
CALCIUM SERPL-MCNC: 6.8 MG/DL (ref 8.4–10.2)
CHLORIDE SERPL-SCNC: 103 MMOL/L (ref 96–112)
CO2 SERPL-SCNC: 23 MMOL/L (ref 20–33)
CREAT SERPL-MCNC: 1.05 MG/DL (ref 0.5–1.4)
EOSINOPHIL # BLD AUTO: 0.23 K/UL (ref 0–0.51)
EOSINOPHIL NFR BLD: 2.2 % (ref 0–6.9)
ERYTHROCYTE [DISTWIDTH] IN BLOOD BY AUTOMATED COUNT: 43.4 FL (ref 35.9–50)
GFR SERPLBLD CREATININE-BSD FMLA CKD-EPI: 57 ML/MIN/1.73 M 2
GLOBULIN SER CALC-MCNC: 3.6 G/DL (ref 1.9–3.5)
GLUCOSE SERPL-MCNC: 141 MG/DL (ref 65–99)
HCT VFR BLD AUTO: 45.4 % (ref 37–47)
HGB BLD-MCNC: 15.2 G/DL (ref 12–16)
IMM GRANULOCYTES # BLD AUTO: 0.03 K/UL (ref 0–0.11)
IMM GRANULOCYTES NFR BLD AUTO: 0.3 % (ref 0–0.9)
LYMPHOCYTES # BLD AUTO: 2.21 K/UL (ref 1–4.8)
LYMPHOCYTES NFR BLD: 21 % (ref 22–41)
MCH RBC QN AUTO: 32 PG (ref 27–33)
MCHC RBC AUTO-ENTMCNC: 33.5 G/DL (ref 32.2–35.5)
MCV RBC AUTO: 95.6 FL (ref 81.4–97.8)
MONOCYTES # BLD AUTO: 0.82 K/UL (ref 0–0.85)
MONOCYTES NFR BLD AUTO: 7.8 % (ref 0–13.4)
NEUTROPHILS # BLD AUTO: 7.12 K/UL (ref 1.82–7.42)
NEUTROPHILS NFR BLD: 67.8 % (ref 44–72)
NRBC # BLD AUTO: 0 K/UL
NRBC BLD-RTO: 0 /100 WBC (ref 0–0.2)
PLATELET # BLD AUTO: 256 K/UL (ref 164–446)
PMV BLD AUTO: 11.6 FL (ref 9–12.9)
POTASSIUM SERPL-SCNC: 4.2 MMOL/L (ref 3.6–5.5)
PROT SERPL-MCNC: 7.2 G/DL (ref 6–8.2)
RBC # BLD AUTO: 4.75 M/UL (ref 4.2–5.4)
SODIUM SERPL-SCNC: 141 MMOL/L (ref 135–145)
WBC # BLD AUTO: 10.5 K/UL (ref 4.8–10.8)

## 2023-10-01 PROCEDURE — 3078F DIAST BP <80 MM HG: CPT

## 2023-10-01 PROCEDURE — 99215 OFFICE O/P EST HI 40 MIN: CPT

## 2023-10-01 PROCEDURE — 96374 THER/PROPH/DIAG INJ IV PUSH: CPT

## 2023-10-01 PROCEDURE — 85025 COMPLETE CBC W/AUTO DIFF WBC: CPT

## 2023-10-01 PROCEDURE — A9270 NON-COVERED ITEM OR SERVICE: HCPCS | Performed by: EMERGENCY MEDICINE

## 2023-10-01 PROCEDURE — 80053 COMPREHEN METABOLIC PANEL: CPT

## 2023-10-01 PROCEDURE — 36415 COLL VENOUS BLD VENIPUNCTURE: CPT

## 2023-10-01 PROCEDURE — 700117 HCHG RX CONTRAST REV CODE 255: Performed by: EMERGENCY MEDICINE

## 2023-10-01 PROCEDURE — 99284 EMERGENCY DEPT VISIT MOD MDM: CPT | Performed by: HOSPITALIST

## 2023-10-01 PROCEDURE — 99285 EMERGENCY DEPT VISIT HI MDM: CPT

## 2023-10-01 PROCEDURE — 700102 HCHG RX REV CODE 250 W/ 637 OVERRIDE(OP): Performed by: EMERGENCY MEDICINE

## 2023-10-01 PROCEDURE — 73201 CT UPPER EXTREMITY W/DYE: CPT | Mod: LT

## 2023-10-01 PROCEDURE — 700111 HCHG RX REV CODE 636 W/ 250 OVERRIDE (IP): Mod: JZ | Performed by: EMERGENCY MEDICINE

## 2023-10-01 PROCEDURE — 3074F SYST BP LT 130 MM HG: CPT

## 2023-10-01 RX ORDER — BISACODYL 10 MG
10 SUPPOSITORY, RECTAL RECTAL
Status: DISCONTINUED | OUTPATIENT
Start: 2023-10-01 | End: 2023-10-01 | Stop reason: HOSPADM

## 2023-10-01 RX ORDER — OXYCODONE HYDROCHLORIDE 5 MG/1
5 TABLET ORAL
Status: DISCONTINUED | OUTPATIENT
Start: 2023-10-01 | End: 2023-10-01 | Stop reason: HOSPADM

## 2023-10-01 RX ORDER — ONDANSETRON 4 MG/1
4 TABLET, ORALLY DISINTEGRATING ORAL EVERY 4 HOURS PRN
Status: DISCONTINUED | OUTPATIENT
Start: 2023-10-01 | End: 2023-10-01 | Stop reason: HOSPADM

## 2023-10-01 RX ORDER — ALBUTEROL SULFATE 90 UG/1
1 AEROSOL, METERED RESPIRATORY (INHALATION)
Status: DISCONTINUED | OUTPATIENT
Start: 2023-10-01 | End: 2023-10-01 | Stop reason: HOSPADM

## 2023-10-01 RX ORDER — CEFTRIAXONE 2 G/1
2000 INJECTION, POWDER, FOR SOLUTION INTRAMUSCULAR; INTRAVENOUS ONCE
Status: COMPLETED | OUTPATIENT
Start: 2023-10-01 | End: 2023-10-01

## 2023-10-01 RX ORDER — CEPHALEXIN 500 MG/1
500 CAPSULE ORAL 4 TIMES DAILY
Qty: 20 CAPSULE | Refills: 0 | Status: ACTIVE | OUTPATIENT
Start: 2023-10-01 | End: 2023-10-06

## 2023-10-01 RX ORDER — AMOXICILLIN 250 MG
2 CAPSULE ORAL 2 TIMES DAILY
Status: DISCONTINUED | OUTPATIENT
Start: 2023-10-01 | End: 2023-10-01 | Stop reason: HOSPADM

## 2023-10-01 RX ORDER — PROPRANOLOL HYDROCHLORIDE 20 MG/1
40 TABLET ORAL 2 TIMES DAILY
Status: DISCONTINUED | OUTPATIENT
Start: 2023-10-01 | End: 2023-10-01 | Stop reason: HOSPADM

## 2023-10-01 RX ORDER — OXYCODONE HYDROCHLORIDE 5 MG/1
2.5 TABLET ORAL
Status: DISCONTINUED | OUTPATIENT
Start: 2023-10-01 | End: 2023-10-01 | Stop reason: HOSPADM

## 2023-10-01 RX ORDER — POLYETHYLENE GLYCOL 3350 17 G/17G
1 POWDER, FOR SOLUTION ORAL
Status: DISCONTINUED | OUTPATIENT
Start: 2023-10-01 | End: 2023-10-01 | Stop reason: HOSPADM

## 2023-10-01 RX ORDER — CALCIUM CARBONATE 500 MG/1
2000 TABLET, CHEWABLE ORAL
Status: DISCONTINUED | OUTPATIENT
Start: 2023-10-01 | End: 2023-10-01 | Stop reason: HOSPADM

## 2023-10-01 RX ORDER — CALCIUM ACETATE 667 MG/1
2001 TABLET ORAL
Status: DISCONTINUED | OUTPATIENT
Start: 2023-10-02 | End: 2023-10-01

## 2023-10-01 RX ORDER — ONDANSETRON 2 MG/ML
4 INJECTION INTRAMUSCULAR; INTRAVENOUS EVERY 4 HOURS PRN
Status: DISCONTINUED | OUTPATIENT
Start: 2023-10-01 | End: 2023-10-01 | Stop reason: HOSPADM

## 2023-10-01 RX ORDER — VENLAFAXINE HYDROCHLORIDE 150 MG/1
150 CAPSULE, EXTENDED RELEASE ORAL DAILY
Status: DISCONTINUED | OUTPATIENT
Start: 2023-10-02 | End: 2023-10-01 | Stop reason: HOSPADM

## 2023-10-01 RX ORDER — OMEPRAZOLE 40 MG/1
40 CAPSULE, DELAYED RELEASE ORAL DAILY
Status: DISCONTINUED | OUTPATIENT
Start: 2023-10-02 | End: 2023-10-01 | Stop reason: HOSPADM

## 2023-10-01 RX ORDER — FLUTICASONE FUROATE AND VILANTEROL 100; 25 UG/1; UG/1
1 POWDER RESPIRATORY (INHALATION) DAILY
Status: DISCONTINUED | OUTPATIENT
Start: 2023-10-02 | End: 2023-10-01 | Stop reason: HOSPADM

## 2023-10-01 RX ORDER — ACETAMINOPHEN 325 MG/1
650 TABLET ORAL EVERY 6 HOURS PRN
Status: DISCONTINUED | OUTPATIENT
Start: 2023-10-01 | End: 2023-10-01 | Stop reason: HOSPADM

## 2023-10-01 RX ORDER — CALCITRIOL 0.25 UG/1
0.25 CAPSULE, LIQUID FILLED ORAL DAILY
Status: DISCONTINUED | OUTPATIENT
Start: 2023-10-02 | End: 2023-10-01 | Stop reason: HOSPADM

## 2023-10-01 RX ORDER — LORAZEPAM 0.5 MG/1
0.5 TABLET ORAL EVERY 8 HOURS PRN
Status: DISCONTINUED | OUTPATIENT
Start: 2023-10-01 | End: 2023-10-01 | Stop reason: HOSPADM

## 2023-10-01 RX ORDER — CALCIUM CARBONATE 500 MG/1
1000 TABLET, CHEWABLE ORAL ONCE
Status: COMPLETED | OUTPATIENT
Start: 2023-10-01 | End: 2023-10-01

## 2023-10-01 RX ADMIN — IOHEXOL 80 ML: 350 INJECTION, SOLUTION INTRAVENOUS at 16:49

## 2023-10-01 RX ADMIN — CEFTRIAXONE SODIUM 2000 MG: 2 INJECTION, POWDER, FOR SOLUTION INTRAMUSCULAR; INTRAVENOUS at 18:47

## 2023-10-01 RX ADMIN — CALCIUM CARBONATE (ANTACID) CHEW TAB 500 MG 1000 MG: 500 CHEW TAB at 18:07

## 2023-10-01 ASSESSMENT — ENCOUNTER SYMPTOMS
EYE REDNESS: 0
FEVER: 0
SHORTNESS OF BREATH: 0
MYALGIAS: 0
STRIDOR: 0
COUGH: 0
EYE DISCHARGE: 0
BRUISES/BLEEDS EASILY: 0
FLANK PAIN: 0
FOCAL WEAKNESS: 0
NERVOUS/ANXIOUS: 1
CHILLS: 0
VOMITING: 0
ABDOMINAL PAIN: 0

## 2023-10-01 ASSESSMENT — FIBROSIS 4 INDEX
FIB4 SCORE: 1.577777777777777778
FIB4 SCORE: 1.577777777777777778

## 2023-10-01 NOTE — ED NOTES
Medication history reviewed with pt. Med rec is complete.  Allergies reviewed, per pt  Interviewed pt with son at bedside with permission from pt.    Patient has not had any outpatient antibiotics in the last 30 days.    Pt is on anticoagulants, pt is on XARELTO 20MG 1 tablet every evening last dose on 9/30/2023 @ 2200

## 2023-10-01 NOTE — PROGRESS NOTES
Subjective:   Miryam Veloz is a 73 y.o. female who presents for Other (Iv site complication,left arm,swollen,x5 days)      HPI:    Patient presents urgent care with her adult son with concerns of left upper arm infection and is requesting a DVT rule out.  Patient reports she was hospitalized on 09/24/2023 for hypocalcemia after undergoing a parathyroidectomy on 09/24/23.  States her upper arm IV was infiltrated and was discharged with a hematoma left upper arm pain.  She she endorses pain, and worsening redness, warmth, swelling.  She is having hot and cold flashes but denies measured temperature at home.  She reports numbness tingling sensation in her fingers.  Range of motion is intact but is painful.  She is on 20 mg Xarelto daily for paroxysmal atrial fibrillation.  Denies chest pain, shortness of breath, dizziness, palpitations, leg swelling, orthopnea, cough.      ROS As above in HPI    Medications:    Current Outpatient Medications on File Prior to Visit   Medication Sig Dispense Refill    Calcium Carbonate Antacid 1000 MG Chew Tab Chew 1 Tablet 4 times a day. 120 Tablet 0    Cholecalciferol 2000 UNIT Tab Take 1 Tablet by mouth every day. 60 Tablet 0    calcitRIOL (ROCALTROL) 0.25 MCG Cap Take 1 Capsule by mouth every day. 30 Capsule 0    albuterol 108 (90 Base) MCG/ACT Aero Soln inhalation aerosol Inhale 1 Puff 1 time a day as needed for Shortness of Breath.      diclofenac sodium (VOLTAREN) 1 % Gel Apply 2 g topically 1 time a day as needed (joint pain).      fluticasone furoate-vilanterol (BREO ELLIPTA) 100-25 MCG/ACT AEROSOL POWDER, BREATH ACTIVATED Inhale 1 Puff every day.      omeprazole (PRILOSEC) 40 MG delayed-release capsule Take 40 mg by mouth every day.      propranolol (INDERAL) 40 MG Tab Take 40 mg by mouth 2 times a day.      rivaroxaban (XARELTO) 20 MG Tab tablet Take 20 mg by mouth with dinner.      oxyCODONE-acetaminophen (PERCOCET) 5-325 MG Tab Take 1 Tablet by mouth every 6 hours  as needed for Severe Pain.      Multiple Vitamins-Minerals (MULTIVITAMIN WOMEN PO) Take 1 Tablet by mouth every day.      acetaminophen (TYLENOL) 325 MG Tab Take 325 mg by mouth as needed for Moderate Pain or Mild Pain.      venlafaxine (EFFEXOR-XR) 150 MG extended-release capsule Take 1 Capsule by mouth every day. 90 Capsule 3    eletriptan (RELPAX) 20 MG Tab Take 1 Tablet by mouth one time as needed for Migraine for up to 1 dose. 10 Tablet 2    ondansetron (ZOFRAN ODT) 4 MG TABLET DISPERSIBLE Take 1 Tablet by mouth every 24 hours as needed for Nausea. 20 Tablet 2     No current facility-administered medications on file prior to visit.        Allergies:   Codeine, Imitrex [sumatriptan succinate], Penicillins, Sulfa drugs, Azithromycin, Dilaudid [hydromorphone], Other drug, Sensipar [cinacalcet], Imipramine, and Seroquel [quetiapine fumarate]    Problem List:   Patient Active Problem List   Diagnosis    Bipolar affective disorder (HCC)    Anxiety    Allergic rhinitis due to pollen    Migraine headache    Lumbar facet arthropathy    Lumbar disc disease    Essential hypertension    Vitamin B12 deficiency    Elevated PTHrP level    Paroxysmal atrial fibrillation (HCC)    Chronic renal failure, stage 3 (moderate) ([facility])    Fibromyalgia    High triglycerides    Obesity (BMI 30-39.9)    Peripheral edema    Chronic anticoagulation    Mixed hyperlipidemia    Mild intermittent asthma without complication    Osteopenia    Elevated fasting blood sugar    Chronic hypoxemic respiratory failure (HCC)    Suspected sleep apnea    Pulmonary emphysema (HCC)    Raynaud's phenomenon    Subscapularis (muscle) sprain, left, initial encounter    Gastroesophageal reflux disease without esophagitis    Dermatitis    Contusion of left ankle    Sprain of posterior talofibular ligament of left ankle    Chronic pain of both knees    Primary hyperparathyroidism (HCC)    Hypocalcemia    Hypoparathyroidism (HCC)        Surgical History:  Past  Surgical History:   Procedure Laterality Date    AK EXPLORE PARATHYROID GLANDS N/A 2023    Procedure: MINIMALLY INVASIVE PARATHYROID EXPLORATION WITH INTRAOPERATIVE PARATHYROID HORMONE MONITORING;  Surgeon: Malika Zayas M.D.;  Location: SURGERY [facility];  Service: General    ORIF, WRIST Left 2018    Procedure: WRIST ORIF;  Surgeon: Mitchel Solano M.D.;  Location: SURGERY [facility];  Service: Orthopedics    KNEE ARTHROSCOPY Right 2018    Procedure: KNEE ARTHROSCOPY;  Surgeon: Henry Medel M.D.;  Location: SURGERY [facility];  Service: Orthopedics    MENISCECTOMY Right 2018    Procedure: MENISCECTOMY-partial medial and lateral;  Surgeon: Henry Medel M.D.;  Location: SURGERY [facility];  Service: Orthopedics    DEBRIDEMENT Right 2018    Procedure: DEBRIDEMENT-medial, lateral, and patellar chondyle;  Surgeon: Henry Medel M.D.;  Location: SURGERY [facility];  Service: Orthopedics    FINGER OR HAND INCISION AND DRAINAGE Right 10/20/2015    Procedure: FINGER OR HAND INCISION AND DRAINAGE- 4th finger;  Surgeon: Eric Pritchett M.D.;  Location: SURGERY [facility];  Service:     DENTAL EXTRACTION(S)      Upper dentures    CHOLECYSTECTOMY      PRIMARY C SECTION      APPENDECTOMY  1950's    BRCA1&2 GEN FULL SEQ DUP/DEL      OTHER      Gets sedation for MRI's       Past Social Hx:   Social History     Tobacco Use    Smoking status: Former     Current packs/day: 0.00     Average packs/day: 1 pack/day for 20.0 years (20.0 ttl pk-yrs)     Types: Cigarettes     Start date: 1974     Quit date: 1994     Years since quittin.7    Smokeless tobacco: Never    Tobacco comments:     quit 27 years ago   Vaping Use    Vaping Use: Never used   Substance Use Topics    Alcohol use: Yes     Alcohol/week: 1.8 oz     Types: 3 Glasses of wine per week     Comment: 2 per month    Drug use: Not Currently     Types: Inhaled     Comment:  "H/o Drug  abuse - Meth, Clean x 26 years.          Problem list, medications, and allergies reviewed by myself today in Epic.     Objective:     /70 (BP Location: Right arm, Patient Position: Sitting, BP Cuff Size: Large adult)   Pulse 94   Temp 37.1 °C (98.8 °F) (Temporal)   Resp 20   Ht 1.778 m (5' 10\")   Wt 112 kg (248 lb)   SpO2 93%   BMI 35.58 kg/m²     Physical Exam  Vitals and nursing note reviewed.   Constitutional:       Appearance: Normal appearance.   HENT:      Head: Normocephalic.   Neck:      Comments: Well approximated surgical wound base of neck appreciated.   Cardiovascular:      Rate and Rhythm: Normal rate and regular rhythm.      Heart sounds: Normal heart sounds. No murmur heard.     No friction rub. No gallop.   Pulmonary:      Effort: Pulmonary effort is normal.      Breath sounds: Normal breath sounds.   Lymphadenopathy:      Upper Body:      Left upper body: No supraclavicular, axillary or pectoral adenopathy.   Skin:     Findings: Erythema and wound present.             Comments: Anterior aspect of left upper arm just proximal of the antecubital fossa has a large area of erythema and warmth.  There is tenderness to palpation. Erythematous and irregular linear streaks extend from the primary infection site toward draining regional nodes.  Unroofed blister is present middle of erythema.      Neurological:      Mental Status: She is alert.         Assessment/Plan:     Diagnosis and associated orders:   1. IV infiltrate, initial encounter    2. Pain and swelling of upper extremity, left    3. Cellulitis of left upper extremity        Comments/MDM:     Patient is a pleasant 73-year-old female who presents to urgent care with concerns of infection of her left upper arm after IV access was infiltrated during her hospitalization on 09/24/2023 for hypocalcemia following parathyroidectomy on 09/20/2024.  She is requesting ultrasound to rule out DVT. She is on 20 mg Xarelto daily for PAF. " Unable to obtain ultrasound or labs today from outpatient setting.  Next available scan is tomorrow afternoon.  She reports worsening signs of infection and her left arm does appear cellulitic (see media tab for clinical images).  Patient is transferred to [facility] emergency room for evaluation of infection and DVT. Follow up with PCP advised upon discharge.       Please note that this dictation was created using voice recognition software. I have made a reasonable attempt to correct obvious errors, but I expect that there are errors of grammar and possibly content that I did not discover before finalizing the note.    This note was electronically signed by CURTIS Nathan

## 2023-10-01 NOTE — ED TRIAGE NOTES
"Chief Complaint   Patient presents with    Wound Check     Pt states has a large open wound on LAC from previous hospital visit possibly from PIV access     /79   Pulse 81   Temp 37.1 °C (98.8 °F) (Temporal)   Resp 17   Ht 1.778 m (5' 10\")   Wt 114 kg (251 lb 15.8 oz)   SpO2 93%   BMI 36.16 kg/m²     Pt ambulated to ED w/ visitor for c/o large red, swollen, tender site on LAC/ALEXA.  Pt states was from a PIV site while in the hospital, has gotten progressively worse s/p discharge.    Reviewed Triage process w/ pt, encouraged to notify RN for any changes in condition or further concerns.    Thanked pt for understanding.    Pt to lobby.    "
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
## 2023-10-01 NOTE — ED NOTES
Rounding completed  Patient resting on gurney talking with son  Vitals cycling  Patient denies any additional complaints  Declined offer for blankets  Call light within reach  Denies any other needs at this time

## 2023-10-01 NOTE — ED NOTES
Sadie from Lab called with critical result of Calcium 6.8 at 1555. Critical lab result read back to Sadie.   Dr. Mendez notified of critical lab result at 1556.  Critical lab result read back by Dr. Mendez.

## 2023-10-02 ENCOUNTER — APPOINTMENT (OUTPATIENT)
Dept: RADIOLOGY | Facility: MEDICAL CENTER | Age: 73
End: 2023-10-02
Payer: COMMERCIAL

## 2023-10-02 NOTE — ED NOTES
Message Hospitalist Dr. Meredith about the pt's plan to go AMA and asking if he can give prescription. Dr. Meredith said just informed Erp. Infromed ERP

## 2023-10-02 NOTE — ED NOTES
Called Med Floor and asked if I can give report, said will call back in 15 minutes as they don't have an assigned nurse for the pt yet. Updated pt to wait for few more minutes to admit them in the med floor

## 2023-10-02 NOTE — ASSESSMENT & PLAN NOTE
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

## 2023-10-02 NOTE — ED NOTES
Called [facility] and spoke with Selvin ESQUEDA, in the middle of conversation pt's son approached this RN and said they wanted to leave AMA.

## 2023-10-02 NOTE — DISCHARGE INSTRUCTIONS
As we discussed, you have elected to leave the hospital AGAINST MEDICAL ADVICE today.  As the next best alternative please take the antibiotics as prescribed.  You may begin your prescription tomorrow, as your first dose was given IV in the emergency department.  Please follow-up with your primary care practitioner tomorrow for skin recheck.  Return to the emergency department immediately at any time to continue your evaluation, or if symptoms begin to worsen despite antibiotic therapy.    Please continue to take oral calcium as previously recommended.

## 2023-10-02 NOTE — ED NOTES
Discharged against medical advise, in stable condition, alert and oriented, ambulatory, accompanied by son.  Prescribed medication and follow up appointment instructed. Health education imparted. Instructed to come back once symptoms worsened. Pt and son verbalized understanding of the information given. Removed IV line and applied pressure.

## 2023-10-02 NOTE — H&P
Hospital Medicine History & Physical Note    Date of Service  10/1/2023    Primary Care Physician  RONI Ardon    Consultants  None     Code Status  Full Code    Chief Complaint  Chief Complaint   Patient presents with    Wound Check     Pt states has a large open wound on LAC from previous hospital visit possibly from PIV access     History of Presenting Illness  Miryam Veloz is a 73 y.o. female with a past medical history of gastroesophageal flux disease, atrial fibrillation, hypocalcemia after recent parathyroidectomy and worsening left elbow cellulitis who presented 10/1/2023 with progressively worsening pain, swelling and redness at her left arm [site of IV infiltration when she was receiving IV calcium on a prior admission] patient denies having fevers or chills.  She was also found to have a low calcium of 6.8, corrected 7.1.  Currently she denies having tremors, spasms or twitching, as she is taking increased amounts of oral calcium based on the advice of her endocrinologist.      I discussed the plan of care with emergency department physician, the patient and patient family present at bedside in the emergency room    Review of Systems  Review of Systems   Constitutional:  Negative for chills and fever.   Eyes:  Negative for discharge and redness.   Respiratory:  Negative for cough, shortness of breath and stridor.    Cardiovascular:  Negative for chest pain and leg swelling.   Gastrointestinal:  Negative for abdominal pain and vomiting.   Genitourinary:  Negative for flank pain.   Musculoskeletal:  Negative for myalgias.        Left arm pain, redness and swelling   Skin: Negative.    Neurological:  Negative for focal weakness.   Endo/Heme/Allergies:  Does not bruise/bleed easily.   Psychiatric/Behavioral:  The patient is nervous/anxious.      Past Medical History   has a past medical history of Abnormal CT scan (07/25/2017), Allergy, Arrhythmia (01/2017), Arthritis  (12/26/2017), Asthma (12/26/2017), Bipolar affective disorder ([facility]) (1995), Carpal tunnel syndrome on both sides (12/22/2017), Chronic knee pain (2000), Chronic pain of both knees (05/30/2017), Claustrophobia, COPD (chronic obstructive pulmonary disease) (HCC), Dental disorder (12/26/2017), Depression (05/22/2009), Dyspnea on exertion (04/12/2017), Family history of breast cancer in mother, Fibromyalgia, Headache, frequent episodic tension-type, Hemorrhagic disorder ([facility]) (01/2017), Hepatitis B (1975), Hypertension, IBD (inflammatory bowel disease), IBS (irritable bowel syndrome), Lumbar facet arthropathy (2004), Migraine, Pain (12/26/2017), Pain (12/26/2017), Pain in both knees (10/06/2016), Pneumonia, PTSD (post-traumatic stress disorder) (12/26/2017), Renal disorder (12/26/2017), and Rotator cuff syndrome of right shoulder (2008).    Surgical History   has a past surgical history that includes finger or hand incision and drainage (Right, 10/20/2015); cholecystectomy (1999); appendectomy (1950's); primary c section (1985); dental extraction(s) (2015); other; knee arthroscopy (Right, 01/05/2018); meniscectomy (Right, 01/05/2018); debridement (Right, 01/05/2018); orif, wrist (Left, 09/24/2018); brca1&2 gen full seq dup/del; and pr explore parathyroid glands (N/A, 9/20/2023).     Family History  family history includes Alcohol abuse in her father; Arthritis in her sister; Breast Cancer in her mother; Cancer in her brother and mother; GI Disease in her father; Heart Disease (age of onset: 40) in her father; Psychiatric Illness in her father; Stroke (age of onset: 79) in her sister.      Social History   reports that she quit smoking about 29 years ago. Her smoking use included cigarettes. She started smoking about 49 years ago. She has a 20.0 pack-year smoking history. She has never used smokeless tobacco. She reports current alcohol use of about 1.8 oz of alcohol per week. She reports that she does not currently use  drugs after having used the following drugs: Inhaled.    Allergies  Allergies   Allergen Reactions    Codeine Itching and Nausea    Imitrex [Sumatriptan Succinate] Shortness of Breath and Swelling    Penicillins Rash and Vomiting    Sulfa Drugs Shortness of Breath and Itching    Azithromycin Itching    Dilaudid [Hydromorphone] Itching     sweating    Other Drug Itching     Z Pack    Sensipar [Cinacalcet] Unspecified     Nightmares and leg cramping    Imipramine Shortness of Breath and Itching     anxious    Seroquel [Quetiapine Fumarate]      Triggered urge to hurt self.      Medications  Prior to Admission Medications   Prescriptions Last Dose Informant Patient Reported? Taking?   Acetaminophen (TYLENOL PO) 9/30/2023 at 2200 Patient Yes Yes   Sig: Take 1 Tablet by mouth 2 times a day as needed (For pain). Pt is not sure the strength (OTC)   Calcium Carbonate Antacid 1000 MG Chew Tab 10/1/2023 at 0000 Patient No No   Sig: Chew 1 Tablet 4 times a day.   Cholecalciferol 2000 UNIT Tab 10/1/2023 at 0900 Patient No No   Sig: Take 1 Tablet by mouth every day.   Multiple Vitamins-Minerals (MULTIVITAMIN WOMEN PO) 10/1/2023 at 0900 Patient Yes No   Sig: Take 1 Tablet by mouth every day.   albuterol 108 (90 Base) MCG/ACT Aero Soln inhalation aerosol 9/20/2023 at Unknown Patient Yes No   Sig: Inhale 1 Puff 1 time a day as needed for Shortness of Breath.   calcitRIOL (ROCALTROL) 0.25 MCG Cap 10/1/2023 at 0900 Patient No No   Sig: Take 1 Capsule by mouth every day.   eletriptan (RELPAX) 20 MG Tab > 2 weeks at Unknown Patient No No   Sig: Take 1 Tablet by mouth one time as needed for Migraine for up to 1 dose.   fluticasone furoate-vilanterol (BREO ELLIPTA) 100-25 MCG/ACT AEROSOL POWDER, BREATH ACTIVATED 10/1/2023 at 0900 Patient Yes No   Sig: Inhale 1 Puff every day.   omeprazole (PRILOSEC) 40 MG delayed-release capsule 10/1/2023 at 0900 Patient Yes No   Sig: Take 40 mg by mouth every day.   ondansetron (ZOFRAN ODT) 4 MG TABLET  DISPERSIBLE > 2 weeks at Unknown Patient No No   Sig: Take 1 Tablet by mouth every 24 hours as needed for Nausea.   oxyCODONE-acetaminophen (PERCOCET) 5-325 MG Tab > 2 days at Unknown Patient Yes No   Sig: Take 1 Tablet by mouth every 6 hours as needed for Severe Pain.   propranolol (INDERAL) 40 MG Tab 10/1/2023 at 0900 Patient Yes No   Sig: Take 40 mg by mouth 2 times a day.   rivaroxaban (XARELTO) 20 MG Tab tablet 9/30/2023 at 2200 Patient Yes No   Sig: Take 20 mg by mouth with dinner.   venlafaxine (EFFEXOR-XR) 150 MG extended-release capsule 10/1/2023 at 0900 Patient No No   Sig: Take 1 Capsule by mouth every day.      Facility-Administered Medications: None     Physical Exam  Temp:  [37.1 °C (98.8 °F)] 37.1 °C (98.8 °F)  Pulse:  [81-95] 94  Resp:  [17-20] 17  BP: (127-153)/(63-84) 140/66  SpO2:  [91 %-94 %] 92 %  Blood Pressure : (!) 140/66   Temperature: 37.1 °C (98.8 °F)   Pulse: 94   Respiration: 17   Pulse Oximetry: 92 %     Physical Exam  Constitutional:       General: She is not in acute distress.  HENT:      Head: Normocephalic and atraumatic.      Right Ear: External ear normal.      Left Ear: External ear normal.      Nose: No congestion or rhinorrhea.      Mouth/Throat:      Mouth: Mucous membranes are moist.      Pharynx: No oropharyngeal exudate or posterior oropharyngeal erythema.   Eyes:      General: No scleral icterus.        Right eye: No discharge.         Left eye: No discharge.      Conjunctiva/sclera: Conjunctivae normal.      Pupils: Pupils are equal, round, and reactive to light.   Cardiovascular:      Rate and Rhythm: Normal rate and regular rhythm.      Heart sounds:      No friction rub. No gallop.   Pulmonary:      Effort: Pulmonary effort is normal.   Abdominal:      General: Abdomen is flat. There is no distension.      Tenderness: There is no guarding.   Musculoskeletal:         General: Swelling and tenderness present.      Cervical back: Neck supple. No rigidity. No muscular  "tenderness.      Right lower leg: No edema.      Left lower leg: No edema.      Comments: Erythema, edema and tenderness of the left cubital fossa   Skin:     Capillary Refill: Capillary refill takes 2 to 3 seconds.      Coloration: Skin is not jaundiced or pale.      Findings: No bruising or erythema.   Neurological:      Mental Status: She is alert and oriented to person, place, and time.   Psychiatric:         Judgment: Judgment normal.      Comments: Anxious mood       Laboratory:  Recent Labs     10/01/23  1502   WBC 10.5   RBC 4.75   HEMOGLOBIN 15.2   HEMATOCRIT 45.4   MCV 95.6   MCH 32.0   MCHC 33.5   RDW 43.4   PLATELETCT 256   MPV 11.6     Recent Labs     10/01/23  1502   SODIUM 141   POTASSIUM 4.2   CHLORIDE 103   CO2 23   GLUCOSE 141*   BUN 14   CREATININE 1.05   CALCIUM 6.8*     Recent Labs     10/01/23  1502   ALTSGPT 14   ASTSGOT 15   ALKPHOSPHAT 117*   TBILIRUBIN 0.5   GLUCOSE 141*         No results for input(s): \"NTPROBNP\" in the last 72 hours.      No results for input(s): \"TROPONINT\" in the last 72 hours.    Imaging:  CT-EXTREMITY, UPPER WITH LEFT   Final Result      1.  There is subcutaneous edema of the mid and lower portion of the left upper arm.   2.  No soft tissue abscess or fluid collection.   3.  Arterial structures are grossly patent. Venous structures not well opacified.   4.  Small amount of air is seen within an unnamed superficial vessel in the anterior mid upper arm possibly related to recent vascular catheter removal.        Assessment/Plan:  Justification for Admission Status  I anticipate this patient will require at least two midnights for appropriate medical management, necessitating inpatient admission because the patient has cellulitis    Patient will need a Telemetry bed on MEDICAL service.  The patient has cellulitis, will require intravenous antibiotics.  Will require imaging to rule out subcutaneous abscess with possible orthopedic consultation.    * Cellulitis- (present " on admission)  Assessment & Plan  I will start ceftriaxone, follow on cultures and sensitivities.  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  Resume anticoagulation with rivaroxaban    Essential hypertension- (present on admission)  Assessment & Plan  Resume propranolol with hold parameters    Mild intermittent asthma without complication- (present on admission)  Assessment & Plan  Not currently in exacerbation.  Resume inhalers    Mixed hyperlipidemia- (present on admission)  Assessment & Plan  Cardiac diet    Anxiety- (present on admission)  Assessment & Plan  Resume venlafaxine  Lorazepam as needed for anxiety    Bipolar affective disorder (HCC)- (present on admission)  Assessment & Plan  Resume venlafaxine      VTE prophylaxis: SCDs/TEDs and therapeutic anticoagulation with rivaroxaban

## 2023-10-02 NOTE — ASSESSMENT & PLAN NOTE
I will start ceftriaxone, follow on cultures and sensitivities.  We will obtain CT of the left upper extremity to rule out underlying abscess

## 2023-10-10 ENCOUNTER — HOSPITAL ENCOUNTER (OUTPATIENT)
Dept: LAB | Facility: MEDICAL CENTER | Age: 73
End: 2023-10-10
Attending: SURGERY
Payer: COMMERCIAL

## 2023-10-10 LAB — CALCIUM SERPL-MCNC: 7.5 MG/DL (ref 8.5–10.5)

## 2023-10-10 PROCEDURE — 82310 ASSAY OF CALCIUM: CPT

## 2023-10-10 PROCEDURE — 36415 COLL VENOUS BLD VENIPUNCTURE: CPT

## 2023-10-17 ENCOUNTER — OFFICE VISIT (OUTPATIENT)
Dept: MEDICAL GROUP | Facility: PHYSICIAN GROUP | Age: 73
End: 2023-10-17
Payer: COMMERCIAL

## 2023-10-17 ENCOUNTER — HOSPITAL ENCOUNTER (OUTPATIENT)
Dept: LAB | Facility: MEDICAL CENTER | Age: 73
End: 2023-10-17
Attending: SURGERY
Payer: COMMERCIAL

## 2023-10-17 VITALS
SYSTOLIC BLOOD PRESSURE: 130 MMHG | DIASTOLIC BLOOD PRESSURE: 80 MMHG | HEART RATE: 91 BPM | HEIGHT: 70 IN | TEMPERATURE: 99 F | OXYGEN SATURATION: 99 % | WEIGHT: 252 LBS | BODY MASS INDEX: 36.08 KG/M2 | RESPIRATION RATE: 18 BRPM

## 2023-10-17 DIAGNOSIS — T80.818S: ICD-10-CM

## 2023-10-17 DIAGNOSIS — S41.102D OPEN WOUND OF LEFT UPPER ARM, SUBSEQUENT ENCOUNTER: ICD-10-CM

## 2023-10-17 PROBLEM — L03.90 CELLULITIS: Status: RESOLVED | Noted: 2023-10-01 | Resolved: 2023-10-17

## 2023-10-17 LAB — CALCIUM SERPL-MCNC: 9 MG/DL (ref 8.5–10.5)

## 2023-10-17 PROCEDURE — 99214 OFFICE O/P EST MOD 30 MIN: CPT | Performed by: NURSE PRACTITIONER

## 2023-10-17 PROCEDURE — 3075F SYST BP GE 130 - 139MM HG: CPT | Performed by: NURSE PRACTITIONER

## 2023-10-17 PROCEDURE — 82310 ASSAY OF CALCIUM: CPT

## 2023-10-17 PROCEDURE — 3079F DIAST BP 80-89 MM HG: CPT | Performed by: NURSE PRACTITIONER

## 2023-10-17 PROCEDURE — 36415 COLL VENOUS BLD VENIPUNCTURE: CPT

## 2023-10-17 ASSESSMENT — FIBROSIS 4 INDEX: FIB4 SCORE: 1.11

## 2023-10-17 NOTE — ASSESSMENT & PLAN NOTE
Patient was recently admitted to the hospital for low calcium after parathyroid surgery. She was admitted for calcium replacement. On 9/25, she developed an infiltration of calcium chloride in dextrose. This has resulted in a significant wound of her left antecubital IV. She had developed a blister where the infiltration was. After being discharged the blister popped and turned into an open wound. She was seen in the ER due to the would about a week later.     Patient has seen her surgeon who has placed a wound consult. She has had a CT of her arm to check for abscess/fluid collection which was normal.     Will take picture of wound and upload to media tab.   Wound is 5cm in width x 3cm in height. Induration around wound is about 10 cm in diameter. Eshar present over wound bed.  Picture taken and uploaded to [employer].    Urgent referral to wound.

## 2023-10-17 NOTE — PROGRESS NOTES
Chief Complaint   Patient presents with    Hospital Follow-up                                                                                                                                       HPI:   Miryam presents today with the following.    Problem   Extravasation of Other Vesicant Agent, Sequela   Cellulitis (Resolved)       Current Outpatient Medications   Medication Sig Dispense Refill    Acetaminophen (TYLENOL PO) Take 1 Tablet by mouth 2 times a day as needed (For pain). Pt is not sure the strength (OTC)      Calcium Carbonate Antacid 1000 MG Chew Tab Chew 1 Tablet 4 times a day. 120 Tablet 0    Cholecalciferol 2000 UNIT Tab Take 1 Tablet by mouth every day. 60 Tablet 0    calcitRIOL (ROCALTROL) 0.25 MCG Cap Take 1 Capsule by mouth every day. 30 Capsule 0    albuterol 108 (90 Base) MCG/ACT Aero Soln inhalation aerosol Inhale 1 Puff 1 time a day as needed for Shortness of Breath.      fluticasone furoate-vilanterol (BREO ELLIPTA) 100-25 MCG/ACT AEROSOL POWDER, BREATH ACTIVATED Inhale 1 Puff every day.      omeprazole (PRILOSEC) 40 MG delayed-release capsule Take 40 mg by mouth every day.      propranolol (INDERAL) 40 MG Tab Take 40 mg by mouth 2 times a day.      rivaroxaban (XARELTO) 20 MG Tab tablet Take 20 mg by mouth with dinner.      oxyCODONE-acetaminophen (PERCOCET) 5-325 MG Tab Take 1 Tablet by mouth every 6 hours as needed for Severe Pain.      Multiple Vitamins-Minerals (MULTIVITAMIN WOMEN PO) Take 1 Tablet by mouth every day.      venlafaxine (EFFEXOR-XR) 150 MG extended-release capsule Take 1 Capsule by mouth every day. 90 Capsule 3    eletriptan (RELPAX) 20 MG Tab Take 1 Tablet by mouth one time as needed for Migraine for up to 1 dose. 10 Tablet 2    ondansetron (ZOFRAN ODT) 4 MG TABLET DISPERSIBLE Take 1 Tablet by mouth every 24 hours as needed for Nausea. 20 Tablet 2     No current facility-administered medications for this visit.       Allergies as of 10/17/2023 - Reviewed 10/17/2023  "  Allergen Reaction Noted    Codeine Itching and Nausea 05/22/2009    Imitrex [sumatriptan succinate] Shortness of Breath and Swelling 03/05/2010    Penicillins Rash and Vomiting 08/21/2007    Sulfa drugs Shortness of Breath and Itching 08/21/2007    Azithromycin Itching 08/21/2007    Dilaudid [hydromorphone] Itching 10/20/2015    Other drug Itching 07/06/2011    Sensipar [cinacalcet] Unspecified 07/28/2016    Imipramine Shortness of Breath and Itching 06/22/2011    Seroquel [quetiapine fumarate]  08/19/2011        ROS:  All systems negative expect as addressed in assessment and plan.     /80 (BP Location: Right arm, Patient Position: Sitting)   Pulse 91   Temp 37.2 °C (99 °F) (Temporal)   Resp 18   Ht 1.778 m (5' 10\")   Wt 114 kg (252 lb)   SpO2 99%   BMI 36.16 kg/m²     Physical Exam:    Physical Exam      Assessment and Plan:  73 y.o. female with the following issues.    1. Extravasation of other vesicant agent, sequela  Referral to Wound Clinic      2. Open wound of left upper arm, subsequent encounter  Referral to Wound Clinic           Extravasation of other vesicant agent, sequela  Patient was recently admitted to the hospital for low calcium after parathyroid surgery. She was admitted for calcium replacement. On 9/25, she developed an infiltration of calcium chloride in dextrose. This has resulted in a significant wound of her left antecubital IV. She had developed a blister where the infiltration was. After being discharged the blister popped and turned into an open wound. She was seen in the ER due to the would about a week later.     Patient has seen her surgeon who has placed a wound consult. She has had a CT of her arm to check for abscess/fluid collection which was normal.     Will take picture of wound and upload to media tab.   Wound is 5cm in width x 3cm in height. Induration around wound is about 10 cm in diameter. Eshar present over wound bed.  Picture taken and uploaded to " media.    Urgent referral to wound.      Return if symptoms worsen or fail to improve.      Please note that this dictation was created using voice recognition software. I have worked with consultants from the vendor as well as technical experts from [facility] to optimize the interface. I have made every reasonable attempt to correct obvious errors, but I expect that there are errors of grammar and possibly content that I did not discover before finalizing the note.

## 2023-10-20 ENCOUNTER — TELEPHONE (OUTPATIENT)
Dept: HEALTH INFORMATION MANAGEMENT | Facility: OTHER | Age: 73
End: 2023-10-20
Payer: COMMERCIAL

## 2023-10-24 ENCOUNTER — HOSPITAL ENCOUNTER (OUTPATIENT)
Dept: LAB | Facility: MEDICAL CENTER | Age: 73
End: 2023-10-24
Attending: SURGERY
Payer: COMMERCIAL

## 2023-10-24 ENCOUNTER — OFFICE VISIT (OUTPATIENT)
Dept: MEDICAL GROUP | Facility: PHYSICIAN GROUP | Age: 73
End: 2023-10-24
Payer: COMMERCIAL

## 2023-10-24 VITALS
OXYGEN SATURATION: 97 % | BODY MASS INDEX: 35.5 KG/M2 | TEMPERATURE: 97.7 F | DIASTOLIC BLOOD PRESSURE: 62 MMHG | SYSTOLIC BLOOD PRESSURE: 110 MMHG | RESPIRATION RATE: 16 BRPM | HEIGHT: 70 IN | HEART RATE: 87 BPM | WEIGHT: 248 LBS

## 2023-10-24 DIAGNOSIS — R29.818 SUSPECTED SLEEP APNEA: ICD-10-CM

## 2023-10-24 DIAGNOSIS — J43.9 PULMONARY EMPHYSEMA, UNSPECIFIED EMPHYSEMA TYPE (HCC): ICD-10-CM

## 2023-10-24 DIAGNOSIS — J96.11 CHRONIC HYPOXEMIC RESPIRATORY FAILURE (HCC): ICD-10-CM

## 2023-10-24 DIAGNOSIS — Z99.81 CHRONIC RESPIRATORY FAILURE WITH HYPOXIA, ON HOME OXYGEN THERAPY (HCC): ICD-10-CM

## 2023-10-24 DIAGNOSIS — J96.11 CHRONIC RESPIRATORY FAILURE WITH HYPOXIA, ON HOME OXYGEN THERAPY (HCC): ICD-10-CM

## 2023-10-24 LAB — CALCIUM SERPL-MCNC: 8.9 MG/DL (ref 8.5–10.5)

## 2023-10-24 PROCEDURE — 82310 ASSAY OF CALCIUM: CPT

## 2023-10-24 PROCEDURE — 36415 COLL VENOUS BLD VENIPUNCTURE: CPT

## 2023-10-24 PROCEDURE — 3074F SYST BP LT 130 MM HG: CPT | Performed by: NURSE PRACTITIONER

## 2023-10-24 PROCEDURE — 3078F DIAST BP <80 MM HG: CPT | Performed by: NURSE PRACTITIONER

## 2023-10-24 PROCEDURE — 99214 OFFICE O/P EST MOD 30 MIN: CPT | Performed by: NURSE PRACTITIONER

## 2023-10-24 RX ORDER — ALBUTEROL SULFATE 90 UG/1
1 AEROSOL, METERED RESPIRATORY (INHALATION)
Qty: 8.5 G | Refills: 5 | Status: SHIPPED | OUTPATIENT
Start: 2023-10-24

## 2023-10-24 RX ORDER — FLUTICASONE FUROATE, UMECLIDINIUM BROMIDE AND VILANTEROL TRIFENATATE 200; 62.5; 25 UG/1; UG/1; UG/1
1 POWDER RESPIRATORY (INHALATION) DAILY
Qty: 1 EACH | Refills: 5 | Status: SHIPPED | OUTPATIENT
Start: 2023-10-24 | End: 2023-11-30 | Stop reason: SDUPTHER

## 2023-10-24 ASSESSMENT — FIBROSIS 4 INDEX: FIB4 SCORE: 1.11

## 2023-10-24 NOTE — PROGRESS NOTES
Chief Complaint   Patient presents with    Medication Management     Inhaler                                                                                                                                       HPI:   Miryam presents today with the following.    Problem   Pulmonary Emphysema (Hcc)   Chronic Respiratory Failure With Hypoxia, On Home Oxygen Therapy ([facility])       Current Outpatient Medications   Medication Sig Dispense Refill    albuterol 108 (90 Base) MCG/ACT Aero Soln inhalation aerosol Inhale 1 Puff 1 time a day as needed for Shortness of Breath. 8.5 g 5    fluticasone-umeclidinium-vilanterol (TRELEGY ELLIPTA) 200-62.5-25 mcg/act inhaler Inhale 1 Puff every day. 1 Each 5    Acetaminophen (TYLENOL PO) Take 1 Tablet by mouth 2 times a day as needed (For pain). Pt is not sure the strength (OTC)      Calcium Carbonate Antacid 1000 MG Chew Tab Chew 1 Tablet 4 times a day. 120 Tablet 0    Cholecalciferol 2000 UNIT Tab Take 1 Tablet by mouth every day. 60 Tablet 0    calcitRIOL (ROCALTROL) 0.25 MCG Cap Take 1 Capsule by mouth every day. 30 Capsule 0    omeprazole (PRILOSEC) 40 MG delayed-release capsule Take 40 mg by mouth every day.      propranolol (INDERAL) 40 MG Tab Take 40 mg by mouth 2 times a day.      rivaroxaban (XARELTO) 20 MG Tab tablet Take 20 mg by mouth with dinner.      oxyCODONE-acetaminophen (PERCOCET) 5-325 MG Tab Take 1 Tablet by mouth every 6 hours as needed for Severe Pain.      Multiple Vitamins-Minerals (MULTIVITAMIN WOMEN PO) Take 1 Tablet by mouth every day.      venlafaxine (EFFEXOR-XR) 150 MG extended-release capsule Take 1 Capsule by mouth every day. 90 Capsule 3    eletriptan (RELPAX) 20 MG Tab Take 1 Tablet by mouth one time as needed for Migraine for up to 1 dose. 10 Tablet 2    ondansetron (ZOFRAN ODT) 4 MG TABLET DISPERSIBLE Take 1 Tablet by mouth every 24 hours as needed for Nausea. 20 Tablet 2     No current facility-administered medications for this visit.  "      Allergies as of 10/24/2023 - Reviewed 10/24/2023   Allergen Reaction Noted    Codeine Itching and Nausea 05/22/2009    Imitrex [sumatriptan succinate] Shortness of Breath and Swelling 03/05/2010    Penicillins Rash and Vomiting 08/21/2007    Sulfa drugs Shortness of Breath and Itching 08/21/2007    Azithromycin Itching 08/21/2007    Dilaudid [hydromorphone] Itching 10/20/2015    Other drug Itching 07/06/2011    Sensipar [cinacalcet] Unspecified 07/28/2016    Imipramine Shortness of Breath and Itching 06/22/2011    Seroquel [quetiapine fumarate]  08/19/2011        ROS:  All systems negative expect as addressed in assessment and plan.     /62 (BP Location: Right arm, Patient Position: Sitting)   Pulse 87   Temp 36.5 °C (97.7 °F) (Temporal)   Resp 16   Ht 1.778 m (5' 10\")   Wt 112 kg (248 lb)   SpO2 97%   BMI 35.58 kg/m²       Physical Exam  Vitals reviewed.   Constitutional:       Appearance: Normal appearance.   HENT:      Head: Normocephalic and atraumatic.      Mouth/Throat:      Mouth: Mucous membranes are moist.   Eyes:      Extraocular Movements: Extraocular movements intact.      Conjunctiva/sclera: Conjunctivae normal.   Pulmonary:      Effort: Pulmonary effort is normal.   Musculoskeletal:         General: Normal range of motion.      Cervical back: Normal range of motion.   Skin:     General: Skin is warm and dry.   Neurological:      General: No focal deficit present.      Mental Status: She is alert and oriented to person, place, and time.   Psychiatric:         Mood and Affect: Mood normal.         Behavior: Behavior normal.         Thought Content: Thought content normal.           Assessment and Plan:  73 y.o. female with the following issues.    1. Chronic hypoxemic respiratory failure (HCC)  DME [employer] for Home Oxygen Concentrator      2. Suspected sleep apnea        3. Pulmonary emphysema, unspecified emphysema type (HCC)        4. Chronic respiratory failure with " hypoxia, on home oxygen therapy (HCC)             Pulmonary emphysema (HCC)  Chronic stable. Patient has been stable on breo. She does feel like her breathing has been ok, except during allergy season.     Will discontinue Breo and start trelegy. Refill albuterol.     Chronic respiratory failure with hypoxia, on home oxygen therapy (HCC)  Chronic stable. Patient wears 2L NC at night. She reports that she has been having nosebleeds from the oxygen.     Will order humidifier for concentrator.       Return for N2U.      Please note that this dictation was created using voice recognition software. I have worked with consultants from the vendor as well as technical experts from [facility] to optimize the interface. I have made every reasonable attempt to correct obvious errors, but I expect that there are errors of grammar and possibly content that I did not discover before finalizing the note.

## 2023-10-24 NOTE — ASSESSMENT & PLAN NOTE
Chronic stable. Patient has been stable on breo. She does feel like her breathing has been ok, except during allergy season.     Will discontinue Breo and start trelegy. Refill albuterol.

## 2023-10-24 NOTE — ASSESSMENT & PLAN NOTE
Chronic stable. Patient wears 2L NC at night. She reports that she has been having nosebleeds from the oxygen.     Will order humidifier for concentrator.

## 2023-10-25 ENCOUNTER — NON-PROVIDER VISIT (OUTPATIENT)
Dept: WOUND CARE | Facility: MEDICAL CENTER | Age: 73
End: 2023-10-25
Attending: NURSE PRACTITIONER
Payer: COMMERCIAL

## 2023-10-25 DIAGNOSIS — F31.75 BIPOLAR DISORDER, IN PARTIAL REMISSION, MOST RECENT EPISODE DEPRESSED (HCC): ICD-10-CM

## 2023-10-25 PROCEDURE — 97597 DBRDMT OPN WND 1ST 20 CM/<: CPT

## 2023-10-25 PROCEDURE — 99211 OFF/OP EST MAY X REQ PHY/QHP: CPT

## 2023-10-25 RX ORDER — VENLAFAXINE HYDROCHLORIDE 150 MG/1
150 CAPSULE, EXTENDED RELEASE ORAL DAILY
Qty: 90 CAPSULE | Refills: 3 | Status: SHIPPED | OUTPATIENT
Start: 2023-10-25

## 2023-10-25 NOTE — PATIENT INSTRUCTIONS
-Keep your wound dressing clean, dry, and intact.    -Change your dressing every 72 hours and if it becomes soiled, soaked, or falls off.    -Should you experience any significant changes in your wound(s), such as infection (redness, swelling, localized heat, increased pain, fever > 101 F, chills) or have any questions regarding your home care instructions, please contact the wound center at [phone]. If after hours, contact your primary care physician or go to the hospital emergency room.

## 2023-10-25 NOTE — CERTIFICATION
Non Provider Encounter- Full Thickness wound  Certification 10/25/23 - 01/25/23    HISTORY OF PRESENT ILLNESS  Wound History:    START OF CARE IN CLINIC: 10/25/23    REFERRING PROVIDER: CURTIS Hess ([facility])   WOUND- Full Thickness Wound   LOCATION: Left anterior upper arm   HISTORY: Left upper arm wound due to IV extravasation approximately 9/24/23.  Pt had been hospitalized at [facility] for  parathyrodectomy.  Other PMI includes afib and COPD.  Recommended NPWT, but pt works long hours and feels she is unable to come to [facility] 3x/wk.    Pertinent Labs and Diagnostics:    Labs:     A1c:   Lab Results   Component Value Date/Time    HBA1C 5.7 (H) 07/11/2022 07:34 AM          IMAGING: none    VASCULAR STUDIES: none  VASCULAR ASSESSMENT: Left radial pulse 2+ easily palpable.    LAST  WOUND CULTURE:  DATE : none    Patient allergies and medications reviewed via Epic.     Wound Assessment:        Wound 10/25/23 Full Thickness Wound Arm Left (Active)   Wound Image     10/25/23 1200   Site Assessment Yellow;Red;Black 10/25/23 1200   Periwound Assessment Blanchable erythema;Induration;Intact 10/25/23 1200   Margins Unattached edges 10/25/23 1200   Closure Secondary intention 10/25/23 1200   Drainage Amount Moderate 10/25/23 1200   Drainage Description Serosanguineous 10/25/23 1200   Treatments Cleansed;Topical Lidocaine;CSWD - Conservative Sharp Wound Debridement 10/25/23 1200   Wound Cleansing Normal Saline Irrigation 10/25/23 1200   Periwound Protectant Skin Protectant Wipes to Periwound;Barrier Paste 10/25/23 1200   Dressing Changed New 10/25/23 1200   Dressing Cleansing/Solutions Normal Saline 10/25/23 1200   Dressing Options Hydrofiber Silver;Silicone Adhesive Foam;Hypafix Tape 10/25/23 1200   Dressing Change/Treatment Frequency Every 72 hrs, and As Needed 10/25/23 1200   Wound Team Following Weekly 10/25/23 1200   Non-staged Wound Description Full thickness 10/25/23 1200   Post-Procedure Length (cm) 4  cm 10/25/23 1200   Post-Procedure Width (cm) 4.2 cm 10/25/23 1200   Post-Procedure Depth (cm) 1.1 cm 10/25/23 1200   Post-Procedure Surface Area (cm^2) 16.8 cm^2 10/25/23 1200   Post-Procedure Volume (cm^3) 18.48 cm^3 10/25/23 1200   Wound Odor None 10/25/23 1200   Pulses Left;Radial;2+ 10/25/23 1200   Exposed Structures Adipose 10/25/23 1200     Procedures:    -2% viscous lidocaine applied topically to wound bed for approximately 5 minutes prior to debridement.  Applied 3 separate times during CSWD.  -Curette used to debride thick eschar and entire surface of wound, 15cm2 debrided.    -Refer to flowsheet for wound care details.         PATIENT EDUCATION  -Advised to go to ER for any increased redness, swelling, drainage or odor, or if patient develops fever, chills, nausea or vomiting.  -Importance of adequate nutrition for wound healing  -Increase protein intake (unless contraindicated by renal status)

## 2023-11-01 ENCOUNTER — OFFICE VISIT (OUTPATIENT)
Dept: WOUND CARE | Facility: MEDICAL CENTER | Age: 73
End: 2023-11-01
Attending: NURSE PRACTITIONER
Payer: COMMERCIAL

## 2023-11-01 VITALS — TEMPERATURE: 98.5 F | HEART RATE: 89 BPM | SYSTOLIC BLOOD PRESSURE: 149 MMHG | DIASTOLIC BLOOD PRESSURE: 76 MMHG

## 2023-11-01 DIAGNOSIS — S41.102D OPEN WOUND OF LEFT UPPER ARM, SUBSEQUENT ENCOUNTER: ICD-10-CM

## 2023-11-01 DIAGNOSIS — T14.8XXA PAIN ASSOCIATED WITH WOUND: ICD-10-CM

## 2023-11-01 DIAGNOSIS — R60.9 PERIPHERAL EDEMA: ICD-10-CM

## 2023-11-01 DIAGNOSIS — T82.898D EXTRAVASATION INJURY OF INTRAVENOUS CATHETER SITE WITH OTHER COMPLICATION, SUBSEQUENT ENCOUNTER: ICD-10-CM

## 2023-11-01 DIAGNOSIS — R52 PAIN ASSOCIATED WITH WOUND: ICD-10-CM

## 2023-11-01 PROCEDURE — 99214 OFFICE O/P EST MOD 30 MIN: CPT

## 2023-11-01 PROCEDURE — 11042 DBRDMT SUBQ TIS 1ST 20SQCM/<: CPT | Performed by: NURSE PRACTITIONER

## 2023-11-01 PROCEDURE — 3077F SYST BP >= 140 MM HG: CPT | Performed by: NURSE PRACTITIONER

## 2023-11-01 PROCEDURE — 11042 DBRDMT SUBQ TIS 1ST 20SQCM/<: CPT

## 2023-11-01 PROCEDURE — 3078F DIAST BP <80 MM HG: CPT | Performed by: NURSE PRACTITIONER

## 2023-11-01 PROCEDURE — 99214 OFFICE O/P EST MOD 30 MIN: CPT | Mod: 25 | Performed by: NURSE PRACTITIONER

## 2023-11-01 ASSESSMENT — ENCOUNTER SYMPTOMS
NAUSEA: 0
DIARRHEA: 0
CHILLS: 0
SHORTNESS OF BREATH: 1
COUGH: 1
BACK PAIN: 0
VOMITING: 0
FEVER: 0
CONSTIPATION: 0

## 2023-11-01 NOTE — PROGRESS NOTES
Wound Vac order faxed to [facility] and scanned into [employer].

## 2023-11-01 NOTE — PROGRESS NOTES
Provider Encounter- Full Thickness wound    HISTORY OF PRESENT ILLNESS  Wound History:    Wound History:               START OF CARE IN CLINIC: 10/25/23               REFERRING PROVIDER: CURTIS Hess ([facility] [place])              WOUND- Full Thickness Wound              LOCATION: Left anterior upper arm              HISTORY: Patient's developed pain swelling and redness to her left upper arm several days after an IV infiltration while she was receiving IV calcium on 9/24/2023.  She developed hypocalcemia several days after undergoing a parathyroidectomy and was admitted to the hospital for continuous cardiac monitoring and was given IV and oral calcium replacement.  She presented to the [facility] ED on 10/1 with an open wound above her left AC.  She states it started as a small bruise which progressively became more painful, red, and swollen.  She developed a blister that ruptured resulting in an open wound.  CT imaging was done, showed no evidence of abscess or fluid collection.  Orthopedic surgery was consulted, Dr. Pritchett, deemed surgery was not indicated.  Patient was treated with IV antibiotics and calcium replacement before being discharged home.   She followed up with her PCP, who referred her to [facility] for management of her wound.      Pertinent Medical History: Hypocalcemia following parathyroidectomy, obesity, PAF, pulmonary emphysema, Raynaud's phenomenon    TOBACCO USE: Former smoker, quit in 1994    Patient's problem list, allergies, and current medications reviewed and updated in Epic    Interval History:  11/1/2023 : Initial provider visit with CURTIS [place], FNP-BC, CWOCN, CFCN.  Patient is quite emotional today, tearful at times.  She is very frustrated with lack of healing from this wound.  She was not able to tolerate a lot of debridement today due to pain.  Discussed wound VAC as most expedient way to heal this wound.  Technology and its application and for wound healing explained  to patient.  Previously she did not think she could return to clinic 2-3 times per week as would be needed for VAC therapy.  However, she states she will speak to employer about taking several afternoons off per week.  She is very motivated to get this wound healed ASAP.      REVIEW OF SYSTEMS:   Review of Systems   Constitutional:  Negative for chills and fever.   Respiratory:  Positive for cough and shortness of breath.         States she has a history of COPD, often short of breath and has frequent cough   Gastrointestinal:  Negative for constipation, diarrhea, nausea and vomiting.   Genitourinary:  Negative for dysuria.   Musculoskeletal:  Negative for back pain.       PHYSICAL EXAMINATION:   BP (!) 149/76   Pulse 89   Temp 36.9 °C (98.5 °F)     Physical Exam  Constitutional:       Appearance: She is obese.   Cardiovascular:      Rate and Rhythm: Normal rate.      Pulses: Normal pulses.      Comments: Radial and brachial pulses are palpable  Pulmonary:      Effort: Pulmonary effort is normal.   Musculoskeletal:         General: Swelling present.      Comments: Swelling of left arm   Skin:     Comments: Full-thickness wound to left upper arm, above AC-wound bed presents with approximately 90% slough and adipose, moderate serosanguineous drainage, no odor, no periwound erythema.  Periwound induration radiating approximately 1 cm all around wound, no odor     Neurological:      Mental Status: She is alert and oriented to person, place, and time.   Psychiatric:      Comments: Anxious with wound care, tearful at times       WOUND ASSESSMENT  Wound 09/20/23 Incision Neck (Active)   Number of days: 42       Wound 10/25/23 Full Thickness Wound Arm Left (Active)   Wound Image    11/01/23 1000   Site Assessment Red;Yellow 11/01/23 1000   Periwound Assessment Blanchable erythema;Induration;Intact;Dry;Edema 11/01/23 1000   Margins Unattached edges 11/01/23 1000   Closure Secondary intention 11/01/23 1000   Drainage Amount  Moderate 11/01/23 1000   Drainage Description Serosanguineous 11/01/23 1000   Treatments Cleansed;Topical Lidocaine;Provider debridement;Site care 11/01/23 1000   Wound Cleansing Normal Saline Irrigation 11/01/23 1000   Periwound Protectant Skin Protectant Wipes to Periwound;Barrier Paste 11/01/23 1000   Dressing Changed New 11/01/23 1000   Dressing Cleansing/Solutions Not Applicable 11/01/23 1000   Dressing Options Hydrofiber Silver Strip;Hydrofiber Silver;Super Absorbent Pad;Hypafix Tape 11/01/23 1000   Dressing Change/Treatment Frequency Every 72 hrs, and As Needed 11/01/23 1000   Wound Team Following Weekly 11/01/23 1000   Non-staged Wound Description Full thickness 11/01/23 1000   Wound Length (cm) 3.8 cm 11/01/23 1000   Wound Width (cm) 5 cm 11/01/23 1000   Wound Depth (cm) 1.2 cm 11/01/23 1000   Wound Surface Area (cm^2) 19 cm^2 11/01/23 1000   Wound Volume (cm^3) 22.8 cm^3 11/01/23 1000   Post-Procedure Length (cm) 3.9 cm 11/01/23 1000   Post-Procedure Width (cm) 4.6 cm 11/01/23 1000   Post-Procedure Depth (cm) 1.4 cm 11/01/23 1000   Post-Procedure Surface Area (cm^2) 17.94 cm^2 11/01/23 1000   Post-Procedure Volume (cm^3) 25.116 cm^3 11/01/23 1000   Wound Odor None 11/01/23 1000   Pulses Left;Radial;2+ 10/25/23 1200   Exposed Structures Adipose 11/01/23 1000   Number of days: 7       PROCEDURE:   -2% viscous lidocaine applied topically to wound bed for approximately 5 minutes prior to debridement  -Curette used to debride wound bed.  Excisional debridement was performed to remove devitalized tissue until healthy, bleeding tissue was visualized.   Entire surface of wound, 17.94 cm² debrided.  Tissue debrided into the subcutaneous layer.    -Bleeding controlled with manual pressure.  Unable to establish 100% clean wound bed due to patient discomfort  -Wound care completed by wound RN, refer to flowsheet  -Patient tolerated the procedure well, without c/o pain or discomfort.       Pertinent Labs and  "Diagnostics:    Labs:     A1c:   Lab Results   Component Value Date/Time    HBA1C 5.7 (H) 2022 07:34 AM          IMAGIN2023-CT of left upper extremity with contrast  IMPRESSION:     1.  There is subcutaneous edema of the mid and lower portion of the left upper arm.  2.  No soft tissue abscess or fluid collection.  3.  Arterial structures are grossly patent. Venous structures not well opacified.  4.  Small amount of air is seen within an unnamed superficial vessel in the anterior mid upper arm possibly related to recent vascular catheter removal.      VASCULAR STUDIES: N/A    LAST  WOUND CULTURE:  DATE : No results found for: \"CULTRSULT\"      ASSESSMENT AND PLAN:     1. Open wound of left upper arm, subsequent encounter  2. Extravasation injury of intravenous catheter site with other complication, subsequent encounter    2023: Initial provider visit.  Patient's wound presents with slough and adipose tissue to most of the wound bed.  -Excisional debridement of wound in clinic today, medically necessary to promote wound healing.  Unable to establish 100% clean wound bed due to patient discomfort  -Patient agreeable to starting VAC therapy as she is very motivated to get this wound healed as soon as possible.  Wound bed will need further debridement prior to application.  -Patient to change dressing 1-2 times per week in between clinic visits     Wound care: Silver Hydrofiber to manage exudate and bioburden, foam cover dressing, Hypafix tape     3. Pain associated with wound    2023: Unable to establish 100% clean bed due to patient discomfort  -Consider local anesthesia with subcutaneous lidocaine prior to next debridement    4. Peripheral edema    2023: Left arm is edematous from shoulder to wrist  -Tubigrip applied  -Patient encouraged to elevate her arm periodically throughout the day        PATIENT EDUCATION  - Importance of adequate nutrition for wound healing  -Advised to go to ER " for any increased redness, swelling, drainage, or odor, or if patient develops fever, chills, nausea or vomiting.     My total time spent caring for the patient on the day of the encounter was 30 minutes.   This does not include time spent on separately billable procedures/tests.       Please note that this note may have been created using voice recognition software. I have worked with technical experts from [employer] [facility] at the [facility] to optimize the interface.  I have made every reasonable attempt to correct obvious errors, but there may be errors of grammar and possibly content that I did not discover before finalizing the note.    N

## 2023-11-01 NOTE — PATIENT INSTRUCTIONS
-Keep your wound dressing clean, dry, and intact.    -Change your dressing if it becomes soiled, soaked, or falls off.    -Should you experience any significant changes in your wound(s), such as infection (redness, swelling, localized heat, increased pain, fever > 101 F, chills) or have any questions regarding your home care instructions, please contact the wound center at [phone]. If after hours, contact your primary care physician or go to the hospital emergency room.

## 2023-11-04 ENCOUNTER — HOSPITAL ENCOUNTER (OUTPATIENT)
Dept: LAB | Facility: MEDICAL CENTER | Age: 73
End: 2023-11-04
Attending: SURGERY
Payer: COMMERCIAL

## 2023-11-04 LAB — CALCIUM SERPL-MCNC: 7.9 MG/DL (ref 8.5–10.5)

## 2023-11-04 PROCEDURE — 36415 COLL VENOUS BLD VENIPUNCTURE: CPT

## 2023-11-04 PROCEDURE — 82310 ASSAY OF CALCIUM: CPT

## 2023-11-05 ENCOUNTER — OFFICE VISIT (OUTPATIENT)
Dept: URGENT CARE | Facility: PHYSICIAN GROUP | Age: 73
End: 2023-11-05
Payer: COMMERCIAL

## 2023-11-05 VITALS
HEIGHT: 70 IN | OXYGEN SATURATION: 92 % | SYSTOLIC BLOOD PRESSURE: 122 MMHG | HEART RATE: 86 BPM | WEIGHT: 248 LBS | BODY MASS INDEX: 35.5 KG/M2 | TEMPERATURE: 97.8 F | DIASTOLIC BLOOD PRESSURE: 50 MMHG | RESPIRATION RATE: 20 BRPM

## 2023-11-05 DIAGNOSIS — L08.9 WOUND INFECTION: ICD-10-CM

## 2023-11-05 DIAGNOSIS — T80.1XXD: ICD-10-CM

## 2023-11-05 DIAGNOSIS — T14.8XXA WOUND INFECTION: ICD-10-CM

## 2023-11-05 PROCEDURE — 3078F DIAST BP <80 MM HG: CPT

## 2023-11-05 PROCEDURE — 99214 OFFICE O/P EST MOD 30 MIN: CPT

## 2023-11-05 PROCEDURE — 3074F SYST BP LT 130 MM HG: CPT

## 2023-11-05 RX ORDER — CLINDAMYCIN HYDROCHLORIDE 300 MG/1
300 CAPSULE ORAL 3 TIMES DAILY
Qty: 15 CAPSULE | Refills: 0 | Status: SHIPPED | OUTPATIENT
Start: 2023-11-05 | End: 2023-11-10

## 2023-11-05 ASSESSMENT — FIBROSIS 4 INDEX: FIB4 SCORE: 1.11
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
## 2023-11-05 NOTE — LETTER
[facility] [place]  [address]  [place] [facility] [id number]     November 5, 2023    Patient: Miryam Veloz   YOB: 1950   Date of Visit: 11/5/2023       To Whom It May Concern:    Miryam Veloz was seen and treated in our department on 11/5/2023.     Sincerely,     ISMAEL Blackburn.

## 2023-11-05 NOTE — LETTER
[facility] [place]  [address]  [place] [facility] [address]     November 5, 2023    Patient: Miryam Veloz   YOB: 1950   Date of Visit: 11/5/2023       To Whom It May Concern:    Miryam Veloz was seen and treated in our department on 11/5/2023. Please excuse 11/6 and 11/7.      Sincerely,     ISMAEL Blackburn.

## 2023-11-08 ENCOUNTER — OFFICE VISIT (OUTPATIENT)
Dept: WOUND CARE | Facility: MEDICAL CENTER | Age: 73
End: 2023-11-08
Attending: NURSE PRACTITIONER
Payer: COMMERCIAL

## 2023-11-08 VITALS
OXYGEN SATURATION: 98 % | HEART RATE: 79 BPM | SYSTOLIC BLOOD PRESSURE: 142 MMHG | RESPIRATION RATE: 18 BRPM | TEMPERATURE: 98.1 F | DIASTOLIC BLOOD PRESSURE: 81 MMHG

## 2023-11-08 DIAGNOSIS — T14.8XXA PAIN ASSOCIATED WITH WOUND: ICD-10-CM

## 2023-11-08 DIAGNOSIS — S41.102D OPEN WOUND OF LEFT UPPER ARM, SUBSEQUENT ENCOUNTER: ICD-10-CM

## 2023-11-08 DIAGNOSIS — T82.898D EXTRAVASATION INJURY OF INTRAVENOUS CATHETER SITE WITH OTHER COMPLICATION, SUBSEQUENT ENCOUNTER: ICD-10-CM

## 2023-11-08 DIAGNOSIS — R52 PAIN ASSOCIATED WITH WOUND: ICD-10-CM

## 2023-11-08 DIAGNOSIS — R60.9 PERIPHERAL EDEMA: ICD-10-CM

## 2023-11-08 PROCEDURE — 99213 OFFICE O/P EST LOW 20 MIN: CPT | Mod: 25 | Performed by: NURSE PRACTITIONER

## 2023-11-08 PROCEDURE — 11042 DBRDMT SUBQ TIS 1ST 20SQCM/<: CPT

## 2023-11-08 PROCEDURE — 11042 DBRDMT SUBQ TIS 1ST 20SQCM/<: CPT | Performed by: NURSE PRACTITIONER

## 2023-11-08 PROCEDURE — 3077F SYST BP >= 140 MM HG: CPT | Performed by: NURSE PRACTITIONER

## 2023-11-08 PROCEDURE — 3079F DIAST BP 80-89 MM HG: CPT | Performed by: NURSE PRACTITIONER

## 2023-11-08 PROCEDURE — 99213 OFFICE O/P EST LOW 20 MIN: CPT

## 2023-11-08 NOTE — PATIENT INSTRUCTIONS
-Keep dressings clean and dry. Change dressings every 3 days, and if they become over saturated, soiled or fall off.     -If you need to change your dressings at home: Wash your wound with normal saline, wound cleanser, or unscented soap and water prior to applying your new dressings. Please avoid cleansing with hydrogen peroxide or rubbing alcohol.    -Avoid prolonged standing or sitting without elevating your legs.    -Remove your compression garments if you have severe pain, severe swelling, numbness, color change, or temperature change in your toes. If you need to remove your compression garments, do so by unrolling them. Do not cut the compression garments off, this is to prevent cutting yourself on accident.      -If you are 5 or more minutes late for an appointment, we reserve the right to cancel and reschedule that appointment. Additionally, if you are habitually late or not showing (3 late cancellations and/or no shows), we reserve the right to cancel your remaining appointments and it will be your responsibility to obtain a new referral if services are still needed.

## 2023-11-08 NOTE — PROGRESS NOTES
Provider Encounter- Full Thickness wound    HISTORY OF PRESENT ILLNESS  Wound History:    Wound History:               START OF CARE IN CLINIC: 10/25/23               REFERRING PROVIDER: CURTIS Hess ([facility])              WOUND- Full Thickness Wound              LOCATION: Left anterior upper arm              HISTORY: Patient's developed pain swelling and redness to her left upper arm several days after an IV infiltration while she was receiving IV calcium on 9/24/2023.  She developed hypocalcemia several days after undergoing a parathyroidectomy and was admitted to the hospital for continuous cardiac monitoring and was given IV and oral calcium replacement.  She presented to the [facility] ED on 10/1 with an open wound above her left AC.  She states it started as a small bruise which progressively became more painful, red, and swollen.  She developed a blister that ruptured resulting in an open wound.  CT imaging was done, showed no evidence of abscess or fluid collection.  Orthopedic surgery was consulted, Dr. Pritchett, deemed surgery was not indicated.  Patient was treated with IV antibiotics and calcium replacement before being discharged home.   She followed up with her PCP, who referred her to [facility] for management of her wound.      Pertinent Medical History: Hypocalcemia following parathyroidectomy, obesity, PAF, pulmonary emphysema, Raynaud's phenomenon    TOBACCO USE: Former smoker, quit in 1994    Patient's problem list, allergies, and current medications reviewed and updated in Epic    Interval History:  11/1/2023 : Initial provider visit with CURTIS Washington, FNP-BC, CWOCN, CFCN.  Patient is quite emotional today, tearful at times.  She is very frustrated with lack of healing from this wound.  She was not able to tolerate a lot of debridement today due to pain.  Discussed wound VAC as most expedient way to heal this wound.  Technology and its application and for wound healing explained  "to patient.  Previously she did not think she could return to clinic 2-3 times per week as would be needed for VAC therapy.  However, she states she will speak to employer about taking several afternoons off per week.  She is very motivated to get this wound healed ASA.      11/8/2023 : Clinic visit with Eileen Carrion, CURTIS, ALANA-BC, JUSTON, MYAH.   Patient is tearful and anxious today.  She brought VAC in with her today, it was supposed to be applied for the first time in clinic.  However, she states she does not want to follow through with this.  She was informed that her co-pay is $22 per day, and now she is not sure she can get into the clinic 3 times per week, does not believe she can take time off work.  VAC was not applied today, advised patient to send this back to .     She did going to an urgent care a few days ago because she thought her wound was infected.  Urgent care provider prescribed clindamycin which she is taking and tolerating without any difficulty.  There is no evidence of wound infection today.   I did advise her that this dressing should be changed at least 1 time in between clinic visits.  She states she does not believe she can do this, partially because of pain, and partially because, \"I can look at\".  Discussed possibly having her son assist her.  Dressing change procedure was videoed in clinic today using patient's phone.  Unfortunately, we cannot accommodate 2 times per week visits in the clinic at this time, and home health is not an option as patient is working.    REVIEW OF SYSTEMS:   Unchanged from previous clinic visit 11/1/2023, except as documented in interval history above    PHYSICAL EXAMINATION:   BP (!) 142/81 Comment: RN notified  Pulse 79   Temp 36.7 °C (98.1 °F) (Temporal)   Resp 18   SpO2 98%     Physical Exam  Constitutional:       Appearance: She is obese.   Cardiovascular:      Rate and Rhythm: Normal rate.      Pulses: Normal pulses.      Comments: Radial and " brachial pulses are palpable  Pulmonary:      Effort: Pulmonary effort is normal.   Musculoskeletal:         General: Swelling present.      Comments: Swelling of left arm   Skin:     Comments: Full-thickness wound to left upper arm, above AC-wound area has decreased since last assessment, increased granulation tissue noted, still quite a bit of adipose and wound bed, moderate serosanguineous drainage, no periwound erythema or induration.       Neurological:      Mental Status: She is alert and oriented to person, place, and time.   Psychiatric:      Comments: Anxious with wound care, tearful at times         WOUND ASSESSMENT  Wound 09/20/23 Incision Neck (Active)   Number of days: 49       Wound 10/25/23 Full Thickness Wound Arm Left (Active)   Wound Image    11/08/23 0915   Site Assessment Red;Yellow;Brown 11/08/23 0915   Periwound Assessment Induration 11/08/23 0915   Margins Unattached edges;Epibole (rolled edges) 11/08/23 0915   Closure Secondary intention 11/01/23 1000   Drainage Amount Large 11/08/23 0915   Drainage Description Serosanguineous 11/08/23 0915   Treatments Cleansed;Topical Lidocaine;Provider debridement 11/08/23 0915   Wound Cleansing Hypochlorus Acid 11/08/23 0915   Periwound Protectant No-sting Skin Prep;Barrier Paste 11/08/23 0915   Dressing Changed New 11/08/23 0915   Dressing Cleansing/Solutions Not Applicable 11/08/23 0915   Dressing Options Hydrofiber Silver Strip;Hydrofiber Silver;Silicone Adhesive Foam 11/08/23 0915   Dressing Change/Treatment Frequency Every 72 hrs, and As Needed 11/08/23 0915   Wound Team Following Weekly 11/08/23 0915   Non-staged Wound Description Full thickness 11/01/23 1000   Wound Length (cm) 3.3 cm 11/08/23 0915   Wound Width (cm) 4.2 cm 11/08/23 0915   Wound Depth (cm) 1.4 cm 11/08/23 0915   Wound Surface Area (cm^2) 13.86 cm^2 11/08/23 0915   Wound Volume (cm^3) 19.404 cm^3 11/08/23 0915   Post-Procedure Length (cm) 3.3 cm 11/08/23 0915   Post-Procedure Width  "(cm) 4.3 cm 23   Post-Procedure Depth (cm) 1.4 cm 23   Post-Procedure Surface Area (cm^2) 14.19 cm^2 23   Post-Procedure Volume (cm^3) 19.866 cm^3 23   Wound Healing % 15 23   Tunneling (cm) 0 cm 23   Undermining (cm) 0.5 cm 23   Undermining of Wound, 1st Location From 11 o'clock;To 3 o'clock 23   Wound Odor None 23   Pulses Left;Radial;2+ 10/25/23 1200   Exposed Structures Adipose 23   Number of days: 14       PROCEDURE:   -2% viscous lidocaine applied topically to wound bed for approximately 5 minutes prior to debridement  -Curette used to debride wound bed.  Excisional debridement was performed to remove devitalized tissue until healthy, bleeding tissue was visualized.   Unable to establish 100% clean wound bed due to patient discomfort.  Entire surface of wound, 14.19 cm² debrided.  Tissue debrided into the subcutaneous layer.    -Bleeding controlled with manual pressure.    -Wound care completed by wound RN, refer to flowsheet  -Patient tolerated the procedure well, without c/o pain or discomfort.       Pertinent Labs and Diagnostics:    Labs:     A1c:   Lab Results   Component Value Date/Time    HBA1C 5.7 (H) 2022 07:34 AM            IMAGIN2023-CT of left upper extremity with contrast  IMPRESSION:     1.  There is subcutaneous edema of the mid and lower portion of the left upper arm.  2.  No soft tissue abscess or fluid collection.  3.  Arterial structures are grossly patent. Venous structures not well opacified.  4.  Small amount of air is seen within an unnamed superficial vessel in the anterior mid upper arm possibly related to recent vascular catheter removal.      VASCULAR STUDIES: N/A    LAST  WOUND CULTURE:  DATE : No results found for: \"CULTRSULT\"      ASSESSMENT AND PLAN:     1. Open wound of left upper arm, subsequent encounter  2. Extravasation injury of intravenous catheter " site with other complication, subsequent encounter    11/8/2023: Wound area has decreased, still quite a bit of adipose and slough to wound bed.  Patient was unable to tolerate thorough debridement today.  -Excisional debridement of wound in clinic today, medically necessary to promote wound healing.  Unable to establish 100% clean wound bed due to patient discomfort  -Patient presented today with VAC, however states she does not want to follow through with this modality.  Co-pay prohibitive, and he is not able to come into the clinic 3 times per week.  -Patient instructed on dressing change in clinic today, procedure videoed on her phone.  She was instructed to change this dressing 1 time per week in between clinic visits.  She does not think she can do this on her own, but we will see if her son can help her or possibly her neighbor who is an RN.    Wound care: Silver Hydrofiber to manage exudate and bioburden, foam cover dressing, Hypafix tape     3. Pain associated with wound    11/8/2023: Debridement terminated prior to establishing 100% clean wound bed due to patient discomfort  -Consider local anesthesia with subcutaneous lidocaine prior to next debridement    4. Peripheral edema    11/8/2023: Patient presented today without Tubigrip in place.  -Patient encouraged to elevate her arm periodically throughout the day  -If no improvement next visit, restart Tubigrip      PATIENT EDUCATION  - Importance of adequate nutrition for wound healing  -Advised to go to ER for any increased redness, swelling, drainage, or odor, or if patient develops fever, chills, nausea or vomiting.     My total time spent caring for the patient on the day of the encounter was 20 minutes.   This does not include time spent on separately billable procedures/tests.       Please note that this note may have been created using voice recognition software. I have worked with technical experts from [employer] to optimize the interface.  I  have made every reasonable attempt to correct obvious errors, but there may be errors of grammar and possibly content that I did not discover before finalizing the note.    N

## 2023-11-15 ENCOUNTER — OFFICE VISIT (OUTPATIENT)
Dept: WOUND CARE | Facility: MEDICAL CENTER | Age: 73
End: 2023-11-15
Attending: NURSE PRACTITIONER
Payer: COMMERCIAL

## 2023-11-15 VITALS
DIASTOLIC BLOOD PRESSURE: 81 MMHG | SYSTOLIC BLOOD PRESSURE: 139 MMHG | HEART RATE: 85 BPM | RESPIRATION RATE: 17 BRPM | TEMPERATURE: 97.4 F | OXYGEN SATURATION: 90 %

## 2023-11-15 DIAGNOSIS — R60.9 PERIPHERAL EDEMA: ICD-10-CM

## 2023-11-15 DIAGNOSIS — R52 PAIN ASSOCIATED WITH WOUND: ICD-10-CM

## 2023-11-15 DIAGNOSIS — S41.102D OPEN WOUND OF LEFT UPPER ARM, SUBSEQUENT ENCOUNTER: ICD-10-CM

## 2023-11-15 DIAGNOSIS — T14.8XXA PAIN ASSOCIATED WITH WOUND: ICD-10-CM

## 2023-11-15 PROCEDURE — 3079F DIAST BP 80-89 MM HG: CPT | Performed by: NURSE PRACTITIONER

## 2023-11-15 PROCEDURE — 11042 DBRDMT SUBQ TIS 1ST 20SQCM/<: CPT

## 2023-11-15 PROCEDURE — 3075F SYST BP GE 130 - 139MM HG: CPT | Performed by: NURSE PRACTITIONER

## 2023-11-15 PROCEDURE — 11042 DBRDMT SUBQ TIS 1ST 20SQCM/<: CPT | Performed by: NURSE PRACTITIONER

## 2023-11-15 NOTE — PATIENT INSTRUCTIONS
-Keep your wound dressing clean, dry, and intact.    -Change your dressing if it becomes soiled, soaked, or falls off.    -Should you experience any significant changes in your wound(s), such as infection (redness, swelling, localized heat, increased pain, fever > 101 F, chills) or have any questions regarding your home care instructions, please contact the wound center at [phone]. If after hours, contact your primary care physician or go to the hospital emergency room.

## 2023-11-15 NOTE — PROGRESS NOTES
Provider Encounter- Full Thickness wound    HISTORY OF PRESENT ILLNESS  Wound History:    Wound History:               START OF CARE IN CLINIC: 10/25/23               REFERRING PROVIDER: CURTIS Hess ([facility] [place])              WOUND- Full Thickness Wound              LOCATION: Left anterior upper arm              HISTORY: Patient's developed pain swelling and redness to her left upper arm several days after an IV infiltration while she was receiving IV calcium on 9/24/2023.  She developed hypocalcemia several days after undergoing a parathyroidectomy and was admitted to the hospital for continuous cardiac monitoring and was given IV and oral calcium replacement.  She presented to [facility] on 10/1 with an open wound above her left AC.  She states it started as a small bruise which progressively became more painful, red, and swollen.  She developed a blister that ruptured resulting in an open wound.  CT imaging was done, showed no evidence of abscess or fluid collection.  Orthopedic surgery was consulted, Dr. Pritchett, deemed surgery was not indicated.  Patient was treated with IV antibiotics and calcium replacement before being discharged home.   She followed up with her PCP, who referred her to [facility] for management of her wound.      Pertinent Medical History: Hypocalcemia following parathyroidectomy, obesity, PAF, pulmonary emphysema, Raynaud's phenomenon    TOBACCO USE: Former smoker, quit in 1994    Patient's problem list, allergies, and current medications reviewed and updated in Epic    Interval History:  11/1/2023 : Initial provider visit with CURTIS Washington, FNP-BC, CWOCN, CFCN.  Patient is quite emotional today, tearful at times.  She is very frustrated with lack of healing from this wound.  She was not able to tolerate a lot of debridement today due to pain.  Discussed wound VAC as most expedient way to heal this wound.  Technology and its application and for wound healing explained  "to patient.  Previously she did not think she could return to clinic 2-3 times per week as would be needed for VAC therapy.  However, she states she will speak to employer about taking several afternoons off per week.  She is very motivated to get this wound healed ASA.      11/8/2023 : Clinic visit with CURTIS Washington, LEONIDAS, RIGO, MYAH.   Patient is tearful and anxious today.  She brought VAC in with her today, it was supposed to be applied for the first time in clinic.  However, she states she does not want to follow through with this.  She was informed that her co-pay is $22 per day, and now she is not sure she can get into the clinic 3 times per week, does not believe she can take time off work.  VAC was not applied today, advised patient to send this back to .     She did going to an urgent care a few days ago because she thought her wound was infected.  Urgent care provider prescribed clindamycin which she is taking and tolerating without any difficulty.  There is no evidence of wound infection today.   I did advise her that this dressing should be changed at least 1 time in between clinic visits.  She states she does not believe she can do this, partially because of pain, and partially because, \"I can look at\".  Discussed possibly having her son assist her.  Dressing change procedure was videoed in clinic today using patient's phone.  Unfortunately, we cannot accommodate 2 times per week visits in the clinic at this time, and home health is not an option as patient is working.    11/15/2023 : Clinic visit with CURTIS Washington, LEONIDAS, RIGO, MYAH.   Patient states he is feeling well overall.  She did get her son to change her dressing once during the week, and states this went well.  Her dressing today is saturated, and there is hypergranulation of tissue along inferior edge.  Still quite a bit of slough to wound bed, patient was not able to tolerate thorough debridement.   We discussed possible " SNAP to accelerate healing of this wound.  However, as this would require 2 times per week clinic visits, patient is not able to take time off from work, and for declines at this time.  She agreed to reconsider if wound stalls or deteriorates.    REVIEW OF SYSTEMS:   Unchanged from previous clinic visit 11/8/2023, except as documented in interval history above    PHYSICAL EXAMINATION:   /81   Pulse 85   Temp 36.3 °C (97.4 °F) (Temporal)   Resp 17   SpO2 90%     Physical Exam  Constitutional:       Appearance: She is obese.   Cardiovascular:      Rate and Rhythm: Normal rate.      Pulses: Normal pulses.      Comments: Radial and brachial pulses are palpable  Pulmonary:      Effort: Pulmonary effort is normal.   Musculoskeletal:         General: Swelling present.      Comments: Swelling of left arm   Skin:     Comments: Full-thickness wound to left upper arm, above AC-wound area has decreased, depth about the same, hypergranulation along inferior edge of wound bed, slough and adipose to wound bed, moderate serosanguineous drainage, no periwound erythema or induration, no wound odor   Neurological:      Mental Status: She is alert and oriented to person, place, and time.   Psychiatric:      Comments: Anxious with wound care         WOUND ASSESSMENT  Wound 09/20/23 Incision Neck (Active)   Number of days: 56       Wound 10/25/23 Full Thickness Wound Arm Left (Active)   Wound Image    11/15/23 0800   Site Assessment Red;Pink;Yellow 11/15/23 0800   Periwound Assessment Induration;Blanchable erythema 11/15/23 0800   Margins Unattached edges 11/15/23 0800   Closure Secondary intention 11/01/23 1000   Drainage Amount Moderate 11/15/23 0800   Drainage Description Serosanguineous 11/15/23 0800   Treatments Cleansed;Topical Lidocaine;Provider debridement;Silver nitrate 11/15/23 0800   Wound Cleansing Hypochlorus Acid 11/15/23 0800   Periwound Protectant Skin Protectant Wipes to Periwound 11/15/23 0800   Dressing  Changed New 11/15/23 0800   Dressing Cleansing/Solutions Normal Saline 11/15/23 0800   Dressing Options Hydrofiber Silver Strip;Hydrofiber Silver;Silicone Adhesive Foam;Tubigrip 11/15/23 0800   Dressing Change/Treatment Frequency Every 72 hrs, and As Needed 11/15/23 0800   Wound Team Following Weekly 11/15/23 0800   Non-staged Wound Description Full thickness 11/15/23 0800   Wound Length (cm) 3 cm 11/15/23 0800   Wound Width (cm) 3.2 cm 11/15/23 0800   Wound Depth (cm) 0.5 cm 11/15/23 0800   Wound Surface Area (cm^2) 9.6 cm^2 11/15/23 0800   Wound Volume (cm^3) 4.8 cm^3 11/15/23 0800   Post-Procedure Length (cm) 3 cm 11/15/23 0800   Post-Procedure Width (cm) 3.3 cm 11/15/23 0800   Post-Procedure Depth (cm) 0.5 cm 11/15/23 0800   Post-Procedure Surface Area (cm^2) 9.9 cm^2 11/15/23 0800   Post-Procedure Volume (cm^3) 4.95 cm^3 11/15/23 0800   Wound Healing % 79 11/15/23 0800   Tunneling (cm) 0 cm 11/15/23 0800   Undermining (cm) 0.5 cm 11/15/23 0800   Undermining of Wound, 1st Location From 12 o'clock;To 2 o'clock 11/15/23 0800   Wound Odor None 11/15/23 0800   Pulses Left;Radial;2+ 10/25/23 1200   Exposed Structures Adipose 11/15/23 0800   Number of days: 21       PROCEDURE: Excisional debridement of left upper arm wound, chemical cauterization of hypergranulation tissue  -2% viscous lidocaine applied topically to wound bed for approximately 5 minutes prior to debridement  -Curette used to debride wound bed.  Excisional debridement was performed to remove devitalized tissue until healthy, bleeding tissue was visualized.   Unable to establish 100% clean wound bed due to patient discomfort.  Entire surface of wound, 9.9 cm² debrided.  Tissue debrided into the subcutaneous layer.    -AgNO3 to hypergranulation tissue to inferior wound bed  -Bleeding controlled with manual pressure.    -Wound care completed by wound RN, refer to flowsheet  -Patient tolerated the procedure well, without c/o pain or discomfort.  "      Pertinent Labs and Diagnostics:    Labs:     A1c:   Lab Results   Component Value Date/Time    HBA1C 5.7 (H) 2022 07:34 AM            IMAGIN2023-CT of left upper extremity with contrast  IMPRESSION:     1.  There is subcutaneous edema of the mid and lower portion of the left upper arm.  2.  No soft tissue abscess or fluid collection.  3.  Arterial structures are grossly patent. Venous structures not well opacified.  4.  Small amount of air is seen within an unnamed superficial vessel in the anterior mid upper arm possibly related to recent vascular catheter removal.      VASCULAR STUDIES: N/A    LAST  WOUND CULTURE:  DATE : No results found for: \"CULTRSULT\"      ASSESSMENT AND PLAN:     1. Open wound of left upper arm, subsequent encounter  2. Extravasation injury of intravenous catheter site with other complication, subsequent encounter    11/15/2023: Wound area has decreased, depth about the same, hypergranulation tissue noted.  Slough and adipose to wound bed  -Excisional debridement of wound in clinic today, medically necessary to promote wound healing.  Unable to establish 100% clean wound bed due to patient discomfort  -Patient declined wound VAC due to need for 3 times per week clinic visits, and due to her co-pay.  -Discussed SNAP to accelerate closure.  This would require patient to come into clinic 2 times per week.  She states she is unable to take enough time off from her job, and thus declines.  She agrees to reconsider if her wound stalls or deteriorates  -Patient's son to assist with dressing changes in between clinic visits.  Due to presence of heavy drainage and hypergranulation, recommend dressing changes every other day.  Consider switching to Hydrofera Blue if hypergranulation continues to be an issue.    Wound care: Silver Hydrofiber to manage exudate and bioburden, foam cover dressing, Hypafix tape     3. Pain associated with wound    11/15/2023: Debridement terminated " prior to establishing 100% clean wound bed due to patient discomfort  -Consider local anesthesia with subcutaneous lidocaine prior to next debridement    4. Peripheral edema    11/15/2023: Patient presents again today without Tubigrip in place.  She states she removed it because it was bunching up under her armpit  -Tubigrip applied in clinic today.  Patient advised that she can reposition as needed for discomfort, may remove at night for sleep      PATIENT EDUCATION  - Importance of adequate nutrition for wound healing  -Advised to go to ER for any increased redness, swelling, drainage, or odor, or if patient develops fever, chills, nausea or vomiting.           Please note that this note may have been created using voice recognition software. I have worked with technical experts from [employer] to optimize the interface.  I have made every reasonable attempt to correct obvious errors, but there may be errors of grammar and possibly content that I did not discover before finalizing the note.    N

## 2023-11-22 ENCOUNTER — OFFICE VISIT (OUTPATIENT)
Dept: WOUND CARE | Facility: MEDICAL CENTER | Age: 73
End: 2023-11-22
Attending: NURSE PRACTITIONER
Payer: COMMERCIAL

## 2023-11-22 VITALS
DIASTOLIC BLOOD PRESSURE: 97 MMHG | OXYGEN SATURATION: 94 % | HEART RATE: 85 BPM | TEMPERATURE: 97.3 F | RESPIRATION RATE: 16 BRPM | SYSTOLIC BLOOD PRESSURE: 154 MMHG

## 2023-11-22 DIAGNOSIS — R60.9 PERIPHERAL EDEMA: ICD-10-CM

## 2023-11-22 DIAGNOSIS — R52 PAIN ASSOCIATED WITH WOUND: ICD-10-CM

## 2023-11-22 DIAGNOSIS — S41.102D OPEN WOUND OF LEFT UPPER ARM, SUBSEQUENT ENCOUNTER: ICD-10-CM

## 2023-11-22 DIAGNOSIS — T82.898D EXTRAVASATION INJURY OF INTRAVENOUS CATHETER SITE WITH OTHER COMPLICATION, SUBSEQUENT ENCOUNTER: ICD-10-CM

## 2023-11-22 DIAGNOSIS — T14.8XXA PAIN ASSOCIATED WITH WOUND: ICD-10-CM

## 2023-11-22 PROCEDURE — 11042 DBRDMT SUBQ TIS 1ST 20SQCM/<: CPT

## 2023-11-22 PROCEDURE — 11042 DBRDMT SUBQ TIS 1ST 20SQCM/<: CPT | Performed by: NURSE PRACTITIONER

## 2023-11-22 PROCEDURE — 3080F DIAST BP >= 90 MM HG: CPT | Performed by: NURSE PRACTITIONER

## 2023-11-22 PROCEDURE — 3077F SYST BP >= 140 MM HG: CPT | Performed by: NURSE PRACTITIONER

## 2023-11-22 NOTE — PROGRESS NOTES
Provider Encounter- Full Thickness wound    HISTORY OF PRESENT ILLNESS  Wound History:    Wound History:               START OF CARE IN CLINIC: 10/25/23               REFERRING PROVIDER: CURTIS Hess ([facility])              WOUND- Full Thickness Wound              LOCATION: Left anterior upper arm              HISTORY: Patient's developed pain swelling and redness to her left upper arm several days after an IV infiltration while she was receiving IV calcium on 9/24/2023.  She developed hypocalcemia several days after undergoing a parathyroidectomy and was admitted to the hospital for continuous cardiac monitoring and was given IV and oral calcium replacement.  She presented to the [facility] ED on 10/1 with an open wound above her left AC.  She states it started as a small bruise which progressively became more painful, red, and swollen.  She developed a blister that ruptured resulting in an open wound.  CT imaging was done, showed no evidence of abscess or fluid collection.  Orthopedic surgery was consulted, Dr. Pritchett, deemed surgery was not indicated.  Patient was treated with IV antibiotics and calcium replacement before being discharged home.   She followed up with her PCP, who referred her to [facility] for management of her wound.      Pertinent Medical History: Hypocalcemia following parathyroidectomy, obesity, PAF, pulmonary emphysema, Raynaud's phenomenon    TOBACCO USE: Former smoker, quit in 1994    Patient's problem list, allergies, and current medications reviewed and updated in Epic    Interval History:  11/1/2023 : Initial provider visit with CURTIS Washington, FNP-BC, CWOCN, CFCN.  Patient is quite emotional today, tearful at times.  She is very frustrated with lack of healing from this wound.  She was not able to tolerate a lot of debridement today due to pain.  Discussed wound VAC as most expedient way to heal this wound.  Technology and its application and for wound healing explained  "to patient.  Previously she did not think she could return to clinic 2-3 times per week as would be needed for VAC therapy.  However, she states she will speak to employer about taking several afternoons off per week.  She is very motivated to get this wound healed ASA.      11/8/2023 : Clinic visit with CURTIS Washington, LEONIDAS, RIGO, MYAH.   Patient is tearful and anxious today.  She brought VAC in with her today, it was supposed to be applied for the first time in clinic.  However, she states she does not want to follow through with this.  She was informed that her co-pay is $22 per day, and now she is not sure she can get into the clinic 3 times per week, does not believe she can take time off work.  VAC was not applied today, advised patient to send this back to .     She did going to an urgent care a few days ago because she thought her wound was infected.  Urgent care provider prescribed clindamycin which she is taking and tolerating without any difficulty.  There is no evidence of wound infection today.   I did advise her that this dressing should be changed at least 1 time in between clinic visits.  She states she does not believe she can do this, partially because of pain, and partially because, \"I can look at\".  Discussed possibly having her son assist her.  Dressing change procedure was videoed in clinic today using patient's phone.  Unfortunately, we cannot accommodate 2 times per week visits in the clinic at this time, and home health is not an option as patient is working.    11/15/2023 : Clinic visit with CURTIS Washington, LEONIDAS, RIGO, MYAH.   Patient states he is feeling well overall.  She did get her son to change her dressing once during the week, and states this went well.  Her dressing today is saturated, and there is hypergranulation of tissue along inferior edge.  Still quite a bit of slough to wound bed, patient was not able to tolerate thorough debridement.   We discussed possible " SNAP to accelerate healing of this wound.  However, as this would require 2 times per week clinic visits, patient is not able to take time off from work, and for declines at this time.  She agreed to reconsider if wound stalls or deteriorates.    11/22/2023 : Clinic visit with CURTIS Washington, ALANA-BC, CWAYADN, CFCN.   Patient states she is feeling well.  She has been able to change her wound dressing.  Her wound is currently progressing well, area and depth decreasing.  She was not able to tolerate Tubigrip, states it frequently rolled down.        REVIEW OF SYSTEMS:   Unchanged from previous clinic visit 11/15/2023, except as documented in interval history above    PHYSICAL EXAMINATION:   BP (!) 154/97   Pulse 85   Temp 36.3 °C (97.3 °F) (Temporal)   Resp 16   SpO2 94%     Physical Exam  Constitutional:       Appearance: She is obese.   Cardiovascular:      Rate and Rhythm: Normal rate.      Pulses: Normal pulses.      Comments: Radial and brachial pulses are palpable  Pulmonary:      Effort: Pulmonary effort is normal.   Musculoskeletal:         General: Swelling present.      Comments: Swelling of left arm   Skin:     Comments: Full-thickness wound to left upper arm, above AC-area and depth have decreased, increased granulation tissue noted, moderate serosanguineous drainage, slough to wound bed, periwound induration radiating approximately 1-2 cm around wound   Neurological:      Mental Status: She is alert and oriented to person, place, and time.   Psychiatric:      Comments: Anxious with wound care         WOUND ASSESSMENT  Wound 09/20/23 Incision Neck (Active)   Number of days: 63       Wound 10/25/23 Full Thickness Wound Arm Left (Active)   Wound Image    11/22/23 0815   Site Assessment Red;Yellow;Pink 11/22/23 0815   Periwound Assessment Induration;Blanchable erythema 11/22/23 0815   Margins Unattached edges 11/22/23 0815   Closure Secondary intention 11/01/23 1000   Drainage Amount Moderate 11/22/23  0815   Drainage Description Serosanguineous 11/22/23 0815   Treatments Cleansed;Topical Lidocaine;Provider debridement 11/22/23 0815   Wound Cleansing Hypochlorus Acid 11/22/23 0815   Periwound Protectant Skin Protectant Wipes to Periwound 11/22/23 0815   Dressing Changed Changed 11/22/23 0815   Dressing Cleansing/Solutions Not Applicable 11/22/23 0815   Dressing Options Hydrofiber Silver;Silicone Adhesive Foam 11/22/23 0815   Dressing Change/Treatment Frequency Every 72 hrs, and As Needed 11/22/23 0815   Wound Team Following Weekly 11/22/23 0815   Non-staged Wound Description Full thickness 11/22/23 0815   Wound Length (cm) 2.3 cm 11/22/23 0815   Wound Width (cm) 3 cm 11/22/23 0815   Wound Depth (cm) 0.4 cm 11/22/23 0815   Wound Surface Area (cm^2) 6.9 cm^2 11/22/23 0815   Wound Volume (cm^3) 2.76 cm^3 11/22/23 0815   Post-Procedure Length (cm) 2.3 cm 11/22/23 0815   Post-Procedure Width (cm) 3.1 cm 11/22/23 0815   Post-Procedure Depth (cm) 0.4 cm 11/22/23 0815   Post-Procedure Surface Area (cm^2) 7.13 cm^2 11/22/23 0815   Post-Procedure Volume (cm^3) 2.852 cm^3 11/22/23 0815   Wound Healing % 88 11/22/23 0815   Tunneling (cm) 0 cm 11/22/23 0815   Undermining (cm) 0 cm 11/22/23 0815   Undermining of Wound, 1st Location From 12 o'clock;To 2 o'clock 11/15/23 0800   Wound Odor None 11/22/23 0815   Pulses Left;Radial;2+ 10/25/23 1200   Exposed Structures Adipose 11/22/23 0815   Number of days: 28       PROCEDURE: Excisional debridement of left upper arm wound, chemical cauterization of hypergranulation tissue  -2% viscous lidocaine applied topically to wound bed for approximately 5 minutes prior to debridement  -Curette used to debride wound bed.  Excisional debridement was performed to remove devitalized tissue until healthy, bleeding tissue was visualized.   Unable to establish 100% clean wound bed due to patient discomfort, though patient was able to tolerate much more debridement today.  Entire surface of wound,  "7.13 cm² debrided.  Tissue debrided into the subcutaneous layer.    -AgNO3 to hypergranulation tissue to inferior wound bed  -Bleeding controlled with manual pressure.    -Wound care completed by wound RN, refer to flowsheet  -Patient tolerated the procedure well, without c/o pain or discomfort.       Pertinent Labs and Diagnostics:    Labs:     A1c:   Lab Results   Component Value Date/Time    HBA1C 5.7 (H) 2022 07:34 AM            IMAGIN2023-CT of left upper extremity with contrast  IMPRESSION:     1.  There is subcutaneous edema of the mid and lower portion of the left upper arm.  2.  No soft tissue abscess or fluid collection.  3.  Arterial structures are grossly patent. Venous structures not well opacified.  4.  Small amount of air is seen within an unnamed superficial vessel in the anterior mid upper arm possibly related to recent vascular catheter removal.      VASCULAR STUDIES: N/A    LAST  WOUND CULTURE:  DATE : No results found for: \"CULTRSULT\"      ASSESSMENT AND PLAN:     1. Open wound of left upper arm, subsequent encounter  2. Extravasation injury of intravenous catheter site with other complication, subsequent encounter    2023: Wound area and depth of decreased.  Patient was able to tolerate slightly more debridement today than she has in the past.  No hypergranulation noted today.  -Excisional debridement of wound in clinic today, medically necessary to promote wound healing.  Unable to establish 100% clean wound bed due to patient discomfort  -Patient declined wound VAC and SNAP due t or increased clinic visits, and due to her co-pay.  -Patient's son has been assisting with her dressing changes.  However, patient states she was also able to change her own dressing this last week.  -Patient to continue changing dressing every other day to manage drainage.    Wound care: Silver Hydrofiber to manage exudate and bioburden, foam cover dressing, Hypafix tape     3. Pain associated " with wound    11/22/2023: Patient was able to tolerate significantly more debridement today with use of topical lidocaine  -2% viscous lidocaine applied topically to wound bed for approximately 5 minutes prior to debridement      4. Peripheral edema    2223: Patient presents again today without Tubigrip in place.  Frequent problems with rolling down  -Discontinue Tubigrip      PATIENT EDUCATION  - Importance of adequate nutrition for wound healing  -Advised to go to ER for any increased redness, swelling, drainage, or odor, or if patient develops fever, chills, nausea or vomiting.           Please note that this note may have been created using voice recognition software. I have worked with technical experts from [facility] to optimize the interface.  I have made every reasonable attempt to correct obvious errors, but there may be errors of grammar and possibly content that I did not discover before finalizing the note.    N

## 2023-11-22 NOTE — PATIENT INSTRUCTIONS
-Keep your wound dressing clean, dry, and intact.    -Change your dressing every 72 hours or if it becomes soiled, soaked, or falls off.    -Should you experience any significant changes in your wound(s), such as infection (redness, swelling, localized heat, increased pain, fever > 101 F, chills) or have any questions regarding your home care instructions, please contact the wound center at [phone]. If after hours, contact your primary care physician or go to the hospital emergency room.

## 2023-11-30 ENCOUNTER — OFFICE VISIT (OUTPATIENT)
Dept: MEDICAL GROUP | Facility: PHYSICIAN GROUP | Age: 73
End: 2023-11-30
Payer: COMMERCIAL

## 2023-11-30 ENCOUNTER — NON-PROVIDER VISIT (OUTPATIENT)
Dept: WOUND CARE | Facility: MEDICAL CENTER | Age: 73
End: 2023-11-30
Attending: NURSE PRACTITIONER
Payer: COMMERCIAL

## 2023-11-30 ENCOUNTER — HOSPITAL ENCOUNTER (OUTPATIENT)
Dept: LAB | Facility: MEDICAL CENTER | Age: 73
End: 2023-11-30
Attending: SURGERY
Payer: COMMERCIAL

## 2023-11-30 VITALS
BODY MASS INDEX: 37.08 KG/M2 | TEMPERATURE: 99.3 F | DIASTOLIC BLOOD PRESSURE: 72 MMHG | HEIGHT: 70 IN | SYSTOLIC BLOOD PRESSURE: 124 MMHG | WEIGHT: 259 LBS | OXYGEN SATURATION: 93 % | HEART RATE: 83 BPM | RESPIRATION RATE: 16 BRPM

## 2023-11-30 DIAGNOSIS — G43.109 MIGRAINE WITH AURA AND WITHOUT STATUS MIGRAINOSUS, NOT INTRACTABLE: ICD-10-CM

## 2023-11-30 DIAGNOSIS — Z99.81 CHRONIC RESPIRATORY FAILURE WITH HYPOXIA, ON HOME OXYGEN THERAPY (HCC): ICD-10-CM

## 2023-11-30 DIAGNOSIS — D68.59 THROMBOPHILIA (HCC): ICD-10-CM

## 2023-11-30 DIAGNOSIS — J43.9 PULMONARY EMPHYSEMA, UNSPECIFIED EMPHYSEMA TYPE (HCC): ICD-10-CM

## 2023-11-30 DIAGNOSIS — J96.11 CHRONIC RESPIRATORY FAILURE WITH HYPOXIA, ON HOME OXYGEN THERAPY (HCC): ICD-10-CM

## 2023-11-30 DIAGNOSIS — L82.1 SEBORRHEIC KERATOSES: ICD-10-CM

## 2023-11-30 PROBLEM — T80.818S: Status: RESOLVED | Noted: 2023-10-17 | Resolved: 2023-11-30

## 2023-11-30 PROBLEM — L30.9 DERMATITIS: Status: RESOLVED | Noted: 2022-07-22 | Resolved: 2023-11-30

## 2023-11-30 PROBLEM — E21.0 PRIMARY HYPERPARATHYROIDISM (HCC): Status: RESOLVED | Noted: 2023-09-20 | Resolved: 2023-11-30

## 2023-11-30 PROBLEM — E78.1 HIGH TRIGLYCERIDES: Status: RESOLVED | Noted: 2017-04-12 | Resolved: 2023-11-30

## 2023-11-30 PROBLEM — S93.492A SPRAIN OF POSTERIOR TALOFIBULAR LIGAMENT OF LEFT ANKLE: Status: RESOLVED | Noted: 2023-06-12 | Resolved: 2023-11-30

## 2023-11-30 PROBLEM — S90.02XA CONTUSION OF LEFT ANKLE: Status: RESOLVED | Noted: 2023-06-12 | Resolved: 2023-11-30

## 2023-11-30 PROBLEM — E89.2 SURGICAL HYPOPARATHYROIDISM (HCC): Status: ACTIVE | Noted: 2023-09-26

## 2023-11-30 PROBLEM — R73.01 ELEVATED FASTING BLOOD SUGAR: Status: RESOLVED | Noted: 2021-01-11 | Resolved: 2023-11-30

## 2023-11-30 PROBLEM — R60.0 PERIPHERAL EDEMA: Status: RESOLVED | Noted: 2017-07-25 | Resolved: 2023-11-30

## 2023-11-30 PROBLEM — R29.818 SUSPECTED SLEEP APNEA: Status: RESOLVED | Noted: 2021-04-19 | Resolved: 2023-11-30

## 2023-11-30 PROBLEM — S46.812A: Status: RESOLVED | Noted: 2022-03-31 | Resolved: 2023-11-30

## 2023-11-30 PROBLEM — R60.9 PERIPHERAL EDEMA: Status: RESOLVED | Noted: 2017-07-25 | Resolved: 2023-11-30

## 2023-11-30 LAB — CALCIUM SERPL-MCNC: 8 MG/DL (ref 8.5–10.5)

## 2023-11-30 PROCEDURE — 97597 DBRDMT OPN WND 1ST 20 CM/<: CPT

## 2023-11-30 PROCEDURE — 82310 ASSAY OF CALCIUM: CPT

## 2023-11-30 PROCEDURE — 3074F SYST BP LT 130 MM HG: CPT | Performed by: NURSE PRACTITIONER

## 2023-11-30 PROCEDURE — 99214 OFFICE O/P EST MOD 30 MIN: CPT | Performed by: NURSE PRACTITIONER

## 2023-11-30 PROCEDURE — 3078F DIAST BP <80 MM HG: CPT | Performed by: NURSE PRACTITIONER

## 2023-11-30 PROCEDURE — 36415 COLL VENOUS BLD VENIPUNCTURE: CPT

## 2023-11-30 RX ORDER — FLUTICASONE FUROATE, UMECLIDINIUM BROMIDE AND VILANTEROL TRIFENATATE 200; 62.5; 25 UG/1; UG/1; UG/1
1 POWDER RESPIRATORY (INHALATION) DAILY
Qty: 3 EACH | Refills: 3 | Status: SHIPPED | OUTPATIENT
Start: 2023-11-30

## 2023-11-30 RX ORDER — ONDANSETRON 4 MG/1
4 TABLET, ORALLY DISINTEGRATING ORAL
Qty: 20 TABLET | Refills: 2 | Status: SHIPPED | OUTPATIENT
Start: 2023-11-30

## 2023-11-30 RX ORDER — ELETRIPTAN HYDROBROMIDE 20 MG/1
20 TABLET, FILM COATED ORAL
Qty: 10 TABLET | Refills: 2 | Status: SHIPPED | OUTPATIENT
Start: 2023-11-30

## 2023-11-30 ASSESSMENT — FIBROSIS 4 INDEX: FIB4 SCORE: 1.11

## 2023-11-30 NOTE — PROGRESS NOTES
Wound Care Procedures 11/30/2023:  Viscous lidocaine applied to wound prior to procedure with 10 minute dwell time.    Selective debridement with curette to remove ~3cm² slough from left upper arm wound.    Wound is progressing well. Therefore, continued Aquacel Ag as primary dressing.

## 2023-11-30 NOTE — LETTER
November 30, 2023         Patient: Miryam Veloz   YOB: 1950   Date of Visit: 11/30/2023           To Whom it May Concern:    Miryam Veloz was seen in my clinic on 11/30/2023. Please excuse patient from work today for a doctors appointment.     If you have any questions or concerns, please don't hesitate to call.        Sincerely,           MINDY Lujan.P.R.N.  Electronically Signed

## 2023-11-30 NOTE — PATIENT INSTRUCTIONS
-Keep dressings clean and dry. Change dressings every 2-3 days, and if they become over saturated, soiled or fall off.     -If you need to change your dressings at home: Wash your wound with normal saline, wound cleanser, or unscented soap and water prior to applying your new dressings. Please avoid cleansing with hydrogen peroxide or rubbing alcohol.    -Should you experience any significant changes in your wound, such as signs of infection (increasing redness, swelling, localized heat, increased pain, fever > 101 F, chills) or have any questions regarding your home care instructions, please contact the wound center at [phone]. If after hours, contact your primary care physician or go to the hospital emergency room.     -If you are 5 or more minutes late for an appointment, we reserve the right to cancel and reschedule that appointment. Additionally, if you are habitually late or not showing (3 late cancellations and/or no shows), we reserve the right to cancel your remaining appointments and it will be your responsibility to obtain a new referral if services are still needed.

## 2023-11-30 NOTE — PROGRESS NOTES
Chief Complaint   Patient presents with    [facility]                                                                                                                                       HPI:   Miryam presents today with the following.    Problem   Seborrheic Keratoses   Surgical Hypoparathyroidism (Hcc)   Pulmonary Emphysema (Hcc)   Chronic Respiratory Failure With Hypoxia, On Home Oxygen Therapy (Hcc)   Thrombophilia (Hcc)   Chronic Bilateral Low Back Pain Without Sciatica   Migraine Headache   Extravasation of Other Vesicant Agent, Sequela (Resolved)   Primary Hyperparathyroidism (Hcc) (Resolved)   Contusion of Left Ankle (Resolved)   Sprain of Posterior Talofibular Ligament of Left Ankle (Resolved)   Dermatitis (Resolved)   Subscapularis (Muscle) Sprain, Left, Initial Encounter (Resolved)   Suspected Sleep Apnea (Resolved)   Elevated Fasting Blood Sugar (Resolved)   Peripheral Edema (Resolved)   High Triglycerides (Resolved)   Lumbar Facet Arthropathy (Resolved)       Current Outpatient Medications   Medication Sig Dispense Refill    Amino Acids (AMINO ACID PO) Take  by mouth.      fluticasone-umeclidinium-vilanterol (TRELEGY ELLIPTA) 200-62.5-25 mcg/act inhaler Inhale 1 Puff every day. 3 Each 3    eletriptan (RELPAX) 20 MG Tab Take 1 Tablet by mouth one time as needed for Migraine for up to 1 dose. 10 Tablet 2    ondansetron (ZOFRAN ODT) 4 MG TABLET DISPERSIBLE Take 1 Tablet by mouth every 24 hours as needed for Nausea/Vomiting (migraines). 20 Tablet 2    Atogepant 60 MG Tab Take 1 Tablet by mouth every day. 30 Tablet 5    venlafaxine (EFFEXOR-XR) 150 MG extended-release capsule Take 1 Capsule by mouth every day. 90 Capsule 3    albuterol 108 (90 Base) MCG/ACT Aero Soln inhalation aerosol Inhale 1 Puff 1 time a day as needed for Shortness of Breath. 8.5 g 5    Acetaminophen (TYLENOL PO) Take 1 Tablet by mouth 2 times a day as needed (For pain). Pt is not sure the strength (OTC)      Calcium Carbonate  "Antacid 1000 MG Chew Tab Chew 1 Tablet 4 times a day. 120 Tablet 0    Cholecalciferol 2000 UNIT Tab Take 1 Tablet by mouth every day. 60 Tablet 0    omeprazole (PRILOSEC) 40 MG delayed-release capsule Take 40 mg by mouth every day.      propranolol (INDERAL) 40 MG Tab Take 40 mg by mouth 2 times a day.      rivaroxaban (XARELTO) 20 MG Tab tablet Take 20 mg by mouth with dinner.      Multiple Vitamins-Minerals (MULTIVITAMIN WOMEN PO) Take 1 Tablet by mouth every day.       No current facility-administered medications for this visit.       Allergies as of 11/30/2023 - Reviewed 11/30/2023   Allergen Reaction Noted    Codeine Itching and Nausea 05/22/2009    Imitrex [sumatriptan succinate] Shortness of Breath and Swelling 03/05/2010    Penicillins Rash and Vomiting 08/21/2007    Sulfa drugs Shortness of Breath and Itching 08/21/2007    Azithromycin Itching 08/21/2007    Dilaudid [hydromorphone] Itching 10/20/2015    Other drug Itching 07/06/2011    Sensipar [cinacalcet] Unspecified 07/28/2016    Imipramine Shortness of Breath and Itching 06/22/2011    Seroquel [quetiapine fumarate]  08/19/2011        ROS:  All systems negative expect as addressed in assessment and plan.     /72 (BP Location: Left arm, Patient Position: Sitting)   Pulse 83   Temp 37.4 °C (99.3 °F) (Temporal)   Resp 16   Ht 1.778 m (5' 10\")   Wt 117 kg (259 lb)   SpO2 93%   BMI 37.16 kg/m²       Physical Exam  Vitals reviewed.   Constitutional:       Appearance: Normal appearance.   HENT:      Head: Normocephalic and atraumatic.      Mouth/Throat:      Mouth: Mucous membranes are moist.   Eyes:      Extraocular Movements: Extraocular movements intact.      Conjunctiva/sclera: Conjunctivae normal.   Pulmonary:      Effort: Pulmonary effort is normal.   Musculoskeletal:         General: Normal range of motion.      Cervical back: Normal range of motion.   Skin:     General: Skin is warm and dry.   Neurological:      General: No focal deficit " present.      Mental Status: She is alert and oriented to person, place, and time.   Psychiatric:         Mood and Affect: Mood normal.         Behavior: Behavior normal.         Thought Content: Thought content normal.           Assessment and Plan:  73 y.o. female with the following issues.    1. Migraine with aura and without status migrainosus, not intractable  eletriptan (RELPAX) 20 MG Tab    ondansetron (ZOFRAN ODT) 4 MG TABLET DISPERSIBLE      2. Chronic respiratory failure with hypoxia, on home oxygen therapy ([facility])        3. Thrombophilia ([facility])        4. Pulmonary emphysema, unspecified emphysema type ([facility])        5. Seborrheic keratoses             Chronic respiratory failure with hypoxia, on home oxygen therapy ([facility])  Chronic stable.     Patient wears oxygen at night. She uses 2L via [place].     Continue nocturnal oxygen. Pts DME is [employer]    Thrombophilia ([facility])  Chronic stable.     This is due to AFib.     Patient is stable on xarelto 20mg nightly.     Pulmonary emphysema ([facility])  Chronic stable.     Patient was recently switched to Trelegy. She states that this is helping with her SOB and wheezing much more than the Breo.     Continue trelegy 1 puff daily.     Migraine headache  Chronic stable.     Patient is on propanolol for prevention. She does also have emphysema/COPD. Discussed that the propanolol may be contributing to her SOB. She has tried other preventatives in the past including topamax.      Will start qulipta 60mg daily.     Seborrheic keratoses  Patient has several seborrheic keratoses.       Return in about 1 month (around 12/30/2023).      Please note that this dictation was created using voice recognition software. I have worked with consultants from the vendor as well as technical experts from [employer] [facility] [employer] to optimize the interface. I have made every reasonable attempt to correct obvious errors, but I expect that there are errors of grammar and possibly content that I did not discover before  finalizing the note.

## 2023-11-30 NOTE — ASSESSMENT & PLAN NOTE
Chronic stable.     Patient wears oxygen at night. She uses 2L via [place].     Continue nocturnal oxygen. Pts DME is [place]

## 2023-11-30 NOTE — ASSESSMENT & PLAN NOTE
Chronic stable.     Patient was recently switched to Trelegy. She states that this is helping with her SOB and wheezing much more than the Breo.     Continue trelegy 1 puff daily.

## 2023-11-30 NOTE — ASSESSMENT & PLAN NOTE
Chronic stable.     Patient is on propanolol for prevention. She does also have emphysema/COPD. Discussed that the propanolol may be contributing to her SOB. She has tried other preventatives in the past including topamax.      Will start qulipta 60mg daily.

## 2023-12-06 ENCOUNTER — NON-PROVIDER VISIT (OUTPATIENT)
Dept: WOUND CARE | Facility: MEDICAL CENTER | Age: 73
End: 2023-12-06
Attending: NURSE PRACTITIONER
Payer: COMMERCIAL

## 2023-12-06 NOTE — PROGRESS NOTES
Per PAR, pt called left voicemail to late cancel stating she will not make today's appt. PAR called pt and left voicemail to reschedule.

## 2023-12-08 ENCOUNTER — APPOINTMENT (OUTPATIENT)
Dept: WOUND CARE | Facility: MEDICAL CENTER | Age: 73
End: 2023-12-08
Attending: NURSE PRACTITIONER
Payer: COMMERCIAL

## 2023-12-12 ENCOUNTER — NON-PROVIDER VISIT (OUTPATIENT)
Dept: WOUND CARE | Facility: MEDICAL CENTER | Age: 73
End: 2023-12-12
Attending: NURSE PRACTITIONER
Payer: COMMERCIAL

## 2023-12-12 PROCEDURE — 97597 DBRDMT OPN WND 1ST 20 CM/<: CPT

## 2023-12-12 NOTE — PATIENT INSTRUCTIONS
-Keep your wound dressing clean, dry, and intact.    -Change your dressing if it becomes soiled, soaked, or falls off.    -Should you experience any significant changes in your wound(s), such as infection (redness, swelling, localized heat, increased pain, fever > 101 F, chills) or have any questions regarding your home care instructions, please contact the wound center at [phone]. If after hours, contact your primary care physician or go to the hospital emergency room.

## 2023-12-12 NOTE — PROGRESS NOTES
CSWD using curette to remove ~1.5cm2 nonviable tissue from left arm wound bed. 2% topical Lidocaine applied prior to debridement with ~5 minute dwell time.    Patient unable to tolerate debridement today r/t pain.    Patient reports she has been changing dressings daily. Educated on moist wound healing. Changed to Iodoflex to address slough and biofilm, patient advised NOT to change for at least 3 days to allow treatment to work. .

## 2023-12-20 RX ORDER — OMEPRAZOLE 40 MG/1
40 CAPSULE, DELAYED RELEASE ORAL DAILY
Qty: 90 CAPSULE | Refills: 0 | Status: SHIPPED | OUTPATIENT
Start: 2023-12-20 | End: 2024-01-26 | Stop reason: SDUPTHER

## 2023-12-21 ENCOUNTER — NON-PROVIDER VISIT (OUTPATIENT)
Dept: WOUND CARE | Facility: MEDICAL CENTER | Age: 73
End: 2023-12-21
Attending: NURSE PRACTITIONER
Payer: COMMERCIAL

## 2023-12-21 PROCEDURE — 97597 DBRDMT OPN WND 1ST 20 CM/<: CPT

## 2023-12-21 NOTE — PATIENT INSTRUCTIONS
-Keep your wound dressing clean, dry, and intact.    -Change your dressing if it becomes soiled, soaked, or falls off.    -Should you experience any significant changes in your wound(s), such as infection (redness, swelling, localized heat, increased pain, fever > 101 F, chills) or have any questions regarding your home care instructions, please contact the wound center at [phone]. If after hours, contact your primary care physician or go to the hospital emergency room.

## 2023-12-21 NOTE — PROGRESS NOTES
2% viscous lidocaine for ~5 minute dwell time. CSWD with curette to remove <1 cm2 non viable tissue from wound bed. Patient tolerated well. Wound nearly resolved.

## 2023-12-22 ENCOUNTER — HOSPITAL ENCOUNTER (OUTPATIENT)
Dept: LAB | Facility: MEDICAL CENTER | Age: 73
End: 2023-12-22
Attending: SURGERY
Payer: COMMERCIAL

## 2023-12-22 LAB — CALCIUM SERPL-MCNC: 8.3 MG/DL (ref 8.5–10.5)

## 2023-12-22 PROCEDURE — 82310 ASSAY OF CALCIUM: CPT

## 2023-12-22 PROCEDURE — 36415 COLL VENOUS BLD VENIPUNCTURE: CPT

## 2023-12-28 ENCOUNTER — NON-PROVIDER VISIT (OUTPATIENT)
Dept: WOUND CARE | Facility: MEDICAL CENTER | Age: 73
End: 2023-12-28
Attending: NURSE PRACTITIONER
Payer: COMMERCIAL

## 2023-12-28 PROCEDURE — 99211 OFF/OP EST MAY X REQ PHY/QHP: CPT

## 2023-12-28 NOTE — PATIENT INSTRUCTIONS
- Resolved wound be fragile for a few days, bathe and dry area gently, only ever regains a maximum of 80% of the tensile strength of the surrounding skin, remodeling of scar can continue for 6mo - a year. Contact PCP for a referral back her if any problems with area opening and draining again.    -Should you experience any significant changes in your wound(s), such as infection (redness, swelling, localized heat, increased pain, fever > 101 F, chills) or have any questions regarding your home care instructions, please contact the wound center at [phone]. If after hours, contact your primary care physician or go to the hospital emergency room.

## 2023-12-29 ENCOUNTER — HOSPITAL ENCOUNTER (OUTPATIENT)
Dept: LAB | Facility: MEDICAL CENTER | Age: 73
End: 2023-12-29
Attending: SURGERY
Payer: COMMERCIAL

## 2023-12-29 LAB — CALCIUM SERPL-MCNC: 8.8 MG/DL (ref 8.5–10.5)

## 2023-12-29 PROCEDURE — 36415 COLL VENOUS BLD VENIPUNCTURE: CPT

## 2023-12-29 PROCEDURE — 82310 ASSAY OF CALCIUM: CPT

## 2024-01-01 NOTE — PROGRESS NOTES
Bedside report received, assumed pt care@[id number]. Pt A&Ox4. Pt denies any pain at this time. POC discussed, pt verbalized understanding. Call light within reach.   2024

## 2024-01-02 ENCOUNTER — HOSPITAL ENCOUNTER (OUTPATIENT)
Dept: RADIOLOGY | Facility: MEDICAL CENTER | Age: 74
End: 2024-01-02
Payer: COMMERCIAL

## 2024-01-02 DIAGNOSIS — Z12.31 ENCOUNTER FOR SCREENING MAMMOGRAM FOR BREAST CANCER: ICD-10-CM

## 2024-01-02 PROCEDURE — 77067 SCR MAMMO BI INCL CAD: CPT

## 2024-01-09 DIAGNOSIS — Z13.29 THYROID DISORDER SCREEN: ICD-10-CM

## 2024-01-09 DIAGNOSIS — Z11.3 SCREENING EXAMINATION FOR SEXUALLY TRANSMITTED DISEASE: ICD-10-CM

## 2024-01-09 DIAGNOSIS — E55.9 VITAMIN D DEFICIENCY: ICD-10-CM

## 2024-01-09 DIAGNOSIS — E83.51 HYPOCALCEMIA: ICD-10-CM

## 2024-01-09 DIAGNOSIS — E78.2 MIXED HYPERLIPIDEMIA: ICD-10-CM

## 2024-01-09 DIAGNOSIS — Z13.6 SCREENING FOR CARDIOVASCULAR CONDITION: ICD-10-CM

## 2024-01-09 DIAGNOSIS — D68.59 THROMBOPHILIA (HCC): ICD-10-CM

## 2024-01-09 DIAGNOSIS — E66.9 OBESITY (BMI 30-39.9): ICD-10-CM

## 2024-01-09 DIAGNOSIS — E53.8 VITAMIN B12 DEFICIENCY: ICD-10-CM

## 2024-01-11 RX ORDER — PROPRANOLOL HYDROCHLORIDE 40 MG/1
40 TABLET ORAL 2 TIMES DAILY
Qty: 90 TABLET | Refills: 1 | Status: SHIPPED | OUTPATIENT
Start: 2024-01-11 | End: 2024-01-26 | Stop reason: SDUPTHER

## 2024-01-26 ENCOUNTER — OFFICE VISIT (OUTPATIENT)
Dept: MEDICAL GROUP | Facility: PHYSICIAN GROUP | Age: 74
End: 2024-01-26
Payer: COMMERCIAL

## 2024-01-26 VITALS
BODY MASS INDEX: 38.08 KG/M2 | DIASTOLIC BLOOD PRESSURE: 80 MMHG | RESPIRATION RATE: 18 BRPM | SYSTOLIC BLOOD PRESSURE: 120 MMHG | HEART RATE: 75 BPM | HEIGHT: 70 IN | OXYGEN SATURATION: 92 % | WEIGHT: 266 LBS | TEMPERATURE: 98.6 F

## 2024-01-26 DIAGNOSIS — F31.75 BIPOLAR DISORDER, IN PARTIAL REMISSION, MOST RECENT EPISODE DEPRESSED (HCC): ICD-10-CM

## 2024-01-26 DIAGNOSIS — E78.2 MIXED HYPERLIPIDEMIA: ICD-10-CM

## 2024-01-26 DIAGNOSIS — G43.009 MIGRAINE WITHOUT AURA AND WITHOUT STATUS MIGRAINOSUS, NOT INTRACTABLE: ICD-10-CM

## 2024-01-26 DIAGNOSIS — M79.89 SWELLING OF BOTH LOWER EXTREMITIES: ICD-10-CM

## 2024-01-26 DIAGNOSIS — E83.51 HYPOCALCEMIA: ICD-10-CM

## 2024-01-26 DIAGNOSIS — E53.8 VITAMIN B12 DEFICIENCY: ICD-10-CM

## 2024-01-26 DIAGNOSIS — M85.80 OSTEOPENIA, UNSPECIFIED LOCATION: ICD-10-CM

## 2024-01-26 DIAGNOSIS — I10 ESSENTIAL HYPERTENSION: ICD-10-CM

## 2024-01-26 DIAGNOSIS — E89.2 SURGICAL HYPOPARATHYROIDISM (HCC): ICD-10-CM

## 2024-01-26 DIAGNOSIS — Z13.29 SCREENING FOR THYROID DISORDER: ICD-10-CM

## 2024-01-26 DIAGNOSIS — N18.30 CHRONIC RENAL FAILURE, STAGE 3 (MODERATE), UNSPECIFIED WHETHER STAGE 3A OR 3B CKD: ICD-10-CM

## 2024-01-26 DIAGNOSIS — E55.9 VITAMIN D DEFICIENCY: ICD-10-CM

## 2024-01-26 DIAGNOSIS — L82.1 SEBORRHEIC KERATOSES: ICD-10-CM

## 2024-01-26 DIAGNOSIS — R79.89 ELEVATED PTHRP LEVEL: ICD-10-CM

## 2024-01-26 PROCEDURE — 3074F SYST BP LT 130 MM HG: CPT | Performed by: NURSE PRACTITIONER

## 2024-01-26 PROCEDURE — 17000 DESTRUCT PREMALG LESION: CPT | Performed by: NURSE PRACTITIONER

## 2024-01-26 PROCEDURE — 99214 OFFICE O/P EST MOD 30 MIN: CPT | Mod: 25 | Performed by: NURSE PRACTITIONER

## 2024-01-26 PROCEDURE — 3079F DIAST BP 80-89 MM HG: CPT | Performed by: NURSE PRACTITIONER

## 2024-01-26 RX ORDER — HYDROCHLOROTHIAZIDE 25 MG/1
25 TABLET ORAL
Qty: 90 TABLET | Refills: 3 | Status: SHIPPED | OUTPATIENT
Start: 2024-01-26

## 2024-01-26 RX ORDER — PROPRANOLOL HYDROCHLORIDE 40 MG/1
40 TABLET ORAL 2 TIMES DAILY
Qty: 180 TABLET | Refills: 3 | Status: SHIPPED | OUTPATIENT
Start: 2024-01-26

## 2024-01-26 RX ORDER — OMEPRAZOLE 40 MG/1
40 CAPSULE, DELAYED RELEASE ORAL DAILY
Qty: 90 CAPSULE | Refills: 0 | Status: SHIPPED | OUTPATIENT
Start: 2024-01-26

## 2024-01-26 ASSESSMENT — FIBROSIS 4 INDEX: FIB4 SCORE: 1.11

## 2024-01-26 NOTE — ASSESSMENT & PLAN NOTE
Chronic stable.     Patient follows with Dr. Zayas. She is stable on calcitrol.     Continue to follow with Dr. Zayas.

## 2024-01-26 NOTE — ASSESSMENT & PLAN NOTE
Chronic unstable.     Patient reports that she has been having more depression than usual.     Will obtain thyroid labs and vitamin labs. Continue effexor XR 150mg daily. Will consider medication adjustment if no contributing factors to recent increase in depression.

## 2024-01-26 NOTE — PROCEDURES
Myrtle Milton - Benign    Date/Time: 1/26/2024 9:11 AM    Performed by: AMALIA LujanRFREDRICK  Authorized by: PIPE LujanP.RFREDRICK    Number of Lesions: 1  Lesion 1:     Body area: upper extremity    Upper extremity location: R upper arm    Initial size (mm): 4    Malignancy: benign lesion      Destruction method: cryotherapy      Cryo cycles: 3

## 2024-01-26 NOTE — ASSESSMENT & PLAN NOTE
Patient with inflamed SK on right upper arm. Reports that it has been itchy and tender.     Small SK about 4mm in diameter on upper right arm with redness and flaking.     Will cryo lesion in office today. Patient verbalizes consent and wishes to proceed.

## 2024-01-26 NOTE — ASSESSMENT & PLAN NOTE
Chronic unstable.     Patient reports that she has been having trace swelling in her bilateral lower legs. She denies any redness or pain.     She was on diuretics in the past.     Will start HCTZ 25mg daily.

## 2024-01-26 NOTE — PROGRESS NOTES
Chief Complaint   Patient presents with    Follow-Up    Migraine           Skin Lesion                                                                                                                                       HPI:   Miryam presents today with the following.    Problem   Swelling of Both Lower Extremities   Seborrheic Keratoses   Surgical Hypoparathyroidism (Hcc)   Bipolar Affective Disorder (Hcc)       Current Outpatient Medications   Medication Sig Dispense Refill    propranolol (INDERAL) 40 MG Tab Take 1 Tablet by mouth 2 times a day. 180 Tablet 3    omeprazole (PRILOSEC) 40 MG delayed-release capsule Take 1 Capsule by mouth every day. 90 Capsule 0    hydroCHLOROthiazide 25 MG Tab Take 1 Tablet by mouth 1 time a day as needed (lower extremity swelling). 90 Tablet 3    calcitRIOL (ROCALTROL) 0.25 MCG Cap Take 0.25 mcg by mouth 2 times a day.      Amino Acids (AMINO ACID PO) Take  by mouth.      fluticasone-umeclidinium-vilanterol (TRELEGY ELLIPTA) 200-62.5-25 mcg/act inhaler Inhale 1 Puff every day. 3 Each 3    eletriptan (RELPAX) 20 MG Tab Take 1 Tablet by mouth one time as needed for Migraine for up to 1 dose. 10 Tablet 2    ondansetron (ZOFRAN ODT) 4 MG TABLET DISPERSIBLE Take 1 Tablet by mouth every 24 hours as needed for Nausea/Vomiting (migraines). 20 Tablet 2    Atogepant 60 MG Tab Take 1 Tablet by mouth every day. 30 Tablet 5    venlafaxine (EFFEXOR-XR) 150 MG extended-release capsule Take 1 Capsule by mouth every day. 90 Capsule 3    albuterol 108 (90 Base) MCG/ACT Aero Soln inhalation aerosol Inhale 1 Puff 1 time a day as needed for Shortness of Breath. 8.5 g 5    Acetaminophen (TYLENOL PO) Take 1 Tablet by mouth 2 times a day as needed (For pain). Pt is not sure the strength (OTC)      Calcium Carbonate Antacid 1000 MG Chew Tab Chew 1 Tablet 4 times a day. 120 Tablet 0    Cholecalciferol 2000 UNIT Tab Take 1 Tablet by mouth every day. 60 Tablet 0    rivaroxaban (XARELTO) 20 MG Tab tablet  "Take 20 mg by mouth with dinner.      Multiple Vitamins-Minerals (MULTIVITAMIN WOMEN PO) Take 1 Tablet by mouth every day.       No current facility-administered medications for this visit.       Allergies as of 01/26/2024 - Reviewed 01/26/2024   Allergen Reaction Noted    Codeine Itching and Nausea 05/22/2009    Imitrex [sumatriptan succinate] Shortness of Breath and Swelling 03/05/2010    Penicillins Rash and Vomiting 08/21/2007    Sulfa drugs Shortness of Breath and Itching 08/21/2007    Azithromycin Itching 08/21/2007    Dilaudid [hydromorphone] Itching 10/20/2015    Other drug Itching 07/06/2011    Sensipar [cinacalcet] Unspecified 07/28/2016    Imipramine Shortness of Breath and Itching 06/22/2011    Seroquel [quetiapine fumarate]  08/19/2011        ROS:  All systems negative expect as addressed in assessment and plan.     /80 (BP Location: Left arm, Patient Position: Sitting)   Pulse 75   Temp 37 °C (98.6 °F) (Temporal)   Resp 18   Ht 1.778 m (5' 10\")   Wt 121 kg (266 lb)   SpO2 92%   BMI 38.17 kg/m²       Physical Exam  Vitals reviewed.   Constitutional:       Appearance: Normal appearance.   HENT:      Head: Normocephalic and atraumatic.      Mouth/Throat:      Mouth: Mucous membranes are moist.   Eyes:      Extraocular Movements: Extraocular movements intact.      Conjunctiva/sclera: Conjunctivae normal.   Pulmonary:      Effort: Pulmonary effort is normal.   Musculoskeletal:         General: Normal range of motion.      Cervical back: Normal range of motion.   Skin:     General: Skin is warm and dry.   Neurological:      General: No focal deficit present.      Mental Status: She is alert and oriented to person, place, and time.   Psychiatric:         Mood and Affect: Mood normal.         Behavior: Behavior normal.         Thought Content: Thought content normal.           Assessment and Plan:  73 y.o. female with the following issues.    1. Swelling of both lower extremities        2. " Migraine without aura and without status migrainosus, not intractable  propranolol (INDERAL) 40 MG Tab      3. Essential hypertension  propranolol (INDERAL) 40 MG Tab      4. Vitamin B12 deficiency  VITAMIN B12      5. Surgical hypoparathyroidism (HCC)  Comp Metabolic Panel    TRIIDOTHYRONINE    PTH INTACT (PTH ONLY)    CBC WITHOUT DIFFERENTIAL      6. Hypocalcemia  Comp Metabolic Panel      7. Mixed hyperlipidemia  Lipid Profile      8. Osteopenia, unspecified location  Comp Metabolic Panel      9. Chronic renal failure, stage 3 (moderate), unspecified whether stage 3a or 3b CKD (HCC)  Comp Metabolic Panel      10. Elevated PTHrP level  Comp Metabolic Panel      11. Screening for thyroid disorder  FREE THYROXINE    TSH    TRIIDOTHYRONINE      12. Vitamin D deficiency  VITAMIN D,25 HYDROXY (DEFICIENCY)      13. Seborrheic keratoses  Lesn Destn - Benign      14. Bipolar disorder, in partial remission, most recent episode depressed (HCC)             Swelling of both lower extremities  Chronic unstable.     Patient reports that she has been having trace swelling in her bilateral lower legs. She denies any redness or pain.     She was on diuretics in the past.     Will start HCTZ 25mg daily.     Seborrheic keratoses  Patient with inflamed SK on right upper arm. Reports that it has been itchy and tender.     Small SK about 4mm in diameter on upper right arm with redness and flaking.     Will cryo lesion in office today. Patient verbalizes consent and wishes to proceed.     Surgical hypoparathyroidism (HCC)  Chronic stable.     Patient follows with Dr. Zayas. She is stable on calcitrol.     Continue to follow with Dr. Zayas.     Bipolar affective disorder (HCC)  Chronic unstable.     Patient reports that she has been having more depression than usual.     Will obtain thyroid labs and vitamin labs. Continue effexor XR 150mg daily. Will consider medication adjustment if no contributing factors to recent increase in  depression.           Return in about 3 months (around 4/26/2024) for migraine, Lab Review.      Please note that this dictation was created using voice recognition software. I have worked with consultants from the vendor as well as technical experts from [facility] to optimize the interface. I have made every reasonable attempt to correct obvious errors, but I expect that there are errors of grammar and possibly content that I did not discover before finalizing the note.

## 2024-02-25 ENCOUNTER — OFFICE VISIT (OUTPATIENT)
Dept: URGENT CARE | Facility: PHYSICIAN GROUP | Age: 74
End: 2024-02-25
Payer: COMMERCIAL

## 2024-02-25 VITALS
RESPIRATION RATE: 12 BRPM | HEART RATE: 80 BPM | BODY MASS INDEX: 36.42 KG/M2 | DIASTOLIC BLOOD PRESSURE: 64 MMHG | WEIGHT: 254.4 LBS | HEIGHT: 70 IN | TEMPERATURE: 98.8 F | OXYGEN SATURATION: 91 % | SYSTOLIC BLOOD PRESSURE: 138 MMHG

## 2024-02-25 DIAGNOSIS — J02.9 PHARYNGITIS, UNSPECIFIED ETIOLOGY: ICD-10-CM

## 2024-02-25 DIAGNOSIS — H01.116 ALLERGIC BLEPHARITIS, LEFT: ICD-10-CM

## 2024-02-25 DIAGNOSIS — J01.90 ACUTE BACTERIAL SINUSITIS: Primary | ICD-10-CM

## 2024-02-25 DIAGNOSIS — B96.89 ACUTE BACTERIAL SINUSITIS: Primary | ICD-10-CM

## 2024-02-25 LAB — S PYO DNA SPEC NAA+PROBE: NOT DETECTED

## 2024-02-25 PROCEDURE — 3075F SYST BP GE 130 - 139MM HG: CPT

## 2024-02-25 PROCEDURE — 99213 OFFICE O/P EST LOW 20 MIN: CPT

## 2024-02-25 PROCEDURE — 3078F DIAST BP <80 MM HG: CPT

## 2024-02-25 PROCEDURE — 87651 STREP A DNA AMP PROBE: CPT

## 2024-02-25 RX ORDER — CALCITRIOL 0.25 UG/1
1 CAPSULE, LIQUID FILLED ORAL 2 TIMES DAILY
COMMUNITY
Start: 2024-01-15

## 2024-02-25 RX ORDER — CETIRIZINE HYDROCHLORIDE 10 MG/1
10 TABLET ORAL DAILY
Qty: 30 TABLET | Refills: 0 | Status: SHIPPED | OUTPATIENT
Start: 2024-02-25

## 2024-02-25 RX ORDER — CEFDINIR 300 MG/1
300 CAPSULE ORAL 2 TIMES DAILY
Qty: 14 CAPSULE | Refills: 0 | Status: SHIPPED | OUTPATIENT
Start: 2024-02-25 | End: 2024-03-03

## 2024-02-25 ASSESSMENT — ENCOUNTER SYMPTOMS
DIARRHEA: 0
MYALGIAS: 0
ABDOMINAL PAIN: 0
EYE DISCHARGE: 0
VOMITING: 0
SHORTNESS OF BREATH: 0
NAUSEA: 0
DIZZINESS: 0
SORE THROAT: 1
SINUS PAIN: 1
STRIDOR: 0
EYE REDNESS: 0
CHILLS: 0
EYE PAIN: 0
HEADACHES: 0
SPUTUM PRODUCTION: 0
WHEEZING: 0
FEVER: 0
PALPITATIONS: 0
COUGH: 1

## 2024-02-25 ASSESSMENT — FIBROSIS 4 INDEX: FIB4 SCORE: 1.11

## 2024-02-25 NOTE — PROGRESS NOTES
Subjective:   Miryam Veloz is a 73 y.o. female who presents for Red Eye (L eye redness, itchy, R side of throat hurts, sinus drainage, x5 days )          I introduced myself to the patient and informed them that I am a family nurse practitioner.    HPI:Delilah is a 73-year-old female who comes in today with c/o pharyngitis, sinus pain, pressure, headache, drainage. Onset was over a week ago, has worsened in the last 5 days.  Patient describes symptoms as constant. They describe the pain as sharp and scratchy, burning to her throat, aching and pressure to her sinuses. She also c/o mild redness and itching below her L eye for the last couple of days.  Denies any eye pain.  She denies any redness of the whites of her eye, or any mucopurulent discharge.  Aggravating factors include eating, drinking, swallowing exacerbates throat pain, bending forward and blowing her nose exacerbates sinus pain. Relieving factors include ice makes her throat feel better. Treatments tried at home include  Niquil with fair effect They describe their symptoms as moderate.  She is allergic to penicillins, sulfa drugs, azithromycin.  Has tolerated cephalosporins well in the past.      Review of Systems   Constitutional:  Positive for malaise/fatigue. Negative for chills and fever.   HENT:  Positive for congestion, sinus pain and sore throat. Negative for ear pain.    Eyes:  Negative for pain, discharge and redness.   Respiratory:  Positive for cough. Negative for sputum production, shortness of breath, wheezing and stridor.    Cardiovascular:  Negative for chest pain and palpitations.   Gastrointestinal:  Negative for abdominal pain, diarrhea, nausea and vomiting.   Genitourinary:  Negative for dysuria.   Musculoskeletal:  Negative for myalgias.   Skin:  Negative for rash.   Neurological:  Negative for dizziness and headaches.       Medications: albuterol Aers  AMINO ACID PO  Atogepant Tabs  calcitRIOL Caps  Calcium Carbonate Antacid  Chew  Cholecalciferol Tabs  eletriptan Tabs  hydroCHLOROthiazide Tabs  MULTIVITAMIN WOMEN PO  omeprazole  ondansetron Tbdp  propranolol Tabs  rivaroxaban Tabs  Trelegy Ellipta Aepb  TYLENOL PO  venlafaxine     Allergies: Codeine, Imitrex [sumatriptan succinate], Penicillins, Sulfa drugs, Azithromycin, Dilaudid [hydromorphone], Other drug, Sensipar [cinacalcet], Imipramine, and Seroquel [quetiapine fumarate]    Problem List: does not have any pertinent problems on file.    Surgical History:  Past Surgical History:   Procedure Laterality Date    NV EXPLORE PARATHYROID GLANDS N/A 9/20/2023    Procedure: MINIMALLY INVASIVE PARATHYROID EXPLORATION WITH INTRAOPERATIVE PARATHYROID HORMONE MONITORING;  Surgeon: Malika Zayas M.D.;  [facility] SURGERY [facility];  Service: General    ORIF, WRIST Left 09/24/2018    Procedure: WRIST ORIF;  Surgeon: Mitchel Solano M.D.;  Location: SURGERY [facility];  Service: Orthopedics    KNEE ARTHROSCOPY Right 01/05/2018    Procedure: KNEE ARTHROSCOPY;  Surgeon: Henry Medel M.D.;  Location: SURGERY [facility];  Service: Orthopedics    MENISCECTOMY Right 01/05/2018    Procedure: MENISCECTOMY-partial medial and lateral;  Surgeon: Henry Medel M.D.;  Location: [facility];  Service: Orthopedics    DEBRIDEMENT Right 01/05/2018    Procedure: DEBRIDEMENT-medial, lateral, and patellar chondyle;  Surgeon: Henry Medel M.D.;  Location: SURGERY [facility];  Service: Orthopedics    FINGER OR HAND INCISION AND DRAINAGE Right 10/20/2015    Procedure: FINGER OR HAND INCISION AND DRAINAGE- 4th finger;  Surgeon: Eric Pritchett M.D.;  Location: [facility];  Service:     DENTAL EXTRACTION(S)  2015    Upper dentures    CHOLECYSTECTOMY  1999    PRIMARY C SECTION  1985    APPENDECTOMY  1950's    BRCA1&2 GEN FULL SEQ DUP/DEL      OTHER      Gets sedation for MRI's       Past Social Hx:   reports that she quit smoking about 30 years ago. Her  "smoking use included cigarettes. She started smoking about 50 years ago. She has a 20 pack-year smoking history. She has never used smokeless tobacco. She reports current alcohol use of about 1.8 oz of alcohol per week. She reports that she does not currently use drugs after having used the following drugs: Inhaled.     Past Family Hx:   family history includes Alcohol abuse in her father; Arthritis in her sister; Breast Cancer in her mother; Cancer in her brother and mother; GI Disease in her father; Heart Disease (age of onset: 40) in her father; Psychiatric Illness in her father; Stroke (age of onset: 79) in her sister.     Problem list, medications, and allergies reviewed by myself today in Epic.   I have documented what I find to be significant in regards to past medical, social, family and surgical history  in my HPI or under PMH/PSH/FH review section, otherwise it is noncontributory     Objective:     /64 (BP Location: Right arm, Patient Position: Sitting, BP Cuff Size: Adult)   Pulse 80   Temp 37.1 °C (98.8 °F) (Temporal)   Resp 12   Ht 1.778 m (5' 10\")   Wt 115 kg (254 lb 6.4 oz)   SpO2 91%   BMI 36.50 kg/m²     During this visit, appropriate PPE was worn, and hand hygiene was performed.    Physical Exam  Vitals reviewed.   Constitutional:       General: She is not in acute distress.     Appearance: Normal appearance. She is not ill-appearing or toxic-appearing.   HENT:      Head: Normocephalic and atraumatic.      Right Ear: Tympanic membrane, ear canal and external ear normal. There is no impacted cerumen.      Left Ear: Tympanic membrane, ear canal and external ear normal. There is no impacted cerumen.      Nose: Rhinorrhea present. No congestion. Rhinorrhea is purulent.      Right Turbinates: Swollen.      Left Turbinates: Swollen.      Right Sinus: Maxillary sinus tenderness and frontal sinus tenderness present.      Left Sinus: Maxillary sinus tenderness and frontal sinus tenderness " present.      Mouth/Throat:      Pharynx: Oropharynx is clear. No oropharyngeal exudate or posterior oropharyngeal erythema.   Eyes:      General: No scleral icterus.        Right eye: No discharge.         Left eye: No discharge.      Extraocular Movements: Extraocular movements intact.      Conjunctiva/sclera: Conjunctivae normal.      Pupils: Pupils are equal, round, and reactive to light.      Comments: Patient states her L eye itches on the outside, under her eyelid, looks normal other than may be some very mild erythema. L eye exam is unremarkable,  shows no penetrative injury or foreign object noted, no injection or erythema of the conjunctivae, no signs of uveitis, hyphema, proptosis, or chemosis.  EOM intact without pain, no periorbital swelling or cellulitis.  Visual acuity is grossly intact. CN II - IV and CN VI grossly intact.      Cardiovascular:      Rate and Rhythm: Normal rate and regular rhythm.      Heart sounds: Normal heart sounds. No murmur heard.     No friction rub. No gallop.   Pulmonary:      Effort: Pulmonary effort is normal. No respiratory distress.      Breath sounds: Normal breath sounds. No wheezing, rhonchi or rales.   Abdominal:      General: There is no distension.   Musculoskeletal:         General: Normal range of motion.      Cervical back: Normal range of motion. No rigidity.      Right lower leg: No edema.      Left lower leg: No edema.   Lymphadenopathy:      Cervical: No cervical adenopathy.   Skin:     General: Skin is warm and dry.      Coloration: Skin is not jaundiced.   Neurological:      General: No focal deficit present.      Mental Status: She is alert and oriented to person, place, and time. Mental status is at baseline.   Psychiatric:         Mood and Affect: Mood normal.         Behavior: Behavior normal.         Thought Content: Thought content normal.         Judgment: Judgment normal.     Creatinine Clearance (Cockcroft-Gault Equation) from [employer]  on  2/25/2024  ** All calculations should be rechecked by clinician prior to use **    RESULT SUMMARY:  89 mL/min  Creatinine clearance, original Cockcroft-Gault    68 mL/min  Creatinine clearance modified for overweight patient, using adjusted body weight of 87 kg (192 lbs).    53.1-67.6 mL/min  Note: This range uses IBW and adjusted body weight. Controversy exists over which form of weight to use.      INPUTS:  Sex --> 1 = Female  Age --> 73 years  Weight --> 254 lbs  Creatinine --> 1.05 mg/dL  Height --> 70 in        Lab Results/POC Test Results   Results for orders placed or performed in visit on 02/25/24   POCT CEPHEID GROUP A STREP - PCR   Result Value Ref Range    POC Group A Strep, PCR Not Detected Not Detected, Invalid             Assessment/Plan:     Diagnosis and associated orders:     1. Acute bacterial sinusitis  cefdinir (OMNICEF) 300 MG Cap      2. Pharyngitis, unspecified etiology  POCT CEPHEID GROUP A STREP - PCR      3. Allergic blepharitis, left  cetirizine (ZYRTEC ALLERGY) 10 MG Tab         Comments/MDM:     1. Pharyngitis, unspecified etiology  Discussed with patient that strep is negative, her sore throat is likely due to postnasal drip from her sinus infection  - POCT CEPHEID GROUP A STREP - PCR    2. Acute bacterial sinusitis  Patient meets Infectious Disease Society of [place] (IDSA)  guidelines for ABRS given duration of symptoms, worsening severity, clinical history and physical exam   We discussed management of sinus infection including -  - supportive care measures such as drinking plenty of fluids, use a humidifier in room at night to keep mucous membranes moist, applying warm compresses to face and forehead may be useful in relieving sinus pain and pressure, using a sinus wash such as the [employer] bottle several times a day as needed, Flonase daily, OTC second-generation non-sedating antihistamine such as Zyrtec, Allegra, or Loratadine daily, alternate Tylenol with ibuprofen (unless  contraindicated) for pain and fever.  OTC decongestant of choice. Follow manufacturers guidelines for dosing and safety precautions.   -We did discuss red flags and reasons to return to urgent care versus when to go to the ER.    Discussed DDx, management options (risks,benefits, and alternatives to planned treatment), natural progression.  Questions were encouraged and answered. Written information was provided and I did go over this with the patient in clinic today.  Instructed patient regarding red flags and to return to urgent care prn if new or worsening sx or if there is no improvement in condition prn.    Advised the patient to follow-up with the primary care physician for recheck, reevaluation, and consideration of further management.  I did instruct patient regarding medications prescribed, purpose, side effects, cautions.  Instructed patient to get a pharmacy consult when picking up any prescribed medications.  Strict ER precautions discussed for any mastoid pain, swelling of the face or around the eyes, visual disturbances, eye pain, chest pain, difficulty breathing, difficulty swallowing, wheezing, stridor, or drooling, fever that does not respond to ibuprofen and Tylenol, inability to tolerate fluids, dehydration, especially in the setting of fever, chills, nausea or vomiting, lethargy.     Patient states they have good understanding and they are agreeable with the plan of care.     - cefdinir (OMNICEF) 300 MG Cap; Take 1 Capsule by mouth 2 times a day for 7 days.  Dispense: 14 Capsule; Refill: 0    3. Allergic blepharitis, left  Discussed with patient that her eye exam is unremarkable, her visual acuity is normal, there is may be very slight erythema to the area underneath the lower lid, possibly an allergic reaction.  She denies any contact with new pets, using new make-up products, changing her face wash, shampoo, laundry detergent.  Suggested she try using Zyrtec 10 mg daily, this will also help  with her sinus congestion, return to clinic for reevaluation if the condition gets worse.  She states she understands and is agreeable.  - cetirizine (ZYRTEC ALLERGY) 10 MG Tab; Take 1 Tablet by mouth every day.  Dispense: 30 Tablet; Refill: 0         Pt is clinically stable at today's acute urgent care visit. Vital signs are normal and reassuring.  No acute distress noted. Appropriate for outpatient management at this time.        I personally reviewed prior external notes and test results pertinent to today's visit.  I have independently reviewed and interpreted all diagnostics ordered during this urgent care acute visit.        Please note that this dictation was created using voice recognition software. I have made a reasonable attempt to correct obvious errors, but I expect that there are errors of grammar and possibly content that I did not discover before finalizing the note.    This note was electronically signed by Ramiro ACEVEDO, LEONIDAS, ULI, MYAH

## 2024-03-25 ENCOUNTER — HOSPITAL ENCOUNTER (OUTPATIENT)
Dept: LAB | Facility: MEDICAL CENTER | Age: 74
End: 2024-03-25
Payer: COMMERCIAL

## 2024-03-25 ENCOUNTER — HOSPITAL ENCOUNTER (OUTPATIENT)
Dept: LAB | Facility: MEDICAL CENTER | Age: 74
End: 2024-03-25
Attending: NURSE PRACTITIONER
Payer: COMMERCIAL

## 2024-03-25 DIAGNOSIS — E53.8 VITAMIN B12 DEFICIENCY: ICD-10-CM

## 2024-03-25 DIAGNOSIS — Z13.29 THYROID DISORDER SCREEN: ICD-10-CM

## 2024-03-25 DIAGNOSIS — E66.9 OBESITY (BMI 30-39.9): ICD-10-CM

## 2024-03-25 DIAGNOSIS — R79.89 ELEVATED PTHRP LEVEL: ICD-10-CM

## 2024-03-25 DIAGNOSIS — E89.2 SURGICAL HYPOPARATHYROIDISM (HCC): ICD-10-CM

## 2024-03-25 DIAGNOSIS — E55.9 VITAMIN D DEFICIENCY: ICD-10-CM

## 2024-03-25 DIAGNOSIS — M85.80 OSTEOPENIA, UNSPECIFIED LOCATION: ICD-10-CM

## 2024-03-25 DIAGNOSIS — E78.2 MIXED HYPERLIPIDEMIA: ICD-10-CM

## 2024-03-25 DIAGNOSIS — N18.30 CHRONIC RENAL FAILURE, STAGE 3 (MODERATE), UNSPECIFIED WHETHER STAGE 3A OR 3B CKD: ICD-10-CM

## 2024-03-25 DIAGNOSIS — E83.51 HYPOCALCEMIA: ICD-10-CM

## 2024-03-25 DIAGNOSIS — Z13.29 SCREENING FOR THYROID DISORDER: ICD-10-CM

## 2024-03-25 LAB
25(OH)D3 SERPL-MCNC: 57 NG/ML (ref 30–100)
ALBUMIN SERPL BCP-MCNC: 3.8 G/DL (ref 3.2–4.9)
ALBUMIN/GLOB SERPL: 1.2 G/DL
ALP SERPL-CCNC: 78 U/L (ref 30–99)
ALT SERPL-CCNC: 12 U/L (ref 2–50)
ANION GAP SERPL CALC-SCNC: 12 MMOL/L (ref 7–16)
AST SERPL-CCNC: 19 U/L (ref 12–45)
BILIRUB SERPL-MCNC: 0.5 MG/DL (ref 0.1–1.5)
BUN SERPL-MCNC: 11 MG/DL (ref 8–22)
CALCIUM ALBUM COR SERPL-MCNC: 8.1 MG/DL (ref 8.5–10.5)
CALCIUM SERPL-MCNC: 7.9 MG/DL (ref 8.5–10.5)
CHLORIDE SERPL-SCNC: 104 MMOL/L (ref 96–112)
CHOLEST SERPL-MCNC: 139 MG/DL (ref 100–199)
CO2 SERPL-SCNC: 26 MMOL/L (ref 20–33)
CREAT SERPL-MCNC: 0.91 MG/DL (ref 0.5–1.4)
ERYTHROCYTE [DISTWIDTH] IN BLOOD BY AUTOMATED COUNT: 50.4 FL (ref 35.9–50)
EST. AVERAGE GLUCOSE BLD GHB EST-MCNC: 126 MG/DL
GFR SERPLBLD CREATININE-BSD FMLA CKD-EPI: 67 ML/MIN/1.73 M 2
GLOBULIN SER CALC-MCNC: 3.1 G/DL (ref 1.9–3.5)
GLUCOSE SERPL-MCNC: 120 MG/DL (ref 65–99)
HBA1C MFR BLD: 6 % (ref 4–5.6)
HCT VFR BLD AUTO: 45.7 % (ref 37–47)
HDLC SERPL-MCNC: 30 MG/DL
HGB BLD-MCNC: 14.8 G/DL (ref 12–16)
LDLC SERPL CALC-MCNC: 80 MG/DL
MCH RBC QN AUTO: 32.1 PG (ref 27–33)
MCHC RBC AUTO-ENTMCNC: 32.4 G/DL (ref 32.2–35.5)
MCV RBC AUTO: 99.1 FL (ref 81.4–97.8)
PLATELET # BLD AUTO: 170 K/UL (ref 164–446)
PMV BLD AUTO: 11.9 FL (ref 9–12.9)
POTASSIUM SERPL-SCNC: 3.9 MMOL/L (ref 3.6–5.5)
PROT SERPL-MCNC: 6.9 G/DL (ref 6–8.2)
PTH-INTACT SERPL-MCNC: 22.4 PG/ML (ref 14–72)
RBC # BLD AUTO: 4.61 M/UL (ref 4.2–5.4)
SODIUM SERPL-SCNC: 142 MMOL/L (ref 135–145)
T3 SERPL-MCNC: 121 NG/DL (ref 60–181)
T4 FREE SERPL-MCNC: 1.37 NG/DL (ref 0.93–1.7)
TRIGL SERPL-MCNC: 143 MG/DL (ref 0–149)
TSH SERPL DL<=0.005 MIU/L-ACNC: 0.59 UIU/ML (ref 0.38–5.33)
VIT B12 SERPL-MCNC: 788 PG/ML (ref 211–911)
WBC # BLD AUTO: 6.9 K/UL (ref 4.8–10.8)

## 2024-03-25 PROCEDURE — 83036 HEMOGLOBIN GLYCOSYLATED A1C: CPT

## 2024-03-25 PROCEDURE — 82306 VITAMIN D 25 HYDROXY: CPT

## 2024-03-25 PROCEDURE — 82607 VITAMIN B-12: CPT

## 2024-03-25 PROCEDURE — 85027 COMPLETE CBC AUTOMATED: CPT

## 2024-03-25 PROCEDURE — 80053 COMPREHEN METABOLIC PANEL: CPT

## 2024-03-25 PROCEDURE — 84480 ASSAY TRIIODOTHYRONINE (T3): CPT

## 2024-03-25 PROCEDURE — 80061 LIPID PANEL: CPT

## 2024-03-25 PROCEDURE — 84439 ASSAY OF FREE THYROXINE: CPT

## 2024-03-25 PROCEDURE — 36415 COLL VENOUS BLD VENIPUNCTURE: CPT

## 2024-03-25 PROCEDURE — 84443 ASSAY THYROID STIM HORMONE: CPT

## 2024-03-25 PROCEDURE — 83970 ASSAY OF PARATHORMONE: CPT

## 2024-03-30 ENCOUNTER — APPOINTMENT (OUTPATIENT)
Dept: RADIOLOGY | Facility: IMAGING CENTER | Age: 74
End: 2024-03-30
Attending: NURSE PRACTITIONER
Payer: COMMERCIAL

## 2024-03-30 ENCOUNTER — OFFICE VISIT (OUTPATIENT)
Dept: URGENT CARE | Facility: PHYSICIAN GROUP | Age: 74
End: 2024-03-30
Payer: COMMERCIAL

## 2024-03-30 ENCOUNTER — APPOINTMENT (OUTPATIENT)
Dept: URGENT CARE | Facility: CLINIC | Age: 74
End: 2024-03-30
Payer: COMMERCIAL

## 2024-03-30 VITALS
BODY MASS INDEX: 35.07 KG/M2 | OXYGEN SATURATION: 92 % | RESPIRATION RATE: 12 BRPM | SYSTOLIC BLOOD PRESSURE: 108 MMHG | HEIGHT: 70 IN | DIASTOLIC BLOOD PRESSURE: 60 MMHG | WEIGHT: 245 LBS | TEMPERATURE: 98 F | HEART RATE: 95 BPM

## 2024-03-30 DIAGNOSIS — J22 BACTERIAL LOWER RESPIRATORY INFECTION: Primary | ICD-10-CM

## 2024-03-30 DIAGNOSIS — B96.89 BACTERIAL LOWER RESPIRATORY INFECTION: Primary | ICD-10-CM

## 2024-03-30 DIAGNOSIS — J44.9 CHRONIC OBSTRUCTIVE PULMONARY DISEASE, UNSPECIFIED COPD TYPE (HCC): ICD-10-CM

## 2024-03-30 DIAGNOSIS — R05.9 COUGH, UNSPECIFIED TYPE: ICD-10-CM

## 2024-03-30 PROCEDURE — 71046 X-RAY EXAM CHEST 2 VIEWS: CPT | Mod: TC | Performed by: NURSE PRACTITIONER

## 2024-03-30 RX ORDER — PREDNISONE 20 MG/1
TABLET ORAL
Qty: 10 TABLET | Refills: 0 | Status: SHIPPED | OUTPATIENT
Start: 2024-03-30

## 2024-03-30 RX ORDER — DOXYCYCLINE HYCLATE 100 MG/1
100 CAPSULE ORAL 2 TIMES DAILY
Qty: 14 CAPSULE | Refills: 0 | Status: SHIPPED | OUTPATIENT
Start: 2024-03-30 | End: 2024-04-06

## 2024-03-30 RX ORDER — IPRATROPIUM BROMIDE AND ALBUTEROL SULFATE 2.5; .5 MG/3ML; MG/3ML
3 SOLUTION RESPIRATORY (INHALATION) ONCE
Status: COMPLETED | OUTPATIENT
Start: 2024-03-30 | End: 2024-03-30

## 2024-03-30 RX ADMIN — IPRATROPIUM BROMIDE AND ALBUTEROL SULFATE 3 ML: 2.5; .5 SOLUTION RESPIRATORY (INHALATION) at 09:46

## 2024-03-30 ASSESSMENT — FIBROSIS 4 INDEX: FIB4 SCORE: 2.29

## 2024-03-30 ASSESSMENT — ENCOUNTER SYMPTOMS
HEADACHES: 1
COUGH: 1
SPUTUM PRODUCTION: 1
SORE THROAT: 0
DIARRHEA: 0
NAUSEA: 0
WHEEZING: 1
ORTHOPNEA: 0
MYALGIAS: 0
EYE DISCHARGE: 0
CHILLS: 0
FEVER: 0
SHORTNESS OF BREATH: 1

## 2024-03-30 NOTE — PROGRESS NOTES
Subjective     Delilah Veloz is a 73 y.o. female who presents with Cough (Chest px, cough x2 weeks/vomiting, diarrhea, slight fever, SOB x1 day)            HPI  New Problem.  Patient is a very pleasant 73-year-old female who presents with cough and nasal congestion x 2 weeks.  She reports in the last several days her symptoms have worsened to include shortness of breath.  She does have a history of COPD for which she takes albuterol and Trelegy.  She did have a dose of albuterol this morning.  She reports that last night she had nausea, vomiting, and diarrhea however this is since resolved this morning.  She does report using oxygen at nighttime.  Her sats today in clinic are 88%.    Codeine, Imitrex [sumatriptan succinate], Penicillins, Sulfa drugs, Azithromycin, Dilaudid [hydromorphone], Other drug, Sensipar [cinacalcet], Imipramine, and Seroquel [quetiapine fumarate]  Current Outpatient Medications on File Prior to Visit   Medication Sig Dispense Refill    propranolol (INDERAL) 40 MG Tab Take 1 Tablet by mouth 2 times a day. 180 Tablet 3    omeprazole (PRILOSEC) 40 MG delayed-release capsule Take 1 Capsule by mouth every day. 90 Capsule 0    hydroCHLOROthiazide 25 MG Tab Take 1 Tablet by mouth 1 time a day as needed (lower extremity swelling). 90 Tablet 3    Amino Acids (AMINO ACID PO) Take  by mouth.      fluticasone-umeclidinium-vilanterol (TRELEGY ELLIPTA) 200-62.5-25 mcg/act inhaler Inhale 1 Puff every day. 3 Each 3    eletriptan (RELPAX) 20 MG Tab Take 1 Tablet by mouth one time as needed for Migraine for up to 1 dose. 10 Tablet 2    ondansetron (ZOFRAN ODT) 4 MG TABLET DISPERSIBLE Take 1 Tablet by mouth every 24 hours as needed for Nausea/Vomiting (migraines). 20 Tablet 2    Atogepant 60 MG Tab Take 1 Tablet by mouth every day. 30 Tablet 5    venlafaxine (EFFEXOR-XR) 150 MG extended-release capsule Take 1 Capsule by mouth every day. 90 Capsule 3    albuterol 108 (90 Base) MCG/ACT Aero Soln inhalation  aerosol Inhale 1 Puff 1 time a day as needed for Shortness of Breath. 8.5 g 5    Acetaminophen (TYLENOL PO) Take 1 Tablet by mouth 2 times a day as needed (For pain). Pt is not sure the strength (OTC)      Calcium Carbonate Antacid 1000 MG Chew Tab Chew 1 Tablet 4 times a day. 120 Tablet 0    Cholecalciferol 2000 UNIT Tab Take 1 Tablet by mouth every day. 60 Tablet 0    rivaroxaban (XARELTO) 20 MG Tab tablet Take 20 mg by mouth with dinner.      Multiple Vitamins-Minerals (MULTIVITAMIN WOMEN PO) Take 1 Tablet by mouth every day.      calcitRIOL (ROCALTROL) 0.25 MCG Cap Take 1 Capsule by mouth 2 times a day.      cetirizine (ZYRTEC ALLERGY) 10 MG Tab Take 1 Tablet by mouth every day. 30 Tablet 0    calcitRIOL (ROCALTROL) 0.25 MCG Cap Take 0.25 mcg by mouth 2 times a day.       No current facility-administered medications on file prior to visit.     Social History     Socioeconomic History    Marital status:      Spouse name: Not on file    Number of children: Not on file    Years of education: Not on file    Highest education level: Some college, no degree   Occupational History    Not on file   Tobacco Use    Smoking status: Former     Current packs/day: 0.00     Average packs/day: 1 pack/day for 20.0 years (20.0 ttl pk-yrs)     Types: Cigarettes     Start date: 1974     Quit date: 1994     Years since quittin.2    Smokeless tobacco: Never    Tobacco comments:     quit 27 years ago   Vaping Use    Vaping Use: Never used   Substance and Sexual Activity    Alcohol use: Yes     Alcohol/week: 1.8 oz     Types: 3 Glasses of wine per week     Comment: Occasional    Drug use: Not Currently     Types: Inhaled     Comment: H/o Drug  abuse - Meth, Clean x 26 years.    Sexual activity: Not Currently     Partners: Male     Birth control/protection: Post-Menopausal     Comment: Partner passed away   Other Topics Concern    Not on file   Social History Narrative    Not on file     Social Determinants of  Health     Financial Resource Strain: Low Risk  (8/3/2023)    Overall Financial Resource Strain (CARDIA)     Difficulty of Paying Living Expenses: Not hard at all   Food Insecurity: No Food Insecurity (8/3/2023)    Hunger Vital Sign     Worried About Running Out of Food in the Last Year: Never true     Ran Out of Food in the Last Year: Never true   Transportation Needs: No Transportation Needs (8/3/2023)    PRAPARE - Transportation     Lack of Transportation (Medical): No     Lack of Transportation (Non-Medical): No   Physical Activity: Insufficiently Active (8/3/2023)    Exercise Vital Sign     Days of Exercise per Week: 3 days     Minutes of Exercise per Session: 30 min   Stress: No Stress Concern Present (8/3/2023)    [employer] - Occupational Stress Questionnaire     Feeling of Stress : Only a little   Social Connections: Socially Isolated (8/3/2023)    Social Connection and Isolation Panel [NHANES]     Frequency of Communication with Friends and Family: Never     Frequency of Social Gatherings with Friends and Family: Twice a week     Attends Scientologist Services: Never     Active Member of Clubs or Organizations: No     Attends Club or Organization Meetings: Never     Marital Status:    Intimate Partner Violence: Not on file   Housing Stability: Low Risk  (8/3/2023)    Housing Stability Vital Sign     Unable to Pay for Housing in the Last Year: No     Number of Places Lived in the Last Year: 1     Unstable Housing in the Last Year: No     Breast Cancer-related family history is not on file.        Review of Systems   Constitutional:  Positive for malaise/fatigue. Negative for chills and fever.   HENT:  Positive for congestion. Negative for sore throat.    Eyes:  Negative for discharge.   Respiratory:  Positive for cough, sputum production, shortness of breath and wheezing.    Cardiovascular:  Negative for chest pain and orthopnea.   Gastrointestinal:  Negative for diarrhea  "and nausea.   Musculoskeletal:  Negative for myalgias.   Neurological:  Positive for headaches.   Endo/Heme/Allergies:  Negative for environmental allergies.              Objective     /60 (BP Location: Right arm, Patient Position: Sitting, BP Cuff Size: Adult)   Pulse 95   Temp 36.7 °C (98 °F) (Temporal)   Resp 12   Ht 1.778 m (5' 10\")   Wt 111 kg (245 lb)   SpO2 88%   BMI 35.15 kg/m²      Physical Exam  Vitals and nursing note reviewed.   Constitutional:       General: She is not in acute distress.     Appearance: Normal appearance. She is well-developed.   HENT:      Head: Normocephalic and atraumatic.      Right Ear: Tympanic membrane, ear canal and external ear normal. No middle ear effusion. Tympanic membrane is not injected or perforated.      Left Ear: Tympanic membrane, ear canal and external ear normal.  No middle ear effusion. Tympanic membrane is not injected or perforated.      Nose: Mucosal edema and congestion present.      Mouth/Throat:      Pharynx: Posterior oropharyngeal erythema present. No oropharyngeal exudate.   Eyes:      General:         Right eye: No discharge.         Left eye: No discharge.      Conjunctiva/sclera: Conjunctivae normal.   Cardiovascular:      Rate and Rhythm: Normal rate and regular rhythm.      Heart sounds: Normal heart sounds. No murmur heard.  Pulmonary:      Effort: Pulmonary effort is normal. No respiratory distress.      Breath sounds: Normal breath sounds.      Comments: Diminished with soft wheezes.  Musculoskeletal:         General: Normal range of motion.      Cervical back: Normal range of motion and neck supple.      Comments: Normal movement of all 4 extremities.   Lymphadenopathy:      Cervical: No cervical adenopathy.      Upper Body:      Right upper body: No supraclavicular adenopathy.      Left upper body: No supraclavicular adenopathy.   Skin:     General: Skin is warm and dry.   Neurological:      Mental Status: She is alert and oriented " to person, place, and time.      Gait: Gait normal.   Psychiatric:         Behavior: Behavior normal.         Thought Content: Thought content normal.                             Assessment & Plan     1. Bacterial lower respiratory infection  doxycycline (VIBRAMYCIN) 100 MG Cap    predniSONE (DELTASONE) 20 MG Tab      2. Cough, unspecified type  DX-CHEST-2 VIEWS    ipratropium-albuterol (DUONEB) nebulizer solution      3. Chronic obstructive pulmonary disease, unspecified COPD type (HCC)          Negative imaging.   Saturation to 92 after neb treatment.  Doxy/prednisone.  Albuterol every 4-6 hours as needed.  Differential diagnosis, natural history, supportive care, and indications for immediate follow-up were discussed.

## 2024-04-30 ENCOUNTER — OFFICE VISIT (OUTPATIENT)
Dept: URGENT CARE | Facility: PHYSICIAN GROUP | Age: 74
End: 2024-04-30
Payer: COMMERCIAL

## 2024-04-30 VITALS
DIASTOLIC BLOOD PRESSURE: 64 MMHG | TEMPERATURE: 99.5 F | WEIGHT: 249 LBS | HEART RATE: 95 BPM | OXYGEN SATURATION: 94 % | HEIGHT: 70 IN | BODY MASS INDEX: 35.65 KG/M2 | SYSTOLIC BLOOD PRESSURE: 122 MMHG | RESPIRATION RATE: 12 BRPM

## 2024-04-30 DIAGNOSIS — M62.830 SPASM OF RIGHT TRAPEZIUS MUSCLE: ICD-10-CM

## 2024-04-30 DIAGNOSIS — M62.830 SPASM OF MUSCLE OF LOWER BACK: ICD-10-CM

## 2024-04-30 PROCEDURE — 3078F DIAST BP <80 MM HG: CPT | Performed by: STUDENT IN AN ORGANIZED HEALTH CARE EDUCATION/TRAINING PROGRAM

## 2024-04-30 PROCEDURE — 99213 OFFICE O/P EST LOW 20 MIN: CPT | Performed by: STUDENT IN AN ORGANIZED HEALTH CARE EDUCATION/TRAINING PROGRAM

## 2024-04-30 PROCEDURE — 3074F SYST BP LT 130 MM HG: CPT | Performed by: STUDENT IN AN ORGANIZED HEALTH CARE EDUCATION/TRAINING PROGRAM

## 2024-04-30 RX ORDER — CYCLOBENZAPRINE HCL 5 MG
5 TABLET ORAL 2 TIMES DAILY PRN
Qty: 15 TABLET | Refills: 0 | Status: SHIPPED | OUTPATIENT
Start: 2024-04-30 | End: 2024-05-21

## 2024-04-30 RX ORDER — METHYLPREDNISOLONE 4 MG/1
TABLET ORAL
Qty: 21 TABLET | Refills: 0 | Status: SHIPPED | OUTPATIENT
Start: 2024-04-30 | End: 2024-05-21

## 2024-04-30 ASSESSMENT — ENCOUNTER SYMPTOMS
PERIANAL NUMBNESS: 0
ABDOMINAL PAIN: 0
TINGLING: 0
WEAKNESS: 0
PARESTHESIAS: 0
PARESIS: 0
FEVER: 0
LEG PAIN: 0
BOWEL INCONTINENCE: 0
NECK PAIN: 1
BACK PAIN: 1
NUMBNESS: 0

## 2024-04-30 ASSESSMENT — FIBROSIS 4 INDEX: FIB4 SCORE: 2.29

## 2024-04-30 NOTE — LETTER
April 30, 2024    To Whom It May Concern:         This is confirmation that Miryam Veloz attended her scheduled appointment with Janett Fine P.A.-C. on 4/30/24. Please excuse work absences through 4/3/24 for medical reasons. Miryam can return to work without restrictions on 5/4/24 or earlier as long as symptoms have improved/resolved.      Sincerely,    Janett Fine P.A.-C.  [phone]

## 2024-04-30 NOTE — LETTER
April 30, 2024    To Whom It May Concern:         This is confirmation that Miryam Veloz attended her scheduled appointment with Janett Fine P.A.-C. on 4/30/24. Please excuse work absences through 5/3/24 for medical reasons. Miryam can return to work without restrictions on 5/4/24 or earlier as long as symptoms have improved/resolved.    Sincerely,    Janett Fine P.A.-C.  [phone]

## 2024-05-01 NOTE — PROGRESS NOTES
"Subjective     Delilah Yessica Veloz is a 73 y.o. female who presents with Back Pain (R side of Neck down to R hip is in pain, stiff muscles, x1 week )            Delilah is a 73 y.o. male who presents to urgent care with neck pain and back pain.  Patient reports right-sided neck pain and right-sided lower back pain.  No known injury or trauma but patient states she does a lot of lifting while at work.  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 Patient been applying ice/heat at home as well as taking OTC Tylenol.  OTC Tylenol does provide temporary relief of symptoms.  Patient has also tried lidocaine patches which have not helped.  Patient reports muscles feeling tight and stiff    Back Pain  This is a new problem. The current episode started in the past 7 days. The problem is unchanged. The pain is present in the lumbar spine. The pain does not radiate. Pertinent negatives include no abdominal pain, bladder incontinence, bowel incontinence, dysuria, fever, leg pain, numbness, paresis, paresthesias, pelvic pain, perianal numbness, tingling or weakness.   Neck Pain   This is a new problem. The current episode started in the past 7 days. The problem has been unchanged. The pain is associated with an unknown factor. The pain is present in the right side. Pertinent negatives include no fever, leg pain, numbness, paresis, tingling or weakness.       Review of Systems   Constitutional:  Negative for fever.   Gastrointestinal:  Negative for abdominal pain and bowel incontinence.   Genitourinary:  Negative for bladder incontinence, dysuria and pelvic pain.   Musculoskeletal:  Positive for back pain and neck pain.   Neurological:  Negative for tingling, weakness, numbness and paresthesias.              Objective     /64 (BP Location: Left arm, Patient Position: Sitting, BP Cuff Size: Adult)   Pulse 95   Temp 37.5 °C (99.5 °F) (Temporal)   Resp 12   Ht 1.778 m (5' 10\")   Wt " 113 kg (249 lb)   SpO2 94%   BMI 35.73 kg/m²      Physical Exam                        Assessment & Plan        There are no diagnoses linked to this encounter.                 indications for immediate follow-up discussed with patient. Pathogenesis of diagnosis discussed including typical length and natural progression.  Follow up with PCP.    Instructed to return to urgent care or nearest emergency department if symptoms fail to improve, for any change in condition, further concerns, or new concerning symptoms.    Patient states understanding and agrees with the plan of care and discharge instructions.

## 2024-05-21 ENCOUNTER — APPOINTMENT (OUTPATIENT)
Dept: MEDICAL GROUP | Facility: PHYSICIAN GROUP | Age: 74
End: 2024-05-21
Payer: COMMERCIAL

## 2024-05-21 ENCOUNTER — OFFICE VISIT (OUTPATIENT)
Dept: MEDICAL GROUP | Facility: PHYSICIAN GROUP | Age: 74
End: 2024-05-21
Payer: COMMERCIAL

## 2024-05-21 VITALS
TEMPERATURE: 97 F | OXYGEN SATURATION: 89 % | RESPIRATION RATE: 18 BRPM | DIASTOLIC BLOOD PRESSURE: 76 MMHG | HEART RATE: 67 BPM | SYSTOLIC BLOOD PRESSURE: 144 MMHG | HEIGHT: 70 IN | BODY MASS INDEX: 35.73 KG/M2

## 2024-05-21 DIAGNOSIS — M62.838 MUSCLE SPASM: ICD-10-CM

## 2024-05-21 DIAGNOSIS — E89.2 SURGICAL HYPOPARATHYROIDISM (HCC): ICD-10-CM

## 2024-05-21 DIAGNOSIS — M79.18 MYOFASCIAL PAIN ON LEFT SIDE: ICD-10-CM

## 2024-05-21 DIAGNOSIS — E83.51 HYPOCALCEMIA: ICD-10-CM

## 2024-05-21 PROCEDURE — 3078F DIAST BP <80 MM HG: CPT | Performed by: FAMILY MEDICINE

## 2024-05-21 PROCEDURE — 3077F SYST BP >= 140 MM HG: CPT | Performed by: FAMILY MEDICINE

## 2024-05-21 PROCEDURE — 99214 OFFICE O/P EST MOD 30 MIN: CPT | Performed by: FAMILY MEDICINE

## 2024-05-21 RX ORDER — CALCITRIOL 0.25 UG/1
0.25 CAPSULE, LIQUID FILLED ORAL 2 TIMES DAILY
Qty: 180 CAPSULE | Refills: 3 | Status: SHIPPED | OUTPATIENT
Start: 2024-05-21

## 2024-05-21 RX ORDER — TIZANIDINE 2 MG/1
2 TABLET ORAL 3 TIMES DAILY PRN
Qty: 45 TABLET | Refills: 0 | Status: SHIPPED | OUTPATIENT
Start: 2024-05-21

## 2024-05-21 NOTE — PROGRESS NOTES
"CHIEF COMPLAINT / REASON FOR VISIT  Miryam Veloz is a 73 y.o. female that presents today for   Chief Complaint   Patient presents with    Lab Results    Back Pain     HISTORY OF PRESENT ILLNESS  History includes COPD, atrial fibrillation, obesity BMI 35, fibromyalgia, surgical hypoparathyroidism on calcitriol for hypocalcemia  History of Present Illness  The patient presents for evaluation of multiple medical concerns.    The patient sought urgent care on 04/30/2024 due to back spasms. 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 The etiology of the spasms remains uncertain, but she speculates it may be due to improper sleeping posture. The spasms, which initially improved, have since recurred, particularly in her left shoulder blade, extending from her bra line downwards, on both sides of her lower back. She denies any radiating pain down her legs. She has attempted to manage the pain with Icy Hot and lidocaine patches. She has exhausted her supply of Flexeril, which was prescribed at urgent care. She has a history of similar pain, which she attributes to her age. She has expressed interest in acupuncture and is seeking a work note.    The patient underwent parathyroid surgery in 09/2023, during which her calcium levels decreased. Post-procedure, she developed a silver dollar-sized hole alongside the scar, which required wound care. She was previously on calcitriol.  Prescription ran out so she stopped taking    OBJECTIVE   BP (!) 144/76 (BP Location: Right arm, Patient Position: Sitting, BP Cuff Size: Large adult)   Pulse 67   Temp 36.1 °C (97 °F) (Temporal)   Resp 18   Ht 1.778 m (5' 10\")   SpO2 89%   BMI 35.73 kg/m²      PHYSICAL EXAM  Constitutional: Sitting comfortably, in no acute distress, responds to questions appropriately.  Back: diffuse tenderness to palpation of back, including " trapezius muscles, thoracic and lumbar paraspinal musculature.  The tenderness is more pronounced on the left side.  5/5 strength bilateral lower extremities.  Skin: Warm and dry, no rashes or lesions on face or exposed upper extremities    ASSESSMENT & PLAN  1. Myofascial pain on left side  2. Muscle spasm  Myofascial lumbar and thoracic back pain, with muscle spasming.  No red flag symptoms.  Recommend physical therapy, acupuncture, and muscle exercise needed.  Return as needed.  - tizanidine (ZANAFLEX) 2 MG tablet; Take 1 Tablet by mouth 3 times a day as needed (muscle spasms).  Dispense: 45 Tablet; Refill: 0  - Referral for Acupuncture  - Referral to Physical Therapy    3. Surgical hypoparathyroidism (HCC)  4. 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  Currently untreated.  Recommend reinitiation of calcitriol 0.25 mcg twice daily.  Recommend reestablishing care with endocrinology for further management and monitoring  - calcitRIOL (ROCALTROL) 0.25 MCG Cap; Take 1 Capsule by mouth 2 times a day.  Dispense: 180 Capsule; Refill: 3  - Referral to Endocrinology  - Comp Metabolic Panel; Future  - IONIZED CALCIUM; Future  - PHOSPHORUS; Future  - VITAMIN D,25 HYDROXY (DEFICIENCY); Future

## 2024-05-21 NOTE — ASSESSMENT & PLAN NOTE
"Pt reports sinus congestion over the w/e  Yesterday \"brown green crud\" and frontal headache.   Hoarse throat today. Pt points to maxillary sinus area as area of pain.  Pt reports has multiple allergies and then leads to sinus problem.  Pt reports two sinus infections in past.    "
Pt reports has allergies year round.   Nose is running, lots of blowing nose, slight ear itching.  Asking for Kenalog shot.  PND.   
74

## 2024-05-21 NOTE — LETTER
[facility]  [address]  [place] [address]     May 21, 2024    Patient: Miryam Veloz   YOB: 1950   Date of Visit: 5/21/2024       To Whom It May Concern:    Miryam Veloz was seen and treated in our department on 5/21/2024.     Please excuse her from work from 5/21/24 through 5/23/24.    Sincerely,     Walter Bautista M.D.

## 2024-05-23 ENCOUNTER — OFFICE VISIT (OUTPATIENT)
Dept: MEDICAL GROUP | Facility: PHYSICIAN GROUP | Age: 74
End: 2024-05-23
Payer: COMMERCIAL

## 2024-05-23 VITALS
HEIGHT: 70 IN | SYSTOLIC BLOOD PRESSURE: 112 MMHG | HEART RATE: 75 BPM | BODY MASS INDEX: 35.65 KG/M2 | TEMPERATURE: 99.1 F | WEIGHT: 249 LBS | OXYGEN SATURATION: 91 % | DIASTOLIC BLOOD PRESSURE: 62 MMHG | RESPIRATION RATE: 18 BRPM

## 2024-05-23 DIAGNOSIS — E83.51 HYPOCALCEMIA: ICD-10-CM

## 2024-05-23 DIAGNOSIS — G43.009 MIGRAINE WITHOUT AURA AND WITHOUT STATUS MIGRAINOSUS, NOT INTRACTABLE: ICD-10-CM

## 2024-05-23 DIAGNOSIS — N18.30 CHRONIC RENAL FAILURE, STAGE 3 (MODERATE), UNSPECIFIED WHETHER STAGE 3A OR 3B CKD: ICD-10-CM

## 2024-05-23 DIAGNOSIS — E89.2 SURGICAL HYPOPARATHYROIDISM (HCC): ICD-10-CM

## 2024-05-23 PROCEDURE — 3074F SYST BP LT 130 MM HG: CPT | Performed by: NURSE PRACTITIONER

## 2024-05-23 PROCEDURE — 99214 OFFICE O/P EST MOD 30 MIN: CPT | Performed by: NURSE PRACTITIONER

## 2024-05-23 PROCEDURE — 3078F DIAST BP <80 MM HG: CPT | Performed by: NURSE PRACTITIONER

## 2024-05-23 ASSESSMENT — FIBROSIS 4 INDEX: FIB4 SCORE: 2.29

## 2024-05-23 NOTE — PROGRESS NOTES
Chief Complaint   Patient presents with    Paperwork     FMLA                                                                                                                                       HPI:   Miryam presents today with the following.    The patient attended the consultation today to provide a follow-up regarding migraines and discuss several other health concerns.    The patient reports experiencing migraines two to three times a month, each lasting one to two days. She no longer sees a neurologist for her migraines but has been referred to an endocrinologist following parathyroid surgery performed by Dr. Zayas. Her post-surgical calcium levels were noted to be 8.1, corrected to 8.5, but still slightly low. The patient expresses concerns about persistent tiredness, lack of energy, and motivation, questioning the cause.    The patient describes severe pain that disrupts her sleep nightly, despite trying various sleeping positions and pillow arrangements. She has recently switched to a different muscle relaxant which she prefers over Flexeril as it causes less drowsiness.    The patient has had recent urgent care visits for back pain, as noted in her medical records. She also mentions undergoing calcium and parathyroid surgery, with details provided by Dr. Zayas.    The patient has a history of back issues, for which she has sought urgent care. She also has a history of calcium and parathyroid concerns requiring surgical intervention.    The patient does not specify current medications or dosages but discusses a recent change to a different muscle relaxant which is not named.    The patient is bothered by rapid hair growth, which she manages with Vivar, and expresses frustration about its frequency. She also notes an indentation in her arm, attributed to subcutaneous fat loss from trauma, and mentions leg cramps and a sensation of extreme fatigue in her legs, which she relates to her ongoing low  "calcium levels.    The patient's current health management strategies and concerns have been discussed, with particular attention to her migraines, parathyroid surgery, calcium levels, back pain, medication changes, and various other symptoms.    ROS:  All systems negative expect as addressed in assessment and plan.     /62 (BP Location: Right arm, Patient Position: Sitting, BP Cuff Size: Adult)   Pulse 75   Temp 37.3 °C (99.1 °F) (Temporal)   Resp 18   Ht 1.778 m (5' 10\")   Wt 113 kg (249 lb)   SpO2 91%   BMI 35.73 kg/m²     Objective:    Physical Exam  Vitals reviewed.   Constitutional:       Appearance: Normal appearance.   HENT:      Head: Normocephalic and atraumatic.      Mouth/Throat:      Mouth: Mucous membranes are moist.   Eyes:      Extraocular Movements: Extraocular movements intact.      Conjunctiva/sclera: Conjunctivae normal.   Pulmonary:      Effort: Pulmonary effort is normal.   Musculoskeletal:         General: Normal range of motion.      Cervical back: Normal range of motion.   Skin:     General: Skin is warm and dry.   Neurological:      General: No focal deficit present.      Mental Status: She is alert and oriented to person, place, and time.   Psychiatric:         Mood and Affect: Mood normal.         Behavior: Behavior normal.         Thought Content: Thought content normal.         Diagnostic Test Results:  - Laboratory Tests:    - Calcium Levels: Corrected calcium level was 8.1, with a recent measurement at 8.5, indicating persistent hypocalcemia.    - Other Relevant Labs (Date: March 25): Vitamin D, vitamin B, and thyroid function tests reported as normal. Further lab work has been ordered but not yet completed.    - Physical Examination:    - Musculoskeletal System: Patient reports waking up due to severe back pain, attempts to alleviate pain by changing sleeping positions and adjusting pillow usage have been unsuccessful.    - Skin and Subcutaneous Tissue: Noted " subcutaneous fat loss and dimpling in the arm, likely due to trauma. Presence of scar tissue contributing to the appearance of indentation.    - Neurological System: Patient experiences migraines, occurring two to three times per month, each lasting one to two days.    Assessment and Plan:  73 y.o. female with the following issues.    1. Chronic renal failure, stage 3 (moderate), unspecified whether stage 3a or 3b CKD    2. Surgical hypoparathyroidism (HCC)    3. Migraine without aura and without status migrainosus, not intractable    4. Hypocalcemia    1. Migraines:     - Assessment: Patient reports 2-3 episodes per month, lasting 1-2 days each.     - Plan: Renew prescription for migraine medication. No need for neurologist follow-up at this time.    2. Back pain:     - Assessment: Patient has had a couple of urgent care visits for back pain.     - Plan:          a. Schedule a follow-up appointment in 1-2 months.          b. If the back pain persists, consider taking an x-ray to check for any issues such as a bulging disc.    3. Calcium and parathyroid surgery:     - Assessment: Patient had surgery performed by Dr. Zayas. Corrected calcium levels at 8.1.     - Plan:          a. Refer the patient to an endocrinologist for follow-up and management of parathyroid levels.          b. Encourage the patient to take calcium supplements.    4. Fatigue:     - Assessment: Patient reports feeling tired and lacking energy.     - Plan:          a. Monitor calcium levels and consider other potential causes of fatigue.          b. Encourage the patient to maintain a healthy lifestyle and address any sleep issues if present.    5. Postmenopausal hair growth:     - Assessment: Patient complains of bothersome hair growth.     - Plan: Discuss options for hair removal, including shaving, waxing, Vivar, or laser hair removal.    6. Leg cramps and muscle pain:     - Assessment: Likely related to low calcium levels.     - Plan:           a. Monitor calcium levels.          b. Encourage the patient to take calcium supplements.    7. Lab work and referral:     - Assessment: Patient needs to complete additional lab work ordered by the endocrinologist.     - Plan:          a. Schedule lab work for June 25th or later.          b. Monitor the referral process and inform the patient via [employer] when the referral is sent.    8. Follow-up appointments:     - Plan: Schedule a follow-up appointment with the patient in 1-2 months, after the completion of the lab work.    Return in about 2 months (around 7/23/2024) for Lab Review and follow up back pain.    Please note that this dictation was created using voice recognition software. I have worked with consultants from the vendor as well as technical experts from [employer] to optimize the interface. I have made every reasonable attempt to correct obvious errors, but I expect that there are errors of grammar and possibly content that I did not discover before finalizing the note.

## 2024-06-14 ENCOUNTER — HOSPITAL ENCOUNTER (OUTPATIENT)
Dept: LAB | Facility: MEDICAL CENTER | Age: 74
End: 2024-06-14
Attending: FAMILY MEDICINE
Payer: COMMERCIAL

## 2024-06-14 DIAGNOSIS — E89.2 SURGICAL HYPOPARATHYROIDISM (HCC): ICD-10-CM

## 2024-06-14 DIAGNOSIS — E83.51 HYPOCALCEMIA: ICD-10-CM

## 2024-06-14 LAB
25(OH)D3 SERPL-MCNC: 67 NG/ML (ref 30–100)
ALBUMIN SERPL BCP-MCNC: 3.9 G/DL (ref 3.2–4.9)
ALBUMIN/GLOB SERPL: 1.1 G/DL
ALP SERPL-CCNC: 88 U/L (ref 30–99)
ALT SERPL-CCNC: 14 U/L (ref 2–50)
ANION GAP SERPL CALC-SCNC: 14 MMOL/L (ref 7–16)
AST SERPL-CCNC: 16 U/L (ref 12–45)
BILIRUB SERPL-MCNC: 0.6 MG/DL (ref 0.1–1.5)
BUN SERPL-MCNC: 18 MG/DL (ref 8–22)
CA-I SERPL-SCNC: 1.1 MMOL/L (ref 1.1–1.3)
CALCIUM ALBUM COR SERPL-MCNC: 9 MG/DL (ref 8.5–10.5)
CALCIUM SERPL-MCNC: 8.9 MG/DL (ref 8.5–10.5)
CHLORIDE SERPL-SCNC: 104 MMOL/L (ref 96–112)
CO2 SERPL-SCNC: 26 MMOL/L (ref 20–33)
CREAT SERPL-MCNC: 0.97 MG/DL (ref 0.5–1.4)
FASTING STATUS PATIENT QL REPORTED: NORMAL
GFR SERPLBLD CREATININE-BSD FMLA CKD-EPI: 62 ML/MIN/1.73 M 2
GLOBULIN SER CALC-MCNC: 3.4 G/DL (ref 1.9–3.5)
GLUCOSE SERPL-MCNC: 127 MG/DL (ref 65–99)
PHOSPHATE SERPL-MCNC: 4.9 MG/DL (ref 2.5–4.5)
POTASSIUM SERPL-SCNC: 4.3 MMOL/L (ref 3.6–5.5)
PROT SERPL-MCNC: 7.3 G/DL (ref 6–8.2)
SODIUM SERPL-SCNC: 144 MMOL/L (ref 135–145)

## 2024-06-14 PROCEDURE — 80053 COMPREHEN METABOLIC PANEL: CPT

## 2024-06-14 PROCEDURE — 84100 ASSAY OF PHOSPHORUS: CPT

## 2024-06-14 PROCEDURE — 82330 ASSAY OF CALCIUM: CPT

## 2024-06-14 PROCEDURE — 82306 VITAMIN D 25 HYDROXY: CPT

## 2024-06-14 PROCEDURE — 36415 COLL VENOUS BLD VENIPUNCTURE: CPT

## 2024-06-18 NOTE — TELEPHONE ENCOUNTER
Received request via: Patient    Was the patient seen in the last year in this department? Yes    Does the patient have an active prescription (recently filled or refills available) for medication(s) requested? No    Pharmacy Name: [facility]    Does the patient have FDC Plus and need 100 day supply (blood pressure, diabetes and cholesterol meds only)? Patient does not have SCP

## 2024-06-19 RX ORDER — OMEPRAZOLE 40 MG/1
40 CAPSULE, DELAYED RELEASE ORAL DAILY
Qty: 90 CAPSULE | Refills: 0 | Status: SHIPPED | OUTPATIENT
Start: 2024-06-19

## 2024-07-16 ENCOUNTER — OFFICE VISIT (OUTPATIENT)
Dept: MEDICAL GROUP | Facility: PHYSICIAN GROUP | Age: 74
End: 2024-07-16
Payer: COMMERCIAL

## 2024-07-16 VITALS
RESPIRATION RATE: 16 BRPM | HEART RATE: 74 BPM | BODY MASS INDEX: 36.36 KG/M2 | DIASTOLIC BLOOD PRESSURE: 72 MMHG | SYSTOLIC BLOOD PRESSURE: 112 MMHG | HEIGHT: 70 IN | WEIGHT: 254 LBS | TEMPERATURE: 98.2 F | OXYGEN SATURATION: 90 %

## 2024-07-16 DIAGNOSIS — M79.2 NERVE PAIN: ICD-10-CM

## 2024-07-16 DIAGNOSIS — J45.20 MILD INTERMITTENT ASTHMA WITHOUT COMPLICATION: ICD-10-CM

## 2024-07-16 DIAGNOSIS — Z71.2 ENCOUNTER TO DISCUSS TEST RESULTS: ICD-10-CM

## 2024-07-16 DIAGNOSIS — G43.109 MIGRAINE WITH AURA AND WITHOUT STATUS MIGRAINOSUS, NOT INTRACTABLE: ICD-10-CM

## 2024-07-16 DIAGNOSIS — Z79.01 CHRONIC ANTICOAGULATION: ICD-10-CM

## 2024-07-16 DIAGNOSIS — I48.0 PAROXYSMAL ATRIAL FIBRILLATION (HCC): ICD-10-CM

## 2024-07-16 PROCEDURE — 99214 OFFICE O/P EST MOD 30 MIN: CPT | Performed by: NURSE PRACTITIONER

## 2024-07-16 PROCEDURE — 3074F SYST BP LT 130 MM HG: CPT | Performed by: NURSE PRACTITIONER

## 2024-07-16 PROCEDURE — 3078F DIAST BP <80 MM HG: CPT | Performed by: NURSE PRACTITIONER

## 2024-07-16 RX ORDER — FLUTICASONE FUROATE, UMECLIDINIUM BROMIDE AND VILANTEROL TRIFENATATE 200; 62.5; 25 UG/1; UG/1; UG/1
1 POWDER RESPIRATORY (INHALATION) DAILY
Qty: 3 EACH | Refills: 3 | Status: SHIPPED | OUTPATIENT
Start: 2024-07-16

## 2024-07-16 RX ORDER — ELETRIPTAN HYDROBROMIDE 20 MG/1
20 TABLET, FILM COATED ORAL
Qty: 10 TABLET | Refills: 2 | Status: SHIPPED | OUTPATIENT
Start: 2024-07-16 | End: 2024-07-19

## 2024-07-16 RX ORDER — LIDOCAINE 4 G/G
1 PATCH TOPICAL EVERY 24 HOURS
Qty: 30 PATCH | Refills: 3 | Status: SHIPPED | OUTPATIENT
Start: 2024-07-16

## 2024-07-16 ASSESSMENT — FIBROSIS 4 INDEX: FIB4 SCORE: 1.79

## 2024-07-19 RX ORDER — ELETRIPTAN HYDROBROMIDE 20 MG/1
20 TABLET, FILM COATED ORAL
Qty: 10 TABLET | Refills: 2 | Status: SHIPPED | OUTPATIENT
Start: 2024-07-19

## 2024-07-30 ENCOUNTER — APPOINTMENT (OUTPATIENT)
Dept: MEDICAL GROUP | Facility: PHYSICIAN GROUP | Age: 74
End: 2024-07-30
Payer: COMMERCIAL

## 2024-08-05 ENCOUNTER — OFFICE VISIT (OUTPATIENT)
Dept: MEDICAL GROUP | Facility: PHYSICIAN GROUP | Age: 74
End: 2024-08-05
Payer: COMMERCIAL

## 2024-08-05 VITALS
DIASTOLIC BLOOD PRESSURE: 74 MMHG | RESPIRATION RATE: 16 BRPM | WEIGHT: 255 LBS | TEMPERATURE: 98.2 F | HEART RATE: 80 BPM | HEIGHT: 70 IN | SYSTOLIC BLOOD PRESSURE: 118 MMHG | BODY MASS INDEX: 36.51 KG/M2 | OXYGEN SATURATION: 95 %

## 2024-08-05 DIAGNOSIS — T14.8XXA SUPERFICIAL FOREIGN BODY (SLIVER): ICD-10-CM

## 2024-08-05 DIAGNOSIS — M79.2 NERVE PAIN: ICD-10-CM

## 2024-08-05 DIAGNOSIS — M79.672 PAIN OF LEFT HEEL: ICD-10-CM

## 2024-08-05 DIAGNOSIS — M54.9 MID BACK PAIN ON LEFT SIDE: ICD-10-CM

## 2024-08-05 PROCEDURE — 3078F DIAST BP <80 MM HG: CPT | Performed by: NURSE PRACTITIONER

## 2024-08-05 PROCEDURE — 3074F SYST BP LT 130 MM HG: CPT | Performed by: NURSE PRACTITIONER

## 2024-08-05 PROCEDURE — 99214 OFFICE O/P EST MOD 30 MIN: CPT | Performed by: NURSE PRACTITIONER

## 2024-08-05 RX ORDER — LIDOCAINE 50 MG/G
1 PATCH TOPICAL EVERY 24 HOURS
Qty: 30 PATCH | Refills: 5 | Status: SHIPPED | OUTPATIENT
Start: 2024-08-05

## 2024-08-05 ASSESSMENT — FIBROSIS 4 INDEX: FIB4 SCORE: 1.79

## 2024-08-05 NOTE — PROGRESS NOTES
"  Chief Complaint   Patient presents with    Foot Pain     Left heel x 2 weeks     Medication Refill     Would like the Lidocaine patches 5 percent    Back Pain     Upper left x 2 days                                                                                                                                       HPI:   Mriyam presents today with the following.    ***    ROS:  All systems negative expect as addressed in assessment and plan.     /74 (BP Location: Left arm, Patient Position: Sitting)   Pulse 80   Temp 36.8 °C (98.2 °F) (Temporal)   Resp 16   Ht 1.778 m (5' 10\")   Wt 116 kg (255 lb)   SpO2 95%   BMI 36.59 kg/m²     Objective:    Physical Exam      ***    Assessment and Plan:  73 y.o. female with the following issues.    There are no diagnoses linked to this encounter.      ***    No follow-ups on file.    I spent a total of *** minutes with record review, exam, and communication with the patient, communication with other providers, and documentation of this encounter. This does not include time spent on separately billable procedures/tests.    Please note that this dictation was created using voice recognition software. I have worked with consultants from the vendor as well as technical experts from [facility] to optimize the interface. I have made every reasonable attempt to correct obvious errors, but I expect that there are errors of grammar and possibly content that I did not discover before finalizing the note.       "

## 2024-08-05 NOTE — PROGRESS NOTES
Family Nurse Practitioner Student Note    Cosigner/Preceptor: CURTIS Hess    Chief Complaint:                                                                                                                                 Chief Complaint   Patient presents with    Foot Pain     Left heel x 2 weeks     Medication Refill     Would like the Lidocaine patches 5 percent    Back Pain     Upper left x 2 days        HPI:   Miryam presents today with the following.    Current Outpatient Medications   Medication Sig Dispense Refill    lidocaine (LIDODERM) 5 % Patch Place 1 Patch on the skin every 24 hours. 30 Patch 5    eletriptan (RELPAX) 20 MG Tab Take 1 Tablet by mouth 1 time a day as needed for Migraine. 10 Tablet 2    fluticasone-umeclidinium-vilanterol (TRELEGY ELLIPTA) 200-62.5-25 mcg/act inhaler Inhale 1 Puff every day. 3 Each 3    omeprazole (PRILOSEC) 40 MG delayed-release capsule TAKE 1 CAPSULE BY MOUTH DAILY 90 Capsule 0    tizanidine (ZANAFLEX) 2 MG tablet Take 1 Tablet by mouth 3 times a day as needed (muscle spasms). 45 Tablet 0    calcitRIOL (ROCALTROL) 0.25 MCG Cap Take 1 Capsule by mouth 2 times a day. 180 Capsule 3    propranolol (INDERAL) 40 MG Tab Take 1 Tablet by mouth 2 times a day. 180 Tablet 3    hydroCHLOROthiazide 25 MG Tab Take 1 Tablet by mouth 1 time a day as needed (lower extremity swelling). 90 Tablet 3    Amino Acids (AMINO ACID PO) Take  by mouth.      ondansetron (ZOFRAN ODT) 4 MG TABLET DISPERSIBLE Take 1 Tablet by mouth every 24 hours as needed for Nausea/Vomiting (migraines). 20 Tablet 2    venlafaxine (EFFEXOR-XR) 150 MG extended-release capsule Take 1 Capsule by mouth every day. 90 Capsule 3    albuterol 108 (90 Base) MCG/ACT Aero Soln inhalation aerosol Inhale 1 Puff 1 time a day as needed for Shortness of Breath. 8.5 g 5    Acetaminophen (TYLENOL PO) Take 1 Tablet by mouth 2 times a day as needed (For pain). Pt is not sure the strength (OTC)      Calcium Carbonate Antacid  "1000 MG Chew Tab Chew 1 Tablet 4 times a day. 120 Tablet 0    rivaroxaban (XARELTO) 20 MG Tab tablet Take 20 mg by mouth with dinner.      Multiple Vitamins-Minerals (MULTIVITAMIN WOMEN PO) Take 1 Tablet by mouth every day.       No current facility-administered medications for this visit.       Allergies as of 08/05/2024 - Reviewed 08/05/2024   Allergen Reaction Noted    Codeine Itching and Nausea 05/22/2009    Imitrex [sumatriptan succinate] Shortness of Breath and Swelling 03/05/2010    Penicillins Rash and Vomiting 08/21/2007    Sulfa drugs Shortness of Breath and Itching 08/21/2007    Azithromycin Itching 08/21/2007    Dilaudid [hydromorphone] Itching 10/20/2015    Other drug Itching 07/06/2011    Sensipar [cinacalcet] Unspecified 07/28/2016    Imipramine Shortness of Breath and Itching 06/22/2011    Seroquel [quetiapine fumarate]  08/19/2011        ROS:  All systems negative expect as addressed in assessment and plan.     Assessment:  /74 (BP Location: Left arm, Patient Position: Sitting)   Pulse 80   Temp 36.8 °C (98.2 °F) (Temporal)   Resp 16   Ht 1.778 m (5' 10\")   Wt 116 kg (255 lb)   SpO2 95%   BMI 36.59 kg/m²     Physical Exam  Constitutional:       Appearance: Normal appearance.   HENT:      Head: Normocephalic.      Nose: Nose normal.   Cardiovascular:      Rate and Rhythm: Normal rate.   Pulmonary:      Effort: Pulmonary effort is normal.   Musculoskeletal:         General: Normal range of motion.      Cervical back: Normal range of motion.        Feet:    Feet:      Left foot:      Skin integrity: Dry skin present.   Skin:     General: Skin is warm and dry.   Neurological:      Mental Status: She is alert and oriented to person, place, and time.   Psychiatric:         Mood and Affect: Mood normal.         Behavior: Behavior normal.         Thought Content: Thought content normal.         Judgment: Judgment normal.         Plan:  73 y.o. female with the following issues.    1. Pain of " left heel      Ptient comes in today with a concern of pain in her left instep/heel area. Upon evaluation patient appears to have a sliver.      2. Superficial foreign body (sliver)      Patient feet soaked in hydrogen peroxide and warm water. Sliver removed itself. Patient reporting improved pain afer and will continue foot soaks.      3. Nerve pain  lidocaine (LIDODERM) 5 % Patch    Patient related to injury from inflitrated IV. Patient has trialed lidocaine 4% patches with no improvement. Will order 5% patches.      4. Mid back pain on left side  DX-SPINE-SCOLIOSIS STUDY    Patient has a history of scoliosis. Will complete scoliosis study.           Return in about 1 year (around 8/5/2025), or if symptoms worsen or fail to improve.

## 2024-09-20 RX ORDER — OMEPRAZOLE 40 MG/1
40 CAPSULE, DELAYED RELEASE ORAL DAILY
Qty: 90 CAPSULE | Refills: 3 | Status: SHIPPED | OUTPATIENT
Start: 2024-09-20

## 2024-10-07 ENCOUNTER — HOSPITAL ENCOUNTER (OUTPATIENT)
Dept: RADIOLOGY | Facility: MEDICAL CENTER | Age: 74
End: 2024-10-07
Attending: NURSE PRACTITIONER
Payer: COMMERCIAL

## 2024-10-07 DIAGNOSIS — M54.9 MID BACK PAIN ON LEFT SIDE: ICD-10-CM

## 2024-11-05 ENCOUNTER — APPOINTMENT (OUTPATIENT)
Dept: RADIOLOGY | Facility: MEDICAL CENTER | Age: 74
End: 2024-11-05
Attending: NURSE PRACTITIONER
Payer: COMMERCIAL

## 2024-11-16 DIAGNOSIS — G43.109 MIGRAINE WITH AURA AND WITHOUT STATUS MIGRAINOSUS, NOT INTRACTABLE: ICD-10-CM

## 2024-11-19 ENCOUNTER — OFFICE VISIT (OUTPATIENT)
Dept: URGENT CARE | Facility: PHYSICIAN GROUP | Age: 74
End: 2024-11-19
Payer: COMMERCIAL

## 2024-11-19 VITALS
RESPIRATION RATE: 16 BRPM | TEMPERATURE: 98.1 F | WEIGHT: 252 LBS | DIASTOLIC BLOOD PRESSURE: 70 MMHG | HEIGHT: 70 IN | SYSTOLIC BLOOD PRESSURE: 130 MMHG | BODY MASS INDEX: 36.08 KG/M2 | HEART RATE: 81 BPM | OXYGEN SATURATION: 93 %

## 2024-11-19 DIAGNOSIS — S00.412A ABRASION OF EAR CANAL, LEFT, INITIAL ENCOUNTER: ICD-10-CM

## 2024-11-19 PROCEDURE — 99213 OFFICE O/P EST LOW 20 MIN: CPT

## 2024-11-19 PROCEDURE — 3075F SYST BP GE 130 - 139MM HG: CPT

## 2024-11-19 PROCEDURE — 3078F DIAST BP <80 MM HG: CPT

## 2024-11-19 RX ORDER — OFLOXACIN 3 MG/ML
10 SOLUTION AURICULAR (OTIC) DAILY
Qty: 14 ML | Refills: 0 | Status: SHIPPED | OUTPATIENT
Start: 2024-11-19 | End: 2024-11-26

## 2024-11-19 ASSESSMENT — FIBROSIS 4 INDEX: FIB4 SCORE: 1.81

## 2024-11-19 NOTE — LETTER
November 19, 2024    To Whom It May Concern:         This is confirmation that Miryam Yessica Veloz attended her scheduled appointment with RONI Lowe on 11/19/24.          If you have any questions please do not hesitate to call me at the phone number listed below.    Sincerely,          ISMAEL Lowe.  [phone]

## 2024-11-19 NOTE — PROGRESS NOTES
"Chief Complaint   Patient presents with    Other     L ear bleeding onset last night          Subjective:   HISTORY OF PRESENT ILLNESS: Miryam Veloz is a 74 y.o. female who presents for blood coming from her left ear. This morning she felt draining from the ear and thought she had water coming out, when she took her finger out it was covered with blood. She denies scratching her ear. She did use q-tips yesterday.  She has not had any trauma. She does not have any significant pain. She has normal hearing. Denies any recent illness    Medications, Allergies, current problem list, Social and Family history reviewed today in Epic.     Objective:     /70 (BP Location: Right arm, Patient Position: Sitting, BP Cuff Size: Adult)   Pulse 81   Temp 36.7 °C (98.1 °F) (Temporal)   Resp 16   Ht 1.778 m (5' 10\")   Wt 114 kg (252 lb)   SpO2 93%     Physical Exam  Vitals reviewed.   Constitutional:       Appearance: Normal appearance.   HENT:      Right Ear: Hearing and tympanic membrane normal. There is no impacted cerumen. Tympanic membrane is not perforated, erythematous, retracted or bulging.      Left Ear: Hearing and tympanic membrane normal. No decreased hearing noted. Tenderness present. No drainage. There is no impacted cerumen. No foreign body. No mastoid tenderness. No hemotympanum. Tympanic membrane is not perforated, erythematous, retracted or bulging.      Ears:      Comments:  Left ear-there is blood that looks to be coming from the posterior aspect of the external auditory canal.  Visualized tympanic membrane is normal clear without evidence of trauma or infection     Mouth/Throat:      Mouth: Mucous membranes are moist.   Cardiovascular:      Rate and Rhythm: Normal rate.   Pulmonary:      Effort: Pulmonary effort is normal.   Skin:     General: Skin is warm and dry.   Neurological:      Mental Status: She is alert and oriented to person, place, and time.   Psychiatric:         Mood and " Affect: Mood normal.         Assessment/Plan:     Diagnosis and associated orders    I personally reviewed prior external notes and test results pertinent to today's visit.     1. Abrasion of ear canal, left, initial encounter  ofloxacin otic sol (FLOXIN OTIC) 0.3 % Solution            IMPRESSION:  Pt has stable vital signs and no red flag symptoms or exam findings identified.  There appears to be abrasion and some irregularity over the posterior aspect of the left external auditory canal.  They are discharged home with a course of ofloxacin prophylactically.   Advised to FU with her pcp next wednesday as scheduled for re-check of the ear    Differential diagnosis discussed. Pt was Educated on red flag symptoms. Pt has been Instructed to return to Urgent Care or nearest Emergency Department if symptoms fail to improve, for any change in condition, further concerns, or new concerning symptoms. Patient states understanding of the plan of care and discharge instructions.  They are discharged in stable condition.         Please note that this dictation was created using voice recognition software. I have made a reasonable attempt to correct obvious errors, but I expect that there are errors of grammar and possibly content that I did not discover before finalizing the note.    This note was electronically signed by RONI Lowe

## 2024-11-19 NOTE — TELEPHONE ENCOUNTER
Received request via: Patient    Was the patient seen in the last year in this department? Yes    Does the patient have an active prescription (recently filled or refills available) for medication(s) requested? No    Pharmacy Name: [facility]    Does the patient have long-term Plus and need 100-day supply? (This applies to ALL medications) Patient does not have SCP

## 2024-11-21 RX ORDER — ONDANSETRON 4 MG/1
4 TABLET, ORALLY DISINTEGRATING ORAL
Qty: 20 TABLET | Refills: 2 | Status: SHIPPED | OUTPATIENT
Start: 2024-11-21

## 2024-11-21 RX ORDER — ELETRIPTAN HYDROBROMIDE 20 MG/1
20 TABLET, FILM COATED ORAL
Qty: 10 TABLET | Refills: 2 | Status: SHIPPED | OUTPATIENT
Start: 2024-11-21

## 2024-11-27 ENCOUNTER — APPOINTMENT (OUTPATIENT)
Dept: MEDICAL GROUP | Facility: PHYSICIAN GROUP | Age: 74
End: 2024-11-27
Payer: COMMERCIAL

## 2024-11-27 VITALS
RESPIRATION RATE: 16 BRPM | OXYGEN SATURATION: 98 % | HEIGHT: 70 IN | HEART RATE: 84 BPM | WEIGHT: 262 LBS | SYSTOLIC BLOOD PRESSURE: 130 MMHG | DIASTOLIC BLOOD PRESSURE: 80 MMHG | BODY MASS INDEX: 37.51 KG/M2 | TEMPERATURE: 99.4 F

## 2024-11-27 DIAGNOSIS — S00.422S: ICD-10-CM

## 2024-11-27 DIAGNOSIS — Z12.12 ENCOUNTER FOR SCREENING FOR COLORECTAL MALIGNANT NEOPLASM: ICD-10-CM

## 2024-11-27 DIAGNOSIS — J30.1 NON-SEASONAL ALLERGIC RHINITIS DUE TO POLLEN: ICD-10-CM

## 2024-11-27 DIAGNOSIS — Z78.0 ENCOUNTER FOR OSTEOPOROSIS SCREENING IN ASYMPTOMATIC POSTMENOPAUSAL PATIENT: ICD-10-CM

## 2024-11-27 DIAGNOSIS — M54.9 MID BACK PAIN ON LEFT SIDE: ICD-10-CM

## 2024-11-27 DIAGNOSIS — Z12.11 ENCOUNTER FOR SCREENING FOR COLORECTAL MALIGNANT NEOPLASM: ICD-10-CM

## 2024-11-27 DIAGNOSIS — L82.0 INFLAMED SEBORRHEIC KERATOSIS: ICD-10-CM

## 2024-11-27 DIAGNOSIS — Z13.820 ENCOUNTER FOR OSTEOPOROSIS SCREENING IN ASYMPTOMATIC POSTMENOPAUSAL PATIENT: ICD-10-CM

## 2024-11-27 RX ORDER — CIPROFLOXACIN AND DEXAMETHASONE 3; 1 MG/ML; MG/ML
4 SUSPENSION/ DROPS AURICULAR (OTIC) 2 TIMES DAILY
Qty: 7.5 ML | Refills: 0 | Status: SHIPPED | OUTPATIENT
Start: 2024-11-27 | End: 2024-12-04

## 2024-11-27 RX ORDER — AZELASTINE 1 MG/ML
1 SPRAY, METERED NASAL 2 TIMES DAILY
Qty: 30 ML | Refills: 5 | Status: SHIPPED | OUTPATIENT
Start: 2024-11-27

## 2024-11-27 ASSESSMENT — FIBROSIS 4 INDEX: FIB4 SCORE: 1.81

## 2024-11-27 NOTE — PROCEDURES
Myrtle Milton - Benign    Date/Time: 11/27/2024 12:26 PM    Performed by: AMALIA LujanRFREDRICK  Authorized by: PIPE LujanP.RFREDRICK    Number of Lesions: 1  Lesion 1:     Body area: trunk    Trunk location: R flank    Initial size (mm): 6    Malignancy: benign lesion      Malignancy comment: inflammed SK    Destruction method: cryotherapy      Cryo cycles: 3

## 2024-11-27 NOTE — PROGRESS NOTES
Chief Complaint   Patient presents with    Back Pain     Thinking it is from bad posture     Other     Itchy spot on her back/ feels a rough lump x 2 weeks    Follow-Up     Blood in left ear/ was seen in  on 11/19/24                                                                                                                                       HPI:   Miryam presents today with the following.    Patient consented to the use of [employer] to record and transcribe notes during this visit.      The patient attended the consultation today to discuss multiple health concerns, including an itchy spot on the back, suspected scoliosis, ear irritation, a possible sinus infection, and allergy-related issues.    The chief complaints presented by the patient include an itchy spot on the back and suspected scoliosis. The patient reports experiencing pain when sitting in certain positions, which is relieved by adopting specific postures. She is currently awaiting an x-ray during the winter break to further investigate the scoliosis concern. The patient has not yet undergone physical therapy for this issue.    Additionally, the patient reports ear irritation. She mentions being previously prescribed ear drops but found them difficult to use. The patient also suspects a possible sinus infection, though no specific symptoms are detailed.    The patient has a history of allergies and migraines, which are reportedly triggered by certain smells. This suggests a potential sensitivity to environmental factors that may be impacting her overall health.    Regarding upcoming medical procedures, the patient is scheduled for an x-ray during the winter break to investigate the suspected scoliosis.    The patient's health concerns and management strategies have been discussed, with particular attention to the suspected scoliosis, ear irritation, possible sinus infection, and the impact of allergies and smell-triggered migraines on her  "daily life.      ROS:  All systems negative expect as addressed in assessment and plan.     /80 (BP Location: Left arm, Patient Position: Sitting)   Pulse 84   Temp 37.4 °C (99.4 °F) (Temporal)   Resp 16   Ht 1.778 m (5' 10\")   Wt 119 kg (262 lb)   SpO2 98%   BMI 37.59 kg/m²     Objective:    - Physical Examination:    - Musculoskeletal: Discussed potential scoliosis symptoms; she had no pain when sitting in specific position.    - Dermatological: Inflamed SK on back, treated with cryotherapy.    - Otic: Patient reports blister sensation in ear; normal eardrum on exam.    Diagnostic Test Results:  - Imaging:    - Scoliosis x-ray ordered for winter break; ensure order remains active due to potential cancellation at imaging center.    - Treatments:    - Cryotherapy for inflamed SK on back.    - Ciprodex drops prescribed for suspected eczema, psoriasis, or allergic reaction in ear.        Assessment and Plan:  74 y.o. female with the following issues.    1. Encounter for screening for colorectal malignant neoplasm  - Cologuard® colon cancer screening    2. Encounter for osteoporosis screening in asymptomatic postmenopausal patient  - DS-BONE DENSITY STUDY (DEXA); Future    3. Mid back pain on left side  - DX-SPINE-SCOLIOSIS STUDY; Future  - Referral to Physical Therapy    4. Inflamed seborrheic keratosis    5. Ear canal blister, left, sequela  - ciprofloxacin/dexamethasone (CIPRODEX) 0.3-0.1 % Suspension; Administer 4 Drops into the left ear 2 times a day for 7 days.  Dispense: 7.5 mL; Refill: 0    6. Non-seasonal allergic rhinitis due to pollen  - azelastine (ASTELIN) 137 MCG/SPRAY nasal spray; Administer 1 Spray into affected nostril(S) 2 times a day.  Dispense: 30 mL; Refill: 5      1. Suspected Scoliosis:     - Plan:          a. Reorder scoliosis x-ray at [facility] imaging during patient's winter break.          b. Schedule follow-up appointment on Jan 20th to review results and discuss " management.    2. Back Pain and Posture:     - Plan:          a. Defer physical therapy until scoliosis is confirmed or ruled out.          b. Consider physical therapy for muscle strengthening if scoliosis or posture issues are identified.    3. Inflamed SK on Back:     - Plan:          a. Cryotherapy performed.          b. Advise patient to avoid itching and monitor for blistering.          c. Reassess at follow-up if needed.    4. Ear Irritation:     - Plan:          a. Prescribe Ciprodex drops, 4 drops in left ear twice daily for 7 days.          b. Instruct patient on proper administration.    5. Possible Sinus Infection and Allergies:     - Plan:          a. Prescribe antihistamine nasal spray, 1-2 sprays in each nostril every morning before work.          b. Monitor congestion and allergy symptoms.    6. Preventive Care:     - Plan:          a. Order Cologuard stool test (to be mailed to patient).          b. Order bone density scan for osteoporosis screening; facility to schedule.    7. Follow-up:     - Plan:          Schedule follow-up appointment on Jan 20th to:          a. Review scoliosis x-ray results.          b. Discuss physical therapy options.          c. Reassess SK condition.        Return in about 8 weeks (around 1/20/2025) for follow up for back pain.      Please note that this dictation was created using voice recognition software. I have worked with consultants from the vendor as well as technical experts from [employer] to optimize the interface. I have made every reasonable attempt to correct obvious errors, but I expect that there are errors of grammar and possibly content that I did not discover before finalizing the note.

## 2024-12-02 ENCOUNTER — OFFICE VISIT (OUTPATIENT)
Dept: URGENT CARE | Facility: PHYSICIAN GROUP | Age: 74
End: 2024-12-02
Payer: COMMERCIAL

## 2024-12-02 ENCOUNTER — APPOINTMENT (OUTPATIENT)
Dept: RADIOLOGY | Facility: IMAGING CENTER | Age: 74
End: 2024-12-02
Attending: STUDENT IN AN ORGANIZED HEALTH CARE EDUCATION/TRAINING PROGRAM
Payer: COMMERCIAL

## 2024-12-02 VITALS
BODY MASS INDEX: 36.51 KG/M2 | TEMPERATURE: 97.5 F | HEIGHT: 70 IN | DIASTOLIC BLOOD PRESSURE: 60 MMHG | HEART RATE: 67 BPM | SYSTOLIC BLOOD PRESSURE: 102 MMHG | WEIGHT: 255 LBS | RESPIRATION RATE: 16 BRPM | OXYGEN SATURATION: 90 %

## 2024-12-02 DIAGNOSIS — R19.7 NAUSEA VOMITING AND DIARRHEA: ICD-10-CM

## 2024-12-02 DIAGNOSIS — Z99.81 CHRONIC RESPIRATORY FAILURE WITH HYPOXIA, ON HOME OXYGEN THERAPY (HCC): ICD-10-CM

## 2024-12-02 DIAGNOSIS — R11.2 NAUSEA VOMITING AND DIARRHEA: ICD-10-CM

## 2024-12-02 DIAGNOSIS — J96.11 CHRONIC RESPIRATORY FAILURE WITH HYPOXIA, ON HOME OXYGEN THERAPY (HCC): ICD-10-CM

## 2024-12-02 DIAGNOSIS — J44.1 COPD WITH ACUTE EXACERBATION (HCC): ICD-10-CM

## 2024-12-02 DIAGNOSIS — R05.9 COUGH, UNSPECIFIED TYPE: ICD-10-CM

## 2024-12-02 PROCEDURE — 71046 X-RAY EXAM CHEST 2 VIEWS: CPT | Mod: TC | Performed by: RADIOLOGY

## 2024-12-02 PROCEDURE — 3074F SYST BP LT 130 MM HG: CPT | Performed by: STUDENT IN AN ORGANIZED HEALTH CARE EDUCATION/TRAINING PROGRAM

## 2024-12-02 PROCEDURE — 99214 OFFICE O/P EST MOD 30 MIN: CPT | Performed by: STUDENT IN AN ORGANIZED HEALTH CARE EDUCATION/TRAINING PROGRAM

## 2024-12-02 PROCEDURE — 3078F DIAST BP <80 MM HG: CPT | Performed by: STUDENT IN AN ORGANIZED HEALTH CARE EDUCATION/TRAINING PROGRAM

## 2024-12-02 RX ORDER — CEFUROXIME AXETIL 500 MG/1
500 TABLET ORAL 2 TIMES DAILY
Qty: 10 TABLET | Refills: 0 | Status: CANCELLED | OUTPATIENT
Start: 2024-12-02 | End: 2024-12-07

## 2024-12-02 RX ORDER — BENZONATATE 100 MG/1
100 CAPSULE ORAL 3 TIMES DAILY PRN
Qty: 60 CAPSULE | Refills: 0 | Status: SHIPPED | OUTPATIENT
Start: 2024-12-02

## 2024-12-02 RX ORDER — METHYLPREDNISOLONE 4 MG/1
TABLET ORAL
Qty: 21 TABLET | Refills: 0 | Status: SHIPPED | OUTPATIENT
Start: 2024-12-02

## 2024-12-02 RX ORDER — DOXYCYCLINE 100 MG/1
100 CAPSULE ORAL 2 TIMES DAILY
Qty: 10 CAPSULE | Refills: 0 | Status: SHIPPED | OUTPATIENT
Start: 2024-12-02 | End: 2024-12-07

## 2024-12-02 ASSESSMENT — ENCOUNTER SYMPTOMS
ABDOMINAL PAIN: 0
VOMITING: 0
DIARRHEA: 0
CHILLS: 0
SORE THROAT: 1
SHORTNESS OF BREATH: 1
NAUSEA: 0
HEMOPTYSIS: 0
SPUTUM PRODUCTION: 1
PALPITATIONS: 0
COUGH: 1
FEVER: 0
WHEEZING: 1

## 2024-12-02 ASSESSMENT — COPD QUESTIONNAIRES: COPD: 1

## 2024-12-02 ASSESSMENT — FIBROSIS 4 INDEX: FIB4 SCORE: 1.81

## 2024-12-02 NOTE — LETTER
December 2, 2024    To Whom It May Concern:         This is confirmation that Miryam Veloz attended her scheduled appointment with Janett Fine P.A.-C. on 12/02/24. Please excuse work absences through 12/5/24 for medical reasons. Miryam can return to work without restrictions on 12/6/24 or earlier as long as symptoms have improved/resolved and there has been no fever for 24 hours.        Sincerely,    Janett Fine P.A.-C.  [phone]

## 2024-12-02 NOTE — PROGRESS NOTES
"Subjective     Delilah Yessica Veloz is a 74 y.o. female who presents with Emesis (Diarrhea, headache, stomach ache, x5 days )            Delilah is a 74 y.o. female who presents to urgent care with cough.  Patient has been sick with URI-like symptoms for the last 5 days including cough, nasal congestion and sore throat.  She also was experiencing symptoms of nausea, vomiting and diarrhea.  Nausea, vomiting and diarrhea resolved yesterday.  Patient overall feeling more fatigued.  She does report history of COPD.  Cough is more productive than normal and has been experiencing symptoms of SOB/wheezing.  She has used albuterol inhaler which does help temporarily with symptoms.    Cough  This is a new problem. The current episode started in the past 7 days. The cough is Productive of sputum. Associated symptoms include nasal congestion, a sore throat, shortness of breath and wheezing. Pertinent negatives include no chest pain, chills, ear pain, fever or hemoptysis. Her past medical history is significant for COPD.       Review of Systems   Constitutional:  Positive for malaise/fatigue. Negative for chills and fever.   HENT:  Positive for congestion and sore throat. Negative for ear pain.    Respiratory:  Positive for cough, sputum production, shortness of breath and wheezing. Negative for hemoptysis.    Cardiovascular:  Negative for chest pain and palpitations.   Gastrointestinal:  Negative for abdominal pain, diarrhea, nausea and vomiting.   All other systems reviewed and are negative.             Objective     /60 (BP Location: Right arm, Patient Position: Sitting, BP Cuff Size: Adult)   Pulse 67   Temp 36.4 °C (97.5 °F) (Temporal)   Resp 16   Ht 1.778 m (5' 10\")   Wt 116 kg (255 lb)   SpO2 89%   BMI 36.59 kg/m²      Physical Exam  Vitals reviewed.   Constitutional:       General: She is not in acute distress.     Appearance: Normal appearance. She is ill-appearing. She is not toxic-appearing or diaphoretic. "   HENT:      Head: Normocephalic and atraumatic.      Nose: Nose normal.      Mouth/Throat:      Mouth: Mucous membranes are moist.      Pharynx: Oropharynx is clear.   Eyes:      Extraocular Movements: Extraocular movements intact.      Conjunctiva/sclera: Conjunctivae normal.      Pupils: Pupils are equal, round, and reactive to light.   Cardiovascular:      Rate and Rhythm: Normal rate.   Pulmonary:      Effort: Pulmonary effort is normal. No respiratory distress.      Breath sounds: Normal breath sounds. No stridor. No wheezing, rhonchi or rales.      Comments: + productive sounding cough  Abdominal:      General: Abdomen is flat.      Palpations: Abdomen is soft.   Skin:     General: Skin is warm and dry.   Neurological:      General: No focal deficit present.      Mental Status: She is alert and oriented to person, place, and time.                    RADIOLOGY RESULTS   DX-CHEST-2 VIEWS    Result Date: 12/2/2024 12/2/2024 10:34 AM HISTORY/REASON FOR EXAM:  Cough TECHNIQUE/EXAM DESCRIPTION AND NUMBER OF VIEWS: Two views of the chest. COMPARISON:  3/30/2024. FINDINGS: No pulmonary infiltrates or consolidations are noted. No pleural effusions, no pneumothorax are appreciated. Normal cardiopericardial silhouette.     1. No active cardiopulmonary abnormalities are identified.                    Assessment & Plan        Assessment & Plan  Cough, unspecified type  -  DX-CHEST-2 VIEWS IMPRESSION: No active cardiopulmonary abnormalities are identified.      Orders:    DX-CHEST-2 VIEWS    benzonatate (TESSALON) 100 MG Cap; Take 1 Capsule by mouth 3 times a day as needed for Cough.    COPD with acute exacerbation (HCC)  -Continue to use albuterol inhaler as needed for SOB/wheezing.    Orders:    doxycycline (MONODOX) 100 MG capsule; Take 1 Capsule by mouth 2 times a day for 5 days.    methylPREDNISolone (MEDROL DOSEPAK) 4 MG Tablet Therapy Pack; Follow schedule on package instructions.    Chronic respiratory failure  with hypoxia, on home oxygen therapy (HCC)  - Patient uses at-home oxygen. Reports using it mainly at night time but the last couple of days she has been using O2 during the day as well.         Nausea vomiting and diarrhea  - Sx of N/V/D resolved yesterday.              Differential diagnoses, supportive care measures and indications for immediate follow-up discussed with patient. Pathogenesis of diagnosis discussed including typical length and natural progression.  Follow-up with PCP.  ER precautions discussed.    Instructed to return to urgent care or nearest emergency department if symptoms fail to improve, for any change in condition, further concerns, or new concerning symptoms.    Patient states understanding and agrees with the plan of care and discharge instructions.

## 2024-12-02 NOTE — ASSESSMENT & PLAN NOTE
- Patient uses at-home oxygen. Reports using it mainly at night time but the last couple of days she has been using O2 during the day as well.

## 2024-12-04 NOTE — TELEPHONE ENCOUNTER
Received request via: Pharmacy    Was the patient seen in the last year in this department? Yes    Does the patient have an active prescription (recently filled or refills available) for medication(s) requested? No   2

## 2024-12-17 ENCOUNTER — PATIENT MESSAGE (OUTPATIENT)
Dept: MEDICAL GROUP | Facility: PHYSICIAN GROUP | Age: 74
End: 2024-12-17
Payer: COMMERCIAL

## 2024-12-17 NOTE — PATIENT COMMUNICATION
Received request via: Patient    Was the patient seen in the last year in this department? Yes    Does the patient have an active prescription (recently filled or refills available) for medication(s) requested? No    Pharmacy Name: [facility]    Does the patient have [facility] and need 100-day supply? (This applies to ALL medications) Patient does not have SCP

## 2025-01-23 RX ORDER — HYDROCHLOROTHIAZIDE 25 MG/1
TABLET ORAL
Qty: 90 TABLET | Refills: 3 | Status: SHIPPED | OUTPATIENT
Start: 2025-01-23

## 2025-01-23 NOTE — TELEPHONE ENCOUNTER
Received request via: Patient    Was the patient seen in the last year in this department? Yes    Does the patient have an active prescription (recently filled or refills available) for medication(s) requested? No    Pharmacy Name: [facility]    Does the patient have [facility] Plus and need 100-day supply? (This applies to ALL medications) Patient does not have SCP

## 2025-02-19 DIAGNOSIS — J30.1 NON-SEASONAL ALLERGIC RHINITIS DUE TO POLLEN: ICD-10-CM

## 2025-02-19 RX ORDER — AZELASTINE 1 MG/ML
1 SPRAY, METERED NASAL 2 TIMES DAILY
Qty: 30 ML | Refills: 5 | Status: SHIPPED | OUTPATIENT
Start: 2025-02-19

## 2025-02-19 RX ORDER — ALBUTEROL SULFATE 90 UG/1
1 INHALANT RESPIRATORY (INHALATION)
Qty: 8.5 G | Refills: 5 | Status: SHIPPED | OUTPATIENT
Start: 2025-02-19

## 2025-02-19 NOTE — TELEPHONE ENCOUNTER
Received request via: Pharmacy    Was the patient seen in the last year in this department? Yes    Does the patient have an active prescription (recently filled or refills available) for medication(s) requested? No    Pharmacy Name:   [facility] [id number] - [address] - [place] [id number]  Phone: [phone] Fax: [phone]       Does the patient have [facility] Plus and need 100-day supply? (This applies to ALL medications) Patient does not have SCP

## 2025-03-10 ENCOUNTER — APPOINTMENT (OUTPATIENT)
Dept: RADIOLOGY | Facility: MEDICAL CENTER | Age: 75
End: 2025-03-10
Attending: NURSE PRACTITIONER
Payer: COMMERCIAL

## 2025-03-12 ENCOUNTER — OFFICE VISIT (OUTPATIENT)
Dept: URGENT CARE | Facility: PHYSICIAN GROUP | Age: 75
End: 2025-03-12
Payer: COMMERCIAL

## 2025-03-12 VITALS
OXYGEN SATURATION: 91 % | WEIGHT: 266 LBS | HEIGHT: 70 IN | TEMPERATURE: 97.5 F | HEART RATE: 74 BPM | RESPIRATION RATE: 14 BRPM | SYSTOLIC BLOOD PRESSURE: 122 MMHG | DIASTOLIC BLOOD PRESSURE: 70 MMHG | BODY MASS INDEX: 38.08 KG/M2
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
## 2025-03-12 PROCEDURE — 90715 TDAP VACCINE 7 YRS/> IM: CPT

## 2025-03-12 PROCEDURE — 3074F SYST BP LT 130 MM HG: CPT

## 2025-03-12 PROCEDURE — 90471 IMMUNIZATION ADMIN: CPT

## 2025-03-12 PROCEDURE — 3078F DIAST BP <80 MM HG: CPT

## 2025-03-12 PROCEDURE — 99213 OFFICE O/P EST LOW 20 MIN: CPT | Mod: 25

## 2025-03-12 RX ORDER — DOXYCYCLINE HYCLATE 100 MG
100 TABLET ORAL 2 TIMES DAILY
Qty: 14 TABLET | Refills: 0 | Status: SHIPPED | OUTPATIENT
Start: 2025-03-12 | End: 2025-03-19

## 2025-03-12 ASSESSMENT — FIBROSIS 4 INDEX: FIB4 SCORE: 1.81
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
## 2025-03-19 ENCOUNTER — HOSPITAL ENCOUNTER (OUTPATIENT)
Dept: RADIOLOGY | Facility: MEDICAL CENTER | Age: 75
End: 2025-03-19
Attending: NURSE PRACTITIONER
Payer: COMMERCIAL

## 2025-03-19 DIAGNOSIS — M54.9 MID BACK PAIN ON LEFT SIDE: ICD-10-CM

## 2025-03-19 PROCEDURE — 72081 X-RAY EXAM ENTIRE SPI 1 VW: CPT

## 2025-03-28 ENCOUNTER — APPOINTMENT (OUTPATIENT)
Dept: MEDICAL GROUP | Facility: PHYSICIAN GROUP | Age: 75
End: 2025-03-28
Payer: COMMERCIAL

## 2025-04-10 DIAGNOSIS — G43.009 MIGRAINE WITHOUT AURA AND WITHOUT STATUS MIGRAINOSUS, NOT INTRACTABLE: ICD-10-CM

## 2025-04-10 DIAGNOSIS — I10 ESSENTIAL HYPERTENSION: ICD-10-CM

## 2025-04-10 NOTE — TELEPHONE ENCOUNTER
Received request via: Pharmacy    Was the patient seen in the last year in this department? Yes    Does the patient have an active prescription (recently filled or refills available) for medication(s) requested? No    Pharmacy Name:   [facility] [id number] - [address] - [place] [address]  Phone: [phone] Fax: [phone]       Does the patient have FDC Plus and need 100-day supply? (This applies to ALL medications) Patient does not have SCP

## 2025-04-12 RX ORDER — PROPRANOLOL HYDROCHLORIDE 40 MG/1
40 TABLET ORAL 2 TIMES DAILY
Qty: 180 TABLET | Refills: 3 | Status: SHIPPED | OUTPATIENT
Start: 2025-04-12 | End: 2025-04-23 | Stop reason: SDUPTHER

## 2025-04-15 ENCOUNTER — OFFICE VISIT (OUTPATIENT)
Dept: URGENT CARE | Facility: PHYSICIAN GROUP | Age: 75
End: 2025-04-15
Payer: COMMERCIAL

## 2025-04-15 ENCOUNTER — APPOINTMENT (OUTPATIENT)
Dept: RADIOLOGY | Facility: IMAGING CENTER | Age: 75
End: 2025-04-15
Attending: FAMILY MEDICINE
Payer: COMMERCIAL

## 2025-04-15 VITALS
BODY MASS INDEX: 37.94 KG/M2 | HEART RATE: 92 BPM | SYSTOLIC BLOOD PRESSURE: 128 MMHG | HEIGHT: 70 IN | WEIGHT: 265 LBS | OXYGEN SATURATION: 93 % | DIASTOLIC BLOOD PRESSURE: 70 MMHG | RESPIRATION RATE: 20 BRPM | TEMPERATURE: 98.5 F

## 2025-04-15 DIAGNOSIS — M79.89 HAND SWELLING: ICD-10-CM
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
## 2025-04-15 PROCEDURE — 99213 OFFICE O/P EST LOW 20 MIN: CPT | Performed by: FAMILY MEDICINE

## 2025-04-15 PROCEDURE — 73110 X-RAY EXAM OF WRIST: CPT | Mod: TC,LT | Performed by: RADIOLOGY

## 2025-04-15 PROCEDURE — 3074F SYST BP LT 130 MM HG: CPT | Performed by: FAMILY MEDICINE

## 2025-04-15 PROCEDURE — 3078F DIAST BP <80 MM HG: CPT | Performed by: FAMILY MEDICINE

## 2025-04-15 RX ORDER — METRONIDAZOLE 500 MG/1
500 TABLET ORAL EVERY 8 HOURS
Qty: 21 TABLET | Refills: 0 | Status: SHIPPED | OUTPATIENT
Start: 2025-04-15 | End: 2025-04-22

## 2025-04-15 RX ORDER — CEFUROXIME AXETIL 500 MG/1
500 TABLET ORAL 2 TIMES DAILY
Qty: 14 TABLET | Refills: 0 | Status: SHIPPED | OUTPATIENT
Start: 2025-04-15 | End: 2025-04-22

## 2025-04-15 ASSESSMENT — FIBROSIS 4 INDEX: FIB4 SCORE: 1.81

## 2025-04-15 ASSESSMENT — ENCOUNTER SYMPTOMS: FEVER: 0

## 2025-04-15 NOTE — LETTER
April 15, 2025    To Whom It May Concern:         This is confirmation that Miryam Veloz attended her scheduled appointment with Amarjit Wilder M.D. on 4/15/25.  Please excuse her from any days missed from work this week.  She is anticipated to be well enough to return by 4/18/2025.  Thank you for making accommodations as she recovers.         If you have any questions please do not hesitate to call me at the phone number listed below.    Sincerely,          Amarjit Wilder M.D.  [phone]

## 2025-04-16 ENCOUNTER — RESULTS FOLLOW-UP (OUTPATIENT)
Dept: MEDICAL GROUP | Facility: PHYSICIAN GROUP | Age: 75
End: 2025-04-16

## 2025-04-16 NOTE — PROGRESS NOTES
Subjective:     Miryam Veloz is a 74 y.o. female who presents for Hand Pain (Pt was in on 3/12/25, not getting better )      HPI  Pt presents for follow-up evaluation  Patient with cat bite sustained 2/23/2025  Subsequently had swelling and tenderness which was suspected to be infection and treated with doxycycline  Patient states that the medications seem to be helping quite a bit and the hand was doing very well  Hand started swelling again 1 week ago   Increased pain in the hand and wrist  Having some difficulties making a full  due to pain  No numbness or tingling    Review of Systems   Constitutional:  Negative for fever.   Skin:  Negative for rash.     PMH:  has a past medical history of Abnormal CT scan (07/25/2017), Allergy, Arrhythmia (01/2017), Arthritis (12/26/2017), Asthma (12/26/2017), Bipolar affective disorder ([facility]) (1995), Carpal tunnel syndrome on both sides (12/22/2017), Chronic knee pain (2000), Chronic pain of both knees (05/30/2017), Claustrophobia, COPD (chronic obstructive pulmonary disease) (HCC), Dental disorder (12/26/2017), Depression (05/22/2009), Dyspnea on exertion (04/12/2017), Family history of breast cancer in mother, Fibromyalgia, Headache, frequent episodic tension-type, Hemorrhagic disorder ([facility]) (01/2017), Hepatitis B (1975), Hypertension, IBD (inflammatory bowel disease), IBS (irritable bowel syndrome), Lumbar facet arthropathy (2004), Migraine, Pain (12/26/2017), Pain (12/26/2017), Pain in both knees (10/06/2016), Pneumonia, PTSD (post-traumatic stress disorder) (12/26/2017), Pulmonary emphysema ([facility]) (06/03/2021), Renal disorder (12/26/2017), and Rotator cuff syndrome of right shoulder (2008).    She has no past medical history of Acute nasopharyngitis, Anesthesia, Anginal syndrome ([facility]), Bowel habit changes, Breast cancer ([facility]), Breath shortness, Bronchitis, Cancer ([facility]), Carcinoma in situ of respiratory system, Cataract, Congestive heart failure ([facility]),  Continuous ambulatory peritoneal dialysis status (HCC), Coughing blood, Diabetes (HCC), Dialysis patient (HCC), Disorder of thyroid, Glaucoma, Gynecological disorder, Heart burn, Heart murmur, Heart valve disease, Hepatitis A, Hepatitis C, Hiatus hernia syndrome, High cholesterol, Indigestion, Jaundice, Myocardial infarct (HCC), Pacemaker, Pregnant, Rheumatic fever, Seizure (HCC), Sleep apnea, Stroke (HCC), Tuberculosis, Urinary bladder disorder, or Urinary incontinence.  MEDS:   Current Outpatient Medications:     cefUROXime (CEFTIN) 500 MG Tab, Take 1 Tablet by mouth 2 times a day for 7 days., Disp: 14 Tablet, Rfl: 0    metroNIDAZOLE (FLAGYL) 500 MG Tab, Take 1 Tablet by mouth every 8 hours for 7 days., Disp: 21 Tablet, Rfl: 0    propranolol (INDERAL) 40 MG Tab, TAKE 1 TABLET BY MOUTH TWICE A DAY, Disp: 180 Tablet, Rfl: 3    albuterol 108 (90 Base) MCG/ACT Aero Soln inhalation aerosol, Inhale 1 Puff 1 time a day as needed for Shortness of Breath., Disp: 8.5 g, Rfl: 5    azelastine (ASTELIN) 137 MCG/SPRAY nasal spray, Administer 1 Spray into affected nostril(S) 2 times a day., Disp: 30 mL, Rfl: 5    hydroCHLOROthiazide 25 MG Tab, TAKE 1 TABLET BY MOUTH DAILY AS NEEDED (LOWER EXTREMITY SWELLING), Disp: 90 Tablet, Rfl: 3    rivaroxaban (XARELTO) 20 MG Tab tablet, Take 1 Tablet by mouth with dinner., Disp: 90 Tablet, Rfl: 3    eletriptan (RELPAX) 20 MG Tab, Take 1 Tablet by mouth 1 time a day as needed for Migraine., Disp: 10 Tablet, Rfl: 2    venlafaxine (EFFEXOR-XR) 150 MG extended-release capsule, Take 1 Capsule by mouth every day., Disp: 90 Capsule, Rfl: 3    omeprazole (PRILOSEC) 40 MG delayed-release capsule, TAKE 1 CAPSULE BY MOUTH DAILY, Disp: 90 Capsule, Rfl: 3    fluticasone-umeclidinium-vilanterol (TRELEGY ELLIPTA) 200-62.5-25 mcg/act inhaler, Inhale 1 Puff every day., Disp: 3 Each, Rfl: 3    calcitRIOL (ROCALTROL) 0.25 MCG Cap, Take 1 Capsule by mouth 2 times a day., Disp: 180 Capsule, Rfl: 3    Multiple  Vitamins-Minerals (MULTIVITAMIN WOMEN PO), Take 1 Tablet by mouth every day., Disp: , Rfl:     ondansetron (ZOFRAN ODT) 4 MG TABLET DISPERSIBLE, Take 1 Tablet by mouth every 24 hours as needed for Nausea/Vomiting (migraines). (Patient not taking: Reported on 4/15/2025), Disp: 20 Tablet, Rfl: 2    tizanidine (ZANAFLEX) 2 MG tablet, Take 1 Tablet by mouth 3 times a day as needed (muscle spasms)., Disp: 45 Tablet, Rfl: 0    Acetaminophen (TYLENOL PO), Take 1 Tablet by mouth 2 times a day as needed (For pain). Pt is not sure the strength (OTC) (Patient not taking: Reported on 4/15/2025), Disp: , Rfl:   ALLERGIES:   Allergies   Allergen Reactions    Codeine Itching and Nausea    Imitrex [Sumatriptan Succinate] Shortness of Breath and Swelling    Penicillins Rash and Vomiting    Sulfa Drugs Shortness of Breath and Itching    Azithromycin Itching    Dilaudid [Hydromorphone] Itching     sweating    Other Drug Itching     Z Pack    Sensipar [Cinacalcet] Unspecified     Nightmares and leg cramping    Imipramine Shortness of Breath and Itching     anxious    Seroquel [Quetiapine Fumarate]      Triggered urge to hurt self.      SURGHX:   Past Surgical History:   Procedure Laterality Date    WY EXPLORE PARATHYROID GLANDS N/A 9/20/2023    Procedure: MINIMALLY INVASIVE PARATHYROID EXPLORATION WITH INTRAOPERATIVE PARATHYROID HORMONE MONITORING;  Surgeon: Malika Zayas M.D.;  Location: SURGERY SAME DAY [facility];  Service: General    ORIF, WRIST Left 09/24/2018    Procedure: WRIST ORIF;  Surgeon: Mitchel Solano M.D.;  Location: [facility];  Service: Orthopedics    KNEE ARTHROSCOPY Right 01/05/2018    Procedure: KNEE ARTHROSCOPY;  Surgeon: Henry Medel M.D.;  Location: [facility];  Service: Orthopedics    MENISCECTOMY Right 01/05/2018    Procedure: MENISCECTOMY-partial medial and lateral;  Surgeon: Henry Medel M.D.;  Location: [facility];  Service: Orthopedics    DEBRIDEMENT  "Right 01/05/2018    Procedure: DEBRIDEMENT-medial, lateral, and patellar chondyle;  Surgeon: Henry Medel M.D.;  Location: SURGERY [facility];  Service: Orthopedics    FINGER OR HAND INCISION AND DRAINAGE Right 10/20/2015    Procedure: FINGER OR HAND INCISION AND DRAINAGE- 4th finger;  Surgeon: Eric Pritchett M.D.;  Location: SURGERY [facility];  Service:     DENTAL EXTRACTION(S)  2015    Upper dentures    CHOLECYSTECTOMY  1999    PRIMARY C SECTION  1985    APPENDECTOMY  1950's    BRCA1&2 GEN FULL SEQ DUP/DEL      OTHER      Gets sedation for MRI's     SOCHX:  reports that she quit smoking about 31 years ago. Her smoking use included cigarettes. She started smoking about 51 years ago. She has a 20 pack-year smoking history. She has never used smokeless tobacco. She reports current alcohol use of about 1.8 oz of alcohol per week. She reports that she does not currently use drugs after having used the following drugs: Inhaled.     Objective:   /70 (BP Location: Left arm, Patient Position: Sitting, BP Cuff Size: Adult)   Pulse 92   Temp 36.9 °C (98.5 °F) (Temporal)   Resp 20   Ht 1.778 m (5' 10\")   Wt 120 kg (265 lb)   SpO2 93%   BMI 38.02 kg/m²     Physical Exam  Constitutional:       General: She is not in acute distress.     Appearance: She is well-developed. She is not diaphoretic.   Pulmonary:      Effort: Pulmonary effort is normal.   Neurological:      Mental Status: She is alert.     Left hand with moderate swelling throughout the dorsal hand and wrist, no erythema, warmth, or bruising.  Full extension of all fingers with flexion mildly limited by pain.  Mildly tender throughout the entire dorsal hand and dorsal wrist.    Assessment/Plan:   Assessment    1. Hand swelling  - DX-WRIST-COMPLETE 3+ LEFT; Future  - cefUROXime (CEFTIN) 500 MG Tab; Take 1 Tablet by mouth 2 times a day for 7 days.  Dispense: 14 Tablet; Refill: 0  - metroNIDAZOLE (FLAGYL) 500 MG Tab; Take 1 Tablet by " mouth every 8 hours for 7 days.  Dispense: 21 Tablet; Refill: 0  - Referral to Orthopedics    2. Cat bite, initial encounter    Resulting in infection of the hand.  Initially treated with doxycycline which did seem to help quite a bit, but then hand swelling returned.  Will treat this time with combination cefuroxime and metronidazole.  Did obtain x-ray which did not show any sign of hardware infection at this time.  All questions answered and will follow-up with orthopedics.

## 2025-04-22 NOTE — Clinical Note
REFERRAL APPROVAL NOTICE         Sent on April 22, 2025                   Delilah Juan Magdiel  [address] [facility] [place] [address]                   Dear Ms. Veloz,    After a careful review of the medical information and benefit coverage, [facility] has processed your referral. See below for additional details.    If applicable, you must be actively enrolled with your insurance for coverage of the authorized service. If you have any questions regarding your coverage, please contact your insurance directly.    REFERRAL INFORMATION   Referral #:  [id number]  Referred-To Department    Referred-By Provider:  Orthopedics    Amarjit Wilder M.D.   [place] [address] [facility] [address]  [phone] [address] [facility] [address]  [phone]    Referral Start Date:  04/15/2025  Referral End Date:   04/15/2026             SCHEDULING  If you do not already have an appointment, please call [phone] to make an appointment.     MORE INFORMATION  If you do not already have a [employer] account, sign up at: [email or web address]  You can access your medical information, make appointments, see lab results, billing information, and more.  If you have questions regarding this referral, please contact  the [facility] Referrals department at:             [phone]. Monday - Friday 8:00AM - 5:00PM.     Sincerely,    [facility]

## 2025-04-23 ENCOUNTER — OFFICE VISIT (OUTPATIENT)
Dept: MEDICAL GROUP | Facility: PHYSICIAN GROUP | Age: 75
End: 2025-04-23
Payer: COMMERCIAL

## 2025-04-23 VITALS
HEIGHT: 70 IN | WEIGHT: 263 LBS | SYSTOLIC BLOOD PRESSURE: 118 MMHG | DIASTOLIC BLOOD PRESSURE: 70 MMHG | HEART RATE: 82 BPM | OXYGEN SATURATION: 92 % | TEMPERATURE: 98.1 F | BODY MASS INDEX: 37.65 KG/M2 | RESPIRATION RATE: 16 BRPM

## 2025-04-23 DIAGNOSIS — J45.20 MILD INTERMITTENT ASTHMA WITHOUT COMPLICATION: ICD-10-CM

## 2025-04-23 DIAGNOSIS — Z13.1 ENCOUNTER FOR SCREENING FOR DIABETES MELLITUS: ICD-10-CM

## 2025-04-23 DIAGNOSIS — E89.2 SURGICAL HYPOPARATHYROIDISM (HCC): ICD-10-CM

## 2025-04-23 DIAGNOSIS — R79.89 ELEVATED PTHRP LEVEL: ICD-10-CM

## 2025-04-23 DIAGNOSIS — G89.29 CHRONIC PAIN OF BOTH KNEES: ICD-10-CM

## 2025-04-23 DIAGNOSIS — D68.59 THROMBOPHILIA (HCC): ICD-10-CM

## 2025-04-23 DIAGNOSIS — I10 ESSENTIAL HYPERTENSION: ICD-10-CM

## 2025-04-23 DIAGNOSIS — M85.80 OSTEOPENIA, UNSPECIFIED LOCATION: ICD-10-CM

## 2025-04-23 DIAGNOSIS — Z78.0 ENCOUNTER FOR OSTEOPOROSIS SCREENING IN ASYMPTOMATIC POSTMENOPAUSAL PATIENT: ICD-10-CM

## 2025-04-23 DIAGNOSIS — M25.562 CHRONIC PAIN OF BOTH KNEES: ICD-10-CM

## 2025-04-23 DIAGNOSIS — M25.561 CHRONIC PAIN OF BOTH KNEES: ICD-10-CM

## 2025-04-23 DIAGNOSIS — F31.75 BIPOLAR DISORDER, IN PARTIAL REMISSION, MOST RECENT EPISODE DEPRESSED (HCC): ICD-10-CM

## 2025-04-23 DIAGNOSIS — Z99.81 CHRONIC RESPIRATORY FAILURE WITH HYPOXIA, ON HOME OXYGEN THERAPY (HCC): ICD-10-CM

## 2025-04-23 DIAGNOSIS — E83.51 HYPOCALCEMIA: ICD-10-CM

## 2025-04-23 DIAGNOSIS — Z12.31 ENCOUNTER FOR SCREENING MAMMOGRAM FOR MALIGNANT NEOPLASM OF BREAST: ICD-10-CM

## 2025-04-23 DIAGNOSIS — I48.0 PAROXYSMAL ATRIAL FIBRILLATION (HCC): ICD-10-CM

## 2025-04-23 DIAGNOSIS — G43.009 MIGRAINE WITHOUT AURA AND WITHOUT STATUS MIGRAINOSUS, NOT INTRACTABLE: ICD-10-CM

## 2025-04-23 DIAGNOSIS — K21.9 GASTROESOPHAGEAL REFLUX DISEASE WITHOUT ESOPHAGITIS: ICD-10-CM

## 2025-04-23 DIAGNOSIS — S62.015D CLOSED NONDISPLACED FRACTURE OF DISTAL POLE OF SCAPHOID OF LEFT WRIST WITH ROUTINE HEALING, SUBSEQUENT ENCOUNTER: ICD-10-CM

## 2025-04-23 DIAGNOSIS — L65.9 HAIR LOSS: ICD-10-CM

## 2025-04-23 DIAGNOSIS — E78.2 MIXED HYPERLIPIDEMIA: ICD-10-CM

## 2025-04-23 DIAGNOSIS — Z13.820 ENCOUNTER FOR OSTEOPOROSIS SCREENING IN ASYMPTOMATIC POSTMENOPAUSAL PATIENT: ICD-10-CM

## 2025-04-23 DIAGNOSIS — E53.8 VITAMIN B12 DEFICIENCY: ICD-10-CM

## 2025-04-23 DIAGNOSIS — J96.11 CHRONIC RESPIRATORY FAILURE WITH HYPOXIA, ON HOME OXYGEN THERAPY (HCC): ICD-10-CM

## 2025-04-23 DIAGNOSIS — J43.9 PULMONARY EMPHYSEMA, UNSPECIFIED EMPHYSEMA TYPE (HCC): ICD-10-CM

## 2025-04-23 DIAGNOSIS — N18.30 CHRONIC RENAL FAILURE, STAGE 3 (MODERATE), UNSPECIFIED WHETHER STAGE 3A OR 3B CKD: ICD-10-CM

## 2025-04-23 PROBLEM — M79.89 SWELLING OF BOTH LOWER EXTREMITIES: Status: RESOLVED | Noted: 2024-01-26 | Resolved: 2025-04-23

## 2025-04-23 PROBLEM — S62.009A SCAPHOID FRACTURE OF WRIST: Status: ACTIVE | Noted: 2025-04-23

## 2025-04-23 PROCEDURE — 3074F SYST BP LT 130 MM HG: CPT | Performed by: NURSE PRACTITIONER

## 2025-04-23 PROCEDURE — 99214 OFFICE O/P EST MOD 30 MIN: CPT | Performed by: NURSE PRACTITIONER

## 2025-04-23 PROCEDURE — 3078F DIAST BP <80 MM HG: CPT | Performed by: NURSE PRACTITIONER

## 2025-04-23 RX ORDER — VENLAFAXINE HYDROCHLORIDE 150 MG/1
150 CAPSULE, EXTENDED RELEASE ORAL DAILY
Qty: 90 CAPSULE | Refills: 3 | Status: SHIPPED | OUTPATIENT
Start: 2025-04-23

## 2025-04-23 RX ORDER — ALBUTEROL SULFATE 90 UG/1
1-2 INHALANT RESPIRATORY (INHALATION) EVERY 6 HOURS PRN
Qty: 1 EACH | Refills: 5 | Status: SHIPPED | OUTPATIENT
Start: 2025-04-23

## 2025-04-23 RX ORDER — CALCITRIOL 0.25 UG/1
0.25 CAPSULE, LIQUID FILLED ORAL 2 TIMES DAILY
Qty: 180 CAPSULE | Refills: 3 | Status: SHIPPED | OUTPATIENT
Start: 2025-04-23

## 2025-04-23 RX ORDER — PROPRANOLOL HYDROCHLORIDE 40 MG/1
40 TABLET ORAL 2 TIMES DAILY
Qty: 180 TABLET | Refills: 3 | Status: SHIPPED | OUTPATIENT
Start: 2025-04-23

## 2025-04-23 RX ORDER — OMEPRAZOLE 40 MG/1
40 CAPSULE, DELAYED RELEASE ORAL DAILY
Qty: 90 CAPSULE | Refills: 3 | Status: SHIPPED | OUTPATIENT
Start: 2025-04-23

## 2025-04-23 RX ORDER — FLUTICASONE FUROATE, UMECLIDINIUM BROMIDE AND VILANTEROL TRIFENATATE 200; 62.5; 25 UG/1; UG/1; UG/1
1 POWDER RESPIRATORY (INHALATION) DAILY
Qty: 3 EACH | Refills: 3 | Status: SHIPPED | OUTPATIENT
Start: 2025-04-23

## 2025-04-23 RX ORDER — HYDROCHLOROTHIAZIDE 25 MG/1
25 TABLET ORAL DAILY
Qty: 90 TABLET | Refills: 3 | Status: SHIPPED | OUTPATIENT
Start: 2025-04-23

## 2025-04-23 ASSESSMENT — FIBROSIS 4 INDEX: FIB4 SCORE: 1.81

## 2025-04-23 NOTE — PROGRESS NOTES
Chief Complaint   Patient presents with    Follow-Up     Wrist injury                                                                                                                                       HPI:   Miryam presents today with the following.    Patient consented to the use of [employer] to record and transcribe notes during this visit.    The patient presents today with multiple concerns including a possible scaphoid fracture, osteopenia, scoliosis, and menopausal symptoms. The patient consented to the use of [employer] to record and transcribe notes during this visit.    Regarding the hand concern, the patient recently experienced swelling which has now resolved to a small lump. She has completed an antibiotic course for a cat bite infection and is awaiting an orthopedic referral.    The patient's scoliosis was evaluated with an X-ray, which shows an S-curvature with a 7% thoracic curve and a 20-degree lumbar curve.    Menopausal symptoms reported by the patient include facial hair growth, thinning hair on the scalp, intense sugar cravings, and night sweats.    The patient is experiencing skin issues, including itching from a keloid scar following a mole removal on her back, and a seborrheic keratosis described as a flaky, raised, and irritating lesion.    The patient expresses a desire to discontinue calcitriol to assess her calcium levels. She has been taking calcium supplements and calcitriol for about a year, which was started post-parathyroidectomy.    The patient's medical history includes osteopenia in the hand and femur, scoliosis, postmenopausal status, elevated fasting blood sugar (possible prediabetes or diabetes), and a parathyroidectomy. Her surgical history includes the parathyroidectomy about 1.5 years ago, a metal implant in the left wrist about 5 years ago, and a mole removal from the back.    Current medications and supplements include Trelegy, an Albuterol inhaler (with new instructions for  "one puff per day), calcium pills (ending soon), and calcitriol.    Family history reveals adult-onset diabetes in the patient's father and Type 1 diabetes in their granddaughter. There is no family history of colon cancer or polyps.    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    The review of systems indicates sugar cravings, itching on the back at the mole removal site, thinning hair on the scalp, shortness of breath (managed with an inhaler), resolved hand swelling and inability to close the hand, and night sweats.        ROS:  All systems negative expect as addressed in assessment and plan.     /70 (BP Location: Right arm, Patient Position: Sitting)   Pulse 82   Temp 36.7 °C (98.1 °F) (Temporal)   Resp 16   Ht 1.778 m (5' 10\")   Wt 119 kg (263 lb)   SpO2 92%   BMI 37.74 kg/m²     Objective:    Physical Exam  Musculoskeletal:      Left hand: Swelling and tenderness present.         Assessment and Plan:  74 y.o. female with the following issues.    1. Closed nondisplaced fracture of distal pole of scaphoid of left wrist with routine healing, subsequent encounter    2. Mild intermittent asthma without complication  - albuterol 108 (90 Base) MCG/ACT Aero Soln inhalation aerosol; Inhale 1-2 Puffs every 6 hours as needed for Shortness of Breath.  Dispense: 1 Each; Refill: 5  - fluticasone-umeclidinium-vilanterol (TRELEGY ELLIPTA) 200-62.5-25 MCG/ACT inhaler; Inhale 1 Puff every day.  Dispense: 3 Each; Refill: 3    3. Encounter for screening mammogram for malignant neoplasm of breast  - MA-SCREENING MAMMO BILAT W/CAD; Future    4. Hair loss  - HEMOGLOBIN A1C; Future  - Comp Metabolic Panel; Future  - TSH; Future  - FREE THYROXINE; Future  - VITAMIN D,25 HYDROXY (DEFICIENCY); Future  - Lipid Profile; Future  - VITAMIN B12; Future  - IRON/TOTAL IRON BIND; Future  - FERRITIN; Future  - CBC WITHOUT DIFFERENTIAL; Future  - INSULIN " FASTING; Future    5. Surgical hypoparathyroidism (HCC)  - Comp Metabolic Panel; Future  - TSH; Future  - FREE THYROXINE; Future  - VITAMIN D,25 HYDROXY (DEFICIENCY); Future  - calcitRIOL (ROCALTROL) 0.25 MCG Cap; Take 1 Capsule by mouth 2 times a day.  Dispense: 180 Capsule; Refill: 3    6. Vitamin B12 deficiency  - VITAMIN B12; Future    7. Essential hypertension  - MICROALBUMIN CREAT RATIO URINE; Future  - hydroCHLOROthiazide 25 MG Tab; Take 1 Tablet by mouth every day.  Dispense: 90 Tablet; Refill: 3  - propranolol (INDERAL) 40 MG Tab; Take 1 Tablet by mouth 2 times a day.  Dispense: 180 Tablet; Refill: 3    8. Migraine without aura and without status migrainosus, not intractable  - propranolol (INDERAL) 40 MG Tab; Take 1 Tablet by mouth 2 times a day.  Dispense: 180 Tablet; Refill: 3    9. Thrombophilia (HCC)  - CBC WITHOUT DIFFERENTIAL; Future  - rivaroxaban (XARELTO) 20 MG Tab tablet; Take 1 Tablet by mouth with dinner.  Dispense: 90 Tablet; Refill: 3    10. Chronic renal failure, stage 3 (moderate), unspecified whether stage 3a or 3b CKD  - Comp Metabolic Panel; Future  - VITAMIN B12; Future  - IRON/TOTAL IRON BIND; Future  - FERRITIN; Future  - CBC WITHOUT DIFFERENTIAL; Future  - MICROALBUMIN CREAT RATIO URINE; Future  - PHOSPHORUS; Future  - PTH INTACT (PTH ONLY); Future    11. Paroxysmal atrial fibrillation (HCC)  - Comp Metabolic Panel; Future    12. Elevated PTHrP level  - Comp Metabolic Panel; Future  - PHOSPHORUS; Future  - PTH INTACT (PTH ONLY); Future    13. Pulmonary emphysema, unspecified emphysema type ([facility])    14. Encounter for screening for diabetes mellitus  - HEMOGLOBIN A1C; Future  - Comp Metabolic Panel; Future  - INSULIN FASTING; Future    15. Mixed hyperlipidemia  - Lipid Profile; Future    16. Osteopenia, unspecified location  - Comp Metabolic Panel; Future  - VITAMIN D,25 HYDROXY (DEFICIENCY); Future  - PHOSPHORUS; Future    17. Hypocalcemia  - calcitRIOL (ROCALTROL) 0.25 MCG Cap; Take  1 Capsule by mouth 2 times a day.  Dispense: 180 Capsule; Refill: 3    18. Mild intermittent asthma without complication  - albuterol 108 (90 Base) MCG/ACT Aero Soln inhalation aerosol; Inhale 1-2 Puffs every 6 hours as needed for Shortness of Breath.  Dispense: 1 Each; Refill: 5  - fluticasone-umeclidinium-vilanterol (TRELEGY ELLIPTA) 200-62.5-25 MCG/ACT inhaler; Inhale 1 Puff every day.  Dispense: 3 Each; Refill: 3    19. Bipolar disorder, in partial remission, most recent episode depressed (HCC)  - venlafaxine (EFFEXOR-XR) 150 MG extended-release capsule; Take 1 Capsule by mouth every day.  Dispense: 90 Capsule; Refill: 3    20. Encounter for osteoporosis screening in asymptomatic postmenopausal patient  - DS-BONE DENSITY STUDY (DEXA); Future    21. Chronic respiratory failure with hypoxia, on home oxygen therapy ([facility])    22. Gastroesophageal reflux disease without esophagitis  - omeprazole (PRILOSEC) 40 MG delayed-release capsule; Take 1 Capsule by mouth every day.  Dispense: 90 Capsule; Refill: 3    23. Chronic pain of both knees    1. Hand Swelling and Possible Scaphoid Fracture:     - Assessment: Hand swelling following cat bite, improved with antibiotics. X-ray shows possible scaphoid fracture and general osteopenia. Concern for potential bone necrosis due to poor blood supply.     - Plan:       - Refer to orthopedics for evaluation and possible CT       - Advise avoiding heavy hand use until orthopedic evaluation       - Follow up on orthopedic consult results    2. Scoliosis and Osteopenia:     - Assessment: X-ray shows S-curvature: 7% thoracic, 20-degree lumbar. Hx of osteopenia with 2021 DXA showing 8% decrease in femur and 1.8% decrease in spine density. Current x-rays indicate hand osteopenia.     - Plan:       - Recommend core strength, muscle strengthening, and weight-bearing exercises       - Schedule DXA       - Educate on calcium and vitamin D intake       - Continue calcium supplementation        - Follow up on DXA results    3. Menopausal Symptoms and Possible Prediabetes/Diabetes:     - Assessment: Hair thinning on scalp, facial hair growth. Strong sugar cravings, family hx of diabetes. Previous labs showed elevated fasting glucose.     - Plan:       - Order fasting glucose test and CMP       - Consider spironolactone for androgen-related symptoms if diabetes ruled out       - Educate on menopausal hormonal changes and effects       - Follow up to discuss results and management strategies    4. Post-parathyroidectomy Calcium Management:     - Assessment: S/P parathyroidectomy 1.5 years ago, on calcitriol. Patient desires to discontinue calcium supplementation.     - Plan:       - Consult Dr. Malika Zayas (surgical oncology) for guidance       - Consider endocrinology follow-up       - Assess calcium levels before changes       - Follow up to discuss recommendations    5. Seborrheic Keratosis:     - Assessment: Itching at previous mole removal site. Exam reveals flaky, raised, irritating lesion.     - Plan:       - Schedule cryotherapy at next visit      Return for Lab Review.      Please note that this dictation was created using voice recognition software. I have worked with consultants from the vendor as well as technical experts from [employer] to optimize the interface. I have made every reasonable attempt to correct obvious errors, but I expect that there are errors of grammar and possibly content that I did not discover before finalizing the note.

## 2025-05-14 ENCOUNTER — HOSPITAL ENCOUNTER (OUTPATIENT)
Dept: LAB | Facility: MEDICAL CENTER | Age: 75
End: 2025-05-14
Attending: NURSE PRACTITIONER
Payer: COMMERCIAL

## 2025-05-14 DIAGNOSIS — L65.9 HAIR LOSS: ICD-10-CM

## 2025-05-14 DIAGNOSIS — E78.2 MIXED HYPERLIPIDEMIA: ICD-10-CM

## 2025-05-14 DIAGNOSIS — M85.80 OSTEOPENIA, UNSPECIFIED LOCATION: ICD-10-CM

## 2025-05-14 DIAGNOSIS — E53.8 VITAMIN B12 DEFICIENCY: ICD-10-CM

## 2025-05-14 DIAGNOSIS — I10 ESSENTIAL HYPERTENSION: ICD-10-CM

## 2025-05-14 DIAGNOSIS — E89.2 SURGICAL HYPOPARATHYROIDISM (HCC): ICD-10-CM

## 2025-05-14 DIAGNOSIS — N18.30 CHRONIC RENAL FAILURE, STAGE 3 (MODERATE), UNSPECIFIED WHETHER STAGE 3A OR 3B CKD: ICD-10-CM

## 2025-05-14 DIAGNOSIS — R79.89 ELEVATED PTHRP LEVEL: ICD-10-CM

## 2025-05-14 DIAGNOSIS — D68.59 THROMBOPHILIA (HCC): ICD-10-CM

## 2025-05-14 DIAGNOSIS — Z13.1 ENCOUNTER FOR SCREENING FOR DIABETES MELLITUS: ICD-10-CM

## 2025-05-14 DIAGNOSIS — I48.0 PAROXYSMAL ATRIAL FIBRILLATION (HCC): ICD-10-CM

## 2025-05-14 PROBLEM — S61.452A CAT BITE OF HAND, LEFT, INITIAL ENCOUNTER: Status: ACTIVE | Noted: 2025-05-14
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
## 2025-05-14 LAB
25(OH)D3 SERPL-MCNC: 51 NG/ML (ref 30–100)
ALBUMIN SERPL BCP-MCNC: 3.8 G/DL (ref 3.2–4.9)
ALBUMIN/GLOB SERPL: 1.1 G/DL
ALP SERPL-CCNC: 72 U/L (ref 30–99)
ALT SERPL-CCNC: 14 U/L (ref 2–50)
ANION GAP SERPL CALC-SCNC: 8 MMOL/L (ref 7–16)
AST SERPL-CCNC: 18 U/L (ref 12–45)
BILIRUB SERPL-MCNC: 0.7 MG/DL (ref 0.1–1.5)
BUN SERPL-MCNC: 19 MG/DL (ref 8–22)
CALCIUM ALBUM COR SERPL-MCNC: 8.5 MG/DL (ref 8.5–10.5)
CALCIUM SERPL-MCNC: 8.3 MG/DL (ref 8.5–10.5)
CHLORIDE SERPL-SCNC: 104 MMOL/L (ref 96–112)
CHOLEST SERPL-MCNC: 171 MG/DL (ref 100–199)
CO2 SERPL-SCNC: 28 MMOL/L (ref 20–33)
CREAT SERPL-MCNC: 1.01 MG/DL (ref 0.5–1.4)
CREAT UR-MCNC: 90.9 MG/DL
ERYTHROCYTE [DISTWIDTH] IN BLOOD BY AUTOMATED COUNT: 47.8 FL (ref 35.9–50)
EST. AVERAGE GLUCOSE BLD GHB EST-MCNC: 128 MG/DL
FERRITIN SERPL-MCNC: 77.7 NG/ML (ref 10–291)
GFR SERPLBLD CREATININE-BSD FMLA CKD-EPI: 59 ML/MIN/1.73 M 2
GLOBULIN SER CALC-MCNC: 3.6 G/DL (ref 1.9–3.5)
GLUCOSE SERPL-MCNC: 97 MG/DL (ref 65–99)
HBA1C MFR BLD: 6.1 % (ref 4–5.6)
HCT VFR BLD AUTO: 45.7 % (ref 37–47)
HDLC SERPL-MCNC: 37 MG/DL
HGB BLD-MCNC: 15.1 G/DL (ref 12–16)
IRON SATN MFR SERPL: 28 % (ref 15–55)
IRON SERPL-MCNC: 85 UG/DL (ref 40–170)
LDLC SERPL CALC-MCNC: 101 MG/DL
MCH RBC QN AUTO: 31.9 PG (ref 27–33)
MCHC RBC AUTO-ENTMCNC: 33 G/DL (ref 32.2–35.5)
MCV RBC AUTO: 96.4 FL (ref 81.4–97.8)
MICROALBUMIN UR-MCNC: 3.8 MG/DL
MICROALBUMIN/CREAT UR: 42 MG/G (ref 0–30)
PHOSPHATE SERPL-MCNC: 3.6 MG/DL (ref 2.5–4.5)
PLATELET # BLD AUTO: 230 K/UL (ref 164–446)
PMV BLD AUTO: 12.5 FL (ref 9–12.9)
POTASSIUM SERPL-SCNC: 4.2 MMOL/L (ref 3.6–5.5)
PROT SERPL-MCNC: 7.4 G/DL (ref 6–8.2)
PTH-INTACT SERPL-MCNC: 26.3 PG/ML (ref 14–72)
RBC # BLD AUTO: 4.74 M/UL (ref 4.2–5.4)
SODIUM SERPL-SCNC: 140 MMOL/L (ref 135–145)
T4 FREE SERPL-MCNC: 1.49 NG/DL (ref 0.93–1.7)
TIBC SERPL-MCNC: 307 UG/DL (ref 250–450)
TRIGL SERPL-MCNC: 166 MG/DL (ref 0–149)
TSH SERPL-ACNC: 0.52 UIU/ML (ref 0.38–5.33)
UIBC SERPL-MCNC: 222 UG/DL (ref 110–370)
VIT B12 SERPL-MCNC: 477 PG/ML (ref 211–911)
WBC # BLD AUTO: 8.5 K/UL (ref 4.8–10.8)

## 2025-05-14 PROCEDURE — 82306 VITAMIN D 25 HYDROXY: CPT

## 2025-05-14 PROCEDURE — 84100 ASSAY OF PHOSPHORUS: CPT

## 2025-05-14 PROCEDURE — 82043 UR ALBUMIN QUANTITATIVE: CPT

## 2025-05-14 PROCEDURE — 82728 ASSAY OF FERRITIN: CPT

## 2025-05-14 PROCEDURE — 80053 COMPREHEN METABOLIC PANEL: CPT

## 2025-05-14 PROCEDURE — 83540 ASSAY OF IRON: CPT

## 2025-05-14 PROCEDURE — 83036 HEMOGLOBIN GLYCOSYLATED A1C: CPT

## 2025-05-14 PROCEDURE — 84443 ASSAY THYROID STIM HORMONE: CPT

## 2025-05-14 PROCEDURE — 83970 ASSAY OF PARATHORMONE: CPT

## 2025-05-14 PROCEDURE — 83550 IRON BINDING TEST: CPT

## 2025-05-14 PROCEDURE — 80061 LIPID PANEL: CPT

## 2025-05-14 PROCEDURE — 82607 VITAMIN B-12: CPT

## 2025-05-14 PROCEDURE — 82570 ASSAY OF URINE CREATININE: CPT

## 2025-05-14 PROCEDURE — 36415 COLL VENOUS BLD VENIPUNCTURE: CPT

## 2025-05-14 PROCEDURE — 85027 COMPLETE CBC AUTOMATED: CPT

## 2025-05-14 PROCEDURE — 83525 ASSAY OF INSULIN: CPT

## 2025-05-14 PROCEDURE — 84439 ASSAY OF FREE THYROXINE: CPT

## 2025-05-16 LAB — INSULIN P FAST SERPL-ACNC: 24 UIU/ML (ref 3–25)

## 2025-05-22 ENCOUNTER — APPOINTMENT (OUTPATIENT)
Dept: MEDICAL GROUP | Facility: PHYSICIAN GROUP | Age: 75
End: 2025-05-22
Payer: COMMERCIAL

## 2025-05-22 VITALS
OXYGEN SATURATION: 92 % | TEMPERATURE: 98.7 F | SYSTOLIC BLOOD PRESSURE: 122 MMHG | RESPIRATION RATE: 16 BRPM | WEIGHT: 263 LBS | HEIGHT: 70 IN | BODY MASS INDEX: 37.65 KG/M2 | HEART RATE: 77 BPM | DIASTOLIC BLOOD PRESSURE: 72 MMHG

## 2025-05-22 DIAGNOSIS — S62.015D CLOSED NONDISPLACED FRACTURE OF DISTAL POLE OF SCAPHOID OF LEFT WRIST WITH ROUTINE HEALING, SUBSEQUENT ENCOUNTER: ICD-10-CM

## 2025-05-22 DIAGNOSIS — L82.0 INFLAMED SEBORRHEIC KERATOSIS: ICD-10-CM

## 2025-05-22 DIAGNOSIS — R73.03 PREDIABETES: Primary | ICD-10-CM

## 2025-05-22 DIAGNOSIS — G43.109 MIGRAINE WITH AURA AND WITHOUT STATUS MIGRAINOSUS, NOT INTRACTABLE: ICD-10-CM

## 2025-05-22 PROCEDURE — 17110 DESTRUCTION B9 LES UP TO 14: CPT | Performed by: NURSE PRACTITIONER

## 2025-05-22 PROCEDURE — 3074F SYST BP LT 130 MM HG: CPT | Performed by: NURSE PRACTITIONER

## 2025-05-22 PROCEDURE — 99215 OFFICE O/P EST HI 40 MIN: CPT | Mod: 25 | Performed by: NURSE PRACTITIONER

## 2025-05-22 PROCEDURE — 3078F DIAST BP <80 MM HG: CPT | Performed by: NURSE PRACTITIONER

## 2025-05-22 RX ORDER — METFORMIN HYDROCHLORIDE 500 MG/1
1000 TABLET, EXTENDED RELEASE ORAL DAILY
Qty: 180 TABLET | Refills: 3 | Status: SHIPPED | OUTPATIENT
Start: 2025-05-22

## 2025-05-22 RX ORDER — ONDANSETRON 4 MG/1
4 TABLET, ORALLY DISINTEGRATING ORAL
Qty: 20 TABLET | Refills: 5 | Status: SHIPPED | OUTPATIENT
Start: 2025-05-22

## 2025-05-22 RX ORDER — ELETRIPTAN HYDROBROMIDE 20 MG/1
20 TABLET, FILM COATED ORAL
Qty: 10 TABLET | Refills: 5 | Status: SHIPPED | OUTPATIENT
Start: 2025-05-22

## 2025-05-22 ASSESSMENT — FIBROSIS 4 INDEX: FIB4 SCORE: 1.51

## 2025-05-22 NOTE — PROGRESS NOTES
"  Chief Complaint   Patient presents with    Lab Results    Paperwork     Re-new [facility]                                                                                                                                       HPI:   Miryam presents today with the following.    Patient consented to the use of [employer] to record and transcribe notes during this visit.    The patient attended the consultation today for medication renewal, lab follow-up, and to discuss weight management issues and sugar cravings since quitting drugs.     The chief complaints presented by the patient include weight gain (3 lbs since Sunday, attributed to water retention), feeling uncomfortable and \"fat\", acknowledging that weight loss could improve knee and back pain, sugar cravings (especially since quitting drugs), and migraines (up to 3 episodes per month, lasting 2 days each).    In terms of current treatments, the patient is using Trelegy (with good compliance and control), L-agyptin, Calcitriol, Desorphin, Eletriptan, and Calcium (temporarily stopped 1-2 weeks pre-labs).    The patient's medical history includes chronic migraines, prediabetes, hypocalcemia, cholesterol abnormalities, and postmenopausal status. Her surgical history includes a cholecystectomy. The patient has an allergy to bananas, which is an inconsistent migraine trigger.    Recent investigations include lab work (patient stopped calcium 1.5-2 weeks prior to check baseline) and an X-ray showing a possible scaphoid fracture, for which she is seeking a second opinion with Dr. Doyle.    The review of systems reveals weight gain in general; post-nasal drip in HEENT; looser stools in GI; knee and back pain in musculoskeletal; tingling in lips and feet (associated with low calcium) in neurological; and sugar cravings in endocrine.    The patient's current health management strategies and concerns have been discussed, with particular attention to her weight management issues, " "sugar cravings, migraines, and the management of her chronic conditions, including prediabetes and hypocalcemia. Her occupation as a , history of substance use (now abstinent), and desire for weight loss through diet and exercise were also noted.    ROS:  All systems negative expect as addressed in assessment and plan.     /72 (BP Location: Left arm, Patient Position: Sitting)   Pulse 77   Temp 37.1 °C (98.7 °F) (Temporal)   Resp 16   Ht 1.778 m (5' 10\")   Wt 119 kg (263 lb)   SpO2 92%   BMI 37.74 kg/m²     Objective:    Physical Exam  Vitals reviewed.   Constitutional:       Appearance: Normal appearance.   HENT:      Head: Normocephalic and atraumatic.      Mouth/Throat:      Mouth: Mucous membranes are moist.   Eyes:      Extraocular Movements: Extraocular movements intact.      Conjunctiva/sclera: Conjunctivae normal.   Pulmonary:      Effort: Pulmonary effort is normal.   Musculoskeletal:         General: Normal range of motion.      Cervical back: Normal range of motion.   Skin:     General: Skin is warm and dry.   Neurological:      General: No focal deficit present.      Mental Status: She is alert and oriented to person, place, and time.   Psychiatric:         Mood and Affect: Mood normal.         Behavior: Behavior normal.         Thought Content: Thought content normal.      Latest Reference Range & Units 05/14/25 10:08   WBC 4.8 - 10.8 K/uL 8.5   RBC 4.20 - 5.40 M/uL 4.74   Hemoglobin 12.0 - 16.0 g/dL 15.1   Hematocrit 37.0 - 47.0 % 45.7   MCV 81.4 - 97.8 fL 96.4   MCH 27.0 - 33.0 pg 31.9   MCHC 32.2 - 35.5 g/dL 33.0   RDW 35.9 - 50.0 fL 47.8   Platelet Count 164 - 446 K/uL 230   MPV 9.0 - 12.9 fL 12.5   Sodium 135 - 145 mmol/L 140   Potassium 3.6 - 5.5 mmol/L 4.2   Chloride 96 - 112 mmol/L 104   Co2 20 - 33 mmol/L 28   Anion Gap 7.0 - 16.0  8.0   Glucose 65 - 99 mg/dL 97   Bun 8 - 22 mg/dL 19   Creatinine 0.50 - 1.40 mg/dL 1.01   GFR (CKD-EPI) >60 mL/min/1.73 m 2 59 ! "   Calcium 8.5 - 10.5 mg/dL 8.3 (L)   Correct Calcium 8.5 - 10.5 mg/dL 8.5   AST(SGOT) 12 - 45 U/L 18   ALT(SGPT) 2 - 50 U/L 14   Alkaline Phosphatase 30 - 99 U/L 72   Total Bilirubin 0.1 - 1.5 mg/dL 0.7   Albumin 3.2 - 4.9 g/dL 3.8   Total Protein 6.0 - 8.2 g/dL 7.4   Globulin 1.9 - 3.5 g/dL 3.6 (H)   A-G Ratio g/dL 1.1   Phosphorus 2.5 - 4.5 mg/dL 3.6   Iron 40 - 170 ug/dL 85   Total Iron Binding 250 - 450 ug/dL 307   % Saturation 15 - 55 % 28   Unsat Iron Binding 110 - 370 ug/dL 222   Glycohemoglobin 4.0 - 5.6 % 6.1 (H)   Estim. Avg Glu mg/dL 128   Cholesterol,Tot 100 - 199 mg/dL 171   Triglycerides 0 - 149 mg/dL 166 (H)   HDL >=40 mg/dL 37 !   LDL <100 mg/dL 101 (H)   Micro Alb Creat Ratio 0 - 30 mg/g 42 (H)   Creatinine, Urine mg/dL 90.90   Microalbumin, Urine Random mg/dL 3.8   25-Hydroxy   Vitamin D 25 30 - 100 ng/mL 51   Ferritin 10.0 - 291.0 ng/mL 77.7   Vitamin B12 -True Cobalamin 211 - 911 pg/mL 477   TSH 0.380 - 5.330 uIU/mL 0.522   Free T-4 0.93 - 1.70 ng/dL 1.49   Insulin Fasting 3 - 25 uIU/mL 24   Pth, Intact 14.0 - 72.0 pg/mL 26.3   !: Data is abnormal  (L): Data is abnormally low  (H): Data is abnormally high    Assessment and Plan:  74 y.o. female with the following issues.    1. Migraine with aura and without status migrainosus, not intractable  - ondansetron (ZOFRAN ODT) 4 MG TABLET DISPERSIBLE; Take 1 Tablet by mouth every 24 hours as needed for Nausea/Vomiting (migraines).  Dispense: 20 Tablet; Refill: 5  - eletriptan (RELPAX) 20 MG Tab; Take 1 Tablet by mouth 1 time a day as needed for Migraine.  Dispense: 10 Tablet; Refill: 5    2. Migraine with aura and without status migrainosus, not intractable  - ondansetron (ZOFRAN ODT) 4 MG TABLET DISPERSIBLE; Take 1 Tablet by mouth every 24 hours as needed for Nausea/Vomiting (migraines).  Dispense: 20 Tablet; Refill: 5  - eletriptan (RELPAX) 20 MG Tab; Take 1 Tablet by mouth 1 time a day as needed for Migraine.  Dispense: 10 Tablet; Refill: 5    3.  Prediabetes  - metFORMIN ER (GLUCOPHAGE XR) 500 MG TABLET SR 24 HR; Take 2 Tablets by mouth every day.  Dispense: 180 Tablet; Refill: 3    4. Closed nondisplaced fracture of distal pole of scaphoid of left wrist with routine healing, subsequent encounter      1. Prediabetes:     - Assessment: A1c remains prediabetic, consistent with previous year. Fasting glucose improved from 127 to 97 mg/dL. Patient reports difficulty with weight loss and sugar cravings, possibly due to insulin resistance. Fasting insulin elevated. Postmenopausal status contributes to weight management challenges.     - Plan:       - Initiate Metformin  mg PO daily, titrate to 1000 mg daily as tolerated       - Start with one tablet daily, increase to two if well-tolerated       - Informed of potential GI side effects and capsule remnants in stool       - Continue lifestyle modifications       - Monitor weight loss and sugar cravings       - Follow up for medication tolerance and efficacy    2. Hyperlipidemia:     - Assessment: LDL slightly elevated (101 mg/dL). HDL improved from 30 to 37 mg/dL. Triglycerides mildly elevated (166 mg/dL).     - Plan:       - Continue current lipid management       - Reassess lipid panel at next follow-up       - Encourage lifestyle modifications    3. Chronic Kidney Disease:     - Assessment: GFR decreased to 59 from 62 mL/min/1.73m², consistent with baseline. Mild proteinuria (40 mg/dL), possibly related to blood sugar fluctuations. PTH normal.     - Plan:       - Continue current management       - Monitor renal function and proteinuria       - Anticipate potential improvement with Metformin    4. Possible Scaphoid Fracture:     - Assessment: Recent x-ray indicated possible fracture. Close monitoring required due to poor blood supply.     - Plan:       - Continue follow-up with orthopedics (Dr. Doyle)       - Periodic x-rays to monitor for necrosis or decreased blood flow       - Consider CT if diagnosis  remains uncertain       - Discuss potential surgical intervention based on future imaging    5. COPD:     - Assessment: Mild lung hyperexpansion on imaging. Good control with Trelegy, no frequent SOB or albuterol use. Post-nasal drip causing cough, likely allergy-related.     - Plan:       - Continue Trelegy as prescribed       - Monitor respiratory symptoms       - Consider allergy management for persistent post-nasal drip    6. Migraines:     - Assessment: Chronic migraines continue (>15 days/month). Ongoing medication adjustments.     - Plan:       - Refill Desorphin and Eletriptan as prescribed       - Continue current management strategy       - Monitor frequency and severity       - Follow up to assess treatment efficacy    Return in about 3 months (around 8/22/2025) for FV on prediabetes.    I spent a total of 42 minutes with record review, exam, and communication with the patient, communication with other providers, and documentation of this encounter. This does not include time spent on separately billable procedures/tests.    Please note that this dictation was created using voice recognition software. I have worked with consultants from the vendor as well as technical experts from [employer] to optimize the interface. I have made every reasonable attempt to correct obvious errors, but I expect that there are errors of grammar and possibly content that I did not discover before finalizing the note.

## 2025-05-22 NOTE — PROCEDURES
Myrtle Milton - Benign    Date/Time: 5/22/2025 8:58 AM    Performed by: AMALIA LujanRFREDRICK  Authorized by: MINDY Lujan.P.[email or web address]    Number of Lesions: 1  Lesion 1:     Body area: trunk    Trunk location: back    Malignancy: benign lesion      Destruction method: cryotherapy

## 2025-06-05 ENCOUNTER — RESULTS FOLLOW-UP (OUTPATIENT)
Dept: MEDICAL GROUP | Facility: PHYSICIAN GROUP | Age: 75
End: 2025-06-05

## 2025-06-13 ENCOUNTER — TELEPHONE (OUTPATIENT)
Dept: MEDICAL GROUP | Facility: PHYSICIAN GROUP | Age: 75
End: 2025-06-13
Payer: COMMERCIAL

## 2025-06-13 DIAGNOSIS — J30.1 NON-SEASONAL ALLERGIC RHINITIS DUE TO POLLEN: ICD-10-CM

## 2025-06-13 RX ORDER — AZELASTINE 1 MG/ML
1 SPRAY, METERED NASAL 2 TIMES DAILY
Qty: 1 G | Refills: 0 | Status: SHIPPED | OUTPATIENT
Start: 2025-06-13

## 2025-06-13 NOTE — TELEPHONE ENCOUNTER
Received request via: Patient    Was the patient seen in the last year in this department? Yes    Does the patient have an active prescription (recently filled or refills available) for medication(s) requested? No    Pharmacy Name: [facility]    Does the patient have longterm Plus and need 100-day supply? (This applies to ALL medications) Patient does not have SCP

## 2025-06-13 NOTE — TELEPHONE ENCOUNTER
It appears she started the metformin for prediabetes.  Therefore, she does not have to take medication.  She could work on lifestyle.  So she can stop it if the side effects are intolerable.

## 2025-06-13 NOTE — TELEPHONE ENCOUNTER
VOICEMAIL  1. Caller Name: Miryam Veloz                       Call Back Number: [phone]     2. Message: Patient left me a VM stating that she has had diarrhea since taking Metformin 2 weeks ago. Would like to know what to do?    Please advise?    3. Patient approves office to leave a detailed voicemail/MyChart message: N\A

## 2025-08-29 ENCOUNTER — OFFICE VISIT (OUTPATIENT)
Dept: MEDICAL GROUP | Facility: PHYSICIAN GROUP | Age: 75
End: 2025-08-29
Payer: COMMERCIAL

## 2025-08-29 VITALS
BODY MASS INDEX: 36.36 KG/M2 | TEMPERATURE: 98.6 F | HEIGHT: 70 IN | SYSTOLIC BLOOD PRESSURE: 130 MMHG | OXYGEN SATURATION: 91 % | RESPIRATION RATE: 16 BRPM | WEIGHT: 254 LBS | DIASTOLIC BLOOD PRESSURE: 76 MMHG | HEART RATE: 84 BPM

## 2025-08-29 DIAGNOSIS — J43.9 PULMONARY EMPHYSEMA, UNSPECIFIED EMPHYSEMA TYPE (HCC): ICD-10-CM

## 2025-08-29 DIAGNOSIS — G43.009 MIGRAINE WITHOUT AURA AND WITHOUT STATUS MIGRAINOSUS, NOT INTRACTABLE: ICD-10-CM

## 2025-08-29 DIAGNOSIS — S20.212A RIB CONTUSION, LEFT, INITIAL ENCOUNTER: Primary | ICD-10-CM

## 2025-08-29 DIAGNOSIS — N18.30 CHRONIC RENAL FAILURE, STAGE 3 (MODERATE), UNSPECIFIED WHETHER STAGE 3A OR 3B CKD: ICD-10-CM

## 2025-08-29 DIAGNOSIS — Z02.89 ENCOUNTER FOR COMPLETION OF FORM WITH PATIENT: ICD-10-CM

## 2025-08-29 DIAGNOSIS — I48.0 PAROXYSMAL ATRIAL FIBRILLATION (HCC): ICD-10-CM

## 2025-08-29 DIAGNOSIS — E89.2 SURGICAL HYPOPARATHYROIDISM (HCC): ICD-10-CM

## 2025-08-29 DIAGNOSIS — Z79.01 CHRONIC ANTICOAGULATION: ICD-10-CM

## 2025-08-29 DIAGNOSIS — E83.51 HYPOCALCEMIA: ICD-10-CM

## 2025-08-29 DIAGNOSIS — E78.2 MIXED HYPERLIPIDEMIA: ICD-10-CM

## 2025-08-29 DIAGNOSIS — R73.03 PREDIABETES: ICD-10-CM

## 2025-08-29 PROBLEM — D68.59 THROMBOPHILIA (HCC): Status: RESOLVED | Noted: 2017-08-01 | Resolved: 2025-08-29
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
## 2025-08-29 PROBLEM — S61.452A CAT BITE OF HAND, LEFT, INITIAL ENCOUNTER: Status: RESOLVED | Noted: 2025-05-14 | Resolved: 2025-08-29

## 2025-08-29 RX ORDER — CALCITRIOL 0.25 UG/1
0.25 CAPSULE, LIQUID FILLED ORAL 2 TIMES DAILY
Qty: 180 CAPSULE | Refills: 3 | Status: SHIPPED | OUTPATIENT
Start: 2025-08-29

## 2025-08-29 ASSESSMENT — FIBROSIS 4 INDEX: FIB4 SCORE: 1.51

## (undated) DEVICE — TOURNIQUET, STERILE 34 (BLUE)

## (undated) DEVICE — SLEEVE VASO CALF MED - (10PR/CA)

## (undated) DEVICE — KIT ROOM DECONTAMINATION

## (undated) DEVICE — PACK KNEE ARTHROSCOPY SM OR - (2EA/CA)

## (undated) DEVICE — PACK UPPER EXTREMITY SM OR - (3/CA)

## (undated) DEVICE — MASK AIRWAY SIZE 4 UNIQUE SILICON (10EA/BX)

## (undated) DEVICE — SUTURE 2-0 VICRYL PLUS CT-2 - 27 INCH (36/BX)

## (undated) DEVICE — TUBE CONNECTING SUCTION - CLEAR PLASTIC STERILE 72 IN (50EA/CA)

## (undated) DEVICE — GLOVE BIOGEL INDICATOR SZ 7.5 SURGICAL PF LTX - (50PR/BX 4BX/CA)

## (undated) DEVICE — NEPTUNE 4 PORT MANIFOLD - (20/PK)

## (undated) DEVICE — GLOVE, LITE (PAIR)

## (undated) DEVICE — SUCTION INSTRUMENT YANKAUER BULBOUS TIP W/O VENT (50EA/CA)

## (undated) DEVICE — SENSOR SPO2 NEO LNCS ADHESIVE (20/BX) SEE USER NOTES

## (undated) DEVICE — DRAPE LARGE 3 QUARTER - (20/CA)

## (undated) DEVICE — BANDAGEESMARK BLUE 6X9' LF - 20/CS"

## (undated) DEVICE — PROBE PRASS STAND STIMULATING (5EA/PK)

## (undated) DEVICE — DRESSING XEROFORM 1X8 - (50/BX 4BX/CA)

## (undated) DEVICE — DRESSING TRANSPARENT FILM TEGADERM 4 X 4.75" (50EA/BX)"

## (undated) DEVICE — HUMID-VENT HEAT AND MOISTURE EXCHANGE- (50/BX)

## (undated) DEVICE — CANISTER SUCTION 3000ML MECHANICAL FILTER AUTO SHUTOFF MEDI-VAC NONSTERILE LF DISP  (40EA/CA)

## (undated) DEVICE — GOWN SURGEONS X-LARGE - DISP. (30/CA)

## (undated) DEVICE — CANISTER SUCTION RIGID RED 1500CC (40EA/CA)

## (undated) DEVICE — HEAD HOLDER JUNIOR/ADULT

## (undated) DEVICE — GLOVE BIOGEL SZ 8 SURGICAL PF LTX - (50PR/BX 4BX/CA)

## (undated) DEVICE — PADDING CAST 4 IN STERILE - 4 X 4 YDS (24/CA)

## (undated) DEVICE — SUTURE GENERAL

## (undated) DEVICE — GOWN SURGEONS LARGE - (32/CA)

## (undated) DEVICE — MAT PATIENT POSITIONING PREVALON (10EA/CA)

## (undated) DEVICE — SPONGE PEANUT - (5/PK 50PK/CA)

## (undated) DEVICE — KIT RADIAL ARTERY 20GA W/MAX BARRIER AND BIOPATCH  (5EA/CA) #10740 IS FOR THE SET RADIAL ARTERIAL

## (undated) DEVICE — SUTURE 3-0 VICRYL PLUS SH - 8X 18 INCH (12/BX)

## (undated) DEVICE — SHAVER4.0 AGGRESSIVE + FORMLA (5EA/BX)

## (undated) DEVICE — SPONGE GAUZE STER 4X4 8-PL - (2/PK 50PK/BX 12BX/CS)

## (undated) DEVICE — TUBING CLEARLINK DUO-VENT - C-FLO (48EA/CA)

## (undated) DEVICE — TUBING PUMP WITH CONNECTOR REDEUCE (1EA)

## (undated) DEVICE — SET LEADWIRE 5 LEAD BEDSIDE DISPOSABLE ECG (1SET OF 5/EA)

## (undated) DEVICE — GOWN WARMING STANDARD FLEX - (30/CA)

## (undated) DEVICE — SUTURE 4-0 ETHILON PS-2 18 (12PK/BX)"

## (undated) DEVICE — KIT ANESTHESIA W/CIRCUIT & 3/LT BAG W/FILTER (20EA/CA)

## (undated) DEVICE — CHLORAPREP 26 ML APPLICATOR - ORANGE TINT(25/CA)

## (undated) DEVICE — SODIUM CHL. IRRIGATION 0.9% 3000ML (4EA/CA 65CA/PF)

## (undated) DEVICE — TOURNIQUET CUFF 24 X 4 ONE PORT - STERILE (10/BX)

## (undated) DEVICE — KIT  I.V. START (100EA/CA)

## (undated) DEVICE — GLOVE 6.5 LF PF PROTEXIS (50PR/BX)

## (undated) DEVICE — CLIP SM INTNL HRZN TI ESCP LGT - (24EA/PK 25PK/BX)

## (undated) DEVICE — SODIUM CHL IRRIGATION 0.9% 1000ML (12EA/CA)

## (undated) DEVICE — GLOVE BIOGEL INDICATOR SZ 7SURGICAL PF LTX - (50/BX 4BX/CA)

## (undated) DEVICE — SODIUM CHL. INJ. 0.9% 500ML (24EA/CA 50CA/PF)

## (undated) DEVICE — TRAY SRGPRP PVP IOD WT PRP - (20/CA)

## (undated) DEVICE — DRAPE MAGNETIC (INSTRA-MAG) - (30/CA)

## (undated) DEVICE — CANNULA O2 COMFORT SOFT EAR ADULT 7 FT TUBING (50/CA)

## (undated) DEVICE — LACTATED RINGERS INJ 1000 ML - (14EA/CA 60CA/PF)

## (undated) DEVICE — SUTURE 4-0 CHROMIC FS-2 (36EA/BX)

## (undated) DEVICE — TOWEL STOP TIMEOUT SAFETY FLAG (40EA/CA)

## (undated) DEVICE — MASK OXYGEN VNYL ADLT MED CONC WITH 7 FOOT TUBING  - (50EA/CA)

## (undated) DEVICE — SENSOR OXIMETER ADULT SPO2 RD SET (20EA/BX)

## (undated) DEVICE — GVL 3 STAT DISPOSABLE - (10/BX)

## (undated) DEVICE — SUTURE 3-0 VICRYL PLUS SH - 27 INCH (36/BX)

## (undated) DEVICE — BOVIE NEEDLE TIP 3CM COLORADO

## (undated) DEVICE — DRAPE FLUOROSCAN C-ARM - 54 X 78 (40EA/CA)

## (undated) DEVICE — DRESSING NON ADHERENT 3 X 4 - STERILE (100/BX 12BX/CA)

## (undated) DEVICE — PROTECTOR ULNA NERVE - (36PR/CA)

## (undated) DEVICE — GLOVE BIOGEL PI ORTHO SZ 8.5 PF LF (40/BX)

## (undated) DEVICE — CLIP MED INTNL HRZN TI ESCP - (25/BX)

## (undated) DEVICE — BANDAGE ELASTIC 6 IN X 5 YDS - LATEX FREE (10/BX)

## (undated) DEVICE — MASK ANESTHESIA ADULT  - (100/CA)

## (undated) DEVICE — ELECTRODE DUAL RETURN W/ CORD - (50/PK)

## (undated) DEVICE — SHEAR HS FOCUS 9CM CVD - (6/BX)

## (undated) DEVICE — FIBRILLAR SURGICEL 4X4 - 10/CA

## (undated) DEVICE — WATER IRRIGATION STERILE 1000ML (12EA/CA)

## (undated) DEVICE — STOCKINET TUBULAR 6IN STERILE - 6 X 48YDS (25/CA)

## (undated) DEVICE — TRANSDUCER ADULT DISP. SINGLE BONDED STERILE - (20EA/CA)

## (undated) DEVICE — SHEET THYROID - (10EA/CA)

## (undated) DEVICE — GLOVE SZ 6.5 BIOGEL PI MICRO - PF LF (50PR/BX)

## (undated) DEVICE — ELECTRODE 850 FOAM ADHESIVE - HYDROGEL RADIOTRNSPRNT (50/PK)

## (undated) DEVICE — GLOVE SURGICAL PROTEXIS PI 8.0 LF - (50PR/BX)

## (undated) DEVICE — Device

## (undated) DEVICE — SUTURE 5-0 MONOCRYL PLUS PC-3 - (12/BX)

## (undated) DEVICE — TUBE EMG NIM TRIVANTAGE 7MM (3EA/PK)

## (undated) DEVICE — INSTRUMENT KIT